# Patient Record
Sex: MALE | Race: WHITE | NOT HISPANIC OR LATINO | Employment: OTHER | URBAN - NONMETROPOLITAN AREA
[De-identification: names, ages, dates, MRNs, and addresses within clinical notes are randomized per-mention and may not be internally consistent; named-entity substitution may affect disease eponyms.]

---

## 2017-01-25 ENCOUNTER — PREP FOR SURGERY (OUTPATIENT)
Dept: UROLOGY | Facility: HOSPITAL | Age: 72
End: 2017-01-25

## 2017-01-25 DIAGNOSIS — N20.1 LEFT URETERAL CALCULUS: Primary | ICD-10-CM

## 2017-01-25 RX ORDER — LEVOFLOXACIN 5 MG/ML
500 INJECTION, SOLUTION INTRAVENOUS ONCE
Status: CANCELLED | OUTPATIENT
Start: 2017-01-25 | End: 2017-01-25

## 2017-01-27 ENCOUNTER — APPOINTMENT (OUTPATIENT)
Dept: PREADMISSION TESTING | Facility: HOSPITAL | Age: 72
End: 2017-01-27

## 2017-01-27 VITALS
RESPIRATION RATE: 18 BRPM | HEIGHT: 65 IN | OXYGEN SATURATION: 93 % | WEIGHT: 221 LBS | HEART RATE: 81 BPM | DIASTOLIC BLOOD PRESSURE: 80 MMHG | SYSTOLIC BLOOD PRESSURE: 150 MMHG | BODY MASS INDEX: 36.82 KG/M2

## 2017-01-27 DIAGNOSIS — E11.9 TYPE 2 DIABETES MELLITUS WITHOUT COMPLICATION, UNSPECIFIED LONG TERM INSULIN USE STATUS: ICD-10-CM

## 2017-01-27 DIAGNOSIS — N20.1 LEFT URETERAL CALCULUS: ICD-10-CM

## 2017-01-27 LAB
ANION GAP SERPL CALCULATED.3IONS-SCNC: 13 MMOL/L (ref 5–15)
BILIRUB UR QL STRIP: ABNORMAL
BUN BLD-MCNC: 19 MG/DL (ref 7–21)
BUN/CREAT SERPL: 16.4 (ref 7–25)
CALCIUM SPEC-SCNC: 9.8 MG/DL (ref 8.4–10.2)
CHLORIDE SERPL-SCNC: 97 MMOL/L (ref 95–110)
CLARITY UR: CLEAR
CO2 SERPL-SCNC: 29 MMOL/L (ref 22–31)
COLOR UR: ABNORMAL
CREAT BLD-MCNC: 1.16 MG/DL (ref 0.7–1.3)
GFR SERPL CREATININE-BSD FRML MDRD: 62 ML/MIN/1.73 (ref 60–113)
GLUCOSE BLD-MCNC: 233 MG/DL (ref 60–100)
GLUCOSE UR STRIP-MCNC: NEGATIVE MG/DL
HGB UR QL STRIP.AUTO: ABNORMAL
KETONES UR QL STRIP: ABNORMAL
LEUKOCYTE ESTERASE UR QL STRIP.AUTO: ABNORMAL
NITRITE UR QL STRIP: NEGATIVE
PH UR STRIP.AUTO: 5.5 [PH] (ref 5–9)
POTASSIUM BLD-SCNC: 4.3 MMOL/L (ref 3.5–5.1)
PROT UR QL STRIP: ABNORMAL
SODIUM BLD-SCNC: 139 MMOL/L (ref 137–145)
SP GR UR STRIP: 1.03 (ref 1–1.03)
UROBILINOGEN UR QL STRIP: ABNORMAL

## 2017-01-27 PROCEDURE — 81003 URINALYSIS AUTO W/O SCOPE: CPT | Performed by: UROLOGY

## 2017-01-27 PROCEDURE — 80048 BASIC METABOLIC PNL TOTAL CA: CPT | Performed by: ANESTHESIOLOGY

## 2017-01-27 PROCEDURE — 36415 COLL VENOUS BLD VENIPUNCTURE: CPT

## 2017-01-27 RX ORDER — LOPERAMIDE HYDROCHLORIDE 2 MG/1
2 CAPSULE ORAL 2 TIMES DAILY PRN
COMMUNITY

## 2017-01-28 ENCOUNTER — ANESTHESIA EVENT (OUTPATIENT)
Dept: PERIOP | Facility: HOSPITAL | Age: 72
End: 2017-01-28

## 2017-02-03 ENCOUNTER — HOSPITAL ENCOUNTER (OUTPATIENT)
Facility: HOSPITAL | Age: 72
Setting detail: HOSPITAL OUTPATIENT SURGERY
Discharge: HOME | End: 2017-02-03
Attending: UROLOGY | Admitting: UROLOGY

## 2017-02-03 ENCOUNTER — ANESTHESIA (OUTPATIENT)
Dept: PERIOP | Facility: HOSPITAL | Age: 72
End: 2017-02-03

## 2017-02-03 ENCOUNTER — APPOINTMENT (OUTPATIENT)
Dept: GENERAL RADIOLOGY | Facility: HOSPITAL | Age: 72
End: 2017-02-03

## 2017-02-03 VITALS
DIASTOLIC BLOOD PRESSURE: 69 MMHG | TEMPERATURE: 96.9 F | WEIGHT: 222.66 LBS | HEIGHT: 65 IN | HEART RATE: 73 BPM | OXYGEN SATURATION: 93 % | RESPIRATION RATE: 18 BRPM | BODY MASS INDEX: 37.1 KG/M2 | SYSTOLIC BLOOD PRESSURE: 111 MMHG

## 2017-02-03 DIAGNOSIS — G89.18 POST-OP PAIN: Primary | ICD-10-CM

## 2017-02-03 LAB
GLUCOSE BLDC GLUCOMTR-MCNC: 134 MG/DL (ref 70–130)
GLUCOSE BLDC GLUCOMTR-MCNC: 153 MG/DL (ref 70–130)

## 2017-02-03 PROCEDURE — 25010000002 MIDAZOLAM PER 1 MG: Performed by: NURSE ANESTHETIST, CERTIFIED REGISTERED

## 2017-02-03 PROCEDURE — 25010000002 LEVOFLOXACIN PER 250 MG: Performed by: UROLOGY

## 2017-02-03 PROCEDURE — 74000 HC ABDOMEN KUB: CPT

## 2017-02-03 PROCEDURE — 25010000002 ONDANSETRON PER 1 MG: Performed by: NURSE ANESTHETIST, CERTIFIED REGISTERED

## 2017-02-03 PROCEDURE — 82962 GLUCOSE BLOOD TEST: CPT

## 2017-02-03 PROCEDURE — 25010000002 PROPOFOL 10 MG/ML EMULSION: Performed by: NURSE ANESTHETIST, CERTIFIED REGISTERED

## 2017-02-03 PROCEDURE — 25010000002 PHENYLEPHRINE PER 1 ML: Performed by: NURSE ANESTHETIST, CERTIFIED REGISTERED

## 2017-02-03 PROCEDURE — 25010000002 FENTANYL CITRATE (PF) 100 MCG/2ML SOLUTION: Performed by: NURSE ANESTHETIST, CERTIFIED REGISTERED

## 2017-02-03 RX ORDER — ONDANSETRON 2 MG/ML
4 INJECTION INTRAMUSCULAR; INTRAVENOUS ONCE AS NEEDED
Status: COMPLETED | OUTPATIENT
Start: 2017-02-03 | End: 2017-02-03

## 2017-02-03 RX ORDER — LABETALOL HYDROCHLORIDE 5 MG/ML
5 INJECTION, SOLUTION INTRAVENOUS
Status: DISCONTINUED | OUTPATIENT
Start: 2017-02-03 | End: 2017-02-03 | Stop reason: HOSPADM

## 2017-02-03 RX ORDER — SODIUM CHLORIDE 9 MG/ML
1000 INJECTION, SOLUTION INTRAVENOUS CONTINUOUS PRN
Status: DISCONTINUED | OUTPATIENT
Start: 2017-02-03 | End: 2017-02-03 | Stop reason: HOSPADM

## 2017-02-03 RX ORDER — SODIUM CHLORIDE 0.9 % (FLUSH) 0.9 %
3 SYRINGE (ML) INJECTION AS NEEDED
Status: DISCONTINUED | OUTPATIENT
Start: 2017-02-03 | End: 2017-02-03 | Stop reason: HOSPADM

## 2017-02-03 RX ORDER — LIDOCAINE HYDROCHLORIDE 10 MG/ML
0.5 INJECTION, SOLUTION INFILTRATION; PERINEURAL ONCE AS NEEDED
Status: DISCONTINUED | OUTPATIENT
Start: 2017-02-03 | End: 2017-02-03 | Stop reason: HOSPADM

## 2017-02-03 RX ORDER — CEPHALEXIN 250 MG/1
250 CAPSULE ORAL 4 TIMES DAILY
Qty: 10 CAPSULE | Refills: 0 | Status: SHIPPED | OUTPATIENT
Start: 2017-02-03 | End: 2017-02-13

## 2017-02-03 RX ORDER — LEVOFLOXACIN 5 MG/ML
500 INJECTION, SOLUTION INTRAVENOUS ONCE
Status: COMPLETED | OUTPATIENT
Start: 2017-02-03 | End: 2017-02-03

## 2017-02-03 RX ORDER — ONDANSETRON 2 MG/ML
INJECTION INTRAMUSCULAR; INTRAVENOUS AS NEEDED
Status: DISCONTINUED | OUTPATIENT
Start: 2017-02-03 | End: 2017-02-03 | Stop reason: SURG

## 2017-02-03 RX ORDER — FENTANYL CITRATE 50 UG/ML
INJECTION, SOLUTION INTRAMUSCULAR; INTRAVENOUS AS NEEDED
Status: DISCONTINUED | OUTPATIENT
Start: 2017-02-03 | End: 2017-02-03 | Stop reason: SURG

## 2017-02-03 RX ORDER — MIDAZOLAM HYDROCHLORIDE 1 MG/ML
INJECTION INTRAMUSCULAR; INTRAVENOUS AS NEEDED
Status: DISCONTINUED | OUTPATIENT
Start: 2017-02-03 | End: 2017-02-03 | Stop reason: SURG

## 2017-02-03 RX ORDER — DIPHENHYDRAMINE HYDROCHLORIDE 50 MG/ML
12.5 INJECTION INTRAMUSCULAR; INTRAVENOUS
Status: DISCONTINUED | OUTPATIENT
Start: 2017-02-03 | End: 2017-02-03 | Stop reason: HOSPADM

## 2017-02-03 RX ORDER — METOCLOPRAMIDE HYDROCHLORIDE 5 MG/ML
10 INJECTION INTRAMUSCULAR; INTRAVENOUS ONCE AS NEEDED
Status: DISCONTINUED | OUTPATIENT
Start: 2017-02-03 | End: 2017-02-03 | Stop reason: HOSPADM

## 2017-02-03 RX ORDER — NALOXONE HCL 0.4 MG/ML
0.2 VIAL (ML) INJECTION AS NEEDED
Status: DISCONTINUED | OUTPATIENT
Start: 2017-02-03 | End: 2017-02-03 | Stop reason: HOSPADM

## 2017-02-03 RX ORDER — MORPHINE SULFATE 2 MG/ML
2 INJECTION, SOLUTION INTRAMUSCULAR; INTRAVENOUS
Status: DISCONTINUED | OUTPATIENT
Start: 2017-02-03 | End: 2017-02-03 | Stop reason: HOSPADM

## 2017-02-03 RX ORDER — LIDOCAINE HYDROCHLORIDE 20 MG/ML
INJECTION, SOLUTION INFILTRATION; PERINEURAL AS NEEDED
Status: DISCONTINUED | OUTPATIENT
Start: 2017-02-03 | End: 2017-02-03 | Stop reason: SURG

## 2017-02-03 RX ORDER — ACETAMINOPHEN 325 MG/1
650 TABLET ORAL ONCE AS NEEDED
Status: DISCONTINUED | OUTPATIENT
Start: 2017-02-03 | End: 2017-02-03 | Stop reason: HOSPADM

## 2017-02-03 RX ORDER — HYDRALAZINE HYDROCHLORIDE 20 MG/ML
5 INJECTION INTRAMUSCULAR; INTRAVENOUS
Status: DISCONTINUED | OUTPATIENT
Start: 2017-02-03 | End: 2017-02-03 | Stop reason: HOSPADM

## 2017-02-03 RX ORDER — FLUMAZENIL 0.1 MG/ML
0.2 INJECTION INTRAVENOUS AS NEEDED
Status: DISCONTINUED | OUTPATIENT
Start: 2017-02-03 | End: 2017-02-03 | Stop reason: HOSPADM

## 2017-02-03 RX ORDER — ACETAMINOPHEN 650 MG/1
650 SUPPOSITORY RECTAL ONCE AS NEEDED
Status: DISCONTINUED | OUTPATIENT
Start: 2017-02-03 | End: 2017-02-03 | Stop reason: HOSPADM

## 2017-02-03 RX ORDER — PROPOFOL 10 MG/ML
VIAL (ML) INTRAVENOUS AS NEEDED
Status: DISCONTINUED | OUTPATIENT
Start: 2017-02-03 | End: 2017-02-03 | Stop reason: SURG

## 2017-02-03 RX ORDER — OXYCODONE AND ACETAMINOPHEN 7.5; 325 MG/1; MG/1
1-2 TABLET ORAL EVERY 4 HOURS PRN
Qty: 15 TABLET | Refills: 0 | Status: SHIPPED | OUTPATIENT
Start: 2017-02-03 | End: 2017-06-23

## 2017-02-03 RX ADMIN — ONDANSETRON 4 MG: 2 INJECTION INTRAMUSCULAR; INTRAVENOUS at 16:41

## 2017-02-03 RX ADMIN — PHENYLEPHRINE HYDROCHLORIDE 100 MCG: 10 INJECTION INTRAVENOUS at 15:19

## 2017-02-03 RX ADMIN — SODIUM CHLORIDE 1000 ML: 9 INJECTION, SOLUTION INTRAVENOUS at 12:55

## 2017-02-03 RX ADMIN — MIDAZOLAM 2 MG: 1 INJECTION INTRAMUSCULAR; INTRAVENOUS at 14:58

## 2017-02-03 RX ADMIN — LEVOFLOXACIN 500 MG: 5 INJECTION, SOLUTION INTRAVENOUS at 15:07

## 2017-02-03 RX ADMIN — FENTANYL CITRATE 50 MCG: 50 INJECTION, SOLUTION INTRAMUSCULAR; INTRAVENOUS at 15:13

## 2017-02-03 RX ADMIN — FENTANYL CITRATE 50 MCG: 50 INJECTION, SOLUTION INTRAMUSCULAR; INTRAVENOUS at 15:36

## 2017-02-03 RX ADMIN — ONDANSETRON 4 MG: 2 INJECTION INTRAMUSCULAR; INTRAVENOUS at 15:12

## 2017-02-03 RX ADMIN — PROPOFOL 170 MG: 10 INJECTION, EMULSION INTRAVENOUS at 15:04

## 2017-02-03 RX ADMIN — PHENYLEPHRINE HYDROCHLORIDE 100 MCG: 10 INJECTION INTRAVENOUS at 15:35

## 2017-02-03 RX ADMIN — LIDOCAINE HYDROCHLORIDE 100 MG: 20 INJECTION, SOLUTION INFILTRATION; PERINEURAL at 15:04

## 2017-02-03 NOTE — ANESTHESIA PROCEDURE NOTES
Airway  Urgency: elective    Airway not difficult    General Information and Staff    Patient location during procedure: OR  CRNA: GABINO THURMAN    Indications and Patient Condition  Indications for airway management: airway protection    Preoxygenated: yes  Mask difficulty assessment: 0 - not attempted    Final Airway Details  Final airway type: supraglottic airway      Successful airway: classic  Size 4    Number of attempts at approach: 1

## 2017-02-03 NOTE — PLAN OF CARE
Problem: Patient Care Overview (Adult)  Goal: Plan of Care Review  Outcome: Ongoing (interventions implemented as appropriate)    02/03/17 6114   Coping/Psychosocial Response Interventions   Plan Of Care Reviewed With patient   Patient Care Overview   Progress improving   Outcome Evaluation   Outcome Summary/Follow up Plan vss. maintaining sats on r/a. meets d/c criteria         Problem: Perioperative Period (Adult)  Goal: Signs and Symptoms of Listed Potential Problems Will be Absent or Manageable (Perioperative Period)  Outcome: Ongoing (interventions implemented as appropriate)

## 2017-02-03 NOTE — BRIEF OP NOTE
EXTRACORPOREAL SHOCKWAVE LITHOTRIPSY WITH STENT INSERTION/REMOVAL  Procedure Note    Rolando Villarrealradha  2/3/2017    Pre-op Diagnosis:   Left ureteral calculus [N20.1]    Post-op Diagnosis:     Post-Op Diagnosis Codes:     * Left ureteral calculus [N20.1]    Procedure/CPT® Codes:      Procedure(s):  LEFT EXTRACORPOREAL SHOCKWAVE LITHOTRIPSY, POSSIBLE CYSTOSCOPY, POSSIBLE STENT REMOVAL     Surgeon(s):  Prosper Ariza MD    Anesthesia: General    Staff:   Circulator: Freda Lucio RN; Sherice Bailey RN; Tonia Huizar RN  Scrub Person: Zander Bailey  Assistant: Justina Lopez CSA    Estimated Blood Loss: * No values recorded between 2/3/2017  2:55 PM and 2/3/2017  3:44 PM *    Specimens:                * No specimens in log *      Drains:    none         Findings: same    Complications: none      Prosper Ariza MD     Date: 2/3/2017  Time: 3:44 PM

## 2017-02-03 NOTE — H&P
[facility] Progress Note  JAMIA WHITAKER  71-year-old; :1945;;male  [address];[place] [id number]  Ins: 1) KORTNEY/FRANK  Employer: [employer];  MRN:[id number]  MAE SALMON MD  [facility]  2017 10:12 AM  Current Medications  lisinopril 40 mg tablet 1 oral  Prilosec 20 mg capsule,delayed release 1  oral  * Medications Reconciled  Problem List  Ureteric stone, NOS 2017 10:12:00 AM  Medical History  DIABETES UNSP  CALCULUS KIDNEY  HYPERTENSION  Heart disease  Patient reports having had a colonoscopy within  the past 9 years.  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 Has anxiety.  Family History  HEART DISEASE  DIABETES UNSP  HYPERTENSION  Mother  Father  Brother  Alive Brother  Brother Alive Sister   Sister   Social History  . Unemployed. Does not drink. Never  smoker. LIves with his wife.  Imm/Inj/Office Meds History Comments  Patient has had influenza vaccination for this  season.  Patient has had pneumococcal vaccination.  Chief Complaint  Ureteral stone  History of Present Illness  JAMIA WHITAKER is a 71-year-old male here for evaluation. He complains today of  left flank pain. KUB shows 5 mm proximal left ureteral stone hasn't moved in 8 weeks.  Location: Left ureter.  Severity: Mild.  Onset / Duration: >2 months.  Modifying factors: Status post ESWL.  Associated signs and symptoms: No fever.  Review of Systems  Eyes: Reports: blurry visionDenies: pain in the eyes and double vision  ENMT: Reports: sinus problemsDenies: ear pain and sore throat  Cardiovascular: Denies: chest pain, varicose veins, angina and syncope  Respiratory: Reports: shortness of breath and wheezingDenies: frequent  cough  Gastrointestinal: Reports: heartburnDenies: abdominal pain, nausea, vomiting and  indigestion  Genitourinary: Reports: other symptoms (see HPI)  Musculoskeletal: Reports: back painDenies: joint pain and neck pain  Integumentary: Reports: skin rash and persistent itchDenies: boils  Neurological: Reports: dizzy spellsDenies: tremors and numbness / tingling  Psychiatric: Reports: generally satisfied with life and anxietyDenies: depression and  suicidal ideation  Endocrine: Reports: tired/sluggishDenies: Excessive thirst, cold intolerance and heat  intolerance  Hematologic/Lymphatic: Denies: swollen glands and blood clotting problem  Allergic/Immunologic: Denies: hayfever  Constitutional: Denies: fever, chills and weight loss  Vital Signs  BP: 133/75 (mmHg) Weight: 224 (lb)  Heart Rate: 73 (/min) Resp Rate: 18 (/min)  BMI: 37.28 Never smoker  Height: 65 (in)  Exam  General appearance: The patient appears well developed and well nourished, in no  apparent acute distress.  Assessment of hearing: Hearing appears normal.  Examination of neck: Neck appears supple.  Assessment of respiratory effort: Respiratory effort appears normal. No apparent  distress.  Inspection of breasts: Deferred.  Obtain stool sample: Stool specimen for occult blood is not indicated.  Digital rectal examination of prostate gland: Digital rectal exam revealed a 3+ prostate.  Inspection of skin and subcutaneous tissue: Exam of the skin is within normal limits.  The color and skin turgor are normal.  No lesions, bruises, rashes, or jaundice.  Description of patients judgment and insight: Judgement and insight appear normal.  Mood and affect: Mood and affect normal.  Orientation to time, place and person: Oriented to person, place, and time.  Electronically Signed By MAE SALMON MD on 02/03/2017 12:22 PM  Generated by [employer], a product of [employer]. [email or web address] Page 1 of 2  [facility]  Note  JAMIA WHITAKER  71-year-old; :1945;;male  [address];[place] [id number]  Ins: 1) KORTNEY/FRANK  Employer: [employer];  MRN:[id number]  MAE ARIZA MD  UROLOGY [facility]  2017 10:12 AM  Data Review  Medications and chart reviewed. History and physical form/ROS reviewed and no  changes noted. Previous encounter reviewed. Last form done 16. KUB ordered  and reviewed by Dr. Ariza today.  Assessment - Ureteric stone, NOS  DX:  Ureteric stone, NOS  SNO: [id number], ICD-9: 592.1, ICD-10: N20.1  Assessment Notes:  Proximal left ureteral stone.  Plan  Plan Notes:  Left ESWL.  Education:  Kidney Stones - English  Discussion:  Discussed my findings and plan of action and reasoning behind decision making. All  questions were answered. FINDINGS WERE DISCUSSED WITH PATIENT. RISKS  AND BENEFITS EXPLAINED. PATIENT WISHES TO PROCEED.  Instructions:  Patient is instructed to call with any problems. Patient is instructed to call if the  condition worsens. Patient is instructed to call with any changes in condition. Patient  is instructed to call if he has any intractable pain, fever, chills, nausea, or vomiting.  INCREASE FLUIDS. IF PAIN WORSENS/ UNCONTROLLED OR TEMPERATURE  >101.0 GO IMMEDIATELY TO ER.  Electronically Signed By MAE ARIZA MD on 2017 12:22 PM  Generated by [employer], a product of [employer]. [email or web address] Page 2 of 2

## 2017-02-03 NOTE — ANESTHESIA PREPROCEDURE EVALUATION
Anesthesia Evaluation     Patient summary reviewed and Nursing notes reviewed    Airway   Mallampati: III  TM distance: >3 FB  Neck ROM: full  possible difficult intubation  Comment: Has a short/thick neck and possible KUMAR  Dental - normal exam     Comment: Has many capped teeth    Pulmonary - normal exam   (+) sleep apnea ( He snores and has possible Kumar, but not on C-PAP),   Cardiovascular - normal exam  Exercise tolerance: good (4-7 METS)  (+) hypertension well controlled,     Beta blocker given within 24 hours of surgery  ROS comment: Had a normal EKG on 9/6/16    Neuro/Psych- negative ROS  GI/Hepatic/Renal/Endo    (+)  GERD well controlled, chronic renal disease ( left sided stones), diabetes mellitus type 2 well controlled,     Musculoskeletal (-) negative ROS    Abdominal   (+) obese,    Substance History - negative use     OB/GYN negative ob/gyn ROS         Other - negative ROS                            Anesthesia Plan    ASA 3     general     intravenous induction   Anesthetic plan and risks discussed with patient and spouse/significant other.

## 2017-02-04 NOTE — ANESTHESIA POSTPROCEDURE EVALUATION
Patient: Rolando Novoa    Procedure Summary     Date Anesthesia Start Anesthesia Stop Room / Location    02/03/17 1502 1547 [facility] OR 05 / [facility] OR       Procedure Diagnosis Surgeon Provider    LEFT EXTRACORPOREAL SHOCKWAVE LITHOTRIPSY, POSSIBLE CYSTOSCOPY, POSSIBLE STENT REMOVAL  (Left ) Left ureteral calculus  (Left ureteral calculus [N20.1]) MD Sherwin Uribe MD          Anesthesia Type: general  Last vitals  BP      Temp      Pulse     Resp      SpO2        Post Anesthesia Care and Evaluation      Comments: The patient was discharged from the PACU by the nurses in stable condition.

## 2017-02-06 NOTE — OP NOTE
Date of Procedure:  02/03/2017    PREOPERATIVE DIAGNOSIS: Left ureteral stone and left kidney stones.     POSTOPERATIVE DIAGNOSIS: Left ureteral stone and left kidney stones.    PROCEDURE: Left extracorporeal shock wave lithotripsy.     SURGEON: Prosper Ariza MD    ASSISTANT: Justina Lopez    ANESTHESIA: General.    INDICATIONS: See history.     DESCRIPTION OF PROCEDURE: The patient was brought to the operating suite, placed in the supine position on the lithotripsy table, AP oblique plane. Stone was localized. With the power ranging between 4 and 5, we used 1000 shocks on the proximal left ureteral stone with good fragmentation and then took our attention to the kidney where which we gave 1500 shocks of that with good fragmentation of stones. Once this was accomplished, the patient was taken back off the table, back to recovery having tolerated the procedure well.       CC:            Prosper Ariza MD  Dictation Date/Time: 02/03/2017 21:39:37(Eastern Time Zone)  Transcribed Date/Time: 02/03/2017 22:48:14 (Eastern Time Zone)  Dictator/ Initials:  ANDREA/co  Document ID:                [id number]  Job ID: [id number]

## 2017-02-21 RX ORDER — NEBIVOLOL 10 MG/1
10 TABLET ORAL NIGHTLY
Qty: 90 TABLET | Refills: 2 | Status: SHIPPED | OUTPATIENT
Start: 2017-02-21 | End: 2017-10-13 | Stop reason: SDUPTHER

## 2017-02-21 NOTE — TELEPHONE ENCOUNTER
Refill Request came in by fax from   [facility] - [place], [place] - [address] [place] [address] RD - [phone]  - [phone]   [address] [facility]  [address]  [place] [address]  Phone: [phone] Fax: [phone]   Refill sent to pharmacy via e-scribe.

## 2017-06-23 ENCOUNTER — OFFICE VISIT (OUTPATIENT)
Dept: CARDIOLOGY | Facility: CLINIC | Age: 72
End: 2017-06-23

## 2017-06-23 VITALS
SYSTOLIC BLOOD PRESSURE: 128 MMHG | BODY MASS INDEX: 36.82 KG/M2 | HEIGHT: 65 IN | WEIGHT: 221 LBS | DIASTOLIC BLOOD PRESSURE: 85 MMHG | HEART RATE: 84 BPM

## 2017-06-23 DIAGNOSIS — E11.9 TYPE 2 DIABETES MELLITUS WITHOUT COMPLICATION, WITHOUT LONG-TERM CURRENT USE OF INSULIN (HCC): Primary | ICD-10-CM

## 2017-06-23 DIAGNOSIS — I10 ESSENTIAL HYPERTENSION: ICD-10-CM

## 2017-06-23 DIAGNOSIS — R06.02 SOB (SHORTNESS OF BREATH): ICD-10-CM

## 2017-06-23 DIAGNOSIS — E78.00 PURE HYPERCHOLESTEROLEMIA: ICD-10-CM

## 2017-06-23 PROCEDURE — 99213 OFFICE O/P EST LOW 20 MIN: CPT | Performed by: INTERNAL MEDICINE

## 2017-06-23 RX ORDER — GLIMEPIRIDE 2 MG/1
2 TABLET ORAL 2 TIMES DAILY
COMMUNITY

## 2017-06-23 NOTE — PROGRESS NOTES
[facility] Cardiology  OFFICE NOTE    Rolando Novoa  72 y.o. male    06/23/2017  1. Type 2 diabetes mellitus without complication, without long-term current use of insulin    2. Essential hypertension    3. Pure hypercholesterolemia    4. SOB (shortness of breath)        Chief complaint -Follow-up shortness of breath      History of present Illness- 72-year-old gentleman who has history of diabetes, hypertension no history of CAD has complications of diabetes with neuropathy and retinopathy.  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  His A1c is 8.1.  His triglycerides and LDL mildly elevated and followed by Dr. Zepeda.  He is on Lipitor 20 mg daily.  He has a sedentary lifestyle asked him to exercise little bit more.  He denies any TIA symptoms headache or visual complaints no GI symptoms              No Known Allergies      Past Medical History:   Diagnosis Date   • Acid reflux    • Diabetes    • Enlarged prostate    • High cholesterol    • Hypertension    • Kidney stone     multiple   • Psoriasis          Past Surgical History:   Procedure Laterality Date   • AMPUTATION Left     lower arm and hand   • CHOLECYSTECTOMY OPEN     • EXTRACORPOREAL SHOCK WAVE LITHOTRIPSY (ESWL)     • EXTRACORPOREAL SHOCKWAVE LITHOTRIPSY (ESWL), STENT INSERTION/REMOVAL Left 2/3/2017    Procedure: LEFT EXTRACORPOREAL SHOCKWAVE LITHOTRIPSY, POSSIBLE CYSTOSCOPY, POSSIBLE STENT REMOVAL ;  Surgeon: Prosper Ariza MD;  Location: [facility];  Service:    • KIDNEY STONE SURGERY     • SCROTAL SURGERY      trauma         History reviewed. No pertinent family history.      Social History     Social History   • Marital status:      Spouse name: N/A   • Number of children: N/A   • Years of education: N/A     Occupational History   • Not on file.     Social History Main Topics   • Smoking status: Never Smoker   • Smokeless tobacco: Never Used   • Alcohol use No   • Drug use: No   • Sexual activity: Defer  "    Other Topics Concern   • Not on file     Social History Narrative         Current Outpatient Prescriptions   Medication Sig Dispense Refill   • atorvastatin (LIPITOR) 20 MG tablet Take 20 mg by mouth Every Night.     • esomeprazole (nexIUM) 20 MG capsule Take 20 mg by mouth Every Morning Before Breakfast.     • Exenatide ER 2 MG pen-injector Inject  under the skin 1 (One) Time Per Week.     • glimepiride (AMARYL) 2 MG tablet Take 2 mg by mouth Every Morning Before Breakfast.     • metFORMIN (GLUCOPHAGE) 500 MG tablet Take 500 mg by mouth 2 (two) times a day. May take 3 depending on glucose reading     • nebivolol (BYSTOLIC) 10 MG tablet Take 1 tablet by mouth Every Night. 90 tablet 2   • loperamide (IMODIUM) 2 MG capsule Take 2 mg by mouth 2 (Two) Times a Day As Needed for diarrhea.       No current facility-administered medications for this visit.          Review of Systems     Constitution: Denies any fatigue, fever or chills    HENT: Denies any headache, hearing impairment,     Eyes: Has diabetic retinopathy     Cardivascular - As per history of present illness     Respiratory system-Has black lung with coworkers pneumoconiosis     Endocrine:   history of hyperlipidemia, diabetes,         Musculoskeletal:   history of arthritis with history of amputation of the left forearm    Gastrointestinal: No nausea, vomiting, or melena    Genitourinary: BPH    Neurological:   No history of seizure disorder, stroke, memory problems    Psychiatric/Behavioral:        No history of depression,  No history of bipolar disorder or schizophrenia     Hematological- no history of easy bruising or any bleeding diathesis            OBJECTIVE    /85  Pulse 84  Ht 65\" (165.1 cm)  Wt 221 lb (100 kg)  BMI 36.78 kg/m2      Physical Exam     Constitutional: is oriented to person, place, and time.     Skin-warm and dry    Well developed and nourished in no acute distress      Head: Normocephalic and atraumatic.     Eyes: Pupils " are equal, round, and reactive to light.     Neck: Neck supple. No bruit in the carotids, no elevation of JVD    Cardiovascular: [place] in the fifth intercostal space   Regular rate, and  Rhythm,    S1 greater than S2, no S3 or S4, no gallop     Pulmonary/Chest:   Air  Entry is equal on both sides  No wheezing or crackles,      Abdominal: Soft.  No hepatosplenomegaly, bowel sounds are present    Musculoskeletal: Arthritis of the left hip    Neurological: is alert and oriented to person, place, and time.    cranial nerve are intact .   Peripheral neuropathy    Extremities-no edema, amputation of the left forearm      Psychiatric: He has a normal mood and affect.                  His behavior is normal.           Procedures      No results found for: WBC, HGB, HCT, MCV, PLT  Lab Results   Component Value Date    GLUCOSE 233 (H) 01/27/2017    BUN 19 01/27/2017    CREATININE 1.16 01/27/2017    EGFRIFNONA 62 01/27/2017    BCR 16.4 01/27/2017    CO2 29.0 01/27/2017    CALCIUM 9.8 01/27/2017         A/P    Shortness of breath-with history of hypertension and diabetes-will do an echocardiogram to assess his LV function and valvular function.  He is sedentary • talked to him about regular exercise.    Hyperlipidemia on atorvastatin doing okay followed by Dr. Zepeda    Diabetes on metformin and glipizide his A1c has improved 8.1 from before    BMI of 36.8.  Talked to him about exercise and watching the diet.  Follow-up in 9 months            This document has been electronically signed by Adonay Grijalva MD on June 23, 2017 9:09 AM       EMR Dragon/Transcription disclaimer:   Some of this note may be an electronic transcription/translation of spoken language to printed text. The electronic translation of spoken language may permit erroneous, or at times, nonsensical words or phrases to be inadvertently transcribed; Although I have reviewed the note for such errors, some may still exist.

## 2017-07-17 LAB
BH CV ECHO MEAS - ACS: 1.6 CM
BH CV ECHO MEAS - AO MAX PG (FULL): 0.33 MMHG
BH CV ECHO MEAS - AO MAX PG: 7.6 MMHG
BH CV ECHO MEAS - AO MEAN PG (FULL): 0 MMHG
BH CV ECHO MEAS - AO MEAN PG: 3 MMHG
BH CV ECHO MEAS - AO ROOT AREA (BSA CORRECTED): 1.5
BH CV ECHO MEAS - AO ROOT AREA: 7.5 CM^2
BH CV ECHO MEAS - AO ROOT DIAM: 3.1 CM
BH CV ECHO MEAS - AO V2 MAX: 138 CM/SEC
BH CV ECHO MEAS - AO V2 MEAN: 83.6 CM/SEC
BH CV ECHO MEAS - AO V2 VTI: 25.3 CM
BH CV ECHO MEAS - BSA(HAYCOCK): 2.2 M^2
BH CV ECHO MEAS - BSA: 2.1 M^2
BH CV ECHO MEAS - BZI_BMI: 36.8 KILOGRAMS/M^2
BH CV ECHO MEAS - BZI_METRIC_HEIGHT: 165.1 CM
BH CV ECHO MEAS - BZI_METRIC_WEIGHT: 100.2 KG
BH CV ECHO MEAS - EDV(CUBED): 125 ML
BH CV ECHO MEAS - EDV(TEICH): 118.2 ML
BH CV ECHO MEAS - EF(CUBED): 62.7 %
BH CV ECHO MEAS - EF(TEICH): 54 %
BH CV ECHO MEAS - EPSS: 0.5 CM
BH CV ECHO MEAS - ESV(CUBED): 46.7 ML
BH CV ECHO MEAS - ESV(TEICH): 54.4 ML
BH CV ECHO MEAS - FS: 28 %
BH CV ECHO MEAS - IVS/LVPW: 0.57
BH CV ECHO MEAS - IVSD: 0.8 CM
BH CV ECHO MEAS - LA DIMENSION: 4.6 CM
BH CV ECHO MEAS - LA/AO: 1.5
BH CV ECHO MEAS - LV MASS(C)D: 207.1 GRAMS
BH CV ECHO MEAS - LV MASS(C)DI: 100.4 GRAMS/M^2
BH CV ECHO MEAS - LV MAX PG: 7.3 MMHG
BH CV ECHO MEAS - LV MEAN PG: 3 MMHG
BH CV ECHO MEAS - LV V1 MAX: 135 CM/SEC
BH CV ECHO MEAS - LV V1 MEAN: 73 CM/SEC
BH CV ECHO MEAS - LV V1 VTI: 25.9 CM
BH CV ECHO MEAS - LVIDD: 5 CM
BH CV ECHO MEAS - LVIDS: 3.6 CM
BH CV ECHO MEAS - LVPWD: 1.4 CM
BH CV ECHO MEAS - MV A MAX VEL: 90.3 CM/SEC
BH CV ECHO MEAS - MV E MAX VEL: 74.5 CM/SEC
BH CV ECHO MEAS - MV E/A: 0.83
BH CV ECHO MEAS - PA MAX PG: 3.9 MMHG
BH CV ECHO MEAS - PA MEAN PG: 3 MMHG
BH CV ECHO MEAS - PA V2 MAX: 99.3 CM/SEC
BH CV ECHO MEAS - PA V2 MEAN: 76.2 CM/SEC
BH CV ECHO MEAS - PA V2 VTI: 23.8 CM
BH CV ECHO MEAS - RAP SYSTOLE: 10 MMHG
BH CV ECHO MEAS - RVDD: 1.2 CM
BH CV ECHO MEAS - RVSP: 22.9 MMHG
BH CV ECHO MEAS - SI(AO): 92.5 ML/M^2
BH CV ECHO MEAS - SI(CUBED): 38 ML/M^2
BH CV ECHO MEAS - SI(TEICH): 30.9 ML/M^2
BH CV ECHO MEAS - SV(AO): 191 ML
BH CV ECHO MEAS - SV(CUBED): 78.3 ML
BH CV ECHO MEAS - SV(TEICH): 63.8 ML
BH CV ECHO MEAS - TR MAX VEL: 179.3 CM/SEC
LV EF 2D ECHO EST: 60 %

## 2017-07-21 ENCOUNTER — TELEPHONE (OUTPATIENT)
Dept: CARDIOLOGY | Facility: CLINIC | Age: 72
End: 2017-07-21

## 2017-10-13 RX ORDER — NEBIVOLOL HYDROCHLORIDE 10 MG/1
TABLET ORAL
Qty: 30 TABLET | Refills: 5 | Status: SHIPPED | OUTPATIENT
Start: 2017-10-13 | End: 2017-12-14 | Stop reason: SDUPTHER

## 2017-12-12 ENCOUNTER — PREP FOR SURGERY (OUTPATIENT)
Dept: OTHER | Facility: HOSPITAL | Age: 72
End: 2017-12-12

## 2017-12-12 DIAGNOSIS — N20.0 KIDNEY STONE: Primary | ICD-10-CM

## 2017-12-12 RX ORDER — LEVOFLOXACIN 5 MG/ML
500 INJECTION, SOLUTION INTRAVENOUS ONCE
Status: CANCELLED | OUTPATIENT
Start: 2017-12-15

## 2017-12-13 PROBLEM — N20.0 KIDNEY STONE: Status: ACTIVE | Noted: 2017-12-13

## 2017-12-14 ENCOUNTER — ANESTHESIA EVENT (OUTPATIENT)
Dept: PERIOP | Facility: HOSPITAL | Age: 72
End: 2017-12-14

## 2017-12-14 ENCOUNTER — APPOINTMENT (OUTPATIENT)
Dept: PREADMISSION TESTING | Facility: HOSPITAL | Age: 72
End: 2017-12-14

## 2017-12-14 VITALS
HEART RATE: 80 BPM | BODY MASS INDEX: 36.99 KG/M2 | SYSTOLIC BLOOD PRESSURE: 140 MMHG | WEIGHT: 222 LBS | HEIGHT: 65 IN | DIASTOLIC BLOOD PRESSURE: 88 MMHG | OXYGEN SATURATION: 95 % | RESPIRATION RATE: 18 BRPM

## 2017-12-14 DIAGNOSIS — N20.0 KIDNEY STONE: ICD-10-CM

## 2017-12-14 LAB
ANION GAP SERPL CALCULATED.3IONS-SCNC: 13 MMOL/L (ref 5–15)
BILIRUB UR QL STRIP: NEGATIVE
BUN BLD-MCNC: 16 MG/DL (ref 7–21)
BUN/CREAT SERPL: 16.3 (ref 7–25)
CALCIUM SPEC-SCNC: 9.5 MG/DL (ref 8.4–10.2)
CHLORIDE SERPL-SCNC: 99 MMOL/L (ref 95–110)
CLARITY UR: ABNORMAL
CO2 SERPL-SCNC: 28 MMOL/L (ref 22–31)
COLOR UR: YELLOW
CREAT BLD-MCNC: 0.98 MG/DL (ref 0.7–1.3)
GFR SERPL CREATININE-BSD FRML MDRD: 76 ML/MIN/1.73 (ref 42–98)
GLUCOSE BLD-MCNC: 127 MG/DL (ref 60–100)
GLUCOSE UR STRIP-MCNC: NEGATIVE MG/DL
HGB UR QL STRIP.AUTO: ABNORMAL
KETONES UR QL STRIP: NEGATIVE
LEUKOCYTE ESTERASE UR QL STRIP.AUTO: ABNORMAL
NITRITE UR QL STRIP: NEGATIVE
PH UR STRIP.AUTO: 6 [PH] (ref 5–9)
POTASSIUM BLD-SCNC: 4.1 MMOL/L (ref 3.5–5.1)
PROT UR QL STRIP: ABNORMAL
SODIUM BLD-SCNC: 140 MMOL/L (ref 137–145)
SP GR UR STRIP: 1.02 (ref 1–1.03)
UROBILINOGEN UR QL STRIP: ABNORMAL

## 2017-12-14 PROCEDURE — 36415 COLL VENOUS BLD VENIPUNCTURE: CPT

## 2017-12-14 PROCEDURE — 80048 BASIC METABOLIC PNL TOTAL CA: CPT | Performed by: ANESTHESIOLOGY

## 2017-12-14 PROCEDURE — 93005 ELECTROCARDIOGRAM TRACING: CPT

## 2017-12-14 PROCEDURE — 81003 URINALYSIS AUTO W/O SCOPE: CPT | Performed by: UROLOGY

## 2017-12-14 PROCEDURE — 93010 ELECTROCARDIOGRAM REPORT: CPT | Performed by: INTERNAL MEDICINE

## 2017-12-14 RX ORDER — OMEPRAZOLE 20 MG/1
20 CAPSULE, DELAYED RELEASE ORAL DAILY
COMMUNITY
End: 2022-03-07

## 2017-12-14 RX ORDER — TAMSULOSIN HYDROCHLORIDE 0.4 MG/1
1 CAPSULE ORAL NIGHTLY
COMMUNITY

## 2017-12-14 RX ORDER — SODIUM CHLORIDE 9 MG/ML
1000 INJECTION, SOLUTION INTRAVENOUS CONTINUOUS PRN
Status: CANCELLED | OUTPATIENT
Start: 2017-12-15

## 2017-12-14 RX ORDER — NEBIVOLOL 10 MG/1
10 TABLET ORAL DAILY
COMMUNITY
End: 2018-03-23 | Stop reason: SDUPTHER

## 2017-12-14 NOTE — DISCHARGE INSTRUCTIONS
[facility]  Pre-op Information and Guidelines    You will be called after 2 p.m. the day before your surgery (Friday for Monday surgery) and notified of your time for arrival and approximate surgery time.  If you have not received a call by 4P.M., please contact Same Day Surgery at [phone] of if outside [facility] call [phone].    Please Follow these Important Safety Guidelines:    • The morning of your procedure, take only the medications listed below with   A sip of water:_____________________________________________       ___BYSTOLIC, OMEPRAZOLE__FLOMAX_________________    • DO NOT eat or drink anything after 12:00 midnight the night before surgery  Specific instructions concerning drinking clear liquids will be discussed during  the pre-surgery instruction call the day before your surgery.    • If you take a blood thinner (ex. Plavix, Coumadin, aspirin), ask your doctor when to stop it before surgery  STOP DATE: _________________    • Only 2 visitors are allowed in patient rooms at a time  Your visitors will be asked to wait in the lobby until the admission process is complete with the exception of a parent with a child and patients in need of special assistance.    • YOU CANNOT DRIVE YOURSELF HOME  You must be accompanied by someone who will be responsible for driving you home after surgery and for your care at home.    • DO NOT chew gum, use breath mints, hard candy, or smoke the day of surgery  • DO NOT drink alcohol for at least 24 hours before your surgery  • DO NOT wear any jewelry and remove all body piercing before coming to the hospital  • DO NOT wear make-up to the hospital  • If you are having surgery on an extremity (arm/leg/foot) remove nail polish/artificial nails on the surgical side  • Clothing, glasses, contacts, dentures, and hairpieces must be removed before surgery  • Bathe the night before or the morning of your surgery and do not use powders/lotions on  skin.

## 2017-12-15 ENCOUNTER — APPOINTMENT (OUTPATIENT)
Dept: GENERAL RADIOLOGY | Facility: HOSPITAL | Age: 72
End: 2017-12-15

## 2017-12-15 ENCOUNTER — ANESTHESIA (OUTPATIENT)
Dept: PERIOP | Facility: HOSPITAL | Age: 72
End: 2017-12-15

## 2017-12-15 ENCOUNTER — HOSPITAL ENCOUNTER (OUTPATIENT)
Facility: HOSPITAL | Age: 72
Discharge: HOME | End: 2017-12-18
Attending: UROLOGY | Admitting: UROLOGY

## 2017-12-15 DIAGNOSIS — N20.0 KIDNEY STONE: ICD-10-CM

## 2017-12-15 DIAGNOSIS — R06.02 SOB (SHORTNESS OF BREATH): ICD-10-CM

## 2017-12-15 DIAGNOSIS — R31.9 HEMATURIA SYNDROME: Primary | ICD-10-CM

## 2017-12-15 LAB
GLUCOSE BLDC GLUCOMTR-MCNC: 109 MG/DL (ref 70–130)
GLUCOSE BLDC GLUCOMTR-MCNC: 117 MG/DL (ref 70–130)

## 2017-12-15 PROCEDURE — 25010000002 PHENYLEPHRINE PER 1 ML: Performed by: NURSE ANESTHETIST, CERTIFIED REGISTERED

## 2017-12-15 PROCEDURE — C1758 CATHETER, URETERAL: HCPCS | Performed by: UROLOGY

## 2017-12-15 PROCEDURE — 25010000002 MORPHINE PER 10 MG: Performed by: UROLOGY

## 2017-12-15 PROCEDURE — 25010000002 PROPOFOL 10 MG/ML EMULSION: Performed by: NURSE ANESTHETIST, CERTIFIED REGISTERED

## 2017-12-15 PROCEDURE — 94640 AIRWAY INHALATION TREATMENT: CPT

## 2017-12-15 PROCEDURE — 63710000001 TAMSULOSIN 0.4 MG CAPSULE: Performed by: UROLOGY

## 2017-12-15 PROCEDURE — C1894 INTRO/SHEATH, NON-LASER: HCPCS | Performed by: UROLOGY

## 2017-12-15 PROCEDURE — 82962 GLUCOSE BLOOD TEST: CPT

## 2017-12-15 PROCEDURE — 25010000002 MIDAZOLAM PER 1 MG: Performed by: NURSE ANESTHETIST, CERTIFIED REGISTERED

## 2017-12-15 PROCEDURE — C2617 STENT, NON-COR, TEM W/O DEL: HCPCS | Performed by: UROLOGY

## 2017-12-15 PROCEDURE — C1769 GUIDE WIRE: HCPCS | Performed by: UROLOGY

## 2017-12-15 PROCEDURE — 25010000002 FENTANYL CITRATE (PF) 100 MCG/2ML SOLUTION: Performed by: NURSE ANESTHETIST, CERTIFIED REGISTERED

## 2017-12-15 PROCEDURE — 0 IOPAMIDOL 61 % SOLUTION: Performed by: UROLOGY

## 2017-12-15 PROCEDURE — 25010000002 LEVOFLOXACIN PER 250 MG: Performed by: UROLOGY

## 2017-12-15 PROCEDURE — 74420 UROGRAPHY RTRGR +-KUB: CPT

## 2017-12-15 PROCEDURE — A9270 NON-COVERED ITEM OR SERVICE: HCPCS | Performed by: UROLOGY

## 2017-12-15 DEVICE — URETERAL STENT
Type: IMPLANTABLE DEVICE | Status: FUNCTIONAL
Brand: POLARIS™ ULTRA

## 2017-12-15 RX ORDER — SODIUM CHLORIDE 9 MG/ML
1000 INJECTION, SOLUTION INTRAVENOUS CONTINUOUS PRN
Status: DISCONTINUED | OUTPATIENT
Start: 2017-12-15 | End: 2017-12-15 | Stop reason: HOSPADM

## 2017-12-15 RX ORDER — ALBUTEROL SULFATE 2.5 MG/3ML
2.5 SOLUTION RESPIRATORY (INHALATION) ONCE
Status: COMPLETED | OUTPATIENT
Start: 2017-12-15 | End: 2017-12-15

## 2017-12-15 RX ORDER — NALOXONE HCL 0.4 MG/ML
0.2 VIAL (ML) INJECTION AS NEEDED
Status: DISCONTINUED | OUTPATIENT
Start: 2017-12-15 | End: 2017-12-15 | Stop reason: HOSPADM

## 2017-12-15 RX ORDER — ACETAMINOPHEN 650 MG/1
650 SUPPOSITORY RECTAL ONCE AS NEEDED
Status: DISCONTINUED | OUTPATIENT
Start: 2017-12-15 | End: 2017-12-15 | Stop reason: HOSPADM

## 2017-12-15 RX ORDER — LIDOCAINE HYDROCHLORIDE 20 MG/ML
INJECTION, SOLUTION INFILTRATION; PERINEURAL AS NEEDED
Status: DISCONTINUED | OUTPATIENT
Start: 2017-12-15 | End: 2017-12-15 | Stop reason: SURG

## 2017-12-15 RX ORDER — ACETAMINOPHEN 325 MG/1
650 TABLET ORAL ONCE AS NEEDED
Status: DISCONTINUED | OUTPATIENT
Start: 2017-12-15 | End: 2017-12-15 | Stop reason: HOSPADM

## 2017-12-15 RX ORDER — PROPOFOL 10 MG/ML
VIAL (ML) INTRAVENOUS AS NEEDED
Status: DISCONTINUED | OUTPATIENT
Start: 2017-12-15 | End: 2017-12-15 | Stop reason: SURG

## 2017-12-15 RX ORDER — LABETALOL HYDROCHLORIDE 5 MG/ML
5 INJECTION, SOLUTION INTRAVENOUS
Status: DISCONTINUED | OUTPATIENT
Start: 2017-12-15 | End: 2017-12-15 | Stop reason: HOSPADM

## 2017-12-15 RX ORDER — ONDANSETRON 2 MG/ML
4 INJECTION INTRAMUSCULAR; INTRAVENOUS EVERY 6 HOURS PRN
Status: DISCONTINUED | OUTPATIENT
Start: 2017-12-15 | End: 2017-12-18 | Stop reason: HOSPADM

## 2017-12-15 RX ORDER — NALOXONE HCL 0.4 MG/ML
0.4 VIAL (ML) INJECTION
Status: DISCONTINUED | OUTPATIENT
Start: 2017-12-15 | End: 2017-12-18 | Stop reason: HOSPADM

## 2017-12-15 RX ORDER — TAMSULOSIN HYDROCHLORIDE 0.4 MG/1
0.4 CAPSULE ORAL NIGHTLY
Status: DISCONTINUED | OUTPATIENT
Start: 2017-12-15 | End: 2017-12-18 | Stop reason: HOSPADM

## 2017-12-15 RX ORDER — ONDANSETRON 4 MG/1
4 TABLET, FILM COATED ORAL EVERY 6 HOURS PRN
Status: DISCONTINUED | OUTPATIENT
Start: 2017-12-15 | End: 2017-12-18 | Stop reason: HOSPADM

## 2017-12-15 RX ORDER — MORPHINE SULFATE 8 MG/ML
4 INJECTION INTRAMUSCULAR; INTRAVENOUS; SUBCUTANEOUS
Status: DISCONTINUED | OUTPATIENT
Start: 2017-12-15 | End: 2017-12-18 | Stop reason: HOSPADM

## 2017-12-15 RX ORDER — DIPHENHYDRAMINE HYDROCHLORIDE 50 MG/ML
12.5 INJECTION INTRAMUSCULAR; INTRAVENOUS
Status: DISCONTINUED | OUTPATIENT
Start: 2017-12-15 | End: 2017-12-15 | Stop reason: HOSPADM

## 2017-12-15 RX ORDER — ONDANSETRON 2 MG/ML
4 INJECTION INTRAMUSCULAR; INTRAVENOUS ONCE AS NEEDED
Status: DISCONTINUED | OUTPATIENT
Start: 2017-12-15 | End: 2017-12-15 | Stop reason: HOSPADM

## 2017-12-15 RX ORDER — FENTANYL CITRATE 50 UG/ML
INJECTION, SOLUTION INTRAMUSCULAR; INTRAVENOUS AS NEEDED
Status: DISCONTINUED | OUTPATIENT
Start: 2017-12-15 | End: 2017-12-15 | Stop reason: SURG

## 2017-12-15 RX ORDER — EPHEDRINE SULFATE 50 MG/ML
5 INJECTION, SOLUTION INTRAVENOUS ONCE AS NEEDED
Status: DISCONTINUED | OUTPATIENT
Start: 2017-12-15 | End: 2017-12-15 | Stop reason: HOSPADM

## 2017-12-15 RX ORDER — ONDANSETRON 4 MG/1
4 TABLET, ORALLY DISINTEGRATING ORAL EVERY 6 HOURS PRN
Status: DISCONTINUED | OUTPATIENT
Start: 2017-12-15 | End: 2017-12-18 | Stop reason: HOSPADM

## 2017-12-15 RX ORDER — LEVOFLOXACIN 5 MG/ML
500 INJECTION, SOLUTION INTRAVENOUS ONCE
Status: COMPLETED | OUTPATIENT
Start: 2017-12-15 | End: 2017-12-15

## 2017-12-15 RX ORDER — FLUMAZENIL 0.1 MG/ML
0.2 INJECTION INTRAVENOUS AS NEEDED
Status: DISCONTINUED | OUTPATIENT
Start: 2017-12-15 | End: 2017-12-15 | Stop reason: HOSPADM

## 2017-12-15 RX ORDER — MIDAZOLAM HYDROCHLORIDE 1 MG/ML
INJECTION INTRAMUSCULAR; INTRAVENOUS AS NEEDED
Status: DISCONTINUED | OUTPATIENT
Start: 2017-12-15 | End: 2017-12-15 | Stop reason: SURG

## 2017-12-15 RX ORDER — LOPERAMIDE HYDROCHLORIDE 2 MG/1
2 CAPSULE ORAL 2 TIMES DAILY PRN
Status: DISCONTINUED | OUTPATIENT
Start: 2017-12-15 | End: 2017-12-18 | Stop reason: HOSPADM

## 2017-12-15 RX ORDER — OXYCODONE AND ACETAMINOPHEN 7.5; 325 MG/1; MG/1
1-2 TABLET ORAL EVERY 4 HOURS PRN
Qty: 12 TABLET | Refills: 0 | Status: ON HOLD | OUTPATIENT
Start: 2017-12-15 | End: 2018-05-04

## 2017-12-15 RX ADMIN — PROPOFOL 50 MG: 10 INJECTION, EMULSION INTRAVENOUS at 16:40

## 2017-12-15 RX ADMIN — MORPHINE SULFATE 4 MG: 8 INJECTION, SOLUTION INTRAMUSCULAR; INTRAVENOUS at 22:11

## 2017-12-15 RX ADMIN — MEPERIDINE HYDROCHLORIDE 12.5 MG: 50 INJECTION INTRAMUSCULAR; INTRAVENOUS; SUBCUTANEOUS at 18:26

## 2017-12-15 RX ADMIN — PHENYLEPHRINE HYDROCHLORIDE 100 MCG: 10 INJECTION INTRAVENOUS at 16:53

## 2017-12-15 RX ADMIN — FENTANYL CITRATE 50 MCG: 50 INJECTION, SOLUTION INTRAMUSCULAR; INTRAVENOUS at 16:28

## 2017-12-15 RX ADMIN — LIDOCAINE HYDROCHLORIDE 80 MG: 20 INJECTION, SOLUTION INFILTRATION; PERINEURAL at 16:28

## 2017-12-15 RX ADMIN — MEPERIDINE HYDROCHLORIDE 12.5 MG: 50 INJECTION INTRAMUSCULAR; INTRAVENOUS; SUBCUTANEOUS at 18:36

## 2017-12-15 RX ADMIN — ALBUTEROL SULFATE 2.5 MG: 2.5 SOLUTION RESPIRATORY (INHALATION) at 12:51

## 2017-12-15 RX ADMIN — SODIUM CHLORIDE 1000 ML: 9 INJECTION, SOLUTION INTRAVENOUS at 12:52

## 2017-12-15 RX ADMIN — MIDAZOLAM 1 MG: 1 INJECTION INTRAMUSCULAR; INTRAVENOUS at 16:40

## 2017-12-15 RX ADMIN — HYDROMORPHONE HYDROCHLORIDE 0.5 MG: 10 INJECTION, SOLUTION INTRAMUSCULAR; INTRAVENOUS; SUBCUTANEOUS at 17:53

## 2017-12-15 RX ADMIN — LEVOFLOXACIN 500 MG: 5 INJECTION, SOLUTION INTRAVENOUS at 16:31

## 2017-12-15 RX ADMIN — PHENYLEPHRINE HYDROCHLORIDE 100 MCG: 10 INJECTION INTRAVENOUS at 16:39

## 2017-12-15 RX ADMIN — MEPERIDINE HYDROCHLORIDE 12.5 MG: 50 INJECTION INTRAMUSCULAR; INTRAVENOUS; SUBCUTANEOUS at 19:11

## 2017-12-15 RX ADMIN — PROPOFOL 150 MG: 10 INJECTION, EMULSION INTRAVENOUS at 16:28

## 2017-12-15 RX ADMIN — MIDAZOLAM 1 MG: 1 INJECTION INTRAMUSCULAR; INTRAVENOUS at 16:21

## 2017-12-15 RX ADMIN — PHENYLEPHRINE HYDROCHLORIDE 100 MCG: 10 INJECTION INTRAVENOUS at 16:44

## 2017-12-15 RX ADMIN — HYDROMORPHONE HYDROCHLORIDE 0.5 MG: 10 INJECTION, SOLUTION INTRAMUSCULAR; INTRAVENOUS; SUBCUTANEOUS at 18:08

## 2017-12-15 RX ADMIN — TAMSULOSIN HYDROCHLORIDE 0.4 MG: 0.4 CAPSULE ORAL at 22:54

## 2017-12-15 RX ADMIN — FENTANYL CITRATE 50 MCG: 50 INJECTION, SOLUTION INTRAMUSCULAR; INTRAVENOUS at 16:40

## 2017-12-15 RX ADMIN — ATROPA BELLADONNA AND OPIUM 30 MG: 16.2; 3 SUPPOSITORY RECTAL at 18:33

## 2017-12-15 NOTE — H&P
Patient Care Team:  Jaxson Zepeda MD as PCP - General    Chief complaint kidney stone    Subjective     HPI Comments: Mr. Rolando Novoa is a 72-year-old male with a history of bilateral kidney stone, left ureteric stone on 12/1/17 KUB which is unchanged from 11/14/17 KUB. He states it is causing a chronic left flank pain for months and is ready for lithotripsy. No urinary symptoms of infection. He takes Flomax daily.    Flank Pain   This is a chronic problem. The current episode started more than 1 month ago. The problem occurs intermittently. The problem has been waxing and waning since onset. The quality of the pain is described as aching. The pain is moderate. Pertinent negatives include no abdominal pain, bladder incontinence, dysuria, fever, headaches or weakness. Risk factors include obesity. He has tried nothing for the symptoms.       Review of Systems   Constitutional: Negative.  Negative for fever.   HENT: Negative.    Eyes: Negative.    Respiratory: Negative.    Cardiovascular: Negative.    Gastrointestinal: Negative for abdominal pain.   Endocrine: Negative.    Genitourinary: Positive for flank pain. Negative for bladder incontinence, decreased urine volume, difficulty urinating, dysuria, hematuria, testicular pain and urgency.   Allergic/Immunologic: Negative.    Neurological: Negative.  Negative for weakness and headaches.   Hematological: Negative.    Psychiatric/Behavioral: Negative.    All other systems reviewed and are negative.       Past Medical History:   Diagnosis Date   • Acid reflux    • Diabetes    • Enlarged prostate    • High cholesterol    • Hypertension    • Kidney stone     multiple   • Psoriasis      Past Surgical History:   Procedure Laterality Date   • AMPUTATION Left     lower arm and hand   • CHOLECYSTECTOMY OPEN     • EXTRACORPOREAL SHOCK WAVE LITHOTRIPSY (ESWL)     • EXTRACORPOREAL SHOCKWAVE LITHOTRIPSY (ESWL), STENT INSERTION/REMOVAL Left 2/3/2017    Procedure: LEFT  EXTRACORPOREAL SHOCKWAVE LITHOTRIPSY, POSSIBLE CYSTOSCOPY, POSSIBLE STENT REMOVAL ;  Surgeon: Prosper Ariza MD;  Location: [facility];  Service:    • KIDNEY STONE SURGERY     • SCROTAL SURGERY      trauma     History reviewed. No pertinent family history.  Social History   Substance Use Topics   • Smoking status: Never Smoker   • Smokeless tobacco: Never Used   • Alcohol use No     Prescriptions Prior to Admission   Medication Sig Dispense Refill Last Dose   • atorvastatin (LIPITOR) 20 MG tablet Take 20 mg by mouth Every Night.   12/14/2017 at 1300   • Exenatide ER 2 MG pen-injector Inject  under the skin 1 (One) Time Per Week.   12/12/2017 at Unknown time   • glimepiride (AMARYL) 2 MG tablet Take 2 mg by mouth Every Morning Before Breakfast.   12/14/2017 at 0900   • loperamide (IMODIUM) 2 MG capsule Take 2 mg by mouth 2 (Two) Times a Day As Needed for diarrhea.   12/14/2017 at 1300   • metFORMIN (GLUCOPHAGE) 500 MG tablet Take 500 mg by mouth 2 (two) times a day. May take 3 depending on glucose reading   12/14/2017 at 1300   • nebivolol (BYSTOLIC) 10 MG tablet Take 10 mg by mouth Daily.   12/15/2017 at 0900   • omeprazole (priLOSEC) 20 MG capsule Take 20 mg by mouth Daily.   12/15/2017 at 0900   • tamsulosin (FLOMAX) 0.4 MG capsule 24 hr capsule Take 1 capsule by mouth Every Night.   12/14/2017 at 1300     Allergies:  Review of patient's allergies indicates no known allergies.    Objective      Vital Signs  Temp:  [97 °F (36.1 °C)] 97 °F (36.1 °C)  Heart Rate:  [69-79] 79  Resp:  [18-20] 18  BP: (149-166)/(74-80) 149/74    Physical Exam   Constitutional: Vital signs are normal. He appears well-developed and well-nourished.  Non-toxic appearance.   HENT:   Head: Normocephalic.   Eyes: Pupils are equal, round, and reactive to light. Right eye exhibits no discharge. Left eye exhibits no discharge. No scleral icterus.   Cardiovascular: Normal rate.    Pulmonary/Chest: Effort normal. No respiratory distress.    Abdominal: Soft. Normal appearance. He exhibits no ascites.   Neurological: He is alert.   Skin: Skin is warm and dry. No cyanosis.   Psychiatric: He has a normal mood and affect. His behavior is normal. He is attentive.       Results Review:   I reviewed the patient's new clinical results.  I reviewed the patient's new imaging results and agree with the interpretation.      Assessment/Plan     Principal Problem:    Kidney stone      Assessment:  (Calculus of the kidney and ureter  Left flank pain).     Plan:   (LEFT CRULLS).       I discussed the patients findings and my recommendations with patient    José HorowitzRIVKA  12/15/17  4:03 PM

## 2017-12-15 NOTE — ANESTHESIA POSTPROCEDURE EVALUATION
Patient: Rolando Novoa    Procedure Summary     Date Anesthesia Start Anesthesia Stop Room / Location    12/15/17 1624 1710 [facility] OR 06 / [facility] OR       Procedure Diagnosis Surgeon Provider    CYSTOSCOPY LEFT URETEROSCOPY RETROGRADE PYELOGRAM HOLMIUM LASER STENT INSERTION (Left ) Kidney stone  (Kidney stone [N20.0]) MD Adonay Uribe MD          Anesthesia Type: general  Last vitals  BP   149/74 (12/15/17 1525)   Temp   97 °F (36.1 °C) (12/15/17 1525)   Pulse   79 (12/15/17 1525)   Resp   18 (12/15/17 1525)     SpO2   94 % (12/15/17 1525)     Post Anesthesia Care and Evaluation    Patient location during evaluation: bedside  Patient participation: complete - patient participated  Pain management: adequate  Airway patency: patent  Anesthetic complications: No anesthetic complications    Cardiovascular status: acceptable  Respiratory status: acceptable  Hydration status: acceptable

## 2017-12-15 NOTE — ANESTHESIA PREPROCEDURE EVALUATION
Anesthesia Evaluation     NPO Solid Status: > 8 hours  NPO Liquid Status: > 8 hours     Airway   Mallampati: III  TM distance: <3 FB  Neck ROM: full  difficult intubation highly probable  Dental - normal exam     Comment: Full set of capped teeth.    Pulmonary    (+) shortness of breath, wheezes,     ROS comment: Patient was a  for years and now has restrictive lung disease.  PE comment: Wheezing on exam to be treated with albuterol.  Cardiovascular     ECG reviewed  Rhythm: regular  Rate: normal    (+) hypertension well controlled, hyperlipidemia      Neuro/Psych  (+) psychiatric history Anxiety,    GI/Hepatic/Renal/Endo    (+) obesity,  GERD well controlled, diabetes mellitus type 2 well controlled,     ROS Comment: Recurrent Kidney stones noted.    Musculoskeletal     (+) arthralgias,   Abdominal     Abdomen: soft.   Substance History      OB/GYN          Other                                        Anesthesia Plan    ASA 3     general     intravenous induction   Anesthetic plan and risks discussed with patient and spouse/significant other.

## 2017-12-16 ENCOUNTER — APPOINTMENT (OUTPATIENT)
Dept: GENERAL RADIOLOGY | Facility: HOSPITAL | Age: 72
End: 2017-12-16

## 2017-12-16 ENCOUNTER — ANESTHESIA EVENT (OUTPATIENT)
Dept: PERIOP | Facility: HOSPITAL | Age: 72
End: 2017-12-16

## 2017-12-16 ENCOUNTER — ANESTHESIA (OUTPATIENT)
Dept: PERIOP | Facility: HOSPITAL | Age: 72
End: 2017-12-16

## 2017-12-16 PROBLEM — R31.9 HEMATURIA SYNDROME: Status: ACTIVE | Noted: 2017-12-13

## 2017-12-16 LAB
ANION GAP SERPL CALCULATED.3IONS-SCNC: 11 MMOL/L (ref 5–15)
ARTERIAL PATENCY WRIST A: ABNORMAL
ATMOSPHERIC PRESS: ABNORMAL MMHG
BASE EXCESS BLDA CALC-SCNC: 1.9 MMOL/L (ref -2.4–2.4)
BASOPHILS # BLD AUTO: 0.02 10*3/MM3 (ref 0–0.2)
BASOPHILS NFR BLD AUTO: 0.2 % (ref 0–2)
BDY SITE: ABNORMAL
BUN BLD-MCNC: 17 MG/DL (ref 7–21)
BUN/CREAT SERPL: 15.7 (ref 7–25)
CA-I BLD-MCNC: 4.7 MG/DL (ref 4.5–4.9)
CALCIUM SPEC-SCNC: 8.9 MG/DL (ref 8.4–10.2)
CHLORIDE SERPL-SCNC: 98 MMOL/L (ref 95–110)
CO2 BLDA-SCNC: 29.8 MMOL/L (ref 23–27)
CO2 SERPL-SCNC: 27 MMOL/L (ref 22–31)
CREAT BLD-MCNC: 1.08 MG/DL (ref 0.7–1.3)
DEPRECATED RDW RBC AUTO: 45.7 FL (ref 35.1–43.9)
EOSINOPHIL # BLD AUTO: 0.01 10*3/MM3 (ref 0–0.7)
EOSINOPHIL NFR BLD AUTO: 0.1 % (ref 0–7)
ERYTHROCYTE [DISTWIDTH] IN BLOOD BY AUTOMATED COUNT: 13.7 % (ref 11.5–14.5)
GFR SERPL CREATININE-BSD FRML MDRD: 68 ML/MIN/1.73 (ref 60–98)
GLUCOSE BLD-MCNC: 170 MG/DL (ref 60–100)
GLUCOSE BLDA-MCNC: 150 MMOL/L
GLUCOSE BLDC GLUCOMTR-MCNC: 129 MG/DL (ref 70–130)
GLUCOSE BLDC GLUCOMTR-MCNC: 164 MG/DL (ref 70–130)
GLUCOSE BLDC GLUCOMTR-MCNC: 201 MG/DL (ref 70–130)
HCO3 BLDA-SCNC: 28.3 MMOL/L (ref 22–26)
HCT VFR BLD AUTO: 45 % (ref 39–49)
HCT VFR BLD CALC: 47 % (ref 40–54)
HGB BLD-MCNC: 14.8 G/DL (ref 13.7–17.3)
HGB BLDA-MCNC: 16 G/DL (ref 14–18)
IMM GRANULOCYTES # BLD: 0.03 10*3/MM3 (ref 0–0.02)
IMM GRANULOCYTES NFR BLD: 0.3 % (ref 0–0.5)
LYMPHOCYTES # BLD AUTO: 1.12 10*3/MM3 (ref 0.6–4.2)
LYMPHOCYTES NFR BLD AUTO: 10.8 % (ref 10–50)
MCH RBC QN AUTO: 30 PG (ref 26.5–34)
MCHC RBC AUTO-ENTMCNC: 32.9 G/DL (ref 31.5–36.3)
MCV RBC AUTO: 91.1 FL (ref 80–98)
MODALITY: ABNORMAL
MONOCYTES # BLD AUTO: 0.64 10*3/MM3 (ref 0–0.9)
MONOCYTES NFR BLD AUTO: 6.2 % (ref 0–12)
NEUTROPHILS # BLD AUTO: 8.56 10*3/MM3 (ref 2–8.6)
NEUTROPHILS NFR BLD AUTO: 82.4 % (ref 37–80)
PCO2 BLDA: 50.4 MM HG (ref 35–45)
PH BLDA: 7.37 PH UNITS (ref 7.35–7.45)
PLATELET # BLD AUTO: 261 10*3/MM3 (ref 150–450)
PMV BLD AUTO: 9.1 FL (ref 8–12)
PO2 BLDA: 65.3 MM HG (ref 80–105)
POTASSIUM BLD-SCNC: 4.5 MMOL/L (ref 3.5–5.1)
POTASSIUM BLDA-SCNC: 4.21 MMOL/L (ref 3.6–4.9)
RBC # BLD AUTO: 4.94 10*6/MM3 (ref 4.37–5.74)
SAO2 % BLDCOA: 92.7 %
SODIUM BLD-SCNC: 136 MMOL/L (ref 137–145)
SODIUM BLDA-SCNC: 138.2 MMOL/L (ref 138–146)
WBC NRBC COR # BLD: 10.38 10*3/MM3 (ref 3.2–9.8)

## 2017-12-16 PROCEDURE — 94760 N-INVAS EAR/PLS OXIMETRY 1: CPT

## 2017-12-16 PROCEDURE — 94640 AIRWAY INHALATION TREATMENT: CPT

## 2017-12-16 PROCEDURE — 80048 BASIC METABOLIC PNL TOTAL CA: CPT | Performed by: UROLOGY

## 2017-12-16 PROCEDURE — 63710000001 BELLADONNA-OPIUM 16.2-30 MG SUPPOSITORY: Performed by: UROLOGY

## 2017-12-16 PROCEDURE — C1894 INTRO/SHEATH, NON-LASER: HCPCS | Performed by: UROLOGY

## 2017-12-16 PROCEDURE — 94799 UNLISTED PULMONARY SVC/PX: CPT

## 2017-12-16 PROCEDURE — 25010000002 PROPOFOL 10 MG/ML EMULSION: Performed by: NURSE ANESTHETIST, CERTIFIED REGISTERED

## 2017-12-16 PROCEDURE — 71020 HC CHEST PA AND LATERAL: CPT

## 2017-12-16 PROCEDURE — 63710000001 INSULIN ASPART PER 5 UNITS: Performed by: UROLOGY

## 2017-12-16 PROCEDURE — 85025 COMPLETE CBC W/AUTO DIFF WBC: CPT | Performed by: UROLOGY

## 2017-12-16 PROCEDURE — 63710000001 TAMSULOSIN 0.4 MG CAPSULE: Performed by: UROLOGY

## 2017-12-16 PROCEDURE — 36600 WITHDRAWAL OF ARTERIAL BLOOD: CPT

## 2017-12-16 PROCEDURE — 82803 BLOOD GASES ANY COMBINATION: CPT | Performed by: FAMILY MEDICINE

## 2017-12-16 PROCEDURE — A9270 NON-COVERED ITEM OR SERVICE: HCPCS | Performed by: UROLOGY

## 2017-12-16 PROCEDURE — 25010000002 MORPHINE PER 10 MG: Performed by: UROLOGY

## 2017-12-16 PROCEDURE — 25010000002 HYDROMORPHONE PER 4 MG: Performed by: ANESTHESIOLOGY

## 2017-12-16 PROCEDURE — 82962 GLUCOSE BLOOD TEST: CPT

## 2017-12-16 RX ORDER — PROPOFOL 10 MG/ML
VIAL (ML) INTRAVENOUS AS NEEDED
Status: DISCONTINUED | OUTPATIENT
Start: 2017-12-16 | End: 2017-12-16 | Stop reason: SURG

## 2017-12-16 RX ORDER — DEXTROSE MONOHYDRATE 25 G/50ML
25 INJECTION, SOLUTION INTRAVENOUS
Status: DISCONTINUED | OUTPATIENT
Start: 2017-12-16 | End: 2017-12-18 | Stop reason: HOSPADM

## 2017-12-16 RX ORDER — LIDOCAINE HYDROCHLORIDE 20 MG/ML
INJECTION, SOLUTION INFILTRATION; PERINEURAL AS NEEDED
Status: DISCONTINUED | OUTPATIENT
Start: 2017-12-16 | End: 2017-12-16 | Stop reason: SURG

## 2017-12-16 RX ORDER — BUDESONIDE 0.5 MG/2ML
0.5 INHALANT ORAL
Status: DISCONTINUED | OUTPATIENT
Start: 2017-12-16 | End: 2017-12-18 | Stop reason: HOSPADM

## 2017-12-16 RX ORDER — HYDROMORPHONE HCL 110MG/55ML
0.5 PATIENT CONTROLLED ANALGESIA SYRINGE INTRAVENOUS
Status: DISCONTINUED | OUTPATIENT
Start: 2017-12-16 | End: 2017-12-16 | Stop reason: HOSPADM

## 2017-12-16 RX ORDER — NEBIVOLOL 5 MG/1
10 TABLET ORAL DAILY
Status: DISCONTINUED | OUTPATIENT
Start: 2017-12-16 | End: 2017-12-18 | Stop reason: HOSPADM

## 2017-12-16 RX ORDER — NICOTINE POLACRILEX 4 MG
15 LOZENGE BUCCAL
Status: DISCONTINUED | OUTPATIENT
Start: 2017-12-16 | End: 2017-12-18 | Stop reason: HOSPADM

## 2017-12-16 RX ORDER — IPRATROPIUM BROMIDE AND ALBUTEROL SULFATE 2.5; .5 MG/3ML; MG/3ML
3 SOLUTION RESPIRATORY (INHALATION)
Status: DISCONTINUED | OUTPATIENT
Start: 2017-12-16 | End: 2017-12-18 | Stop reason: HOSPADM

## 2017-12-16 RX ADMIN — IPRATROPIUM BROMIDE AND ALBUTEROL SULFATE 3 ML: 2.5; .5 SOLUTION RESPIRATORY (INHALATION) at 11:56

## 2017-12-16 RX ADMIN — PROPOFOL 150 MG: 10 INJECTION, EMULSION INTRAVENOUS at 13:05

## 2017-12-16 RX ADMIN — MORPHINE SULFATE 4 MG: 8 INJECTION, SOLUTION INTRAMUSCULAR; INTRAVENOUS at 20:23

## 2017-12-16 RX ADMIN — ATROPA BELLADONNA AND OPIUM 30 MG: 16.2; 3 SUPPOSITORY RECTAL at 15:44

## 2017-12-16 RX ADMIN — MORPHINE SULFATE 4 MG: 8 INJECTION, SOLUTION INTRAMUSCULAR; INTRAVENOUS at 05:42

## 2017-12-16 RX ADMIN — IPRATROPIUM BROMIDE AND ALBUTEROL SULFATE 3 ML: 2.5; .5 SOLUTION RESPIRATORY (INHALATION) at 19:27

## 2017-12-16 RX ADMIN — HYDROMORPHONE HYDROCHLORIDE 0.5 MG: 2 INJECTION, SOLUTION INTRAMUSCULAR; INTRAVENOUS; SUBCUTANEOUS at 14:09

## 2017-12-16 RX ADMIN — HYDROMORPHONE HYDROCHLORIDE 0.5 MG: 2 INJECTION, SOLUTION INTRAMUSCULAR; INTRAVENOUS; SUBCUTANEOUS at 13:59

## 2017-12-16 RX ADMIN — BUDESONIDE 0.5 MG: 0.5 INHALANT RESPIRATORY (INHALATION) at 19:28

## 2017-12-16 RX ADMIN — MORPHINE SULFATE 4 MG: 8 INJECTION, SOLUTION INTRAMUSCULAR; INTRAVENOUS at 11:15

## 2017-12-16 RX ADMIN — LIDOCAINE HYDROCHLORIDE 100 MG: 20 INJECTION, SOLUTION INFILTRATION; PERINEURAL at 13:05

## 2017-12-16 RX ADMIN — MORPHINE SULFATE 4 MG: 8 INJECTION, SOLUTION INTRAMUSCULAR; INTRAVENOUS at 18:32

## 2017-12-16 RX ADMIN — PROPOFOL 50 MG: 10 INJECTION, EMULSION INTRAVENOUS at 13:15

## 2017-12-16 RX ADMIN — HYDROMORPHONE HYDROCHLORIDE 0.5 MG: 2 INJECTION, SOLUTION INTRAMUSCULAR; INTRAVENOUS; SUBCUTANEOUS at 13:49

## 2017-12-16 RX ADMIN — TAMSULOSIN HYDROCHLORIDE 0.4 MG: 0.4 CAPSULE ORAL at 20:56

## 2017-12-16 RX ADMIN — MORPHINE SULFATE 4 MG: 8 INJECTION, SOLUTION INTRAMUSCULAR; INTRAVENOUS at 09:38

## 2017-12-16 RX ADMIN — INSULIN ASPART 3 UNITS: 100 INJECTION, SOLUTION INTRAVENOUS; SUBCUTANEOUS at 21:33

## 2017-12-16 RX ADMIN — BUDESONIDE 0.5 MG: 0.5 INHALANT RESPIRATORY (INHALATION) at 12:01

## 2017-12-16 NOTE — NURSING NOTE
No measurable urine overnight, just 'squirts' per the patient every couple of hours. Assisted pt to bathroom, did not have but a few dribbles. Post void residual bladder scan >530ml. Pt c/o pain in abdomen/bladder 'feels like its full of water.' Dr Ariza notified. Order for coude insertion. Coude 16FR inserted without difficulty or resistance, pt tolerated well. Urine returned, however only about 40ml with small pieces of clots, tea color. Pt having bladder spasms and urine noted to trickle down tube with spasms. Pt still voices discomfort of 'full of water' sensation. Dr Galo in room and assessing pt.

## 2017-12-16 NOTE — ANESTHESIA POSTPROCEDURE EVALUATION
Patient: Rolando Novoa    Procedure Summary     Date Anesthesia Start Anesthesia Stop Room / Location    12/16/17 1301 1334  [facility] OR 02 / [facility]       Procedure Diagnosis Surgeon Provider    CYSTOSCOPY WITH CLOT EVACUATION (N/A Urethra) Hematuria syndrome  (Hematuria syndrome [R31.9]) MD Uday Uribe MD          Anesthesia Type: general  Last vitals  BP   133/76 (12/16/17 1212)   Temp   98 °F (36.7 °C) (12/16/17 1212)   Pulse   95 (12/16/17 1212)   Resp   18 (12/16/17 1212)     SpO2   92 % (12/16/17 1212)     Post Anesthesia Care and Evaluation    Patient location during evaluation: PACU  Patient participation: complete - patient participated  Level of consciousness: awake and alert  Pain score: 0  Pain management: adequate  Airway patency: patent  Anesthetic complications: No anesthetic complications  PONV Status: none  Cardiovascular status: acceptable  Respiratory status: acceptable  Hydration status: acceptable

## 2017-12-16 NOTE — DISCHARGE INSTR - APPOINTMENTS
Please call Dr. Ocampo's office Tuesday for follow up  On Wednesday 12-20.  Information sent to office

## 2017-12-16 NOTE — PLAN OF CARE
Problem: Patient Care Overview (Adult)  Goal: Plan of Care Review  Outcome: Ongoing (interventions implemented as appropriate)  Pt voiding well but did not always use urinal. Urine cherry color.    12/16/17 0513   Coping/Psychosocial Response Interventions   Plan Of Care Reviewed With patient   Patient Care Overview   Progress progress toward functional goals as expected       Goal: Adult Individualization and Mutuality  Outcome: Ongoing (interventions implemented as appropriate)  Goal: Discharge Needs Assessment  Outcome: Ongoing (interventions implemented as appropriate)    Problem: Perioperative Period (Adult)  Goal: Signs and Symptoms of Listed Potential Problems Will be Absent or Manageable (Perioperative Period)  Outcome: Ongoing (interventions implemented as appropriate)

## 2017-12-16 NOTE — NURSING NOTE
Pt still complaining of discomfort, feeling full. Screaming with bladder spasms. Dr Annita aware, Pt made NPO and pt going to have clot removal in bladder. Dr Galo notified that pt unable to go to CXR at this time and states its ok to get CXR after procedure is completed.

## 2017-12-16 NOTE — OP NOTE
CYSTOSCOPY  Procedure Note    Rolando Novoa  12/15/2017 - 12/16/2017    Pre-op Diagnosis:   Hematuria syndrome [R31.9]    Post-op Diagnosis:     Post-Op Diagnosis Codes:     * Hematuria syndrome [R31.9]      Procedure(s):  CYSTOSCOPY WITH CLOT EVACUATION    Surgeon(s):  Prosper Ariza MD    Anesthesia: Choice    Staff:   Circulator: Myrna Hernandez RN  Scrub Person: Merry Palacios    Estimated Blood Loss: none    Specimens:                None      Drains:           Findings: Urinary retention is high riding prostate obstruction    Complications: Unable void difficulty getting catheter in    Indications: Same    Description of Procedure: Patient brought operative suite placed in dorsal lithotomy position.  I passed a 22 cystoscope open the bladder itself evacuated clot and also urinary retention 5 cc drape of the bladder.  A placed a 22 three-way catheter with 20 cc placed in balloon through irrigation was started.  Examination was done and shows a high riding prostate feels to be benign noted on inspection of the cystoscopy at that the prostate was high riding and obstructed and inflamed bladder was hemorrhagic    Prosper Ariza MD     Date: 12/16/2017  Time: 1:26 PM

## 2017-12-16 NOTE — ANESTHESIA PROCEDURE NOTES
Airway  Urgency: emergent    Airway not difficult    General Information and Staff    Patient location during procedure: OR    Consent for Airway (if performed for an anesthetic, see related documentation for consents)  Patient identity confirmed: verbally with patient and arm band  Consent: No emergent situation. Verbal consent obtained. Written consent obtained.  Risks and benefits: risks, benefits and alternatives were discussed  Consent given by: patient      Indications and Patient Condition  Indications for airway management: airway protection and CNS depression    Preoxygenated: yes  MILS maintained throughout  Mask difficulty assessment: 0 - not attempted    Final Airway Details  Final airway type: supraglottic airway      Successful airway: classic  Size 4

## 2017-12-16 NOTE — PROGRESS NOTES
LOS: 0 days     Patient Care Team:  Jaxson Zepeda MD as PCP - General      Subjective     Miserable with urinary clot retention    Objective       Vital Signs  Temp:  [96.8 °F (36 °C)-98 °F (36.7 °C)] 98 °F (36.7 °C)  Heart Rate:  [63-95] 95  Resp:  [16-24] 18  BP: (118-149)/(67-89) 133/76    Physical Exam:        General Appearance:    Excruciating pain     Respiratory:    UNLABORED RESPIRATIONS.     Abdomen:     SOFT.       Genitourinary:   Evans not draining distended abdomen     Rectal:     DEFERRED       Results Review:       Imaging Results (last 24 hours)     Procedure Component Value Units Date/Time    FL Retrograde Pyelogram In OR [[id number]] Updated:  12/15/17 1702        Lab Results (last 24 hours)     Procedure Component Value Units Date/Time    POC Glucose Once [[id number]]  (Normal) Collected:  12/15/17 1713    Specimen:  Blood Updated:  12/15/17 1725     Glucose 117 mg/dL       RN NotifiedMeter: [id number]: [id number] EJ BRADFORD       CBC & Differential [[id number]] Collected:  12/16/17 0602    Specimen:  Blood Updated:  12/16/17 0639    Narrative:       The following orders were created for panel order CBC & Differential.  Procedure                               Abnormality         Status                     ---------                               -----------         ------                     CBC Auto Differential[[id number]]        Abnormal            Final result                 Please view results for these tests on the individual orders.    CBC Auto Differential [[id number]]  (Abnormal) Collected:  12/16/17 0602    Specimen:  Blood Updated:  12/16/17 0639     WBC 10.38 (H) 10*3/mm3      RBC 4.94 10*6/mm3      Hemoglobin 14.8 g/dL      Hematocrit 45.0 %      MCV 91.1 fL      MCH 30.0 pg      MCHC 32.9 g/dL      RDW 13.7 %      RDW-SD 45.7 (H) fl      MPV 9.1 fL      Platelets 261 10*3/mm3      Neutrophil % 82.4 (H) %      Lymphocyte % 10.8 %      Monocyte % 6.2 %      Eosinophil %  0.1 %      Basophil % 0.2 %      Immature Grans % 0.3 %      Neutrophils, Absolute 8.56 10*3/mm3      Lymphocytes, Absolute 1.12 10*3/mm3      Monocytes, Absolute 0.64 10*3/mm3      Eosinophils, Absolute 0.01 10*3/mm3      Basophils, Absolute 0.02 10*3/mm3      Immature Grans, Absolute 0.03 (H) 10*3/mm3     Basic Metabolic Panel [[id number]]  (Abnormal) Collected:  12/16/17 0602    Specimen:  Blood Updated:  12/16/17 0651     Glucose 170 (H) mg/dL      BUN 17 mg/dL      Creatinine 1.08 mg/dL      Sodium 136 (L) mmol/L      Potassium 4.5 mmol/L      Chloride 98 mmol/L      CO2 27.0 mmol/L      Calcium 8.9 mg/dL      eGFR Non African Amer 68 mL/min/1.73      BUN/Creatinine Ratio 15.7     Anion Gap 11.0 mmol/L     Blood Gas, Arterial [[id number]]  (Abnormal) Collected:  12/16/17 1153    Specimen:  Arterial Blood from Arm, Right Updated:  12/16/17 1216     Site --      Not performed at this site.        Elder's Test --      Not performed at this site.        pH, Arterial 7.367 pH units      pCO2, Arterial 50.4 (H) mm Hg      pO2, Arterial 65.3 (L) mm Hg      HCO3, Arterial 28.3 (H) mmol/L      Base Excess, Arterial 1.9 mmol/L      O2 Saturation, Arterial 92.7 %      Hemoglobin, Blood Gas 16.0 g/dL      Hematocrit, Blood Gas 47.0 %      CO2 Content 29.8 (H)     Sodium, Arterial 138.2 mmol/L      Potassium, Arterial 4.21 mmol/L      Glucose, Arterial 150 mmol/L      Barometric Pressure for Blood Gas -- mmHg       Not performed at this site.        Modality --      Not performed at this site.        Ionized Calcium 4.70 mg/dL             I reviewed the patient's new clinical results.  I reviewed the patient's new imaging results and agree with the interpretation.  I reviewed the patient's other test results and agree with the interpretation        Assessment/Plan     Principal Problem:    Hematuria syndrome  Active Problems:    Kidney stone      Plan for cystoscopy clot evacuation and 3 way CBI, patient was discussed risk  and benefits      Prosper Ariza MD  12/16/17  1:10 PM

## 2017-12-16 NOTE — NURSING NOTE
"Informed Dr Ariza of inability to remove oxygen from patient without patient sats falling to less than 90%. Also informed MD patient stated \"my pain is being caused from this catheter being in place.\" Md stated to \"D/c skinner at this time and we will see if his pain improves. Place order for bedded outpatient and obtain room for patient at this time.\"  "

## 2017-12-16 NOTE — ANESTHESIA PREPROCEDURE EVALUATION
Anesthesia Evaluation     NPO Solid Status: > 8 hours  NPO Liquid Status: > 8 hours     Airway   Mallampati: III  TM distance: <3 FB  Neck ROM: full  Dental - normal exam     Comment: Full set of capped teeth.    Pulmonary    (+) shortness of breath, wheezes,     ROS comment: Patient was a  for years and now has restrictive lung disease.  PE comment: Wheezing on exam to be treated with albuterol.  Cardiovascular     ECG reviewed  Rhythm: regular  Rate: normal    (+) hypertension well controlled, hyperlipidemia      Neuro/Psych  (+) psychiatric history Anxiety,    GI/Hepatic/Renal/Endo    (+) obesity,  GERD well controlled, diabetes mellitus type 2 well controlled,     ROS Comment: Recurrent Kidney stones noted.    Musculoskeletal     (+) arthralgias,   Abdominal     Abdomen: soft.   Substance History      OB/GYN          Other                                      Anesthesia Plan    ASA 3     general     intravenous induction   Anesthetic plan and risks discussed with patient.

## 2017-12-16 NOTE — PLAN OF CARE
Problem: Infection, Risk/Actual (Adult)  Goal: Identify Related Risk Factors and Signs and Symptoms  Outcome: Outcome(s) achieved Date Met:  12/16/17  Goal: Infection Prevention/Resolution  Outcome: Ongoing (interventions implemented as appropriate)    Problem: Pain, Acute (Adult)  Goal: Identify Related Risk Factors and Signs and Symptoms  Outcome: Outcome(s) achieved Date Met:  12/16/17  Goal: Acceptable Pain Control/Comfort Level  Outcome: Ongoing (interventions implemented as appropriate)    Problem: Respiratory Insufficiency (Adult)  Goal: Identify Related Risk Factors and Signs and Symptoms  Outcome: Outcome(s) achieved Date Met:  12/16/17  Goal: Acid/Base Balance  Outcome: Ongoing (interventions implemented as appropriate)  Goal: Effective Ventilation  Outcome: Ongoing (interventions implemented as appropriate)  Still requiring oxy    Problem: Urine Elimination, Impaired (Adult)  Goal: Identify Related Risk Factors and Signs and Symptoms  Outcome: Outcome(s) achieved Date Met:  12/16/17  Goal: Effective Urinary Elimination  Outcome: Ongoing (interventions implemented as appropriate)  Goal: Effective Containment of Urine  Outcome: Ongoing (interventions implemented as appropriate)  Goal: Reduced Incontinence Episodes  Outcome: Outcome(s) achieved Date Met:  12/16/17  No incontinence

## 2017-12-16 NOTE — OP NOTE
CYSTOSCOPY URETEROSCOPY RETROGRADE PYELOGRAM HOLMIUM LASER STENT INSERTION  Procedure Note    Rolando Novoa  12/15/2017    Pre-op Diagnosis:   Kidney stone [N20.0]    Post-op Diagnosis:     Post-Op Diagnosis Codes:     * Kidney stone [N20.0]      Procedure(s):  CYSTOSCOPY LEFT URETEROSCOPY RETROGRADE PYELOGRAM HOLMIUM LASER STENT INSERTION    Surgeon(s):  Prosper Ariza MD    Anesthesia: General    Staff:   Circulator: Gabi Simons RN; Pearl Santiago RN  Scrub Person: Mehnaz Beard  Assistant: Justina Lopez CSA    Estimated Blood Loss: none    Specimens:                None      Drains:   Urethral Catheter 12/15/17 1656 silastic 16 10 10 (Active)   Daily Indications < 24 hr post op 12/15/2017  5:08 PM   Securement secured to upper leg with tape 12/15/2017  5:08 PM           Findings: Proximal left ureteral stone    Complications: None    Indications: Flank pain hematuria failure stone to progress    Description of Procedure: Patient was brought the operating suite placed in dorsolithotomy position.  Cystoscope was placed in the bladder patient had a high bladder neck and large prostate.  Switched to a flexible cystoscope place in the bladder but 0.38 guidewire up the ureter left inside in the renal pelvis.  Ureter catheter was top that shot 6 cc contrast pushing the stone back in the renal pelvis.  It was prohibitive to be able to pass flexor scope up the ureter to this point.  Over top guidewire through cystoscope we placed a 6 x 28 double-J stent with pusher was a double-J stent in the renal pelvis distal end in the bladder.  I anchored a #16 coudé catheter.  This showed J stent was good position.  Take an recovery Prosper Ariza MD     Date: 12/15/2017  Time: 6:27 PM

## 2017-12-17 LAB
ALBUMIN SERPL-MCNC: 4 G/DL (ref 3.4–4.8)
ALBUMIN/GLOB SERPL: 1.3 G/DL (ref 1.1–1.8)
ALP SERPL-CCNC: 57 U/L (ref 38–126)
ALT SERPL W P-5'-P-CCNC: 46 U/L (ref 21–72)
ANION GAP SERPL CALCULATED.3IONS-SCNC: 14 MMOL/L (ref 5–15)
AST SERPL-CCNC: 26 U/L (ref 17–59)
BASOPHILS # BLD AUTO: 0.02 10*3/MM3 (ref 0–0.2)
BASOPHILS NFR BLD AUTO: 0.2 % (ref 0–2)
BILIRUB SERPL-MCNC: 0.9 MG/DL (ref 0.2–1.3)
BUN BLD-MCNC: 16 MG/DL (ref 7–21)
BUN/CREAT SERPL: 15.1 (ref 7–25)
CALCIUM SPEC-SCNC: 8.2 MG/DL (ref 8.4–10.2)
CHLORIDE SERPL-SCNC: 96 MMOL/L (ref 95–110)
CO2 SERPL-SCNC: 28 MMOL/L (ref 22–31)
CREAT BLD-MCNC: 1.06 MG/DL (ref 0.7–1.3)
DEPRECATED RDW RBC AUTO: 48.1 FL (ref 35.1–43.9)
EOSINOPHIL # BLD AUTO: 0.08 10*3/MM3 (ref 0–0.7)
EOSINOPHIL NFR BLD AUTO: 0.7 % (ref 0–7)
ERYTHROCYTE [DISTWIDTH] IN BLOOD BY AUTOMATED COUNT: 14.2 % (ref 11.5–14.5)
GFR SERPL CREATININE-BSD FRML MDRD: 69 ML/MIN/1.73 (ref 60–98)
GLOBULIN UR ELPH-MCNC: 3.2 GM/DL (ref 2.3–3.5)
GLUCOSE BLD-MCNC: 154 MG/DL (ref 60–100)
GLUCOSE BLDC GLUCOMTR-MCNC: 134 MG/DL (ref 70–130)
GLUCOSE BLDC GLUCOMTR-MCNC: 147 MG/DL (ref 70–130)
GLUCOSE BLDC GLUCOMTR-MCNC: 165 MG/DL (ref 70–130)
GLUCOSE BLDC GLUCOMTR-MCNC: 180 MG/DL (ref 70–130)
HCT VFR BLD AUTO: 44.1 % (ref 39–49)
HGB BLD-MCNC: 14.1 G/DL (ref 13.7–17.3)
IMM GRANULOCYTES # BLD: 0.07 10*3/MM3 (ref 0–0.02)
IMM GRANULOCYTES NFR BLD: 0.7 % (ref 0–0.5)
LYMPHOCYTES # BLD AUTO: 1.5 10*3/MM3 (ref 0.6–4.2)
LYMPHOCYTES NFR BLD AUTO: 14 % (ref 10–50)
MCH RBC QN AUTO: 29.9 PG (ref 26.5–34)
MCHC RBC AUTO-ENTMCNC: 32 G/DL (ref 31.5–36.3)
MCV RBC AUTO: 93.4 FL (ref 80–98)
MONOCYTES # BLD AUTO: 1.39 10*3/MM3 (ref 0–0.9)
MONOCYTES NFR BLD AUTO: 13 % (ref 0–12)
NEUTROPHILS # BLD AUTO: 7.63 10*3/MM3 (ref 2–8.6)
NEUTROPHILS NFR BLD AUTO: 71.4 % (ref 37–80)
PLATELET # BLD AUTO: 254 10*3/MM3 (ref 150–450)
PMV BLD AUTO: 9.1 FL (ref 8–12)
POTASSIUM BLD-SCNC: 3.9 MMOL/L (ref 3.5–5.1)
PROT SERPL-MCNC: 7.2 G/DL (ref 6.3–8.6)
RBC # BLD AUTO: 4.72 10*6/MM3 (ref 4.37–5.74)
SODIUM BLD-SCNC: 138 MMOL/L (ref 137–145)
WBC NRBC COR # BLD: 10.69 10*3/MM3 (ref 3.2–9.8)

## 2017-12-17 PROCEDURE — 85025 COMPLETE CBC W/AUTO DIFF WBC: CPT | Performed by: FAMILY MEDICINE

## 2017-12-17 PROCEDURE — A9270 NON-COVERED ITEM OR SERVICE: HCPCS | Performed by: UROLOGY

## 2017-12-17 PROCEDURE — 94799 UNLISTED PULMONARY SVC/PX: CPT

## 2017-12-17 PROCEDURE — 63710000001 METFORMIN 500 MG TABLET: Performed by: UROLOGY

## 2017-12-17 PROCEDURE — 63710000001 INSULIN ASPART PER 5 UNITS: Performed by: UROLOGY

## 2017-12-17 PROCEDURE — 82962 GLUCOSE BLOOD TEST: CPT

## 2017-12-17 PROCEDURE — 25010000002 MORPHINE PER 10 MG: Performed by: UROLOGY

## 2017-12-17 PROCEDURE — 63710000001 NEBIVOLOL 5 MG TABLET: Performed by: UROLOGY

## 2017-12-17 PROCEDURE — 94760 N-INVAS EAR/PLS OXIMETRY 1: CPT

## 2017-12-17 PROCEDURE — 80053 COMPREHEN METABOLIC PANEL: CPT | Performed by: FAMILY MEDICINE

## 2017-12-17 PROCEDURE — 63710000001 TAMSULOSIN 0.4 MG CAPSULE: Performed by: UROLOGY

## 2017-12-17 PROCEDURE — 63710000001 BELLADONNA-OPIUM 16.2-30 MG SUPPOSITORY: Performed by: UROLOGY

## 2017-12-17 RX ADMIN — MORPHINE SULFATE 4 MG: 8 INJECTION, SOLUTION INTRAMUSCULAR; INTRAVENOUS at 21:42

## 2017-12-17 RX ADMIN — MORPHINE SULFATE 4 MG: 8 INJECTION, SOLUTION INTRAMUSCULAR; INTRAVENOUS at 03:34

## 2017-12-17 RX ADMIN — TAMSULOSIN HYDROCHLORIDE 0.4 MG: 0.4 CAPSULE ORAL at 21:42

## 2017-12-17 RX ADMIN — ATROPA BELLADONNA AND OPIUM 30 MG: 16.2; 3 SUPPOSITORY RECTAL at 11:06

## 2017-12-17 RX ADMIN — BUDESONIDE 0.5 MG: 0.5 INHALANT RESPIRATORY (INHALATION) at 08:20

## 2017-12-17 RX ADMIN — MORPHINE SULFATE 4 MG: 8 INJECTION, SOLUTION INTRAMUSCULAR; INTRAVENOUS at 14:16

## 2017-12-17 RX ADMIN — BUDESONIDE 0.5 MG: 0.5 INHALANT RESPIRATORY (INHALATION) at 20:36

## 2017-12-17 RX ADMIN — METFORMIN HYDROCHLORIDE 500 MG: 500 TABLET ORAL at 17:41

## 2017-12-17 RX ADMIN — IPRATROPIUM BROMIDE AND ALBUTEROL SULFATE 3 ML: 2.5; .5 SOLUTION RESPIRATORY (INHALATION) at 08:20

## 2017-12-17 RX ADMIN — IPRATROPIUM BROMIDE AND ALBUTEROL SULFATE 3 ML: 2.5; .5 SOLUTION RESPIRATORY (INHALATION) at 11:56

## 2017-12-17 RX ADMIN — IPRATROPIUM BROMIDE AND ALBUTEROL SULFATE 3 ML: 2.5; .5 SOLUTION RESPIRATORY (INHALATION) at 16:52

## 2017-12-17 RX ADMIN — INSULIN ASPART 2 UNITS: 100 INJECTION, SOLUTION INTRAVENOUS; SUBCUTANEOUS at 21:42

## 2017-12-17 RX ADMIN — NEBIVOLOL HYDROCHLORIDE 10 MG: 5 TABLET ORAL at 08:13

## 2017-12-17 RX ADMIN — IPRATROPIUM BROMIDE AND ALBUTEROL SULFATE 3 ML: 2.5; .5 SOLUTION RESPIRATORY (INHALATION) at 20:36

## 2017-12-17 RX ADMIN — MORPHINE SULFATE 4 MG: 8 INJECTION, SOLUTION INTRAMUSCULAR; INTRAVENOUS at 11:05

## 2017-12-17 NOTE — PROGRESS NOTES
[facility]  INPATIENT PROGRESS NOTE    Length of Stay: 0  Date of Admission: 12/15/2017  Primary Care Physician: Jaxson Zepeda MD    Subjective   Chief Complaint: kidney stone  HPI:  Patient reports no trouble breathing.  No coughing.  Still on 2-3 L nasal canula.  Reports being enrolled in [facility] for pneumoconiosis however doesn't have oxygen at home    Review of Systems   Constitutional: Negative for activity change.   Respiratory: Negative for chest tightness and shortness of breath.    Gastrointestinal: Negative for diarrhea.   Genitourinary: Positive for dysuria.        All pertinent negatives and positives are as above. All other systems have been reviewed and are negative unless otherwise stated.     Objective    Temp:  [96.3 °F (35.7 °C)-99.5 °F (37.5 °C)] 96.3 °F (35.7 °C)  Heart Rate:  [] 92  Resp:  [18-20] 20  BP: (116-147)/(60-76) 147/76  Physical Exam   Constitutional: He appears well-developed and well-nourished. No distress.   HENT:   Head: Normocephalic and atraumatic.   Cardiovascular: Normal rate.    Pulmonary/Chest: Effort normal. No respiratory distress. He has no wheezes.   Abdominal: Soft. He exhibits no distension.   Musculoskeletal: Normal range of motion.   Neurological: He is alert. No cranial nerve deficit.   Skin: Skin is warm. He is not diaphoretic.   Psychiatric: He has a normal mood and affect. His behavior is normal. Judgment and thought content normal.   Vitals reviewed.          Results Review:  I have reviewed the labs, radiology results, and diagnostic studies.    Laboratory Data:   Lab Results (last 24 hours)     Procedure Component Value Units Date/Time    Blood Gas, Arterial [[id number]]  (Abnormal) Collected:  12/16/17 1153    Specimen:  Arterial Blood from Arm, Right Updated:  12/16/17 1216     Site --      Not performed at this site.        Elder's Test --      Not performed at this site.         pH, Arterial 7.367 pH units      pCO2, Arterial 50.4 (H) mm Hg      pO2, Arterial 65.3 (L) mm Hg      HCO3, Arterial 28.3 (H) mmol/L      Base Excess, Arterial 1.9 mmol/L      O2 Saturation, Arterial 92.7 %      Hemoglobin, Blood Gas 16.0 g/dL      Hematocrit, Blood Gas 47.0 %      CO2 Content 29.8 (H)     Sodium, Arterial 138.2 mmol/L      Potassium, Arterial 4.21 mmol/L      Glucose, Arterial 150 mmol/L      Barometric Pressure for Blood Gas -- mmHg       Not performed at this site.        Modality --      Not performed at this site.        Ionized Calcium 4.70 mg/dL     POC Glucose Once [[id number]]  (Normal) Collected:  12/16/17 1338    Specimen:  Blood Updated:  12/16/17 1350     Glucose 129 mg/dL       RN NotifiedMeter: [id number]: [id number] PERCY CARRASCO       POC Glucose Once [[id number]]  (Abnormal) Collected:  12/16/17 1643    Specimen:  Blood Updated:  12/16/17 1656     Glucose 164 (H) mg/dL       RN NotifiedMeter: [id number]: [id number] ANGIE BREWSTER       POC Glucose Once [[id number]]  (Abnormal) Collected:  12/16/17 2051    Specimen:  Blood Updated:  12/16/17 2129     Glucose 201 (H) mg/dL       RN NotifiedMeter: [id number]: [id number] ROGERIO HUSSEIN       Comprehensive Metabolic Panel [[id number]]  (Abnormal) Collected:  12/17/17 0529    Specimen:  Blood Updated:  12/17/17 0718     Glucose 154 (H) mg/dL      BUN 16 mg/dL      Creatinine 1.06 mg/dL      Sodium 138 mmol/L      Potassium 3.9 mmol/L      Chloride 96 mmol/L      CO2 28.0 mmol/L      Calcium 8.2 (L) mg/dL      Total Protein 7.2 g/dL      Albumin 4.00 g/dL      ALT (SGPT) 46 U/L      AST (SGOT) 26 U/L      Alkaline Phosphatase 57 U/L      Total Bilirubin 0.9 mg/dL      eGFR Non African Amer 69 mL/min/1.73      Globulin 3.2 gm/dL      A/G Ratio 1.3 g/dL      BUN/Creatinine Ratio 15.1     Anion Gap 14.0 mmol/L     CBC & Differential [[id number]] Collected:  12/17/17 0529    Specimen:  Blood Updated:  12/17/17 0725     Narrative:       The following orders were created for panel order CBC & Differential.  Procedure                               Abnormality         Status                     ---------                               -----------         ------                     CBC Auto Differential[[id number]]        Abnormal            Final result                 Please view results for these tests on the individual orders.    CBC Auto Differential [[id number]]  (Abnormal) Collected:  12/17/17 0529    Specimen:  Blood Updated:  12/17/17 0725     WBC 10.69 (H) 10*3/mm3      RBC 4.72 10*6/mm3      Hemoglobin 14.1 g/dL      Hematocrit 44.1 %      MCV 93.4 fL      MCH 29.9 pg      MCHC 32.0 g/dL      RDW 14.2 %      RDW-SD 48.1 (H) fl      MPV 9.1 fL      Platelets 254 10*3/mm3      Neutrophil % 71.4 %      Lymphocyte % 14.0 %      Monocyte % 13.0 (H) %      Eosinophil % 0.7 %      Basophil % 0.2 %      Immature Grans % 0.7 (H) %      Neutrophils, Absolute 7.63 10*3/mm3      Lymphocytes, Absolute 1.50 10*3/mm3      Monocytes, Absolute 1.39 (H) 10*3/mm3      Eosinophils, Absolute 0.08 10*3/mm3      Basophils, Absolute 0.02 10*3/mm3      Immature Grans, Absolute 0.07 (H) 10*3/mm3     POC Glucose Once [[id number]]  (Abnormal) Collected:  12/17/17 0715    Specimen:  Blood Updated:  12/17/17 0732     Glucose 165 (H) mg/dL       RN NotifiedMeter: [id number]: [id number] ANGIE BREWSTER             Culture Data:   No results found for: BLOODCX  No results found for: URINECX  No results found for: RESPCX  No results found for: WOUNDCX  No results found for: STOOLCX  No components found for: BODYFLD    Radiology Data:   Imaging Results (last 24 hours)     Procedure Component Value Units Date/Time    XR Chest PA & Lateral [[id number]] Collected:  12/16/17 1821     Updated:  12/16/17 1840    Narrative:         EXAM:          Radiograph(s), Chest   VIEWS:    PA / Lat ; 2       DATE/TIME:  12/16/2017 6:38 PM CST               INDICATION:    shortness of air, R31.9 Hematuria, unspecified  N20.0 Calculus of kidney  COMPARISON:  CXR: none          FINDINGS:             - lines/tubes:    none     - cardiac:         size within normal limits         - mediastinum: contour within normal limits         - lungs:         Zones of linear fibrotic changes, likely  atelectatic.             - pleura:         no evidence of  fluid                  - osseous:         unremarkable for age                  - misc.:         Impression:       CONCLUSION:        1. No evidence of an acute cardiopulmonary process.                                    Electronically signed by:  MARILYNN Lau MD  12/16/2017 6:39  PM CST Workstation: [id number]          I have reviewed the patient current medications.     Assessment/Plan     Hospital Problem List     * (Principal)Hematuria syndrome    Overview Signed 12/16/2017 11:40 AM by Prosper Ariza MD     Added automatically from request for surgery [id number]         Kidney stone    Overview Signed 12/13/2017  8:42 AM by Prosper Ariza MD     Added automatically from request for surgery [id number]             Acute on chronic respiratory failure with hypoxia - supplemental oxygen. pulmicort , albuterol nebulizer treatments.  Chest xray is clear, Will continue to wean off oxygen.    Kidney stone and hematuria - had a clot evacuation yesterday.  Doing better today with pain.   Lander managing   DM II - sliding scale insulin  HTN - bystolic       Xu Galo MD   12/17/17   9:20 AM

## 2017-12-17 NOTE — PLAN OF CARE
Problem: Infection, Risk/Actual (Adult)  Goal: Infection Prevention/Resolution  Outcome: Ongoing (interventions implemented as appropriate)    Problem: Pain, Acute (Adult)  Goal: Acceptable Pain Control/Comfort Level  Outcome: Ongoing (interventions implemented as appropriate)    Problem: Respiratory Insufficiency (Adult)  Goal: Acid/Base Balance  Outcome: Ongoing (interventions implemented as appropriate)  Goal: Effective Ventilation  Outcome: Ongoing (interventions implemented as appropriate)    Problem: Urine Elimination, Impaired (Adult)  Goal: Effective Urinary Elimination  Outcome: Ongoing (interventions implemented as appropriate)  Goal: Effective Containment of Urine  Outcome: Ongoing (interventions implemented as appropriate)

## 2017-12-17 NOTE — PLAN OF CARE
Problem: Patient Care Overview (Adult)  Goal: Plan of Care Review  Outcome: Ongoing (interventions implemented as appropriate)  Pt urine clearing up.    12/16/17 2059   Coping/Psychosocial Response Interventions   Plan Of Care Reviewed With patient   Patient Care Overview   Progress improving       Goal: Adult Individualization and Mutuality  Outcome: Ongoing (interventions implemented as appropriate)  Goal: Discharge Needs Assessment  Outcome: Ongoing (interventions implemented as appropriate)    Problem: Infection, Risk/Actual (Adult)  Goal: Infection Prevention/Resolution  Outcome: Ongoing (interventions implemented as appropriate)    Problem: Pain, Acute (Adult)  Goal: Acceptable Pain Control/Comfort Level  Outcome: Ongoing (interventions implemented as appropriate)    Problem: Respiratory Insufficiency (Adult)  Goal: Acid/Base Balance  Outcome: Ongoing (interventions implemented as appropriate)  Goal: Effective Ventilation  Outcome: Ongoing (interventions implemented as appropriate)    Problem: Urine Elimination, Impaired (Adult)  Goal: Effective Urinary Elimination  Outcome: Ongoing (interventions implemented as appropriate)  Goal: Effective Containment of Urine  Outcome: Ongoing (interventions implemented as appropriate)

## 2017-12-17 NOTE — PROGRESS NOTES
LOS: 0 days     Patient Care Team:  Jaxson Zepeda MD as PCP - General      Subjective     Urine retention BPH left renal stone with J stent    Objective       Vital Signs  Temp:  [96.3 °F (35.7 °C)-99.5 °F (37.5 °C)] 97.2 °F (36.2 °C)  Heart Rate:  [] 88  Resp:  [18-20] 18  BP: (116-157)/(60-97) 157/97    Physical Exam:        General Appearance:    Up eating lunch pain better but has bladder spasms     Respiratory:    UNLABORED RESPIRATIONS.     Abdomen:     SOFT.       Genitourinary: Evans on CBI     Rectal:     DEFERRED       Results Review:       Imaging Results (last 24 hours)     Procedure Component Value Units Date/Time    XR Chest PA & Lateral [[id number]] Collected:  12/16/17 1821     Updated:  12/16/17 1840    Narrative:         EXAM:          Radiograph(s), Chest   VIEWS:    PA / Lat ; 2       DATE/TIME:  12/16/2017 6:38 PM CST               INDICATION:   shortness of air, R31.9 Hematuria, unspecified  N20.0 Calculus of kidney  COMPARISON:  CXR: none          FINDINGS:             - lines/tubes:    none     - cardiac:         size within normal limits         - mediastinum: contour within normal limits         - lungs:         Zones of linear fibrotic changes, likely  atelectatic.             - pleura:         no evidence of  fluid                  - osseous:         unremarkable for age                  - misc.:         Impression:       CONCLUSION:        1. No evidence of an acute cardiopulmonary process.                                    Electronically signed by:  MARILYNN Lau MD  12/16/2017 6:39  PM CST Workstation: [id number]        Lab Results (last 24 hours)     Procedure Component Value Units Date/Time    Blood Gas, Arterial [[id number]]  (Abnormal) Collected:  12/16/17 1153    Specimen:  Arterial Blood from Arm, Right Updated:  12/16/17 1216     Site --      Not performed at this site.        Elder's Test --      Not performed at this site.        pH, Arterial 7.367 pH units       pCO2, Arterial 50.4 (H) mm Hg      pO2, Arterial 65.3 (L) mm Hg      HCO3, Arterial 28.3 (H) mmol/L      Base Excess, Arterial 1.9 mmol/L      O2 Saturation, Arterial 92.7 %      Hemoglobin, Blood Gas 16.0 g/dL      Hematocrit, Blood Gas 47.0 %      CO2 Content 29.8 (H)     Sodium, Arterial 138.2 mmol/L      Potassium, Arterial 4.21 mmol/L      Glucose, Arterial 150 mmol/L      Barometric Pressure for Blood Gas -- mmHg       Not performed at this site.        Modality --      Not performed at this site.        Ionized Calcium 4.70 mg/dL     POC Glucose Once [[id number]]  (Normal) Collected:  12/16/17 1338    Specimen:  Blood Updated:  12/16/17 1350     Glucose 129 mg/dL       RN NotifiedMeter: [id number]: [id number] PERCY CARRASCO       POC Glucose Once [[id number]]  (Abnormal) Collected:  12/16/17 1643    Specimen:  Blood Updated:  12/16/17 1656     Glucose 164 (H) mg/dL       RN NotifiedMeter: [id number]: [id number] WAKAMILAH BREWSTER       POC Glucose Once [[id number]]  (Abnormal) Collected:  12/16/17 2051    Specimen:  Blood Updated:  12/16/17 2129     Glucose 201 (H) mg/dL       RN NotifiedMeter: [id number]: [id number] ROGERIO HUSSEIN       Comprehensive Metabolic Panel [[id number]]  (Abnormal) Collected:  12/17/17 0529    Specimen:  Blood Updated:  12/17/17 0718     Glucose 154 (H) mg/dL      BUN 16 mg/dL      Creatinine 1.06 mg/dL      Sodium 138 mmol/L      Potassium 3.9 mmol/L      Chloride 96 mmol/L      CO2 28.0 mmol/L      Calcium 8.2 (L) mg/dL      Total Protein 7.2 g/dL      Albumin 4.00 g/dL      ALT (SGPT) 46 U/L      AST (SGOT) 26 U/L      Alkaline Phosphatase 57 U/L      Total Bilirubin 0.9 mg/dL      eGFR Non African Amer 69 mL/min/1.73      Globulin 3.2 gm/dL      A/G Ratio 1.3 g/dL      BUN/Creatinine Ratio 15.1     Anion Gap 14.0 mmol/L     CBC & Differential [[id number]] Collected:  12/17/17 0529    Specimen:  Blood Updated:  12/17/17 0725    Narrative:       The following  orders were created for panel order CBC & Differential.  Procedure                               Abnormality         Status                     ---------                               -----------         ------                     CBC Auto Differential[[id number]]        Abnormal            Final result                 Please view results for these tests on the individual orders.    CBC Auto Differential [[id number]]  (Abnormal) Collected:  12/17/17 0529    Specimen:  Blood Updated:  12/17/17 0725     WBC 10.69 (H) 10*3/mm3      RBC 4.72 10*6/mm3      Hemoglobin 14.1 g/dL      Hematocrit 44.1 %      MCV 93.4 fL      MCH 29.9 pg      MCHC 32.0 g/dL      RDW 14.2 %      RDW-SD 48.1 (H) fl      MPV 9.1 fL      Platelets 254 10*3/mm3      Neutrophil % 71.4 %      Lymphocyte % 14.0 %      Monocyte % 13.0 (H) %      Eosinophil % 0.7 %      Basophil % 0.2 %      Immature Grans % 0.7 (H) %      Neutrophils, Absolute 7.63 10*3/mm3      Lymphocytes, Absolute 1.50 10*3/mm3      Monocytes, Absolute 1.39 (H) 10*3/mm3      Eosinophils, Absolute 0.08 10*3/mm3      Basophils, Absolute 0.02 10*3/mm3      Immature Grans, Absolute 0.07 (H) 10*3/mm3     POC Glucose Once [[id number]]  (Abnormal) Collected:  12/17/17 0715    Specimen:  Blood Updated:  12/17/17 0732     Glucose 165 (H) mg/dL       RN NotifiedMeter: [id number]: [id number] ANGIE BREWSTER       POC Glucose Once [[id number]]  (Abnormal) Collected:  12/17/17 1058    Specimen:  Blood Updated:  12/17/17 1122     Glucose 147 (H) mg/dL       RN NotifiedMeter: [id number]: [id number] ANGIE BREWSTER               I reviewed the patient's new clinical results.  I reviewed the patient's new imaging results and agree with the interpretation.  I reviewed the patient's other test results and agree with the interpretation        Assessment/Plan     Principal Problem:    Hematuria syndrome  Active Problems:    Kidney stone      Plan to AZAM Evans in a.m. and probably discharge home  left ESWL later in the week      Prosper Ariza MD  12/17/17  11:38 AM

## 2017-12-18 VITALS
TEMPERATURE: 98.9 F | SYSTOLIC BLOOD PRESSURE: 128 MMHG | DIASTOLIC BLOOD PRESSURE: 79 MMHG | HEIGHT: 65 IN | HEART RATE: 87 BPM | OXYGEN SATURATION: 88 % | RESPIRATION RATE: 18 BRPM | WEIGHT: 227.51 LBS | BODY MASS INDEX: 37.91 KG/M2

## 2017-12-18 LAB
ANION GAP SERPL CALCULATED.3IONS-SCNC: 16 MMOL/L (ref 5–15)
BASOPHILS # BLD AUTO: 0.04 10*3/MM3 (ref 0–0.2)
BASOPHILS NFR BLD AUTO: 0.5 % (ref 0–2)
BUN BLD-MCNC: 14 MG/DL (ref 7–21)
BUN/CREAT SERPL: 12.5 (ref 7–25)
CALCIUM SPEC-SCNC: 8.9 MG/DL (ref 8.4–10.2)
CHLORIDE SERPL-SCNC: 95 MMOL/L (ref 95–110)
CO2 SERPL-SCNC: 29 MMOL/L (ref 22–31)
CREAT BLD-MCNC: 1.12 MG/DL (ref 0.7–1.3)
DEPRECATED RDW RBC AUTO: 49.5 FL (ref 35.1–43.9)
EOSINOPHIL # BLD AUTO: 0.19 10*3/MM3 (ref 0–0.7)
EOSINOPHIL NFR BLD AUTO: 2.3 % (ref 0–7)
ERYTHROCYTE [DISTWIDTH] IN BLOOD BY AUTOMATED COUNT: 14.3 % (ref 11.5–14.5)
GFR SERPL CREATININE-BSD FRML MDRD: 65 ML/MIN/1.73
GLUCOSE BLD-MCNC: 146 MG/DL (ref 60–100)
GLUCOSE BLDC GLUCOMTR-MCNC: 133 MG/DL (ref 70–130)
GLUCOSE BLDC GLUCOMTR-MCNC: 156 MG/DL (ref 70–130)
HCT VFR BLD AUTO: 45.5 % (ref 39–49)
HGB BLD-MCNC: 14.8 G/DL (ref 13.7–17.3)
IMM GRANULOCYTES # BLD: 0.06 10*3/MM3 (ref 0–0.02)
IMM GRANULOCYTES NFR BLD: 0.7 % (ref 0–0.5)
LYMPHOCYTES # BLD AUTO: 1.91 10*3/MM3 (ref 0.6–4.2)
LYMPHOCYTES NFR BLD AUTO: 22.9 % (ref 10–50)
MCH RBC QN AUTO: 30.6 PG (ref 26.5–34)
MCHC RBC AUTO-ENTMCNC: 32.5 G/DL (ref 31.5–36.3)
MCV RBC AUTO: 94.2 FL (ref 80–98)
MONOCYTES # BLD AUTO: 0.85 10*3/MM3 (ref 0–0.9)
MONOCYTES NFR BLD AUTO: 10.2 % (ref 0–12)
NEUTROPHILS # BLD AUTO: 5.29 10*3/MM3 (ref 2–8.6)
NEUTROPHILS NFR BLD AUTO: 63.4 % (ref 37–80)
PLATELET # BLD AUTO: 302 10*3/MM3 (ref 150–450)
PMV BLD AUTO: 8.6 FL (ref 8–12)
POTASSIUM BLD-SCNC: 3.7 MMOL/L (ref 3.5–5.1)
RBC # BLD AUTO: 4.83 10*6/MM3 (ref 4.37–5.74)
SODIUM BLD-SCNC: 140 MMOL/L (ref 137–145)
WBC NRBC COR # BLD: 8.34 10*3/MM3 (ref 3.2–9.8)

## 2017-12-18 PROCEDURE — 25010000002 MORPHINE PER 10 MG: Performed by: UROLOGY

## 2017-12-18 PROCEDURE — 63710000001 METFORMIN 500 MG TABLET: Performed by: UROLOGY

## 2017-12-18 PROCEDURE — 90682 RIV4 VACC RECOMBINANT DNA IM: CPT | Performed by: UROLOGY

## 2017-12-18 PROCEDURE — 63710000001 NEBIVOLOL 5 MG TABLET: Performed by: UROLOGY

## 2017-12-18 PROCEDURE — 82962 GLUCOSE BLOOD TEST: CPT

## 2017-12-18 PROCEDURE — 85025 COMPLETE CBC W/AUTO DIFF WBC: CPT | Performed by: FAMILY MEDICINE

## 2017-12-18 PROCEDURE — A9270 NON-COVERED ITEM OR SERVICE: HCPCS | Performed by: UROLOGY

## 2017-12-18 PROCEDURE — 94799 UNLISTED PULMONARY SVC/PX: CPT

## 2017-12-18 PROCEDURE — G0008 ADMIN INFLUENZA VIRUS VAC: HCPCS | Performed by: UROLOGY

## 2017-12-18 PROCEDURE — 94760 N-INVAS EAR/PLS OXIMETRY 1: CPT

## 2017-12-18 PROCEDURE — 25010000002 INFLUENZA VAC SPLIT QUAD SUSPENSION: Performed by: UROLOGY

## 2017-12-18 PROCEDURE — 80048 BASIC METABOLIC PNL TOTAL CA: CPT | Performed by: FAMILY MEDICINE

## 2017-12-18 RX ORDER — DOXYCYCLINE HYCLATE 100 MG/1
100 TABLET, DELAYED RELEASE ORAL DAILY
Qty: 7 TABLET | Refills: 0
Start: 2017-12-18 | End: 2017-12-25

## 2017-12-18 RX ADMIN — IPRATROPIUM BROMIDE AND ALBUTEROL SULFATE 3 ML: 2.5; .5 SOLUTION RESPIRATORY (INHALATION) at 11:57

## 2017-12-18 RX ADMIN — NEBIVOLOL HYDROCHLORIDE 10 MG: 5 TABLET ORAL at 08:16

## 2017-12-18 RX ADMIN — IPRATROPIUM BROMIDE AND ALBUTEROL SULFATE 3 ML: 2.5; .5 SOLUTION RESPIRATORY (INHALATION) at 16:06

## 2017-12-18 RX ADMIN — IPRATROPIUM BROMIDE AND ALBUTEROL SULFATE 3 ML: 2.5; .5 SOLUTION RESPIRATORY (INHALATION) at 09:03

## 2017-12-18 RX ADMIN — MORPHINE SULFATE 4 MG: 8 INJECTION, SOLUTION INTRAMUSCULAR; INTRAVENOUS at 05:00

## 2017-12-18 RX ADMIN — BUDESONIDE 0.5 MG: 0.5 INHALANT RESPIRATORY (INHALATION) at 09:03

## 2017-12-18 RX ADMIN — INFLUENZA A VIRUS A/MICHIGAN/45/2015 X-275 (H1N1) ANTIGEN (FORMALDEHYDE INACTIVATED), INFLUENZA A VIRUS A/HONG KONG/4801/2014 X-263B (H3N2) ANTIGEN (FORMALDEHYDE INACTIVATED), INFLUENZA B VIRUS B/PHUKET/3073/2013 ANTIGEN (FORMALDEHYDE INACTIVATED), AND INFLUENZA B VIRUS B/BRISBANE/60/2008 ANTIGEN (FORMALDEHYDE INACTIVATED) 0.5 ML: 15; 15; 15; 15 INJECTION, SUSPENSION INTRAMUSCULAR at 16:22

## 2017-12-18 RX ADMIN — METFORMIN HYDROCHLORIDE 500 MG: 500 TABLET ORAL at 08:16

## 2017-12-18 NOTE — DISCHARGE PLACEMENT REQUEST
"Rolando Novoa (72 y.o. Male)     Date of Birth Social Security Number Address Home Phone MRN    1945  [address] [id number] [phone] [id number]    [employer] Marital Status          Faith        Admission Date Admission Type Admitting Provider Attending Provider Department, Room/Bed    12/15/17 Elective [place], Prosper WALKER MD [place], Prosper WALKER MD [address], [facility]/1    Discharge Date Discharge Disposition Discharge Destination         Home or Self Care             Attending Provider: Prosper Ariza MD     Allergies:  No Known Allergies    Isolation:  None   Infection:  None   Code Status:  FULL    Ht:  165.1 cm (65\")   Wt:  103 kg (227 lb 8.2 oz)    Admission Cmt:  None   Principal Problem:  Hematuria syndrome [R31.9] More...                 Active Insurance as of 12/15/2017     Primary Coverage     Payor Plan Insurance Group Employer/Plan Group    ANTHEM MEDICARE REPLACEMENT ANTHEM MEDICARE ADVANTAGE [id number]     Payor Plan Address Payor Plan Phone Number Effective From Effective To    [address] [phone] 2016     [place] [address]       Subscriber Name Subscriber Birth Date Member ID       ROLANDO NOVOA 1945 [id number]                 Emergency Contacts      (Rel.) Home Phone Work Phone Mobile Phone    Solange Novoa (Spouse) [phone] -- [phone]        Pilar Austin RN [facility]  [phone] phone  [phone] fax    [address] [address]  Phone:  [phone]  Fax:   Date Ordered: Dec 18, 2017         Patient:  Rolando Novoa MRN:  [id number]   [address] [facility] [address] :  1945  SSN:    Phone: [phone] Sex:  M     Weight: 103 kg (227 lb 8.2 oz)         Ht Readings from Last 1 Encounters:   12/15/17 165.1 cm (65\")         Oxygen Therapy                   (Order ID: " [id number])    Diagnosis:  SOB (shortness of breath) (R06.02 [ICD-10-CM] 786.05 [ICD-9-CM])   Quantity:  1     Delivery Modality: Nasal Cannula  Liters Per Minute: 3.5  Duration: Continuous  Equipment:  Oxygen Concentrator &  &  Portable Gaseous Oxygen System & Portable Oxygen Contents Gaseous &  Conserving Regulator  Length of Need (99 Months = Lifetime): 99 Months = Lifetime            Verbal Order Mode: Verbal with readback   Authorizing Provider: Prosper Ariza MD  Authorizing Provider's NPI: [id number]     Order Entered By: Festus Sharp RN 12/18/2017  3:48 PM     Electronically signed by:                    History & Physical      RIVKA Bonilla at 12/15/2017  4:03 PM                Patient Care Team:  Jaxson Zepeda MD as PCP - General    Chief complaint kidney stone    Subjective     HPI Comments: Mr. Rolando Novoa is a 72-year-old male with a history of bilateral kidney stone, left ureteric stone on 12/1/17 KUB which is unchanged from 11/14/17 KUB. He states it is causing a chronic left flank pain for months and is ready for lithotripsy. No urinary symptoms of infection. He takes Flomax daily.    Flank Pain   This is a chronic problem. The current episode started more than 1 month ago. The problem occurs intermittently. The problem has been waxing and waning since onset. The quality of the pain is described as aching. The pain is moderate. Pertinent negatives include no abdominal pain, bladder incontinence, dysuria, fever, headaches or weakness. Risk factors include obesity. He has tried nothing for the symptoms.       Review of Systems   Constitutional: Negative.  Negative for fever.   HENT: Negative.    Eyes: Negative.    Respiratory: Negative.    Cardiovascular: Negative.    Gastrointestinal: Negative for abdominal pain.   Endocrine: Negative.    Genitourinary: Positive for flank pain. Negative for bladder incontinence, decreased urine volume, difficulty urinating,  dysuria, hematuria, testicular pain and urgency.   Allergic/Immunologic: Negative.    Neurological: Negative.  Negative for weakness and headaches.   Hematological: Negative.    Psychiatric/Behavioral: Negative.    All other systems reviewed and are negative.       Past Medical History:   Diagnosis Date   • Acid reflux    • Diabetes    • Enlarged prostate    • High cholesterol    • Hypertension    • Kidney stone     multiple   • Psoriasis      Past Surgical History:   Procedure Laterality Date   • AMPUTATION Left     lower arm and hand   • CHOLECYSTECTOMY OPEN     • EXTRACORPOREAL SHOCK WAVE LITHOTRIPSY (ESWL)     • EXTRACORPOREAL SHOCKWAVE LITHOTRIPSY (ESWL), STENT INSERTION/REMOVAL Left 2/3/2017    Procedure: LEFT EXTRACORPOREAL SHOCKWAVE LITHOTRIPSY, POSSIBLE CYSTOSCOPY, POSSIBLE STENT REMOVAL ;  Surgeon: Prosper Ariza MD;  Location: [facility];  Service:    • KIDNEY STONE SURGERY     • SCROTAL SURGERY      trauma     History reviewed. No pertinent family history.  Social History   Substance Use Topics   • Smoking status: Never Smoker   • Smokeless tobacco: Never Used   • Alcohol use No     Prescriptions Prior to Admission   Medication Sig Dispense Refill Last Dose   • atorvastatin (LIPITOR) 20 MG tablet Take 20 mg by mouth Every Night.   12/14/2017 at 1300   • Exenatide ER 2 MG pen-injector Inject  under the skin 1 (One) Time Per Week.   12/12/2017 at Unknown time   • glimepiride (AMARYL) 2 MG tablet Take 2 mg by mouth Every Morning Before Breakfast.   12/14/2017 at 0900   • loperamide (IMODIUM) 2 MG capsule Take 2 mg by mouth 2 (Two) Times a Day As Needed for diarrhea.   12/14/2017 at 1300   • metFORMIN (GLUCOPHAGE) 500 MG tablet Take 500 mg by mouth 2 (two) times a day. May take 3 depending on glucose reading   12/14/2017 at 1300   • nebivolol (BYSTOLIC) 10 MG tablet Take 10 mg by mouth Daily.   12/15/2017 at 0900   • omeprazole (priLOSEC) 20 MG capsule Take 20 mg by mouth Daily.   12/15/2017 at 0900   •  tamsulosin (FLOMAX) 0.4 MG capsule 24 hr capsule Take 1 capsule by mouth Every Night.   12/14/2017 at 1300     Allergies:  Review of patient's allergies indicates no known allergies.    Objective      Vital Signs  Temp:  [97 °F (36.1 °C)] 97 °F (36.1 °C)  Heart Rate:  [69-79] 79  Resp:  [18-20] 18  BP: (149-166)/(74-80) 149/74    Physical Exam   Constitutional: Vital signs are normal. He appears well-developed and well-nourished.  Non-toxic appearance.   HENT:   Head: Normocephalic.   Eyes: Pupils are equal, round, and reactive to light. Right eye exhibits no discharge. Left eye exhibits no discharge. No scleral icterus.   Cardiovascular: Normal rate.    Pulmonary/Chest: Effort normal. No respiratory distress.   Abdominal: Soft. Normal appearance. He exhibits no ascites.   Neurological: He is alert.   Skin: Skin is warm and dry. No cyanosis.   Psychiatric: He has a normal mood and affect. His behavior is normal. He is attentive.       Results Review:   I reviewed the patient's new clinical results.  I reviewed the patient's new imaging results and agree with the interpretation.      Assessment/Plan     Principal Problem:    Kidney stone      Assessment:  (Calculus of the kidney and ureter  Left flank pain).     Plan:   (LEFT CRULLS).       I discussed the patients findings and my recommendations with patient    RIVKA Bonilla  12/15/17  4:03 PM       Electronically signed by Prosper Ariza MD at 12/15/2017  4:15 PM      Prosper Ariza MD at 12/15/2017  4:16 PM            H&P reviewed. The patient was examined and there are no changes to the H&P.         Electronically signed by Prosper Ariza MD at 12/15/2017  4:16 PM    Source Note                   Patient Care Team:  Jaxson Zepeda MD as PCP - General    Chief complaint kidney stone    Subjective     HPI Comments: Mr. Rolando Novoa is a 72-year-old male with a history of bilateral kidney stone, left ureteric stone on 12/1/17 KUB which is unchanged  from 11/14/17 KUB. He states it is causing a chronic left flank pain for months and is ready for lithotripsy. No urinary symptoms of infection. He takes Flomax daily.    Flank Pain   This is a chronic problem. The current episode started more than 1 month ago. The problem occurs intermittently. The problem has been waxing and waning since onset. The quality of the pain is described as aching. The pain is moderate. Pertinent negatives include no abdominal pain, bladder incontinence, dysuria, fever, headaches or weakness. Risk factors include obesity. He has tried nothing for the symptoms.       Review of Systems   Constitutional: Negative.  Negative for fever.   HENT: Negative.    Eyes: Negative.    Respiratory: Negative.    Cardiovascular: Negative.    Gastrointestinal: Negative for abdominal pain.   Endocrine: Negative.    Genitourinary: Positive for flank pain. Negative for bladder incontinence, decreased urine volume, difficulty urinating, dysuria, hematuria, testicular pain and urgency.   Allergic/Immunologic: Negative.    Neurological: Negative.  Negative for weakness and headaches.   Hematological: Negative.    Psychiatric/Behavioral: Negative.    All other systems reviewed and are negative.       Past Medical History:   Diagnosis Date   • Acid reflux    • Diabetes    • Enlarged prostate    • High cholesterol    • Hypertension    • Kidney stone     multiple   • Psoriasis      Past Surgical History:   Procedure Laterality Date   • AMPUTATION Left     lower arm and hand   • CHOLECYSTECTOMY OPEN     • EXTRACORPOREAL SHOCK WAVE LITHOTRIPSY (ESWL)     • EXTRACORPOREAL SHOCKWAVE LITHOTRIPSY (ESWL), STENT INSERTION/REMOVAL Left 2/3/2017    Procedure: LEFT EXTRACORPOREAL SHOCKWAVE LITHOTRIPSY, POSSIBLE CYSTOSCOPY, POSSIBLE STENT REMOVAL ;  Surgeon: Prosper Ariza MD;  Location: [facility];  Service:    • KIDNEY STONE SURGERY     • SCROTAL SURGERY      trauma     History reviewed. No pertinent family history.  Social  History   Substance Use Topics   • Smoking status: Never Smoker   • Smokeless tobacco: Never Used   • Alcohol use No     Prescriptions Prior to Admission   Medication Sig Dispense Refill Last Dose   • atorvastatin (LIPITOR) 20 MG tablet Take 20 mg by mouth Every Night.   12/14/2017 at 1300   • Exenatide ER 2 MG pen-injector Inject  under the skin 1 (One) Time Per Week.   12/12/2017 at Unknown time   • glimepiride (AMARYL) 2 MG tablet Take 2 mg by mouth Every Morning Before Breakfast.   12/14/2017 at 0900   • loperamide (IMODIUM) 2 MG capsule Take 2 mg by mouth 2 (Two) Times a Day As Needed for diarrhea.   12/14/2017 at 1300   • metFORMIN (GLUCOPHAGE) 500 MG tablet Take 500 mg by mouth 2 (two) times a day. May take 3 depending on glucose reading   12/14/2017 at 1300   • nebivolol (BYSTOLIC) 10 MG tablet Take 10 mg by mouth Daily.   12/15/2017 at 0900   • omeprazole (priLOSEC) 20 MG capsule Take 20 mg by mouth Daily.   12/15/2017 at 0900   • tamsulosin (FLOMAX) 0.4 MG capsule 24 hr capsule Take 1 capsule by mouth Every Night.   12/14/2017 at 1300     Allergies:  Review of patient's allergies indicates no known allergies.    Objective      Vital Signs  Temp:  [97 °F (36.1 °C)] 97 °F (36.1 °C)  Heart Rate:  [69-79] 79  Resp:  [18-20] 18  BP: (149-166)/(74-80) 149/74    Physical Exam   Constitutional: Vital signs are normal. He appears well-developed and well-nourished.  Non-toxic appearance.   HENT:   Head: Normocephalic.   Eyes: Pupils are equal, round, and reactive to light. Right eye exhibits no discharge. Left eye exhibits no discharge. No scleral icterus.   Cardiovascular: Normal rate.    Pulmonary/Chest: Effort normal. No respiratory distress.   Abdominal: Soft. Normal appearance. He exhibits no ascites.   Neurological: He is alert.   Skin: Skin is warm and dry. No cyanosis.   Psychiatric: He has a normal mood and affect. His behavior is normal. He is attentive.       Results Review:   I reviewed the patient's new  clinical results.  I reviewed the patient's new imaging results and agree with the interpretation.      Assessment/Plan     Principal Problem:    Kidney stone      Assessment:  (Calculus of the kidney and ureter  Left flank pain).     Plan:   (LEFT CRULLS).       I discussed the patients findings and my recommendations with patient    RIVKA Bonilla  12/15/17  4:03 PM        Electronically signed by Prosper Ariza MD at 12/15/2017  4:15 PM                 Physician Progress Notes (most recent note)      Jorge Palma MD at 12/18/2017  9:55 AM  Version 1 of 1          LOS: 0 days     Patient Care Team:  Jaxson Zepeda MD as PCP - General      Subjective     Urine retention BPH left renal stone with J stent  Patient reports that catheter was removed about an hour ago. He denies having had any urination since skinner removal and states that he can't get his foreskin to come forward as it feels swollen.     Objective       Vital Signs  Temp:  [97.2 °F (36.2 °C)-98.5 °F (36.9 °C)] 97.7 °F (36.5 °C)  Heart Rate:  [74-97] 74  Resp:  [18-20] 18  BP: (119-166)/(72-97) 140/78    Physical Exam:        General Appearance:    Alert and oriented in no acute distress.      Respiratory:    UNLABORED RESPIRATIONS, clear to auscultation bilaterally.     Abdomen:     SOFT, ND, NT, BS+.       Genitourinary:    Skinner removed, swollen and slightly erythematous prepuce.     Rectal:     DEFERRED       Results Review:       Imaging Results (last 24 hours)     Procedure Component Value Units Date/Time    FL Retrograde Pyelogram In OR [[id number]] Resulted:  12/18/17 0811     Updated:  12/18/17 0811        Lab Results (last 24 hours)     Procedure Component Value Units Date/Time    POC Glucose Once [[id number]]  (Abnormal) Collected:  12/17/17 1058    Specimen:  Blood Updated:  12/17/17 1122     Glucose 147 (H) mg/dL       RN NotifiedMeter: [id number]: [id number] ANGIE BREWSTER       POC Glucose Once [[id number]]  (Abnormal)  Collected:  12/17/17 1624    Specimen:  Blood Updated:  12/17/17 1637     Glucose 134 (H) mg/dL       RN NotifiedMeter: [id number]: [id number] ANGIE BREWSTER       POC Glucose Once [[id number]]  (Abnormal) Collected:  12/17/17 2014    Specimen:  Blood Updated:  12/17/17 2032     Glucose 180 (H) mg/dL       RN NotifiedMeter: [id number]: [id number] ROGERIO HUSSEIN       CBC & Differential [[id number]] Collected:  12/18/17 0528    Specimen:  Blood Updated:  12/18/17 0628    Narrative:       The following orders were created for panel order CBC & Differential.  Procedure                               Abnormality         Status                     ---------                               -----------         ------                     CBC Auto Differential[[id number]]        Abnormal            Final result                 Please view results for these tests on the individual orders.    CBC Auto Differential [[id number]]  (Abnormal) Collected:  12/18/17 0528    Specimen:  Blood Updated:  12/18/17 0628     WBC 8.34 10*3/mm3      RBC 4.83 10*6/mm3      Hemoglobin 14.8 g/dL      Hematocrit 45.5 %      MCV 94.2 fL      MCH 30.6 pg      MCHC 32.5 g/dL      RDW 14.3 %      RDW-SD 49.5 (H) fl      MPV 8.6 fL      Platelets 302 10*3/mm3      Neutrophil % 63.4 %      Lymphocyte % 22.9 %      Monocyte % 10.2 %      Eosinophil % 2.3 %      Basophil % 0.5 %      Immature Grans % 0.7 (H) %      Neutrophils, Absolute 5.29 10*3/mm3      Lymphocytes, Absolute 1.91 10*3/mm3      Monocytes, Absolute 0.85 10*3/mm3      Eosinophils, Absolute 0.19 10*3/mm3      Basophils, Absolute 0.04 10*3/mm3      Immature Grans, Absolute 0.06 (H) 10*3/mm3     POC Glucose Once [[id number]]  (Abnormal) Collected:  12/18/17 0705    Specimen:  Blood Updated:  12/18/17 0719     Glucose 156 (H) mg/dL       RN NotifiedMeter: [id number]: [id number] ROGERIO HUSSEIN       Basic Metabolic Panel [[id number]]  (Abnormal) Collected:  12/18/17 0528     Specimen:  Blood Updated:  12/18/17 0824     Glucose 146 (H) mg/dL      BUN 14 mg/dL      Creatinine 1.12 mg/dL      Sodium 140 mmol/L      Potassium 3.7 mmol/L      Chloride 95 mmol/L      CO2 29.0 mmol/L      Calcium 8.9 mg/dL      eGFR Non African Amer 65 mL/min/1.73      BUN/Creatinine Ratio 12.5     Anion Gap 16.0 (H) mmol/L             I reviewed the patient's new clinical results.  I reviewed the patient's new imaging results and agree with the interpretation.  I reviewed the patient's other test results and agree with the interpretation        Assessment/Plan     Principal Problem:    Hematuria syndrome  Active Problems:    Kidney stone      Evans D/Bang this morning, no urination yet, swollen prepuce. Will discuss with Dr. Ariza. ESWL planned for later this week.          This document has been electronically signed by Jorge Sanchez MD on December 18, 2017 9:57 AM      Jorge Sanchez MD  12/18/17  9:55 AM       Electronically signed by Jorge Palma MD at 12/18/2017  9:59 AM           Consult Notes (most recent note)      Xu Galo MD at 12/16/2017 10:36 AM  Version 1 of 1     Consult Orders:    1. Inpatient Consult to Hospitalist [[id number]] ordered by Prosper Ariza MD at 12/16/17 0931                      [facility] Medicine Admission      Date of Admission: 12/15/2017      Primary Care Physician: Jaxson Zepeda MD      Chief Complaint: hypoxia    HPI:    72 yr old male admitted after he had a stent placed for a kidney stone.  Following the procedure he was unable to wean off of his oxygen.  Although he does not complain of any shortness of air.  His oxygen saturation went to 87 percent on room air.  And currently he is on 2 L.  He denies any coughing.  He does report that he has on and off trouble breathing especially in the winter months.  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  He uses albuterol inhaler intermittently but he is not on  oxygen at home.    Past Medical History:  has a past medical history of Acid reflux; Diabetes; Enlarged prostate; High cholesterol; Hypertension; Kidney stone; and Psoriasis.    Past Surgical History:  has a past surgical history that includes Extracorporeal shock wave lithotripsy; Cholecystectomy open; Amputation (Left); Scrotal surgery; extracorporeal shockwave lithotripsy (eswl), stent insertion/removal (Left, 2/3/2017); and Kidney stone surgery.    Family History: family history is not on file.   Father had coronary artery disease    Social History:  reports that he has never smoked. He has never used smokeless tobacco. He reports that he does not drink alcohol or use illicit drugs.    Allergies: No Known Allergies    Medications: Scheduled Meds:  budesonide 0.5 mg Nebulization BID - RT   ipratropium-albuterol 3 mL Nebulization 4x Daily - RT   metFORMIN 500 mg Oral BID   tamsulosin 0.4 mg Oral Nightly     Continuous Infusions:   PRN Meds:.iopamidol  •  loperamide  •  Morphine **AND** naloxone  •  ondansetron **OR** ondansetron ODT **OR** ondansetron  No current facility-administered medications on file prior to encounter.      Current Outpatient Prescriptions on File Prior to Encounter   Medication Sig Dispense Refill   • atorvastatin (LIPITOR) 20 MG tablet Take 20 mg by mouth Every Night.     • Exenatide ER 2 MG pen-injector Inject  under the skin 1 (One) Time Per Week.     • glimepiride (AMARYL) 2 MG tablet Take 2 mg by mouth Every Morning Before Breakfast.     • loperamide (IMODIUM) 2 MG capsule Take 2 mg by mouth 2 (Two) Times a Day As Needed for diarrhea.     • metFORMIN (GLUCOPHAGE) 500 MG tablet Take 500 mg by mouth 2 (two) times a day. May take 3 depending on glucose reading         Review of Systems:  Review of Systems   Constitutional: Negative for activity change.   HENT: Positive for congestion.    Respiratory: Negative for chest tightness and shortness of breath.    Cardiovascular: Negative for  chest pain.   Gastrointestinal: Negative for constipation and diarrhea.   Genitourinary: Positive for difficulty urinating and dysuria.      Otherwise complete ROS is negative except as mentioned above.    Physical Exam:   Temp:  [96.8 °F (36 °C)-98 °F (36.7 °C)] 97.7 °F (36.5 °C)  Heart Rate:  [63-90] 88  Resp:  [16-24] 20  BP: (118-166)/(67-89) 125/73  Physical Exam   Constitutional: He appears well-developed and well-nourished. No distress.   HENT:   Head: Normocephalic and atraumatic.   Cardiovascular: Normal rate.    Pulmonary/Chest: Effort normal. No respiratory distress. He has no wheezes.   Abdominal: Soft. He exhibits no distension.   Musculoskeletal: Normal range of motion.   Neurological: He is alert. No cranial nerve deficit.   Skin: Skin is warm. He is not diaphoretic.   Psychiatric: He has a normal mood and affect. His behavior is normal. Judgment and thought content normal.   Vitals reviewed.        Results Reviewed:  I have personally reviewed current lab, radiology, and data and agree with results.  Lab Results (last 24 hours)     Procedure Component Value Units Date/Time    POC Glucose Once [[id number]]  (Normal) Collected:  12/15/17 1244    Specimen:  Blood Updated:  12/15/17 1255     Glucose 109 mg/dL       Meter: [id number]: [id number] [facility]       POC Glucose Once [[id number]]  (Normal) Collected:  12/15/17 1713    Specimen:  Blood Updated:  12/15/17 1725     Glucose 117 mg/dL       RN NotifiedMeter: [id number]: [id number] EJ BRADFORD       CBC & Differential [[id number]] Collected:  12/16/17 0602    Specimen:  Blood Updated:  12/16/17 0639    Narrative:       The following orders were created for panel order CBC & Differential.  Procedure                               Abnormality         Status                     ---------                               -----------         ------                     CBC Auto Differential[[id number]]        Abnormal            Final  result                 Please view results for these tests on the individual orders.    CBC Auto Differential [[id number]]  (Abnormal) Collected:  12/16/17 0602    Specimen:  Blood Updated:  12/16/17 0639     WBC 10.38 (H) 10*3/mm3      RBC 4.94 10*6/mm3      Hemoglobin 14.8 g/dL      Hematocrit 45.0 %      MCV 91.1 fL      MCH 30.0 pg      MCHC 32.9 g/dL      RDW 13.7 %      RDW-SD 45.7 (H) fl      MPV 9.1 fL      Platelets 261 10*3/mm3      Neutrophil % 82.4 (H) %      Lymphocyte % 10.8 %      Monocyte % 6.2 %      Eosinophil % 0.1 %      Basophil % 0.2 %      Immature Grans % 0.3 %      Neutrophils, Absolute 8.56 10*3/mm3      Lymphocytes, Absolute 1.12 10*3/mm3      Monocytes, Absolute 0.64 10*3/mm3      Eosinophils, Absolute 0.01 10*3/mm3      Basophils, Absolute 0.02 10*3/mm3      Immature Grans, Absolute 0.03 (H) 10*3/mm3     Basic Metabolic Panel [[id number]]  (Abnormal) Collected:  12/16/17 0602    Specimen:  Blood Updated:  12/16/17 0651     Glucose 170 (H) mg/dL      BUN 17 mg/dL      Creatinine 1.08 mg/dL      Sodium 136 (L) mmol/L      Potassium 4.5 mmol/L      Chloride 98 mmol/L      CO2 27.0 mmol/L      Calcium 8.9 mg/dL      eGFR Non African Amer 68 mL/min/1.73      BUN/Creatinine Ratio 15.7     Anion Gap 11.0 mmol/L         Imaging Results (last 24 hours)     Procedure Component Value Units Date/Time    FL Retrograde Pyelogram In OR [[id number]] Updated:  12/15/17 1702            Assessment:    Hospital Problem List     * (Principal)Kidney stone    Overview Signed 12/13/2017  8:42 AM by Prosper Ariza MD     Added automatically from request for surgery [id number]             Kidney stone - dr. Ocampo managing. Currently has skinner catheter    Hypoxia - nebulizer treatments ordered, supplemental oxygen , chest xray , ABG    DM II - metformin          Xu Galo MD  12/16/17  10:36 AM                   Electronically signed by Xu Galo MD at 12/16/2017 10:42 AM                    12/10 -  12/11 12/11 - 12/12 12/12 - 12/13 12/13 - 12/14 12/14 - 12/15 12/15 - 12/16 12/16 - 12/17 12/17 - 12/18 12/18 - 12/19    24 Hrs:  0701 0701 0701 0701 0701 0701 0701 0701 0701        Vitals    Temp       98 (36.7) 98.9 (37.2) 97.7 (36.5) 98.9 (37.2)  Temp     Temp Source       Oral Oral Temporal Art Oral  Temp Source     Pulse      80 80 91 90 87  Pulse     Heart Rate Source       Monitor Monitor Monitor Monitor  Heart Rate Source     Resp rate      18 18 20 18 18  Resp rate     Resp rate source      Visual Visual Visual Visual Visual  Resp rate source     BP (cuff)      140/88 135/82 143/74 140/78 128/79  BP (cuff)     BP Location      Right arm Right arm Right arm Right arm Right arm  BP Location     BP Method      Manual Automatic Automatic Automatic Automatic  BP Method     Orthostatic Position      Sitting Lying Lying Lying Lying  Orthostatic Position     Weight      101 kg (222 lb) 103 kg (227 l...     Weight     Pain    Presence Of Pain       complains of... denies pain/... complains of... denies pain/...  Presence Of Pain     Preferred Pain Scale       word (verbal...  number (Nume... number (Nume...  Preferred Pain Scale     Pain Rating (0-10): Rest       0 0 4 0  Pain Rating (0-10): Rest     Nonverbal Indicators Of Pain       moaning;grim...     Nonverbal Indicators Of Pain     Pain Management Interventions       medicated medicated medicated   Pain Management Interventions     Sleep/Rest/Relaxation       awake awake awake awake  Sleep/Rest/Relaxation     Sedation Scales    Activity       2-->moves 4 ... 2-->moves 4 ...    Activity     Respiration       2-->able to ... 2-->able to ...    Respiration     Circulation       2-->BP withi... 2-->BP withi...    Circulation     Consciousness       2-->fully aw... 2-->fully aw...    Consciousness     O2 Saturation       1-->needs O2... 1-->needs O2...    O2 Saturation     Rose (with O2 Saturation) Score       9 9    Rose (with O2 Saturation) Score     (POSS)  Pasero Opioid-Induced Sed Scale       S - Sleep, e...     (POSS) Pasero Opioid-Induced Sed Scale     Oxygenation    SpO2      95 94 92 95 88  SpO2     Pulse Oximetry Type       Continuous Intermittent Intermittent Intermittent  Pulse Oximetry Type     O2 Device      room air nasal cannula nasal cannula nasal cannula room air  O2 Device     Flow (L/min)

## 2017-12-18 NOTE — PLAN OF CARE
Problem: Patient Care Overview (Adult)  Goal: Plan of Care Review  Outcome: Ongoing (interventions implemented as appropriate)    12/18/17 0258   Coping/Psychosocial Response Interventions   Plan Of Care Reviewed With patient   Patient Care Overview   Progress no change   Outcome Evaluation   Outcome Summary/Follow up Plan Patient VSS, no changes       Goal: Adult Individualization and Mutuality  Outcome: Ongoing (interventions implemented as appropriate)  Goal: Discharge Needs Assessment  Outcome: Ongoing (interventions implemented as appropriate)    Problem: Infection, Risk/Actual (Adult)  Goal: Infection Prevention/Resolution  Outcome: Ongoing (interventions implemented as appropriate)    Problem: Pain, Acute (Adult)  Goal: Acceptable Pain Control/Comfort Level  Outcome: Ongoing (interventions implemented as appropriate)    Problem: Respiratory Insufficiency (Adult)  Goal: Acid/Base Balance  Outcome: Ongoing (interventions implemented as appropriate)  Goal: Effective Ventilation  Outcome: Ongoing (interventions implemented as appropriate)    Problem: Urine Elimination, Impaired (Adult)  Goal: Effective Urinary Elimination  Outcome: Ongoing (interventions implemented as appropriate)  Goal: Effective Containment of Urine  Outcome: Ongoing (interventions implemented as appropriate)

## 2017-12-18 NOTE — NURSING NOTE
Pressure held to glans per MD instructions for 10 minutes. Swelling was decreased and was able to pull foreskin over glans of penis. Patient tolerated well. Festus EID at bedside to assist

## 2017-12-18 NOTE — PROGRESS NOTES
LOS: 0 days     Patient Care Team:  Jaxson Zepeda MD as PCP - General      Subjective     Urine retention BPH left renal stone with J stent  Patient reports that catheter was removed about an hour ago. He denies having had any urination since skinner removal and states that he can't get his foreskin to come forward as it feels swollen.     Objective       Vital Signs  Temp:  [97.2 °F (36.2 °C)-98.5 °F (36.9 °C)] 97.7 °F (36.5 °C)  Heart Rate:  [74-97] 74  Resp:  [18-20] 18  BP: (119-166)/(72-97) 140/78    Physical Exam:        General Appearance:    Alert and oriented in no acute distress.      Respiratory:    UNLABORED RESPIRATIONS, clear to auscultation bilaterally.     Abdomen:     SOFT, ND, NT, BS+.       Genitourinary:    Skinner removed, swollen and slightly erythematous prepuce.     Rectal:     DEFERRED       Results Review:       Imaging Results (last 24 hours)     Procedure Component Value Units Date/Time    FL Retrograde Pyelogram In OR [[id number]] Resulted:  12/18/17 0811     Updated:  12/18/17 0811        Lab Results (last 24 hours)     Procedure Component Value Units Date/Time    POC Glucose Once [[id number]]  (Abnormal) Collected:  12/17/17 1058    Specimen:  Blood Updated:  12/17/17 1122     Glucose 147 (H) mg/dL       RN NotifiedMeter: [id number]: [id number] ANGIE BREWSTER       POC Glucose Once [[id number]]  (Abnormal) Collected:  12/17/17 1624    Specimen:  Blood Updated:  12/17/17 1637     Glucose 134 (H) mg/dL       RN NotifiedMeter: [id number]: [id number] ANGIE BREWSTER       POC Glucose Once [[id number]]  (Abnormal) Collected:  12/17/17 2014    Specimen:  Blood Updated:  12/17/17 2032     Glucose 180 (H) mg/dL       RN NotifiedMeter: [id number]: [id number] ROGERIO HUSSEIN       CBC & Differential [[id number]] Collected:  12/18/17 0528    Specimen:  Blood Updated:  12/18/17 0628    Narrative:       The following orders were created for panel order CBC & Differential.  Procedure                                Abnormality         Status                     ---------                               -----------         ------                     CBC Auto Differential[[id number]]        Abnormal            Final result                 Please view results for these tests on the individual orders.    CBC Auto Differential [[id number]]  (Abnormal) Collected:  12/18/17 0528    Specimen:  Blood Updated:  12/18/17 0628     WBC 8.34 10*3/mm3      RBC 4.83 10*6/mm3      Hemoglobin 14.8 g/dL      Hematocrit 45.5 %      MCV 94.2 fL      MCH 30.6 pg      MCHC 32.5 g/dL      RDW 14.3 %      RDW-SD 49.5 (H) fl      MPV 8.6 fL      Platelets 302 10*3/mm3      Neutrophil % 63.4 %      Lymphocyte % 22.9 %      Monocyte % 10.2 %      Eosinophil % 2.3 %      Basophil % 0.5 %      Immature Grans % 0.7 (H) %      Neutrophils, Absolute 5.29 10*3/mm3      Lymphocytes, Absolute 1.91 10*3/mm3      Monocytes, Absolute 0.85 10*3/mm3      Eosinophils, Absolute 0.19 10*3/mm3      Basophils, Absolute 0.04 10*3/mm3      Immature Grans, Absolute 0.06 (H) 10*3/mm3     POC Glucose Once [[id number]]  (Abnormal) Collected:  12/18/17 0705    Specimen:  Blood Updated:  12/18/17 0719     Glucose 156 (H) mg/dL       RN NotifiedMeter: [id number]: [id number] ROGERIO HUSSEIN       Basic Metabolic Panel [[id number]]  (Abnormal) Collected:  12/18/17 0528    Specimen:  Blood Updated:  12/18/17 0824     Glucose 146 (H) mg/dL      BUN 14 mg/dL      Creatinine 1.12 mg/dL      Sodium 140 mmol/L      Potassium 3.7 mmol/L      Chloride 95 mmol/L      CO2 29.0 mmol/L      Calcium 8.9 mg/dL      eGFR Non African Amer 65 mL/min/1.73      BUN/Creatinine Ratio 12.5     Anion Gap 16.0 (H) mmol/L             I reviewed the patient's new clinical results.  I reviewed the patient's new imaging results and agree with the interpretation.  I reviewed the patient's other test results and agree with the interpretation        Assessment/Plan     Principal  Problem:    Hematuria syndrome  Active Problems:    Kidney stone      Evans D/Bang this morning, no urination yet, swollen prepuce. Will discuss with Dr. Ariza. ESWL planned for later this week.          This document has been electronically signed by Jorge Sanchez MD on December 18, 2017 9:57 AM      Jorge Sanchez MD  12/18/17  9:55 AM

## 2017-12-27 NOTE — DISCHARGE SUMMARY
Date of Discharge:  12/18/2017    Discharge Diagnosis: hematuria    Secondary Diagnoses: HTN, DM, COPD    History of Present Illness:  Kidney stone [N20.0]  Kidney stone [N20.0]  Kidney stone [N20.0]       Hospital Course  Patient Rolando Novoa  is a 72 y.o. male presented on 12/16/17 with urinary retention and difficulty placing catheter after LEFT CRULLS (J-stent in place) 12/15/17 for proximal left ureteric stone. On 12/16/17 Dr. Ariza performed cystoscopy with clot evacuation on 12/16/17 and anchored a 22 F 3-way catheter for bladder irrigation and noted a high riding prostate with prostate obstruction and hemorrhagic bladder.  He was discharged on 12/18/17 with an SpO2 of 88% on room air, medical history included pneumoconiosis and is followed by the [facility] and he does not use oxygen supplementation at home. He was asymptomatic from a respiratory standpoint. CXR showed no acute process. Dr. Ariza' plan was to discharge home and perform LEFT ESWL later in the week.     Procedures Performed  Procedure(s):  CYSTOSCOPY WITH CLOT EVACUATION       Consults:   Consults     Date and Time Order Name Status Description    12/16/2017 0931 Inpatient Consult to Hospitalist Completed           Pertinent Test Results:   Lab Results (last 72 hours)     Procedure Component Value Units Date/Time    CBC & Differential [[id number]] Collected:  12/16/17 0602    Specimen:  Blood Updated:  12/16/17 0639    Narrative:       The following orders were created for panel order CBC & Differential.  Procedure                               Abnormality         Status                     ---------                               -----------         ------                     CBC Auto Differential[[id number]]        Abnormal            Final result                 Please view results for these tests on the individual orders.    CBC Auto Differential [[id number]]  (Abnormal) Collected:  12/16/17 0602    Specimen:  Blood Updated:   12/16/17 0639     WBC 10.38 (H) 10*3/mm3      RBC 4.94 10*6/mm3      Hemoglobin 14.8 g/dL      Hematocrit 45.0 %      MCV 91.1 fL      MCH 30.0 pg      MCHC 32.9 g/dL      RDW 13.7 %      RDW-SD 45.7 (H) fl      MPV 9.1 fL      Platelets 261 10*3/mm3      Neutrophil % 82.4 (H) %      Lymphocyte % 10.8 %      Monocyte % 6.2 %      Eosinophil % 0.1 %      Basophil % 0.2 %      Immature Grans % 0.3 %      Neutrophils, Absolute 8.56 10*3/mm3      Lymphocytes, Absolute 1.12 10*3/mm3      Monocytes, Absolute 0.64 10*3/mm3      Eosinophils, Absolute 0.01 10*3/mm3      Basophils, Absolute 0.02 10*3/mm3      Immature Grans, Absolute 0.03 (H) 10*3/mm3     Basic Metabolic Panel [[id number]]  (Abnormal) Collected:  12/16/17 0602    Specimen:  Blood Updated:  12/16/17 0651     Glucose 170 (H) mg/dL      BUN 17 mg/dL      Creatinine 1.08 mg/dL      Sodium 136 (L) mmol/L      Potassium 4.5 mmol/L      Chloride 98 mmol/L      CO2 27.0 mmol/L      Calcium 8.9 mg/dL      eGFR Non African Amer 68 mL/min/1.73      BUN/Creatinine Ratio 15.7     Anion Gap 11.0 mmol/L     Blood Gas, Arterial [[id number]]  (Abnormal) Collected:  12/16/17 1153    Specimen:  Arterial Blood from Arm, Right Updated:  12/16/17 1216     Site --      Not performed at this site.        Elder's Test --      Not performed at this site.        pH, Arterial 7.367 pH units      pCO2, Arterial 50.4 (H) mm Hg      pO2, Arterial 65.3 (L) mm Hg      HCO3, Arterial 28.3 (H) mmol/L      Base Excess, Arterial 1.9 mmol/L      O2 Saturation, Arterial 92.7 %      Hemoglobin, Blood Gas 16.0 g/dL      Hematocrit, Blood Gas 47.0 %      CO2 Content 29.8 (H)     Sodium, Arterial 138.2 mmol/L      Potassium, Arterial 4.21 mmol/L      Glucose, Arterial 150 mmol/L      Barometric Pressure for Blood Gas -- mmHg       Not performed at this site.        Modality --      Not performed at this site.        Ionized Calcium 4.70 mg/dL     POC Glucose Once [[id number]]  (Normal) Collected:   12/16/17 1338    Specimen:  Blood Updated:  12/16/17 1350     Glucose 129 mg/dL       RN NotifiedMeter: [id number]: [id number] PERCY CARRASCO       POC Glucose Once [[id number]]  (Abnormal) Collected:  12/16/17 1643    Specimen:  Blood Updated:  12/16/17 1656     Glucose 164 (H) mg/dL       RN NotifiedMeter: [id number]: [id number] ANGIE BREWSTER       POC Glucose Once [[id number]]  (Abnormal) Collected:  12/16/17 2051    Specimen:  Blood Updated:  12/16/17 2129     Glucose 201 (H) mg/dL       RN NotifiedMeter: [id number]: [id number] ROGERIO HUSSEIN       Comprehensive Metabolic Panel [[id number]]  (Abnormal) Collected:  12/17/17 0529    Specimen:  Blood Updated:  12/17/17 0718     Glucose 154 (H) mg/dL      BUN 16 mg/dL      Creatinine 1.06 mg/dL      Sodium 138 mmol/L      Potassium 3.9 mmol/L      Chloride 96 mmol/L      CO2 28.0 mmol/L      Calcium 8.2 (L) mg/dL      Total Protein 7.2 g/dL      Albumin 4.00 g/dL      ALT (SGPT) 46 U/L      AST (SGOT) 26 U/L      Alkaline Phosphatase 57 U/L      Total Bilirubin 0.9 mg/dL      eGFR Non African Amer 69 mL/min/1.73      Globulin 3.2 gm/dL      A/G Ratio 1.3 g/dL      BUN/Creatinine Ratio 15.1     Anion Gap 14.0 mmol/L     CBC & Differential [[id number]] Collected:  12/17/17 0529    Specimen:  Blood Updated:  12/17/17 0725    Narrative:       The following orders were created for panel order CBC & Differential.  Procedure                               Abnormality         Status                     ---------                               -----------         ------                     CBC Auto Differential[[id number]]        Abnormal            Final result                 Please view results for these tests on the individual orders.    CBC Auto Differential [[id number]]  (Abnormal) Collected:  12/17/17 0529    Specimen:  Blood Updated:  12/17/17 0725     WBC 10.69 (H) 10*3/mm3      RBC 4.72 10*6/mm3      Hemoglobin 14.1 g/dL      Hematocrit 44.1 %       MCV 93.4 fL      MCH 29.9 pg      MCHC 32.0 g/dL      RDW 14.2 %      RDW-SD 48.1 (H) fl      MPV 9.1 fL      Platelets 254 10*3/mm3      Neutrophil % 71.4 %      Lymphocyte % 14.0 %      Monocyte % 13.0 (H) %      Eosinophil % 0.7 %      Basophil % 0.2 %      Immature Grans % 0.7 (H) %      Neutrophils, Absolute 7.63 10*3/mm3      Lymphocytes, Absolute 1.50 10*3/mm3      Monocytes, Absolute 1.39 (H) 10*3/mm3      Eosinophils, Absolute 0.08 10*3/mm3      Basophils, Absolute 0.02 10*3/mm3      Immature Grans, Absolute 0.07 (H) 10*3/mm3     POC Glucose Once [[id number]]  (Abnormal) Collected:  12/17/17 0715    Specimen:  Blood Updated:  12/17/17 0732     Glucose 165 (H) mg/dL       RN NotifiedMeter: [id number]: [id number] ANGIE BREWSTER       POC Glucose Once [[id number]]  (Abnormal) Collected:  12/17/17 1058    Specimen:  Blood Updated:  12/17/17 1122     Glucose 147 (H) mg/dL       RN NotifiedMeter: [id number]: [id number] ANGIE BREWSTER       POC Glucose Once [[id number]]  (Abnormal) Collected:  12/17/17 1624    Specimen:  Blood Updated:  12/17/17 1637     Glucose 134 (H) mg/dL       RN NotifiedMeter: [id number]: [id number] ANGIE BREWSTER       POC Glucose Once [[id number]]  (Abnormal) Collected:  12/17/17 2014    Specimen:  Blood Updated:  12/17/17 2032     Glucose 180 (H) mg/dL       RN NotifiedMeter: [id number]: [id number] ROGERIO HUSSEIN       CBC & Differential [[id number]] Collected:  12/18/17 0528    Specimen:  Blood Updated:  12/18/17 0628    Narrative:       The following orders were created for panel order CBC & Differential.  Procedure                               Abnormality         Status                     ---------                               -----------         ------                     CBC Auto Differential[[id number]]        Abnormal            Final result                 Please view results for these tests on the individual orders.    CBC Auto Differential  "[[id number]]  (Abnormal) Collected:  12/18/17 0528    Specimen:  Blood Updated:  12/18/17 0628     WBC 8.34 10*3/mm3      RBC 4.83 10*6/mm3      Hemoglobin 14.8 g/dL      Hematocrit 45.5 %      MCV 94.2 fL      MCH 30.6 pg      MCHC 32.5 g/dL      RDW 14.3 %      RDW-SD 49.5 (H) fl      MPV 8.6 fL      Platelets 302 10*3/mm3      Neutrophil % 63.4 %      Lymphocyte % 22.9 %      Monocyte % 10.2 %      Eosinophil % 2.3 %      Basophil % 0.5 %      Immature Grans % 0.7 (H) %      Neutrophils, Absolute 5.29 10*3/mm3      Lymphocytes, Absolute 1.91 10*3/mm3      Monocytes, Absolute 0.85 10*3/mm3      Eosinophils, Absolute 0.19 10*3/mm3      Basophils, Absolute 0.04 10*3/mm3      Immature Grans, Absolute 0.06 (H) 10*3/mm3     POC Glucose Once [[id number]]  (Abnormal) Collected:  12/18/17 0705    Specimen:  Blood Updated:  12/18/17 0719     Glucose 156 (H) mg/dL       RN NotifiedMeter: [id number]: [id number] ROGERIO HUSSEIN       Basic Metabolic Panel [[id number]]  (Abnormal) Collected:  12/18/17 0528    Specimen:  Blood Updated:  12/18/17 0824     Glucose 146 (H) mg/dL      BUN 14 mg/dL      Creatinine 1.12 mg/dL      Sodium 140 mmol/L      Potassium 3.7 mmol/L      Chloride 95 mmol/L      CO2 29.0 mmol/L      Calcium 8.9 mg/dL      eGFR Non African Amer 65 mL/min/1.73      BUN/Creatinine Ratio 12.5     Anion Gap 16.0 (H) mmol/L     POC Glucose Once [[id number]]  (Abnormal) Collected:  12/18/17 1043    Specimen:  Blood Updated:  12/18/17 1059     Glucose 133 (H) mg/dL       RN NotifiedMeter: [id number]: [id number] HUSK JV             Condition on Discharge:  stable    Vital Signs  Blood pressure 128/79, pulse 87, temperature 98.9 °F (37.2 °C), temperature source Oral, resp. rate 18, height 165.1 cm (65\"), weight 103 kg (227 lb 8.2 oz), SpO2 (!) 88 %.        Discharge Disposition  Home or Self Care    Discharge Medications   Rolando Novoa   Home Medication Instructions JOSÉ MIGUEL:[id number]    Printed " on:12/27/17 1603   Medication Information                      atorvastatin (LIPITOR) 20 MG tablet  Take 20 mg by mouth Every Night.             Exenatide ER 2 MG pen-injector  Inject  under the skin 1 (One) Time Per Week.             glimepiride (AMARYL) 2 MG tablet  Take 2 mg by mouth Every Morning Before Breakfast.             loperamide (IMODIUM) 2 MG capsule  Take 2 mg by mouth 2 (Two) Times a Day As Needed for diarrhea.             metFORMIN (GLUCOPHAGE) 500 MG tablet  Take 500 mg by mouth 2 (two) times a day. May take 3 depending on glucose reading             nebivolol (BYSTOLIC) 10 MG tablet  Take 10 mg by mouth Daily.             omeprazole (priLOSEC) 20 MG capsule  Take 20 mg by mouth Daily.             oxyCODONE-acetaminophen (PERCOCET) 7.5-325 MG per tablet  Take 1-2 tablets by mouth Every 4 (Four) Hours As Needed (Pain).             tamsulosin (FLOMAX) 0.4 MG capsule 24 hr capsule  Take 1 capsule by mouth Every Night.                 Discharge Diet:   Diet Instructions     Advance Diet as Tolerated                     Activity at Discharge:   Activity Instructions     Discharge Activity       1) No driving for 24 hours or while taking pain medications.  2) May shower / sponge bathe after 24 hours.  3) Do not lift / push / pull more then 10 lbs.                 Follow-up Appointments  Future Appointments  Date Time Provider Department Center   3/23/2018 9:15 AM Adonay Grijalva MD [facility] None     Additional Instructions for the Follow-ups that You Need to Schedule     Dr ANTHONY  DEC. 26, 2017 AT 11:20 A.M.  [phone]                 Test Results Pending at Discharge       RIVKA Bonilla  12/27/17  4:03 PM

## 2018-03-21 RX ORDER — NEBIVOLOL HYDROCHLORIDE 10 MG/1
TABLET ORAL
Qty: 30 TABLET | Refills: 6 | Status: SHIPPED | OUTPATIENT
Start: 2018-03-21 | End: 2018-05-02

## 2018-03-23 ENCOUNTER — DOCUMENTATION (OUTPATIENT)
Dept: CARDIOLOGY | Facility: CLINIC | Age: 73
End: 2018-03-23

## 2018-03-23 ENCOUNTER — OFFICE VISIT (OUTPATIENT)
Dept: CARDIOLOGY | Facility: CLINIC | Age: 73
End: 2018-03-23

## 2018-03-23 VITALS
HEIGHT: 65 IN | DIASTOLIC BLOOD PRESSURE: 62 MMHG | SYSTOLIC BLOOD PRESSURE: 132 MMHG | OXYGEN SATURATION: 94 % | WEIGHT: 227.5 LBS | HEART RATE: 75 BPM | BODY MASS INDEX: 37.9 KG/M2

## 2018-03-23 DIAGNOSIS — E11.9 TYPE 2 DIABETES MELLITUS WITHOUT COMPLICATION, WITHOUT LONG-TERM CURRENT USE OF INSULIN (HCC): ICD-10-CM

## 2018-03-23 DIAGNOSIS — I10 ESSENTIAL HYPERTENSION: ICD-10-CM

## 2018-03-23 DIAGNOSIS — E78.00 PURE HYPERCHOLESTEROLEMIA: ICD-10-CM

## 2018-03-23 DIAGNOSIS — R06.02 SOB (SHORTNESS OF BREATH): Primary | ICD-10-CM

## 2018-03-23 PROCEDURE — 99214 OFFICE O/P EST MOD 30 MIN: CPT | Performed by: INTERNAL MEDICINE

## 2018-03-23 NOTE — PROGRESS NOTES
[facility]  OFFICE NOTE    Rolando Novoa  72 y.o. male    03/23/2018  1. SOB (shortness of breath)    2. Essential hypertension    3. Pure hypercholesterolemia    4. Type 2 diabetes mellitus without complication, without long-term current use of insulin        Chief complaint -Follow-up shortness of breath      History of present Illness- 72-year-old gentleman who has history of diabetes, hypertension no history of CAD has complications of diabetes with neuropathy and retinopathy.  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 He has been having multiple kidney stones and they've been removing it still he has couple more stones left.  His echo showed normal LV systolic function.  He is a diabetic and has been having some burning sensation on the left side of the chest predominately to the lower abdominal area.  He never had any history of CAD in the past.  And will do a Lexiscan nuclear stress to rule out ischemia as he has multiple risk factors.          No Known Allergies      Past Medical History:   Diagnosis Date   • Acid reflux    • Diabetes    • Enlarged prostate    • High cholesterol    • Hypertension    • Kidney stone     multiple   • Psoriasis          Past Surgical History:   Procedure Laterality Date   • AMPUTATION Left     lower arm and hand   • CHOLECYSTECTOMY OPEN     • CYSTOSCOPY N/A 12/16/2017    Procedure: CYSTOSCOPY WITH CLOT EVACUATION;  Surgeon: Prosper Ariza MD;  Location: [facility];  Service:    • CYSTOSCOPY, URETEROSCOPY, RETROGRADE PYELOGRAM, STENT INSERTION Left 12/15/2017    Procedure: CYSTOSCOPY LEFT URETEROSCOPY RETROGRADE PYELOGRAM HOLMIUM LASER STENT INSERTION;  Surgeon: Prosper Ariza MD;  Location: [facility];  Service:    • EXTRACORPOREAL SHOCK WAVE LITHOTRIPSY (ESWL)     • EXTRACORPOREAL SHOCKWAVE LITHOTRIPSY (ESWL), STENT INSERTION/REMOVAL Left 2/3/2017    Procedure: LEFT EXTRACORPOREAL SHOCKWAVE LITHOTRIPSY, POSSIBLE CYSTOSCOPY, POSSIBLE  STENT REMOVAL ;  Surgeon: Prosper Ariza MD;  Location: [facility];  Service:    • KIDNEY STONE SURGERY     • SCROTAL SURGERY      trauma         No family history on file.      Social History     Social History   • Marital status:      Spouse name: N/A   • Number of children: N/A   • Years of education: N/A     Occupational History   • Not on file.     Social History Main Topics   • Smoking status: Never Smoker   • Smokeless tobacco: Never Used   • Alcohol use No   • Drug use: No   • Sexual activity: Defer     Other Topics Concern   • Not on file     Social History Narrative   • No narrative on file         Current Outpatient Prescriptions   Medication Sig Dispense Refill   • atorvastatin (LIPITOR) 20 MG tablet Take 20 mg by mouth Every Night.     • BYSTOLIC 10 MG tablet TAKE ONE TABLET BY MOUTH ONCE DAILY 30 tablet 6   • Exenatide ER 2 MG pen-injector Inject  under the skin 1 (One) Time Per Week.     • glimepiride (AMARYL) 2 MG tablet Take 2 mg by mouth Every Morning Before Breakfast.     • loperamide (IMODIUM) 2 MG capsule Take 2 mg by mouth 2 (Two) Times a Day As Needed for diarrhea.     • metFORMIN (GLUCOPHAGE) 500 MG tablet Take 500 mg by mouth 2 (two) times a day. May take 3 depending on glucose reading     • omeprazole (priLOSEC) 20 MG capsule Take 20 mg by mouth Daily.     • oxyCODONE-acetaminophen (PERCOCET) 7.5-325 MG per tablet Take 1-2 tablets by mouth Every 4 (Four) Hours As Needed (Pain). 12 tablet 0   • tamsulosin (FLOMAX) 0.4 MG capsule 24 hr capsule Take 1 capsule by mouth Every Night.     • TANZEUM 30 MG pen-injector        No current facility-administered medications for this visit.          Review of Systems     Constitution: Denies any fatigue, fever or chills    HENT: Denies any headache, hearing impairment,     Eyes: Has diabetic retinopathy     Cardivascular - As per history of present illness     Respiratory system-Has black lung with coal workers pneumoconiosis     Endocrine:    "history of hyperlipidemia, diabetes,         Musculoskeletal:   history of arthritis with history of amputation of the left forearm    Gastrointestinal: No nausea, vomiting, or melena    Genitourinary: BPH    Neurological:   No history of seizure disorder, stroke, memory problems    Psychiatric/Behavioral:        No history of depression,      Hematological- no history of easy bruising             OBJECTIVE    /62 (BP Location: Right arm, Patient Position: Sitting, Cuff Size: Adult)   Pulse 75   Ht 165.1 cm (65\")   Wt 103 kg (227 lb 8 oz)   SpO2 94%   BMI 37.86 kg/m²       Physical Exam     Constitutional: is oriented to person, place, and time.     Skin-warm and dry    Well developed and nourished in no acute distress      Head: Normocephalic and atraumatic.     Eyes: Pupils are equal, round,     Neck: Neck supple. No bruit in the carotids,     Cardiovascular: [place] in the fifth intercostal space   Regular rate, and  Rhythm,    S1 greater than S2, no S3 or S4, no gallop     Pulmonary/Chest:   Air  Entry is equal on both sides  No wheezing or crackles,      Abdominal: Soft.  No hepatosplenomegaly,     Musculoskeletal: Arthritis of the left hip    Neurological: is alert and oriented to person, place, and time.    cranial nerve are intact .   Peripheral neuropathy    Extremities-no edema, amputation of the left forearm      Psychiatric: He has a normal mood and affect.                  His behavior is normal.           Procedures      Lab Results   Component Value Date    WBC 8.34 12/18/2017    HGB 14.8 12/18/2017    HCT 45.5 12/18/2017    MCV 94.2 12/18/2017     12/18/2017     Lab Results   Component Value Date    GLUCOSE 146 (H) 12/18/2017    BUN 14 12/18/2017    CREATININE 1.12 12/18/2017    EGFRIFNONA 65 12/18/2017    BCR 12.5 12/18/2017    CO2 29.0 12/18/2017    CALCIUM 8.9 12/18/2017    ALBUMIN 4.00 12/17/2017    LABIL2 1.3 12/17/2017    AST 26 12/17/2017    ALT 46 12/17/2017 "         A/P    Shortness of breath-with history of hypertension and diabetes-Also has some burning sensation on the left side of the lower chest and abdominal area, will do a Lexiscan nuclear stress to rule out ischemia.    Hyperlipidemia on atorvastatin doing okay followed by Dr. Zepeda    Diabetes on metformin and glipizide and is developing complications with neuropathy    BMI of 37.8.  Talked to him about exercise and watching the diet.    Follow-up in 12 months            This document has been electronically signed by Adonay Grijalva MD on March 23, 2018 9:39 AM       EMR Dragon/Transcription disclaimer:   Some of this note may be an electronic transcription/translation of spoken language to printed text. The electronic translation of spoken language may permit erroneous, or at times, nonsensical words or phrases to be inadvertently transcribed; Although I have reviewed the note for such errors, some may still exist.

## 2018-04-27 ENCOUNTER — PREP FOR SURGERY (OUTPATIENT)
Dept: OTHER | Facility: HOSPITAL | Age: 73
End: 2018-04-27

## 2018-04-27 DIAGNOSIS — N20.0 CALCULUS OF KIDNEY: Primary | ICD-10-CM

## 2018-04-27 RX ORDER — LEVOFLOXACIN 5 MG/ML
500 INJECTION, SOLUTION INTRAVENOUS ONCE
Status: CANCELLED | OUTPATIENT
Start: 2018-05-04 | End: 2018-05-04

## 2018-05-01 NOTE — H&P
"[facility] History and Physical  JAMIA WHITAKER  73-year-old; :1945;;male  [address];[place] [address]  Ins: 1) KORTNEY/FRANK  Employer: [employer];  MRN:[id number]  [facility] NITA KIRAN SALMON MD  [facility] [place]  2018 10:20 AM  Allergies  No Known Allergies Unknown  Current Medications  lisinopril 40 mg tablet 1 oral  Prilosec 20 mg capsule,delayed release 1 oral  ondansetron 4 mg oral tablet 1 tablet every 6  hours as needed for nausea  Flomax 0.4 mg oral capsule Take 1 Capsule(s)  once a day (at bedtime)  Problem List  Calculus of kidney and ureter  Medical History  DIABETES UNSP  CALCULUS KIDNEY  HYPERTENSION  Heart disease  Surgical History  KIDNEY STONE SURGERY  Gallbladder removed  Arm surgery  AMPUTATED 40 YRS AGO  ESWL 2016  ESWL 2017  Psychiatric History  Patient is generally satisfied with life and has no  depression or thoughts of suicide. Has anxiety.  Family History  HEART DISEASE  DIABETES UNSP  HYPERTENSION  Mother  Father  Brother  Alive Brother  Brother Alive Sister   Sister   Social History  . Unemployed. Does not drink. Never  smoker. LIves with his wife.  Chief Complaint  Ureteral Calculi  History of Present Illness  JAMIA WHITAKER is a 73-year-old male here for evaluation. He has a history of  bilateral kidney stones. KUB today shows bilateral kidney stones similar in appearance from Feb. 19 and 6 mm upper LEFT ureteric stone at L2 unchanged. Renal ultrasound shows no hydronephrosis. He feels his stones are growing, he can \"feel the stones\" and wants to have ESWL now. Previous ESWL was 12/15/17 (left side) and required cysto with clot evac and J-stent removal.  Location: Ureter LEFT  Severity: mild  Onset / Duration: months  Timing: intermittent  Modifying factors: LEFT ESWL  Associated signs and symptoms: flank awareness  Review of Systems  Eyes: " Reports: blurry vision; Denies: pain in the eyes and double vision  ENMT: Reports: sinus problems; Denies: ear pain and sore throat  Cardiovascular: ; Denies: chest pain, varicose veins, angina and syncope  Respiratory: Reports: shortness of breath; Denies: wheezing and frequent cough  Gastrointestinal: ; Denies: abdominal pain, nausea, vomiting, indigestion and  heartburn  Genitourinary: Reports: other symptoms (see HPI)  Musculoskeletal: Reports: back pain; Denies: joint pain and neck pain  Integumentary: Reports: skin rash and persistent itch; Denies: boils  Neurological: Reports: dizzy spells; Denies: tremors and numbness / tingling  Psychiatric: Reports: generally satisfied with life and anxiety; Denies: depression  and suicidal ideation  Endocrine: Reports: tired/sluggish; Denies: Excessive thirst, cold intolerance and  heat intolerance  Hematologic/Lymphatic: ; Denies: swollen glands and blood clotting problem  Allergic/Immunologic: ; Denies: hayfever  Constitutional: ; Denies: fever, chills and weight loss  Vital Signs  4/23/2018 10:20:00 AM  BP: 141/80 (mmHg) Heart Rate: 67 (/min)  Weight: 225 (lb) Resp Rate: 18 (/min)  Height: 67 (in) BMI: 35.24  Exam  General appearance: The patient appears well developed and well nourished, in no apparent acute distress.  Examination of pupils and irises: Normal.  Assessment of hearing: Normal.  Examination of neck: Neck appears supple.  Assessment of respiratory effort: Respiratory effort appears normal. No apparent  distress.  Auscultation of lungs: Chest exam is clear to auscultation.  Auscultation of heart: Normal rate and rhythm noted.  Examination of Extremities for edema and/or varicosities: None  Inspection of breasts: Deferred.  Examination of abdomen: Soft benign abdomen on exam.  Obtain stool sample: Stool specimen for occult blood is not indicated.  Examination of gait and station: Normal.  Orientation to time, place and person: Oriented to person, place, and  time.  Mood and affect: Normal mood and affect.  Data Review  Medications and chart reviewed. History and physical form/ROS reviewed and no  changes noted. Previous encounter reviewed. Last form done 10/31/17. US ordered and reviewed by provider. KUB ordered and reviewed by PROVIDER today.  Assessment - Calculus of kidney and ureter  DX:  Calculus of kidney and ureter  SNO: [id number], ICD-9: 592.0, ICD-10: N20.2  Assessment Notes:  no UTI symptoms, no hydronephrosis  Plan  Plan Notes:  LEFT ESWL, possible cystoscopy, stent placement.  Education:  Kidney Stones - English  Discussion:  Discussed my findings and plan of action and reasoning behind decision making. All questions were answered. FINDINGS WERE DISCUSSED WITH PATIENT. RISKS AND BENEFITS EXPLAINED. PATIENT WISHES TO PROCEED.  Instructions:  Patient is instructed to call with any problems. Patient is instructed to call if the  condition worsens. Patient is instructed to call with any changes in condition. Patient is instructed to call if he has any intractable pain, fever, chills, nausea, or vomiting. INCREASE FLUIDS. IF PAIN WORSENS/ UNCONTROLLED OR TEMPERATURE >101.0 GO IMMEDIATELY TO ER.

## 2018-05-02 RX ORDER — NEBIVOLOL 10 MG/1
10 TABLET ORAL DAILY
COMMUNITY
End: 2020-02-25 | Stop reason: SDUPTHER

## 2018-05-04 ENCOUNTER — APPOINTMENT (OUTPATIENT)
Dept: GENERAL RADIOLOGY | Facility: HOSPITAL | Age: 73
End: 2018-05-04

## 2018-05-04 ENCOUNTER — HOSPITAL ENCOUNTER (OUTPATIENT)
Facility: HOSPITAL | Age: 73
Setting detail: HOSPITAL OUTPATIENT SURGERY
Discharge: HOME | End: 2018-05-04
Attending: UROLOGY | Admitting: UROLOGY

## 2018-05-04 ENCOUNTER — ANESTHESIA (OUTPATIENT)
Dept: PERIOP | Facility: HOSPITAL | Age: 73
End: 2018-05-04

## 2018-05-04 ENCOUNTER — ANESTHESIA EVENT (OUTPATIENT)
Dept: PERIOP | Facility: HOSPITAL | Age: 73
End: 2018-05-04

## 2018-05-04 VITALS
OXYGEN SATURATION: 93 % | SYSTOLIC BLOOD PRESSURE: 177 MMHG | RESPIRATION RATE: 20 BRPM | WEIGHT: 227.29 LBS | TEMPERATURE: 97 F | BODY MASS INDEX: 37.87 KG/M2 | DIASTOLIC BLOOD PRESSURE: 85 MMHG | HEART RATE: 80 BPM | HEIGHT: 65 IN

## 2018-05-04 DIAGNOSIS — N20.0 CALCULUS OF KIDNEY: ICD-10-CM

## 2018-05-04 LAB
ANION GAP SERPL CALCULATED.3IONS-SCNC: 11 MMOL/L (ref 5–15)
BILIRUB UR QL STRIP: NEGATIVE
BUN BLD-MCNC: 15 MG/DL (ref 7–21)
BUN/CREAT SERPL: 15.8 (ref 7–25)
CALCIUM SPEC-SCNC: 9 MG/DL (ref 8.4–10.2)
CHLORIDE SERPL-SCNC: 100 MMOL/L (ref 95–110)
CLARITY UR: CLEAR
CO2 SERPL-SCNC: 29 MMOL/L (ref 22–31)
COLOR UR: YELLOW
CREAT BLD-MCNC: 0.95 MG/DL (ref 0.7–1.3)
GFR SERPL CREATININE-BSD FRML MDRD: 78 ML/MIN/1.73 (ref 42–98)
GLUCOSE BLD-MCNC: 125 MG/DL (ref 60–100)
GLUCOSE BLDC GLUCOMTR-MCNC: 108 MG/DL (ref 70–130)
GLUCOSE UR STRIP-MCNC: NEGATIVE MG/DL
HGB UR QL STRIP.AUTO: ABNORMAL
KETONES UR QL STRIP: NEGATIVE
LEUKOCYTE ESTERASE UR QL STRIP.AUTO: NEGATIVE
NITRITE UR QL STRIP: NEGATIVE
PH UR STRIP.AUTO: <=5 [PH] (ref 5–9)
POTASSIUM BLD-SCNC: 4.2 MMOL/L (ref 3.5–5.1)
PROT UR QL STRIP: ABNORMAL
SODIUM BLD-SCNC: 140 MMOL/L (ref 137–145)
SP GR UR STRIP: 1.02 (ref 1–1.03)
UROBILINOGEN UR QL STRIP: ABNORMAL

## 2018-05-04 PROCEDURE — 82962 GLUCOSE BLOOD TEST: CPT

## 2018-05-04 PROCEDURE — 81003 URINALYSIS AUTO W/O SCOPE: CPT | Performed by: UROLOGY

## 2018-05-04 PROCEDURE — 25010000002 LEVOFLOXACIN PER 250 MG: Performed by: UROLOGY

## 2018-05-04 PROCEDURE — 93005 ELECTROCARDIOGRAM TRACING: CPT | Performed by: ANESTHESIOLOGY

## 2018-05-04 PROCEDURE — 93010 ELECTROCARDIOGRAM REPORT: CPT | Performed by: INTERNAL MEDICINE

## 2018-05-04 PROCEDURE — 25010000002 FENTANYL CITRATE (PF) 100 MCG/2ML SOLUTION: Performed by: NURSE ANESTHETIST, CERTIFIED REGISTERED

## 2018-05-04 PROCEDURE — 25010000002 PROPOFOL 10 MG/ML EMULSION: Performed by: NURSE ANESTHETIST, CERTIFIED REGISTERED

## 2018-05-04 PROCEDURE — 0TF4XZZ FRAGMENTATION IN LEFT KIDNEY PELVIS, EXTERNAL APPROACH: ICD-10-PCS | Performed by: UROLOGY

## 2018-05-04 PROCEDURE — 74018 RADEX ABDOMEN 1 VIEW: CPT

## 2018-05-04 PROCEDURE — 25010000002 MIDAZOLAM PER 1 MG: Performed by: NURSE ANESTHETIST, CERTIFIED REGISTERED

## 2018-05-04 PROCEDURE — 80048 BASIC METABOLIC PNL TOTAL CA: CPT | Performed by: ANESTHESIOLOGY

## 2018-05-04 PROCEDURE — 25010000002 PHENYLEPHRINE PER 1 ML: Performed by: NURSE ANESTHETIST, CERTIFIED REGISTERED

## 2018-05-04 RX ORDER — PROPOFOL 10 MG/ML
VIAL (ML) INTRAVENOUS AS NEEDED
Status: DISCONTINUED | OUTPATIENT
Start: 2018-05-04 | End: 2018-05-04

## 2018-05-04 RX ORDER — LABETALOL HYDROCHLORIDE 5 MG/ML
5 INJECTION, SOLUTION INTRAVENOUS
Status: DISCONTINUED | OUTPATIENT
Start: 2018-05-04 | End: 2018-05-04 | Stop reason: HOSPADM

## 2018-05-04 RX ORDER — ACETAMINOPHEN 325 MG/1
650 TABLET ORAL ONCE AS NEEDED
Status: DISCONTINUED | OUTPATIENT
Start: 2018-05-04 | End: 2018-05-04 | Stop reason: HOSPADM

## 2018-05-04 RX ORDER — EPHEDRINE SULFATE 50 MG/ML
INJECTION, SOLUTION INTRAVENOUS AS NEEDED
Status: DISCONTINUED | OUTPATIENT
Start: 2018-05-04 | End: 2018-05-04 | Stop reason: SURG

## 2018-05-04 RX ORDER — OXYCODONE AND ACETAMINOPHEN 7.5; 325 MG/1; MG/1
1-2 TABLET ORAL EVERY 4 HOURS PRN
Qty: 10 TABLET | Refills: 0 | Status: SHIPPED | OUTPATIENT
Start: 2018-05-04 | End: 2018-11-06

## 2018-05-04 RX ORDER — DOXYCYCLINE HYCLATE 100 MG/1
100 CAPSULE ORAL DAILY
Qty: 7 CAPSULE | Refills: 0 | Status: SHIPPED | OUTPATIENT
Start: 2018-05-04 | End: 2018-05-11 | Stop reason: HOSPADM

## 2018-05-04 RX ORDER — EPHEDRINE SULFATE 50 MG/ML
5 INJECTION, SOLUTION INTRAVENOUS ONCE AS NEEDED
Status: DISCONTINUED | OUTPATIENT
Start: 2018-05-04 | End: 2018-05-04 | Stop reason: HOSPADM

## 2018-05-04 RX ORDER — LIDOCAINE HYDROCHLORIDE 20 MG/ML
INJECTION, SOLUTION INFILTRATION; PERINEURAL AS NEEDED
Status: DISCONTINUED | OUTPATIENT
Start: 2018-05-04 | End: 2018-05-04

## 2018-05-04 RX ORDER — LEVOFLOXACIN 5 MG/ML
500 INJECTION, SOLUTION INTRAVENOUS ONCE
Status: COMPLETED | OUTPATIENT
Start: 2018-05-04 | End: 2018-05-04

## 2018-05-04 RX ORDER — MIDAZOLAM HYDROCHLORIDE 1 MG/ML
INJECTION INTRAMUSCULAR; INTRAVENOUS AS NEEDED
Status: DISCONTINUED | OUTPATIENT
Start: 2018-05-04 | End: 2018-05-04 | Stop reason: SURG

## 2018-05-04 RX ORDER — FENTANYL CITRATE 50 UG/ML
INJECTION, SOLUTION INTRAMUSCULAR; INTRAVENOUS AS NEEDED
Status: DISCONTINUED | OUTPATIENT
Start: 2018-05-04 | End: 2018-05-04 | Stop reason: SURG

## 2018-05-04 RX ORDER — MEPERIDINE HYDROCHLORIDE 50 MG/ML
12.5 INJECTION INTRAMUSCULAR; INTRAVENOUS; SUBCUTANEOUS
Status: DISCONTINUED | OUTPATIENT
Start: 2018-05-04 | End: 2018-05-04 | Stop reason: HOSPADM

## 2018-05-04 RX ORDER — FLUMAZENIL 0.1 MG/ML
0.2 INJECTION INTRAVENOUS AS NEEDED
Status: DISCONTINUED | OUTPATIENT
Start: 2018-05-04 | End: 2018-05-04 | Stop reason: HOSPADM

## 2018-05-04 RX ORDER — ONDANSETRON 2 MG/ML
4 INJECTION INTRAMUSCULAR; INTRAVENOUS ONCE AS NEEDED
Status: DISCONTINUED | OUTPATIENT
Start: 2018-05-04 | End: 2018-05-04 | Stop reason: HOSPADM

## 2018-05-04 RX ORDER — DIPHENHYDRAMINE HYDROCHLORIDE 50 MG/ML
12.5 INJECTION INTRAMUSCULAR; INTRAVENOUS
Status: DISCONTINUED | OUTPATIENT
Start: 2018-05-04 | End: 2018-05-04 | Stop reason: HOSPADM

## 2018-05-04 RX ORDER — NALOXONE HCL 0.4 MG/ML
0.2 VIAL (ML) INJECTION AS NEEDED
Status: DISCONTINUED | OUTPATIENT
Start: 2018-05-04 | End: 2018-05-04 | Stop reason: HOSPADM

## 2018-05-04 RX ORDER — ACETAMINOPHEN 650 MG/1
650 SUPPOSITORY RECTAL ONCE AS NEEDED
Status: DISCONTINUED | OUTPATIENT
Start: 2018-05-04 | End: 2018-05-04 | Stop reason: HOSPADM

## 2018-05-04 RX ORDER — SODIUM CHLORIDE 9 MG/ML
1000 INJECTION, SOLUTION INTRAVENOUS CONTINUOUS
Status: DISCONTINUED | OUTPATIENT
Start: 2018-05-04 | End: 2018-05-04 | Stop reason: HOSPADM

## 2018-05-04 RX ORDER — PROPOFOL 10 MG/ML
VIAL (ML) INTRAVENOUS AS NEEDED
Status: DISCONTINUED | OUTPATIENT
Start: 2018-05-04 | End: 2018-05-04 | Stop reason: SURG

## 2018-05-04 RX ADMIN — FENTANYL CITRATE 50 MCG: 50 INJECTION, SOLUTION INTRAMUSCULAR; INTRAVENOUS at 18:00

## 2018-05-04 RX ADMIN — MIDAZOLAM 2 MG: 1 INJECTION INTRAMUSCULAR; INTRAVENOUS at 17:25

## 2018-05-04 RX ADMIN — PROPOFOL 150 MG: 10 INJECTION, EMULSION INTRAVENOUS at 17:32

## 2018-05-04 RX ADMIN — PHENYLEPHRINE HYDROCHLORIDE 100 MCG: 10 INJECTION INTRAVENOUS at 17:53

## 2018-05-04 RX ADMIN — SODIUM CHLORIDE 1000 ML: 9 INJECTION, SOLUTION INTRAVENOUS at 15:23

## 2018-05-04 RX ADMIN — LEVOFLOXACIN 500 MG: 5 INJECTION, SOLUTION INTRAVENOUS at 17:26

## 2018-05-04 RX ADMIN — PHENYLEPHRINE HYDROCHLORIDE 100 MCG: 10 INJECTION INTRAVENOUS at 18:00

## 2018-05-04 RX ADMIN — EPHEDRINE SULFATE 10 MG: 50 INJECTION INTRAVENOUS at 18:00

## 2018-05-04 RX ADMIN — PHENYLEPHRINE HYDROCHLORIDE 100 MCG: 10 INJECTION INTRAVENOUS at 17:44

## 2018-05-04 RX ADMIN — PHENYLEPHRINE HYDROCHLORIDE 100 MCG: 10 INJECTION INTRAVENOUS at 17:35

## 2018-05-04 RX ADMIN — EPHEDRINE SULFATE 10 MG: 50 INJECTION INTRAVENOUS at 17:35

## 2018-05-04 RX ADMIN — PHENYLEPHRINE HYDROCHLORIDE 100 MCG: 10 INJECTION INTRAVENOUS at 17:49

## 2018-05-04 RX ADMIN — FENTANYL CITRATE 100 MCG: 50 INJECTION, SOLUTION INTRAMUSCULAR; INTRAVENOUS at 17:30

## 2018-05-04 NOTE — ANESTHESIA POSTPROCEDURE EVALUATION
Patient: Rolando Novoa    Procedure Summary     Date:  05/04/18 Room / Location:  [facility] OR 08 / [facility] OR    Anesthesia Start:  1725 Anesthesia Stop:  1820    Procedure:  EXTRACORPOREAL SHOCKWAVE LITHOTRIPSY WITH  POSIBLE STENT INSERTION (Left ) Diagnosis:       Calculus of kidney      (Calculus of kidney [N20.0])    Surgeon:  Prosper Ariza MD Provider:  Adonay Venegas MD    Anesthesia Type:  general ASA Status:  3          Anesthesia Type: general  Last vitals  BP   142/88 (05/04/18 1515)   Temp   97 °F (36.1 °C) (05/04/18 1515)   Pulse   70 (05/04/18 1515)   Resp   18 (05/04/18 1515)     SpO2   93 % (05/04/18 1515)     Post Anesthesia Care and Evaluation    Patient location during evaluation: PACU  Patient participation: complete - patient participated  Level of consciousness: awake and alert  Pain score: 1  Pain management: adequate  Airway patency: patent  Anesthetic complications: No anesthetic complications  PONV Status: noneRespiratory status: acceptable  Hydration status: acceptable

## 2018-05-04 NOTE — ANESTHESIA PROCEDURE NOTES
Airway  Urgency: elective    Airway not difficult    General Information and Staff    Patient location during procedure: OR    Indications and Patient Condition  Indications for airway management: airway protection    Preoxygenated: yes  Mask difficulty assessment: 2 - vent by mask + OA or adjuvant +/- NMBA    Final Airway Details  Final airway type: supraglottic airway      Successful airway: I-gel  Size 4    Cormack-Lehane Classification: grade IIa - partial view of glottis  Number of attempts at approach: 1

## 2018-05-04 NOTE — ANESTHESIA PREPROCEDURE EVALUATION
Anesthesia Evaluation     Patient summary reviewed and Nursing notes reviewed   NPO Solid Status: > 8 hours  NPO Liquid Status: > 8 hours           Airway   Mallampati: III  TM distance: <3 FB  Neck ROM: full  Narrow palate and Possible difficult intubation  Dental    (+) poor dentition    Comment: Full set of capped teeth.    Pulmonary    (+) a smoker Former, COPD mild, shortness of breath, decreased breath sounds,   (-) wheezes    ROS comment: Patient was a  for years and now has restrictive lung disease.  PE comment: Wheezing on exam to be treated with albuterol.  Cardiovascular   Exercise tolerance: poor (<4 METS)    NYHA Classification: III  ECG reviewed  PT is on anticoagulation therapy  Rhythm: regular  Rate: normal    (+) hypertension well controlled, dysrhythmias PAC, hyperlipidemia,       Neuro/Psych  (+) weakness, psychiatric history Anxiety,     GI/Hepatic/Renal/Endo    (+) obesity,  GERD well controlled,  diabetes mellitus type 2 well controlled,     ROS Comment: Recurrent Kidney stones noted.    Musculoskeletal     (+) arthralgias, back pain, chronic pain,   Abdominal     Abdomen: soft.   Substance History      OB/GYN          Other   (+) arthritis                       Anesthesia Plan    ASA 3     general     intravenous induction   Anesthetic plan and risks discussed with patient and spouse/significant other.

## 2018-05-04 NOTE — OP NOTE
EXTRACORPOREAL SHOCKWAVE LITHOTRIPSY WITH STENT INSERTION/REMOVAL  Procedure Note    Rolando Novoa  5/4/2018    Pre-op Diagnosis:   Calculus of kidney [N20.0]    Post-op Diagnosis:     Post-Op Diagnosis Codes:     * Calculus of kidney [N20.0]      Procedure(s):  EXTRACORPOREAL SHOCKWAVE LITHOTRIPSY WITH  POSIBLE STENT INSERTION    Surgeon(s):  Prosper Ariza MD    Anesthesia: General    Staff:   Circulator: Gabi Simons RN  Scrub Person: Mehnaz Beard  Assistant: Justina Lopez CSA    Estimated Blood Loss: minimal    Specimens:                None      Drains:      Findings: Left kidney stone UPJ    Complications: None    Indications: Left flank pain hematuria    Description of Procedure: The patient was brought to the operating suite, placed in the supine position on lithotripsy table.  AP and oblique planes, stone was localized.  With a power setting ranging between 4 and5, we used 2500 shocks to fragment the stone.  Once this was accomplished, patient was taken back off the table and taken to recovery having tolerated the procedure well.    Prosper Ariza MD     Date: 5/4/2018  Time: 6:12 PM

## 2018-05-05 ENCOUNTER — APPOINTMENT (OUTPATIENT)
Dept: CT IMAGING | Facility: HOSPITAL | Age: 73
End: 2018-05-05

## 2018-05-05 ENCOUNTER — HOSPITAL ENCOUNTER (EMERGENCY)
Facility: HOSPITAL | Age: 73
Discharge: HOME | End: 2018-05-05
Attending: FAMILY MEDICINE | Admitting: FAMILY MEDICINE

## 2018-05-05 VITALS
HEART RATE: 82 BPM | BODY MASS INDEX: 37.82 KG/M2 | DIASTOLIC BLOOD PRESSURE: 76 MMHG | RESPIRATION RATE: 18 BRPM | SYSTOLIC BLOOD PRESSURE: 140 MMHG | HEIGHT: 65 IN | TEMPERATURE: 97.5 F | WEIGHT: 227 LBS | OXYGEN SATURATION: 94 %

## 2018-05-05 DIAGNOSIS — N20.1 URETEROLITHIASIS: Primary | ICD-10-CM

## 2018-05-05 LAB
ALBUMIN SERPL-MCNC: 4.2 G/DL (ref 3.4–4.8)
ALBUMIN/GLOB SERPL: 1.4 G/DL (ref 1.1–1.8)
ALP SERPL-CCNC: 60 U/L (ref 38–126)
ALT SERPL W P-5'-P-CCNC: 56 U/L (ref 21–72)
ANION GAP SERPL CALCULATED.3IONS-SCNC: 11 MMOL/L (ref 5–15)
AST SERPL-CCNC: 30 U/L (ref 17–59)
BACTERIA UR QL AUTO: ABNORMAL /HPF
BASOPHILS # BLD AUTO: 0.05 10*3/MM3 (ref 0–0.2)
BASOPHILS NFR BLD AUTO: 0.7 % (ref 0–2)
BILIRUB SERPL-MCNC: 0.5 MG/DL (ref 0.2–1.3)
BILIRUB UR QL STRIP: NEGATIVE
BUN BLD-MCNC: 15 MG/DL (ref 7–21)
BUN/CREAT SERPL: 15.8 (ref 7–25)
CALCIUM SPEC-SCNC: 8.8 MG/DL (ref 8.4–10.2)
CHLORIDE SERPL-SCNC: 100 MMOL/L (ref 95–110)
CLARITY UR: ABNORMAL
CO2 SERPL-SCNC: 26 MMOL/L (ref 22–31)
COLOR UR: YELLOW
CREAT BLD-MCNC: 0.95 MG/DL (ref 0.7–1.3)
DEPRECATED RDW RBC AUTO: 45.2 FL (ref 35.1–43.9)
EOSINOPHIL # BLD AUTO: 0.18 10*3/MM3 (ref 0–0.7)
EOSINOPHIL NFR BLD AUTO: 2.4 % (ref 0–7)
ERYTHROCYTE [DISTWIDTH] IN BLOOD BY AUTOMATED COUNT: 13.7 % (ref 11.5–14.5)
GFR SERPL CREATININE-BSD FRML MDRD: 78 ML/MIN/1.73 (ref 42–98)
GLOBULIN UR ELPH-MCNC: 3 GM/DL (ref 2.3–3.5)
GLUCOSE BLD-MCNC: 177 MG/DL (ref 60–100)
GLUCOSE UR STRIP-MCNC: NEGATIVE MG/DL
HCT VFR BLD AUTO: 44.8 % (ref 39–49)
HGB BLD-MCNC: 15 G/DL (ref 13.7–17.3)
HGB UR QL STRIP.AUTO: ABNORMAL
HOLD SPECIMEN: NORMAL
HYALINE CASTS UR QL AUTO: ABNORMAL /LPF
IMM GRANULOCYTES # BLD: 0.06 10*3/MM3 (ref 0–0.02)
IMM GRANULOCYTES NFR BLD: 0.8 % (ref 0–0.5)
KETONES UR QL STRIP: NEGATIVE
LEUKOCYTE ESTERASE UR QL STRIP.AUTO: NEGATIVE
LYMPHOCYTES # BLD AUTO: 1.44 10*3/MM3 (ref 0.6–4.2)
LYMPHOCYTES NFR BLD AUTO: 19 % (ref 10–50)
MAGNESIUM SERPL-MCNC: 1.9 MG/DL (ref 1.6–2.3)
MCH RBC QN AUTO: 30.5 PG (ref 26.5–34)
MCHC RBC AUTO-ENTMCNC: 33.5 G/DL (ref 31.5–36.3)
MCV RBC AUTO: 91.1 FL (ref 80–98)
MONOCYTES # BLD AUTO: 0.6 10*3/MM3 (ref 0–0.9)
MONOCYTES NFR BLD AUTO: 7.9 % (ref 0–12)
NEUTROPHILS # BLD AUTO: 5.26 10*3/MM3 (ref 2–8.6)
NEUTROPHILS NFR BLD AUTO: 69.2 % (ref 37–80)
NITRITE UR QL STRIP: NEGATIVE
PH UR STRIP.AUTO: 5.5 [PH] (ref 5–9)
PLATELET # BLD AUTO: 224 10*3/MM3 (ref 150–450)
PMV BLD AUTO: 9 FL (ref 8–12)
POTASSIUM BLD-SCNC: 4.4 MMOL/L (ref 3.5–5.1)
PROT SERPL-MCNC: 7.2 G/DL (ref 6.3–8.6)
PROT UR QL STRIP: ABNORMAL
RBC # BLD AUTO: 4.92 10*6/MM3 (ref 4.37–5.74)
RBC # UR: ABNORMAL /HPF
REF LAB TEST METHOD: ABNORMAL
SODIUM BLD-SCNC: 137 MMOL/L (ref 137–145)
SP GR UR STRIP: ABNORMAL (ref 1–1.03)
SQUAMOUS #/AREA URNS HPF: ABNORMAL /HPF
UROBILINOGEN UR QL STRIP: ABNORMAL
WBC NRBC COR # BLD: 7.59 10*3/MM3 (ref 3.2–9.8)
WBC UR QL AUTO: ABNORMAL /HPF
WHOLE BLOOD HOLD SPECIMEN: NORMAL

## 2018-05-05 PROCEDURE — 25010000002 MORPHINE PER 10 MG: Performed by: FAMILY MEDICINE

## 2018-05-05 PROCEDURE — 81001 URINALYSIS AUTO W/SCOPE: CPT | Performed by: FAMILY MEDICINE

## 2018-05-05 PROCEDURE — 96374 THER/PROPH/DIAG INJ IV PUSH: CPT

## 2018-05-05 PROCEDURE — 96375 TX/PRO/DX INJ NEW DRUG ADDON: CPT

## 2018-05-05 PROCEDURE — 83735 ASSAY OF MAGNESIUM: CPT | Performed by: FAMILY MEDICINE

## 2018-05-05 PROCEDURE — 99284 EMERGENCY DEPT VISIT MOD MDM: CPT

## 2018-05-05 PROCEDURE — 80053 COMPREHEN METABOLIC PANEL: CPT | Performed by: FAMILY MEDICINE

## 2018-05-05 PROCEDURE — 87086 URINE CULTURE/COLONY COUNT: CPT | Performed by: FAMILY MEDICINE

## 2018-05-05 PROCEDURE — 85025 COMPLETE CBC W/AUTO DIFF WBC: CPT | Performed by: FAMILY MEDICINE

## 2018-05-05 PROCEDURE — 74176 CT ABD & PELVIS W/O CONTRAST: CPT

## 2018-05-05 PROCEDURE — 25010000002 KETOROLAC TROMETHAMINE PER 15 MG: Performed by: FAMILY MEDICINE

## 2018-05-05 PROCEDURE — 25010000002 ONDANSETRON PER 1 MG: Performed by: FAMILY MEDICINE

## 2018-05-05 RX ORDER — ONDANSETRON 4 MG/1
4 TABLET, ORALLY DISINTEGRATING ORAL EVERY 6 HOURS PRN
Qty: 10 TABLET | Refills: 0 | Status: SHIPPED | OUTPATIENT
Start: 2018-05-05 | End: 2021-01-20

## 2018-05-05 RX ORDER — KETOROLAC TROMETHAMINE 30 MG/ML
30 INJECTION, SOLUTION INTRAMUSCULAR; INTRAVENOUS ONCE
Status: COMPLETED | OUTPATIENT
Start: 2018-05-05 | End: 2018-05-05

## 2018-05-05 RX ORDER — KETOROLAC TROMETHAMINE 10 MG/1
10 TABLET, FILM COATED ORAL EVERY 6 HOURS PRN
Qty: 15 TABLET | Refills: 0 | Status: ON HOLD | OUTPATIENT
Start: 2018-05-05 | End: 2018-05-07

## 2018-05-05 RX ORDER — ONDANSETRON 2 MG/ML
4 INJECTION INTRAMUSCULAR; INTRAVENOUS ONCE
Status: COMPLETED | OUTPATIENT
Start: 2018-05-05 | End: 2018-05-05

## 2018-05-05 RX ADMIN — ONDANSETRON 4 MG: 2 INJECTION INTRAMUSCULAR; INTRAVENOUS at 15:06

## 2018-05-05 RX ADMIN — KETOROLAC TROMETHAMINE 30 MG: 30 INJECTION, SOLUTION INTRAMUSCULAR; INTRAVENOUS at 15:06

## 2018-05-05 RX ADMIN — MORPHINE SULFATE 4 MG: 4 INJECTION, SOLUTION INTRAMUSCULAR; INTRAVENOUS at 15:06

## 2018-05-05 NOTE — NURSING NOTE
Notified Dr Ariza pt's med rec for d/c was incomplete. Went through medications with MD and he stated to continue all.

## 2018-05-05 NOTE — ED PROVIDER NOTES
Subjective   Pt with h/o chroinic renal stones, sees Dr Ariza.        Flank Pain   Pain location:  L flank  Pain quality: aching    Pain radiates to:  L flank and LLQ  Pain severity:  Moderate  Onset quality:  Sudden  Duration:  4 hours  Timing:  Constant  Progression:  Waxing and waning  Chronicity:  Recurrent  Relieved by:  Nothing  Worsened by:  Nothing  Ineffective treatments:  None tried  Associated symptoms: nausea    Associated symptoms: no chest pain, no chills, no cough, no diarrhea, no dysuria, no fatigue, no fever, no shortness of breath, no sore throat and no vomiting        Review of Systems   Constitutional: Negative for appetite change, chills, diaphoresis, fatigue and fever.   HENT: Negative for congestion, ear discharge, ear pain, nosebleeds, rhinorrhea, sinus pressure, sore throat and trouble swallowing.    Eyes: Negative for discharge and redness.   Respiratory: Negative for apnea, cough, chest tightness, shortness of breath and wheezing.    Cardiovascular: Negative for chest pain.   Gastrointestinal: Positive for nausea. Negative for abdominal pain, diarrhea and vomiting.   Endocrine: Negative for polyuria.   Genitourinary: Positive for flank pain. Negative for dysuria, frequency and urgency.   Musculoskeletal: Negative for myalgias and neck pain.   Skin: Negative for color change and rash.   Allergic/Immunologic: Negative for immunocompromised state.   Neurological: Negative for dizziness, seizures, syncope, weakness, light-headedness and headaches.   Hematological: Negative for adenopathy. Does not bruise/bleed easily.   Psychiatric/Behavioral: Negative for behavioral problems and confusion.   All other systems reviewed and are negative.      Past Medical History:   Diagnosis Date   • Acid reflux    • Diabetes    • Enlarged prostate    • High cholesterol    • Hypertension    • Kidney stone     multiple   • Psoriasis        Allergies   Allergen Reactions   • Toradol [Ketorolac Tromethamine]  Anaphylaxis       Past Surgical History:   Procedure Laterality Date   • AMPUTATION Left     lower arm and hand   • CHOLECYSTECTOMY OPEN     • CYSTOSCOPY N/A 12/16/2017    Procedure: CYSTOSCOPY WITH CLOT EVACUATION;  Surgeon: Prosper Ariza MD;  Location: [facility];  Service:    • CYSTOSCOPY, URETEROSCOPY, RETROGRADE PYELOGRAM, STENT INSERTION Left 12/15/2017    Procedure: CYSTOSCOPY LEFT URETEROSCOPY RETROGRADE PYELOGRAM HOLMIUM LASER STENT INSERTION;  Surgeon: Prosper Ariza MD;  Location: [facility];  Service:    • EXTRACORPOREAL SHOCK WAVE LITHOTRIPSY (ESWL)     • EXTRACORPOREAL SHOCKWAVE LITHOTRIPSY (ESWL), STENT INSERTION/REMOVAL Left 2/3/2017    Procedure: LEFT EXTRACORPOREAL SHOCKWAVE LITHOTRIPSY, POSSIBLE CYSTOSCOPY, POSSIBLE STENT REMOVAL ;  Surgeon: Prosper Ariza MD;  Location: [facility];  Service:    • EXTRACORPOREAL SHOCKWAVE LITHOTRIPSY (ESWL), STENT INSERTION/REMOVAL Left 5/4/2018    Procedure: EXTRACORPOREAL SHOCKWAVE LITHOTRIPSY;  Surgeon: Prosper Ariza MD;  Location: [facility];  Service: Urology   • KIDNEY STONE SURGERY     • SCROTAL SURGERY      trauma       History reviewed. No pertinent family history.    Social History     Social History   • Marital status:      Social History Main Topics   • Smoking status: Never Smoker   • Smokeless tobacco: Never Used   • Alcohol use No   • Drug use: No   • Sexual activity: Defer     Other Topics Concern   • Not on file           Objective   Physical Exam   Constitutional: He is oriented to person, place, and time. He appears well-developed and well-nourished.   HENT:   Head: Normocephalic and atraumatic.   Nose: Nose normal.   Mouth/Throat: Oropharynx is clear and moist.   Eyes: Conjunctivae and EOM are normal. Pupils are equal, round, and reactive to light. Right eye exhibits no discharge. Left eye exhibits no discharge. No scleral icterus.   Neck: Normal range of motion. Neck supple. No tracheal deviation present.   Cardiovascular: Normal  rate, regular rhythm and normal heart sounds.    No murmur heard.  Pulmonary/Chest: Effort normal and breath sounds normal. No stridor. No respiratory distress. He has no wheezes. He has no rales.   Abdominal: Soft. Bowel sounds are normal. He exhibits no distension and no mass. There is tenderness in the left lower quadrant. There is CVA tenderness (left). There is no rebound and no guarding.   Musculoskeletal: He exhibits no edema.   Neurological: He is alert and oriented to person, place, and time. Coordination normal.   Skin: Skin is warm and dry. No rash noted. No erythema.   Psychiatric: He has a normal mood and affect. His behavior is normal. Thought content normal.   Nursing note and vitals reviewed.      Procedures           ED Course  ED Course          Labs Reviewed   COMPREHENSIVE METABOLIC PANEL - Abnormal; Notable for the following:        Result Value    Glucose 177 (*)     All other components within normal limits    Narrative:     The MDRD GFR formula is only valid for adults with stable renal function between ages 18 and 70.   URINALYSIS W/ CULTURE IF INDICATED - Abnormal; Notable for the following:     Appearance, UA Turbid (*)     Blood, UA Large (3+) (*)     Protein,  mg/dL (2+) (*)     All other components within normal limits   CBC WITH AUTO DIFFERENTIAL - Abnormal; Notable for the following:     RDW-SD 45.2 (*)     Immature Grans % 0.8 (*)     Immature Grans, Absolute 0.06 (*)     All other components within normal limits   URINALYSIS, MICROSCOPIC ONLY - Abnormal; Notable for the following:     RBC, UA Too Numerous to Count (*)     WBC, UA 6-12 (*)     Squamous Epithelial Cells, UA 3-5 (*)     All other components within normal limits   URINE CULTURE - Normal   MAGNESIUM - Normal   CBC AND DIFFERENTIAL    Narrative:     The following orders were created for panel order CBC & Differential.  Procedure                               Abnormality         Status                     ---------                                -----------         ------                     CBC Auto Differential[[id number]]        Abnormal            Final result                 Please view results for these tests on the individual orders.   EXTRA TUBES    Narrative:     The following orders were created for panel order Extra Tubes.  Procedure                               Abnormality         Status                     ---------                               -----------         ------                     Light Blue Top[[id number]]                                   Final result               Gold Top - SST[[id number]]                                   Final result                 Please view results for these tests on the individual orders.   LIGHT BLUE TOP   GOLD TOP - SST       CT Abdomen Pelvis Without Contrast   Final Result   CONCLUSION:       1.  0.5 cm calculus involving the distal ureter.   2. Bilateral nephrolithiasis.         Suggest obtaining a single supine abdominal radiograph to   correlate with this examination to serve as a baseline to monitor   change in location of the above described calculus.                    Electronically signed by:  MARILYNN Lau MD  5/5/2018 3:54 PM   CDT Workstation: [id number]                    [facility] [place]      Final diagnoses:   Ureterolithiasis            Moose Nam MD  05/05/18 1703       Moose Nam MD  05/16/18 0056

## 2018-05-06 ENCOUNTER — ANESTHESIA EVENT (OUTPATIENT)
Dept: ICU | Facility: HOSPITAL | Age: 73
End: 2018-05-06

## 2018-05-06 ENCOUNTER — HOSPITAL ENCOUNTER (INPATIENT)
Facility: HOSPITAL | Age: 73
LOS: 5 days | Discharge: HOME HEALTH | End: 2018-05-11
Attending: EMERGENCY MEDICINE | Admitting: INTERNAL MEDICINE

## 2018-05-06 ENCOUNTER — APPOINTMENT (OUTPATIENT)
Dept: GENERAL RADIOLOGY | Facility: HOSPITAL | Age: 73
End: 2018-05-06

## 2018-05-06 ENCOUNTER — ANESTHESIA (OUTPATIENT)
Dept: ICU | Facility: HOSPITAL | Age: 73
End: 2018-05-06

## 2018-05-06 DIAGNOSIS — R26.89 IMPAIRED GAIT AND MOBILITY: ICD-10-CM

## 2018-05-06 DIAGNOSIS — Z74.09 IMPAIRED MOBILITY AND ACTIVITIES OF DAILY LIVING: ICD-10-CM

## 2018-05-06 DIAGNOSIS — R09.02 HYPOXIA: ICD-10-CM

## 2018-05-06 DIAGNOSIS — R06.02 SOB (SHORTNESS OF BREATH): ICD-10-CM

## 2018-05-06 DIAGNOSIS — T78.2XXA ANAPHYLAXIS, INITIAL ENCOUNTER: Primary | ICD-10-CM

## 2018-05-06 DIAGNOSIS — Z78.9 IMPAIRED MOBILITY AND ACTIVITIES OF DAILY LIVING: ICD-10-CM

## 2018-05-06 PROBLEM — N20.0 KIDNEY STONE: Status: ACTIVE | Noted: 2017-12-13

## 2018-05-06 PROBLEM — N20.0 KIDNEY STONES: Status: ACTIVE | Noted: 2018-05-06

## 2018-05-06 LAB
ANION GAP SERPL CALCULATED.3IONS-SCNC: 12 MMOL/L (ref 5–15)
APTT PPP: 23 SECONDS (ref 20–40.3)
ARTERIAL PATENCY WRIST A: ABNORMAL
ATMOSPHERIC PRESS: 748 MMHG
BACTERIA SPEC AEROBE CULT: NORMAL
BASE EXCESS BLDA CALC-SCNC: -1.4 MMOL/L (ref 0–2)
BASOPHILS # BLD AUTO: 0.02 10*3/MM3 (ref 0–0.2)
BASOPHILS NFR BLD AUTO: 0.2 % (ref 0–2)
BDY SITE: ABNORMAL
BUN BLD-MCNC: 18 MG/DL (ref 7–21)
BUN/CREAT SERPL: 15.1 (ref 7–25)
CA-I BLD-MCNC: 4.53 MG/DL (ref 4.6–5.6)
CALCIUM SPEC-SCNC: 8.9 MG/DL (ref 8.4–10.2)
CHLORIDE SERPL-SCNC: 97 MMOL/L (ref 95–110)
CO2 SERPL-SCNC: 28 MMOL/L (ref 22–31)
COHGB MFR BLD: 0.6 % (ref 0–5)
CREAT BLD-MCNC: 1.19 MG/DL (ref 0.7–1.3)
DEPRECATED RDW RBC AUTO: 45.6 FL (ref 35.1–43.9)
EOSINOPHIL # BLD AUTO: 0.19 10*3/MM3 (ref 0–0.7)
EOSINOPHIL NFR BLD AUTO: 1.9 % (ref 0–7)
ERYTHROCYTE [DISTWIDTH] IN BLOOD BY AUTOMATED COUNT: 13.7 % (ref 11.5–14.5)
GFR SERPL CREATININE-BSD FRML MDRD: 60 ML/MIN/1.73 (ref 60–98)
GLUCOSE BLD-MCNC: 181 MG/DL (ref 60–100)
GLUCOSE BLDA-MCNC: 191 MMOL/L (ref 65–95)
GLUCOSE BLDC GLUCOMTR-MCNC: 213 MG/DL (ref 70–130)
HCO3 BLDA-SCNC: 27.2 MMOL/L (ref 20–26)
HCT VFR BLD AUTO: 50.4 % (ref 39–49)
HCT VFR BLD CALC: 46.9 % (ref 38–51)
HGB BLD-MCNC: 16.6 G/DL (ref 13.7–17.3)
HGB BLDA-MCNC: 15.3 G/DL (ref 14–18)
HOLD SPECIMEN: NORMAL
HOLD SPECIMEN: NORMAL
HOROWITZ INDEX BLD+IHG-RTO: 100 %
IMM GRANULOCYTES # BLD: 0.07 10*3/MM3 (ref 0–0.02)
IMM GRANULOCYTES NFR BLD: 0.7 % (ref 0–0.5)
LIPASE SERPL-CCNC: 52 U/L (ref 23–300)
LYMPHOCYTES # BLD AUTO: 3.35 10*3/MM3 (ref 0.6–4.2)
LYMPHOCYTES NFR BLD AUTO: 33.8 % (ref 10–50)
Lab: ABNORMAL
MAGNESIUM SERPL-MCNC: 2 MG/DL (ref 1.6–2.3)
MCH RBC QN AUTO: 30.3 PG (ref 26.5–34)
MCHC RBC AUTO-ENTMCNC: 32.9 G/DL (ref 31.5–36.3)
MCV RBC AUTO: 92.1 FL (ref 80–98)
METHGB BLD QL: 0.6 % (ref 0–3)
MODALITY: ABNORMAL
MONOCYTES # BLD AUTO: 0.76 10*3/MM3 (ref 0–0.9)
MONOCYTES NFR BLD AUTO: 7.7 % (ref 0–12)
NEUTROPHILS # BLD AUTO: 5.52 10*3/MM3 (ref 2–8.6)
NEUTROPHILS NFR BLD AUTO: 55.7 % (ref 37–80)
OXYHGB MFR BLDV: 93.6 % (ref 94–99)
PCO2 BLDA: 60.8 MM HG (ref 35–45)
PCO2 TEMP ADJ BLD: ABNORMAL MM HG (ref 35–48)
PEEP RESPIRATORY: 8 CM[H2O]
PH BLDA: 7.26 PH UNITS (ref 7.35–7.45)
PH, TEMP CORRECTED: ABNORMAL PH UNITS
PHOSPHATE SERPL-MCNC: 3.6 MG/DL (ref 2.4–4.4)
PLATELET # BLD AUTO: 282 10*3/MM3 (ref 150–450)
PMV BLD AUTO: 9.4 FL (ref 8–12)
PO2 BLDA: 79.4 MM HG (ref 83–108)
PO2 TEMP ADJ BLD: ABNORMAL MM HG (ref 83–108)
POTASSIUM BLD-SCNC: 4.2 MMOL/L (ref 3.5–5.1)
POTASSIUM BLDA-SCNC: 4.5 MMOL/L (ref 3.4–4.5)
RBC # BLD AUTO: 5.47 10*6/MM3 (ref 4.37–5.74)
SAO2 % BLDCOA: 94.7 % (ref 94–99)
SET MECH RESP RATE: 12
SODIUM BLD-SCNC: 137 MMOL/L (ref 137–145)
SODIUM BLDA-SCNC: 137 MMOL/L (ref 136–146)
T4 FREE SERPL-MCNC: 1.21 NG/DL (ref 0.78–2.19)
TSH SERPL DL<=0.05 MIU/L-ACNC: 3.02 MIU/ML (ref 0.46–4.68)
VENTILATOR MODE: AC
VT ON VENT VENT: 550 ML
WBC NRBC COR # BLD: 9.91 10*3/MM3 (ref 3.2–9.8)
WHOLE BLOOD HOLD SPECIMEN: NORMAL
WHOLE BLOOD HOLD SPECIMEN: NORMAL

## 2018-05-06 PROCEDURE — 36415 COLL VENOUS BLD VENIPUNCTURE: CPT

## 2018-05-06 PROCEDURE — 25010000002 LEVOFLOXACIN PER 250 MG: Performed by: FAMILY MEDICINE

## 2018-05-06 PROCEDURE — 82375 ASSAY CARBOXYHB QUANT: CPT

## 2018-05-06 PROCEDURE — 94799 UNLISTED PULMONARY SVC/PX: CPT

## 2018-05-06 PROCEDURE — 25010000002 SUCCINYLCHOLINE PER 20 MG: Performed by: EMERGENCY MEDICINE

## 2018-05-06 PROCEDURE — 25010000002 EPINEPHRINE PER 0.1 MG: Performed by: EMERGENCY MEDICINE

## 2018-05-06 PROCEDURE — 83690 ASSAY OF LIPASE: CPT | Performed by: EMERGENCY MEDICINE

## 2018-05-06 PROCEDURE — 36600 WITHDRAWAL OF ARTERIAL BLOOD: CPT

## 2018-05-06 PROCEDURE — 85730 THROMBOPLASTIN TIME PARTIAL: CPT | Performed by: EMERGENCY MEDICINE

## 2018-05-06 PROCEDURE — 25010000002 PROPOFOL 10 MG/ML EMULSION: Performed by: EMERGENCY MEDICINE

## 2018-05-06 PROCEDURE — 94760 N-INVAS EAR/PLS OXIMETRY 1: CPT

## 2018-05-06 PROCEDURE — 80048 BASIC METABOLIC PNL TOTAL CA: CPT | Performed by: EMERGENCY MEDICINE

## 2018-05-06 PROCEDURE — 63710000001 INSULIN ASPART PER 5 UNITS: Performed by: FAMILY MEDICINE

## 2018-05-06 PROCEDURE — 94640 AIRWAY INHALATION TREATMENT: CPT

## 2018-05-06 PROCEDURE — 84439 ASSAY OF FREE THYROXINE: CPT | Performed by: EMERGENCY MEDICINE

## 2018-05-06 PROCEDURE — 85025 COMPLETE CBC W/AUTO DIFF WBC: CPT | Performed by: EMERGENCY MEDICINE

## 2018-05-06 PROCEDURE — 25010000002 DIPHENHYDRAMINE PER 50 MG: Performed by: EMERGENCY MEDICINE

## 2018-05-06 PROCEDURE — 25010000002 PROPOFOL 1000 MG/ML EMULSION: Performed by: EMERGENCY MEDICINE

## 2018-05-06 PROCEDURE — 31500 INSERT EMERGENCY AIRWAY: CPT | Performed by: ANESTHESIOLOGY

## 2018-05-06 PROCEDURE — 5A1945Z RESPIRATORY VENTILATION, 24-96 CONSECUTIVE HOURS: ICD-10-PCS | Performed by: EMERGENCY MEDICINE

## 2018-05-06 PROCEDURE — 71045 X-RAY EXAM CHEST 1 VIEW: CPT

## 2018-05-06 PROCEDURE — 25010000002 METHYLPREDNISOLONE PER 40 MG: Performed by: FAMILY MEDICINE

## 2018-05-06 PROCEDURE — 82962 GLUCOSE BLOOD TEST: CPT

## 2018-05-06 PROCEDURE — 93010 ELECTROCARDIOGRAM REPORT: CPT | Performed by: INTERNAL MEDICINE

## 2018-05-06 PROCEDURE — 99291 CRITICAL CARE FIRST HOUR: CPT

## 2018-05-06 PROCEDURE — 83735 ASSAY OF MAGNESIUM: CPT | Performed by: EMERGENCY MEDICINE

## 2018-05-06 PROCEDURE — 25010000002 METHYLPREDNISOLONE PER 125 MG: Performed by: EMERGENCY MEDICINE

## 2018-05-06 PROCEDURE — 83050 HGB METHEMOGLOBIN QUAN: CPT

## 2018-05-06 PROCEDURE — 93005 ELECTROCARDIOGRAM TRACING: CPT | Performed by: EMERGENCY MEDICINE

## 2018-05-06 PROCEDURE — 94003 VENT MGMT INPAT SUBQ DAY: CPT

## 2018-05-06 PROCEDURE — 25010000002 HEPARIN (PORCINE) PER 1000 UNITS: Performed by: FAMILY MEDICINE

## 2018-05-06 PROCEDURE — 82805 BLOOD GASES W/O2 SATURATION: CPT

## 2018-05-06 PROCEDURE — 94002 VENT MGMT INPAT INIT DAY: CPT

## 2018-05-06 PROCEDURE — 25010000002 METHYLPREDNISOLONE PER 125 MG: Performed by: FAMILY MEDICINE

## 2018-05-06 PROCEDURE — 31500 INSERT EMERGENCY AIRWAY: CPT

## 2018-05-06 PROCEDURE — 84443 ASSAY THYROID STIM HORMONE: CPT | Performed by: EMERGENCY MEDICINE

## 2018-05-06 PROCEDURE — 84100 ASSAY OF PHOSPHORUS: CPT | Performed by: EMERGENCY MEDICINE

## 2018-05-06 PROCEDURE — 0BH17EZ INSERTION OF ENDOTRACHEAL AIRWAY INTO TRACHEA, VIA NATURAL OR ARTIFICIAL OPENING: ICD-10-PCS | Performed by: EMERGENCY MEDICINE

## 2018-05-06 RX ORDER — NICOTINE POLACRILEX 4 MG
15 LOZENGE BUCCAL
Status: DISCONTINUED | OUTPATIENT
Start: 2018-05-06 | End: 2018-05-11 | Stop reason: HOSPADM

## 2018-05-06 RX ORDER — SODIUM CHLORIDE 0.9 % (FLUSH) 0.9 %
10 SYRINGE (ML) INJECTION AS NEEDED
Status: DISCONTINUED | OUTPATIENT
Start: 2018-05-06 | End: 2018-05-11 | Stop reason: HOSPADM

## 2018-05-06 RX ORDER — PROPOFOL 10 MG/ML
50 VIAL (ML) INTRAVENOUS ONCE
Status: COMPLETED | OUTPATIENT
Start: 2018-05-06 | End: 2018-05-06

## 2018-05-06 RX ORDER — METHYLPREDNISOLONE SODIUM SUCCINATE 125 MG/2ML
60 INJECTION, POWDER, LYOPHILIZED, FOR SOLUTION INTRAMUSCULAR; INTRAVENOUS EVERY 6 HOURS
Status: DISCONTINUED | OUTPATIENT
Start: 2018-05-06 | End: 2018-05-08

## 2018-05-06 RX ORDER — SODIUM CHLORIDE 9 MG/ML
100 INJECTION, SOLUTION INTRAVENOUS CONTINUOUS
Status: DISCONTINUED | OUTPATIENT
Start: 2018-05-06 | End: 2018-05-07

## 2018-05-06 RX ORDER — EPINEPHRINE 1 MG/ML
0.3 INJECTION, SOLUTION, CONCENTRATE INTRAVENOUS ONCE
Status: COMPLETED | OUTPATIENT
Start: 2018-05-06 | End: 2018-05-06

## 2018-05-06 RX ORDER — SUCCINYLCHOLINE CHLORIDE 20 MG/ML
100 INJECTION INTRAMUSCULAR; INTRAVENOUS ONCE
Status: COMPLETED | OUTPATIENT
Start: 2018-05-06 | End: 2018-05-06

## 2018-05-06 RX ORDER — LEVOFLOXACIN 5 MG/ML
500 INJECTION, SOLUTION INTRAVENOUS EVERY 24 HOURS
Status: DISCONTINUED | OUTPATIENT
Start: 2018-05-06 | End: 2018-05-09

## 2018-05-06 RX ORDER — DIPHENHYDRAMINE HYDROCHLORIDE 50 MG/ML
50 INJECTION INTRAMUSCULAR; INTRAVENOUS ONCE
Status: COMPLETED | OUTPATIENT
Start: 2018-05-06 | End: 2018-05-06

## 2018-05-06 RX ORDER — SODIUM CHLORIDE 0.9 % (FLUSH) 0.9 %
1-10 SYRINGE (ML) INJECTION AS NEEDED
Status: DISCONTINUED | OUTPATIENT
Start: 2018-05-06 | End: 2018-05-11 | Stop reason: HOSPADM

## 2018-05-06 RX ORDER — ONDANSETRON 2 MG/ML
4 INJECTION INTRAMUSCULAR; INTRAVENOUS EVERY 6 HOURS PRN
Status: DISCONTINUED | OUTPATIENT
Start: 2018-05-06 | End: 2018-05-11 | Stop reason: HOSPADM

## 2018-05-06 RX ORDER — HEPARIN SODIUM 5000 [USP'U]/ML
5000 INJECTION, SOLUTION INTRAVENOUS; SUBCUTANEOUS EVERY 8 HOURS SCHEDULED
Status: DISCONTINUED | OUTPATIENT
Start: 2018-05-06 | End: 2018-05-11 | Stop reason: HOSPADM

## 2018-05-06 RX ORDER — ALBUTEROL SULFATE 90 UG/1
2 AEROSOL, METERED RESPIRATORY (INHALATION)
Status: DISCONTINUED | OUTPATIENT
Start: 2018-05-06 | End: 2018-05-09

## 2018-05-06 RX ORDER — ALBUTEROL SULFATE 2.5 MG/3ML
5 SOLUTION RESPIRATORY (INHALATION) ONCE
Status: DISCONTINUED | OUTPATIENT
Start: 2018-05-06 | End: 2018-05-08

## 2018-05-06 RX ORDER — METHYLPREDNISOLONE SODIUM SUCCINATE 125 MG/2ML
125 INJECTION, POWDER, LYOPHILIZED, FOR SOLUTION INTRAMUSCULAR; INTRAVENOUS ONCE
Status: COMPLETED | OUTPATIENT
Start: 2018-05-06 | End: 2018-05-06

## 2018-05-06 RX ORDER — PROPOFOL 10 MG/ML
VIAL (ML) INTRAVENOUS
Status: DISCONTINUED
Start: 2018-05-06 | End: 2018-05-11 | Stop reason: HOSPADM

## 2018-05-06 RX ORDER — DEXTROSE MONOHYDRATE 25 G/50ML
25 INJECTION, SOLUTION INTRAVENOUS
Status: DISCONTINUED | OUTPATIENT
Start: 2018-05-06 | End: 2018-05-11 | Stop reason: HOSPADM

## 2018-05-06 RX ORDER — PANTOPRAZOLE SODIUM 40 MG/10ML
40 INJECTION, POWDER, LYOPHILIZED, FOR SOLUTION INTRAVENOUS EVERY 12 HOURS SCHEDULED
Status: DISCONTINUED | OUTPATIENT
Start: 2018-05-06 | End: 2018-05-08

## 2018-05-06 RX ADMIN — METHYLPREDNISOLONE SODIUM SUCCINATE 60 MG: 125 INJECTION, POWDER, FOR SOLUTION INTRAMUSCULAR; INTRAVENOUS at 05:30

## 2018-05-06 RX ADMIN — SUCCINYLCHOLINE CHLORIDE 100 MG: 20 INJECTION, SOLUTION INTRAMUSCULAR; INTRAVENOUS at 02:34

## 2018-05-06 RX ADMIN — PROPOFOL 50 MCG/KG/MIN: 10 INJECTION, EMULSION INTRAVENOUS at 21:36

## 2018-05-06 RX ADMIN — LEVOFLOXACIN 500 MG: 5 INJECTION, SOLUTION INTRAVENOUS at 04:29

## 2018-05-06 RX ADMIN — PROPOFOL 50 MG: 10 INJECTION, EMULSION INTRAVENOUS at 02:48

## 2018-05-06 RX ADMIN — METHYLPREDNISOLONE SODIUM SUCCINATE 60 MG: 125 INJECTION, POWDER, FOR SOLUTION INTRAMUSCULAR; INTRAVENOUS at 15:43

## 2018-05-06 RX ADMIN — ALBUTEROL SULFATE 2 PUFF: 90 AEROSOL, METERED RESPIRATORY (INHALATION) at 15:21

## 2018-05-06 RX ADMIN — EPINEPHRINE 0.3 MG: 1 INJECTION, SOLUTION, CONCENTRATE INTRAVENOUS at 02:00

## 2018-05-06 RX ADMIN — SUCCINYLCHOLINE CHLORIDE 100 MG: 20 INJECTION, SOLUTION INTRAMUSCULAR; INTRAVENOUS at 02:25

## 2018-05-06 RX ADMIN — DIPHENHYDRAMINE HYDROCHLORIDE 50 MG: 50 INJECTION INTRAMUSCULAR; INTRAVENOUS at 01:29

## 2018-05-06 RX ADMIN — PROPOFOL 50 MG: 10 INJECTION, EMULSION INTRAVENOUS at 02:26

## 2018-05-06 RX ADMIN — TRIAMCINOLONE ACETONIDE: 1 OINTMENT TOPICAL at 21:00

## 2018-05-06 RX ADMIN — METHYLPREDNISOLONE SODIUM SUCCINATE 60 MG: 125 INJECTION, POWDER, FOR SOLUTION INTRAMUSCULAR; INTRAVENOUS at 09:02

## 2018-05-06 RX ADMIN — SODIUM CHLORIDE 100 ML/HR: 900 INJECTION, SOLUTION INTRAVENOUS at 15:43

## 2018-05-06 RX ADMIN — METHYLPREDNISOLONE SODIUM SUCCINATE 125 MG: 125 INJECTION, POWDER, FOR SOLUTION INTRAMUSCULAR; INTRAVENOUS at 01:29

## 2018-05-06 RX ADMIN — EPINEPHRINE 0.3 MG: 1 INJECTION INTRAMUSCULAR; INTRAVENOUS; SUBCUTANEOUS at 01:26

## 2018-05-06 RX ADMIN — HEPARIN SODIUM 5000 UNITS: 5000 INJECTION, SOLUTION INTRAVENOUS; SUBCUTANEOUS at 21:35

## 2018-05-06 RX ADMIN — INSULIN ASPART 4 UNITS: 100 INJECTION, SOLUTION INTRAVENOUS; SUBCUTANEOUS at 21:43

## 2018-05-06 RX ADMIN — PROPOFOL 50 MCG/KG/MIN: 10 INJECTION, EMULSION INTRAVENOUS at 11:18

## 2018-05-06 RX ADMIN — PANTOPRAZOLE SODIUM 40 MG: 40 INJECTION, POWDER, FOR SOLUTION INTRAVENOUS at 21:35

## 2018-05-06 RX ADMIN — SODIUM CHLORIDE 100 ML/HR: 900 INJECTION, SOLUTION INTRAVENOUS at 04:29

## 2018-05-06 RX ADMIN — ALBUTEROL SULFATE 2 PUFF: 90 AEROSOL, METERED RESPIRATORY (INHALATION) at 10:58

## 2018-05-06 RX ADMIN — ALBUTEROL SULFATE 2 PUFF: 90 AEROSOL, METERED RESPIRATORY (INHALATION) at 19:52

## 2018-05-06 RX ADMIN — PANTOPRAZOLE SODIUM 40 MG: 40 INJECTION, POWDER, FOR SOLUTION INTRAVENOUS at 09:02

## 2018-05-06 RX ADMIN — PROPOFOL 50 MG: 10 INJECTION, EMULSION INTRAVENOUS at 02:25

## 2018-05-06 RX ADMIN — PROPOFOL 50 MG: 10 INJECTION, EMULSION INTRAVENOUS at 02:45

## 2018-05-06 RX ADMIN — PROPOFOL 50 MG: 10 INJECTION, EMULSION INTRAVENOUS at 02:57

## 2018-05-06 RX ADMIN — PROPOFOL 50 MCG/KG/MIN: 10 INJECTION, EMULSION INTRAVENOUS at 07:45

## 2018-05-06 RX ADMIN — HEPARIN SODIUM 5000 UNITS: 5000 INJECTION, SOLUTION INTRAVENOUS; SUBCUTANEOUS at 15:43

## 2018-05-06 RX ADMIN — HEPARIN SODIUM 5000 UNITS: 5000 INJECTION, SOLUTION INTRAVENOUS; SUBCUTANEOUS at 05:30

## 2018-05-06 RX ADMIN — RACEPINEPHRINE HYDROCHLORIDE 0.5 ML: 11.25 SOLUTION RESPIRATORY (INHALATION) at 01:41

## 2018-05-06 RX ADMIN — ALBUTEROL SULFATE 2 PUFF: 90 AEROSOL, METERED RESPIRATORY (INHALATION) at 06:57

## 2018-05-06 RX ADMIN — METHYLPREDNISOLONE SODIUM SUCCINATE 60 MG: 125 INJECTION, POWDER, FOR SOLUTION INTRAMUSCULAR; INTRAVENOUS at 21:34

## 2018-05-06 RX ADMIN — PROPOFOL 50 MCG/KG/MIN: 10 INJECTION, EMULSION INTRAVENOUS at 15:00

## 2018-05-06 RX ADMIN — PROPOFOL 50 MG: 10 INJECTION, EMULSION INTRAVENOUS at 02:31

## 2018-05-06 RX ADMIN — PROPOFOL 50 MCG/KG/MIN: 10 INJECTION, EMULSION INTRAVENOUS at 04:30

## 2018-05-06 RX ADMIN — PROPOFOL 50 MG: 10 INJECTION, EMULSION INTRAVENOUS at 02:41

## 2018-05-06 NOTE — H&P
[facility] Medicine Admission      Date of Admission: 5/6/2018      Primary Care Physician: Jaxson Zepeda MD      Chief Complaint   Patient presents with   • Oral Swelling       HPI:  Patient is currently intubated. HPI is obtained from family and chart ,  Patient is 73-year-old male came to  ED because of tongue swelling and difficulty breathing.  Which had been worsening through the day.  Patient had received SHOCKWAVE LITHOTRIPSY by Dr ariza the day before, was supposed to start anabiotics according to the wife but she doesn't think that he picked them up, complained of pain earlier today, came to the ED received Toradol and morphine sent home. Patient came back with worsening symptoms of tongue swelling and dyspnea and rash . Wife states symptoms present before the first ED visit.    Past Medical History:   Past Medical History:   Diagnosis Date   • Acid reflux    • Diabetes    • Enlarged prostate    • High cholesterol    • Hypertension    • Kidney stone     multiple   • Psoriasis        Past Surgical History:   Past Surgical History:   Procedure Laterality Date   • AMPUTATION Left     lower arm and hand   • CHOLECYSTECTOMY OPEN     • CYSTOSCOPY N/A 12/16/2017    Procedure: CYSTOSCOPY WITH CLOT EVACUATION;  Surgeon: Prosper Ariza MD;  Location: [facility];  Service:    • CYSTOSCOPY, URETEROSCOPY, RETROGRADE PYELOGRAM, STENT INSERTION Left 12/15/2017    Procedure: CYSTOSCOPY LEFT URETEROSCOPY RETROGRADE PYELOGRAM HOLMIUM LASER STENT INSERTION;  Surgeon: Prosper Ariza MD;  Location: [facility];  Service:    • EXTRACORPOREAL SHOCK WAVE LITHOTRIPSY (ESWL)     • EXTRACORPOREAL SHOCKWAVE LITHOTRIPSY (ESWL), STENT INSERTION/REMOVAL Left 2/3/2017    Procedure: LEFT EXTRACORPOREAL SHOCKWAVE LITHOTRIPSY, POSSIBLE CYSTOSCOPY, POSSIBLE STENT REMOVAL ;  Surgeon: Prosper Ariza MD;  Location: [facility];  Service:    • KIDNEY STONE SURGERY     • SCROTAL SURGERY      trauma        Family History: History reviewed. No pertinent family history.    Social History:   Social History     Social History   • Marital status:      Social History Main Topics   • Smoking status: Never Smoker   • Smokeless tobacco: Never Used   • Alcohol use No   • Drug use: No   • Sexual activity: Defer     Other Topics Concern   • Not on file       Allergies: No Known Allergies    Medications:   Prior to Admission medications    Medication Sig Start Date End Date Taking? Authorizing Provider   aspirin 81 MG tablet Take 81 mg by mouth Daily.    Historical Provider, MD   atorvastatin (LIPITOR) 20 MG tablet Take 20 mg by mouth Every Night. 8/2/16   Historical Provider, MD   doxycycline (VIBRAMYCIN) 100 MG capsule Take 1 capsule by mouth Daily for 7 days. 5/4/18 5/11/18  Prosper Ariza MD   glimepiride (AMARYL) 2 MG tablet Take 2 mg by mouth Every Morning Before Breakfast.    Historical Provider, MD   ketorolac (TORADOL) 10 MG tablet Take 1 tablet by mouth Every 6 (Six) Hours As Needed for Moderate Pain . 5/5/18   Moose Nam MD   loperamide (IMODIUM) 2 MG capsule Take 2 mg by mouth 2 (Two) Times a Day As Needed for diarrhea.    Historical Provider, MD   metFORMIN (GLUCOPHAGE) 500 MG tablet Take 500 mg by mouth 2 (two) times a day. May take 3 depending on glucose reading 6/3/16   Historical Provider, MD   nebivolol (BYSTOLIC) 10 MG tablet Take 10 mg by mouth Daily.    Historical Provider, MD   omeprazole (priLOSEC) 20 MG capsule Take 20 mg by mouth Daily.    Historical Provider, MD   ondansetron ODT (ZOFRAN-ODT) 4 MG disintegrating tablet Take 1 tablet by mouth Every 6 (Six) Hours As Needed for Nausea or Vomiting. 5/5/18   Moose Nam MD   oxyCODONE-acetaminophen (PERCOCET) 7.5-325 MG per tablet Take 1-2 tablets by mouth Every 4 (Four) Hours As Needed (Pain). 5/4/18   Prosper Ariza MD   tamsulosin (FLOMAX) 0.4 MG capsule 24 hr capsule Take 1 capsule by mouth Every Night.    Historical  Provider, MD   TANZEUM 30 MG pen-injector Inject 1 dose as directed 1 (One) Time Per Week. 3/21/18   Historical Provider, MD       Review of Systems:    -Otherwise complete ROS is negative except as mentioned above.    Physical Exam:    Physical Exam     Temp:  [98 °F (36.7 °C)] 98 °F (36.7 °C)  Heart Rate:  [] 102  Resp:  [20-24] 24  BP: (112-172)/(58-80) 112/58    -General:intaubed . Ill looking .   -CVS: Normal rate, regular rhythm, normal heart sounds and intact distal pulses.  Exam reveals no gallop and no friction rub.    No murmur heard.  -Pulmonary: Effort normal and breath sounds normal. No stridor. No respiratory distress. He has no wheezes. No rales or tenderness.   -Abdominal: Soft, Bowel sounds are normal. No distension and no mass. There is no tenderness. There is no rebound and no guarding. No hernia.   -Musc: No Cyanosis clubbing or edema.  -Lymph: No cervical adenopathy.   -Skin: Skin is warm. No rash noted. Patient is not diaphoretic. No erythema. No pallor.     Nursing note and vitals reviewed.    Results Reviewed:  I have personally reviewed current lab, radiology, and data and agree with results.  Lab Results (last 24 hours)     Procedure Component Value Units Date/Time    TSH [[id number]]  (Normal) Collected:  05/06/18 0129    Specimen:  Blood Updated:  05/06/18 0241     TSH 3.020 mIU/mL     [place] Draw [[id number]] Collected:  05/06/18 0129    Specimen:  Blood Updated:  05/06/18 0230    Narrative:       The following orders were created for panel order [place] Draw.  Procedure                               Abnormality         Status                     ---------                               -----------         ------                     Light Blue Top[[id number]]                                   Final result               Green Top (Gel)[[id number]]                                  Final result               Lavender Top[[id number]]                                     Final result                Gold Top - SST[[id number]]                                   Final result                 Please view results for these tests on the individual orders.    Light Blue Top [[id number]] Collected:  05/06/18 0129    Specimen:  Blood Updated:  05/06/18 0230     Extra Tube hold for add-on     Comment: Auto resulted       Green Top (Gel) [[id number]] Collected:  05/06/18 0129    Specimen:  Blood Updated:  05/06/18 0230     Extra Tube Hold for add-ons.     Comment: Auto resulted.       Lavender Top [[id number]] Collected:  05/06/18 0129    Specimen:  Blood Updated:  05/06/18 0230     Extra Tube hold for add-on     Comment: Auto resulted       Gold Top - SST [[id number]] Collected:  05/06/18 0129    Specimen:  Blood Updated:  05/06/18 0230     Extra Tube Hold for add-ons.     Comment: Auto resulted.       T4, Free [[id number]]  (Normal) Collected:  05/06/18 0129    Specimen:  Blood Updated:  05/06/18 0227     Free T4 1.21 ng/dL     CBC & Differential [[id number]] Collected:  05/06/18 0129    Specimen:  Blood Updated:  05/06/18 0219    Narrative:       The following orders were created for panel order CBC & Differential.  Procedure                               Abnormality         Status                     ---------                               -----------         ------                     CBC Auto Differential[[id number]]        Abnormal            Final result                 Please view results for these tests on the individual orders.    CBC Auto Differential [[id number]]  (Abnormal) Collected:  05/06/18 0129    Specimen:  Blood Updated:  05/06/18 0219     WBC 9.91 (H) 10*3/mm3      RBC 5.47 10*6/mm3      Hemoglobin 16.6 g/dL      Hematocrit 50.4 (H) %      MCV 92.1 fL      MCH 30.3 pg      MCHC 32.9 g/dL      RDW 13.7 %      RDW-SD 45.6 (H) fl      MPV 9.4 fL      Platelets 282 10*3/mm3      Neutrophil % 55.7 %      Lymphocyte % 33.8 %      Monocyte % 7.7 %      Eosinophil % 1.9 %      Basophil % 0.2 %      Immature Grans  % 0.7 (H) %      Neutrophils, Absolute 5.52 10*3/mm3      Lymphocytes, Absolute 3.35 10*3/mm3      Monocytes, Absolute 0.76 10*3/mm3      Eosinophils, Absolute 0.19 10*3/mm3      Basophils, Absolute 0.02 10*3/mm3      Immature Grans, Absolute 0.07 (H) 10*3/mm3     Lipase [[id number]]  (Normal) Collected:  05/06/18 0129    Specimen:  Blood Updated:  05/06/18 0210     Lipase 52 U/L     Basic Metabolic Panel [[id number]]  (Abnormal) Collected:  05/06/18 0129    Specimen:  Blood Updated:  05/06/18 0210     Glucose 181 (H) mg/dL      BUN 18 mg/dL      Creatinine 1.19 mg/dL      Sodium 137 mmol/L      Potassium 4.2 mmol/L      Chloride 97 mmol/L      CO2 28.0 mmol/L      Calcium 8.9 mg/dL      eGFR Non African Amer 60 (L) mL/min/1.73      BUN/Creatinine Ratio 15.1     Anion Gap 12.0 mmol/L     Narrative:       The MDRD GFR formula is only valid for adults with stable renal function between ages 18 and 70.    Phosphorus [[id number]]  (Normal) Collected:  05/06/18 0129    Specimen:  Blood Updated:  05/06/18 0210     Phosphorus 3.6 mg/dL     Magnesium [[id number]]  (Normal) Collected:  05/06/18 0129    Specimen:  Blood Updated:  05/06/18 0210     Magnesium 2.0 mg/dL     aPTT [[id number]]  (Normal) Collected:  05/06/18 0129    Specimen:  Blood Updated:  05/06/18 0208     PTT 23.0 seconds     Narrative:       The recommended Heparin therapeutic range is 68-97 seconds.        Imaging Results (last 24 hours)     Procedure Component Value Units Date/Time    XR Chest 1 View [[id number]] Updated:  05/06/18 0303    XR Chest 1 View [[id number]] Collected:  05/06/18 0207     Updated:  05/06/18 0233    Narrative:       Exam: AP portable chest    INDICATION: Shortness of breath    COMPARISON: 12/16/2017    FINDINGS: AP portable chest. Bony structures are intact. The  lungs are hypoinflated which accentuates the heart size and  bronchovascular markings. There is bilateral linear atelectasis.  Difficult to assess the heart size. Cannot exclude  infiltrate  and/or pleural effusion in the left lung base.      Impression:       1. Hypoinflated lungs.  2. Bilateral linear atelectasis.    Electronically signed by:  Valentino South MD  5/6/2018 2:32 AM CDT  Workstation: [facility] Problem List     * (Principal)Anaphylactic syndrome    Type 2 diabetes mellitus without complication    Hyperlipemia    Essential hypertension    Psoriasis    Kidney stones        Assessment/Plan           Assessment / Plan  --anaphylaxis   received epi and solumdrol in ED  Admit to ICU   Continue solumderol, vent management and close monitring     --kidney stones   Had SHOCKWAVE LITHOTRIPSY by Dr jain on 5/4/18  According to the wife Patient was supposed to start on some antibiotics , she is not very sure of the name.  Will start Levaquin .    --DM  ISS    --HLD  Lipitor. Held for now    --GERD  PPI    --HTN  bystolic    --GI/DVT prophylaxis    I discussed the patients findings and my recommendations with the patient, and the patient verbalized understanding of the plan, risks and benefits of the plan.    This document has been electronically signed by Mike Byrd MD on May 6, 2018 3:07 AM

## 2018-05-06 NOTE — ANESTHESIA PROCEDURE NOTES
Airway  Urgency: emergent    Difficult airway    General Information and Staff    Patient location during procedure: ED    Consent for Airway (if performed for an anesthetic, see related documentation for consents)  Patient identity confirmed: verbally with patient, arm band and hospital-assigned identification number  Consent: No emergent situation. Verbal consent obtained.  Consent given by: patient and spouse      Indications and Patient Condition  Indications for airway management: airway protection, respiratory distress/failure and CNS depression    Preoxygenated: yes  MILS maintained throughout  Mask difficulty assessment: 3 - difficult mask (inadequate, unstable or two providers) +/- NMBA    Final Airway Details  Final airway type: endotracheal airway      Successful airway: ETT  Cuffed: yes   Successful intubation technique: video laryngoscopy  Facilitating devices/methods: intubating stylet  Endotracheal tube insertion site: oral  Blade: Wilcox  Blade size: #3  ETT size: 7.0 mm  Cormack-Lehane Classification: grade III - view of epiglottis only  Placement verified by: chest auscultation and capnometry   Inital cuff pressure (cm H2O): 5  Measured from: gums  ETT to gums (cm): 22  Number of attempts at approach: 3 or more    Additional Comments  Ctsp by er physician dr. Carmona; patient was in obvious respiratory distress secondary to worsening angioedema.  Patient was on face mask oxygen with declining saturation and mental status.  Initially failed attempts at DL necessitated the use of the wilcox to place a 7.0 ett with use of blue bougie.  Multiple attempts finally succeeded with etco2.  Patient remained HD thruout the procedure.

## 2018-05-06 NOTE — PLAN OF CARE
Problem: Skin Injury Risk (Adult)  Goal: Identify Related Risk Factors and Signs and Symptoms  Outcome: Ongoing (interventions implemented as appropriate)   05/06/18 0639   Skin Injury Risk (Adult)   Related Risk Factors (Skin Injury Risk) advanced age;critical care admission     Goal: Skin Health and Integrity  Outcome: Ongoing (interventions implemented as appropriate)      Problem: Restraint, Nonbehavioral (Nonviolent)  Goal: Rationale and Justification  Outcome: Ongoing (interventions implemented as appropriate)    Goal: Nonbehavioral (Nonviolent) Restraint: Absence of Injury/Harm  Outcome: Ongoing (interventions implemented as appropriate)    Goal: Nonbehavioral (Nonviolent) Restraint: Achievement of Discontinuation Criteria  Outcome: Ongoing (interventions implemented as appropriate)    Goal: Nonbehavioral (Nonviolent) Restraint: Preservation of Dignity and Wellbeing  Outcome: Ongoing (interventions implemented as appropriate)

## 2018-05-06 NOTE — PROGRESS NOTES
[facility] Medicine Services  INPATIENT PROGRESS NOTE    Length of Stay: 0  Date of Admission: 5/6/2018  Primary Care Physician: Jaxson Zepeda MD    Subjective   Chief Complaint: anaphylaxis   HPI:    Intubated sedated.  Still has no cuff leak.  Requiring 100% oxygen.  Family at bedside.   No obvious allergen identified     Review of Systems   Unable to perform ROS: Intubated        All pertinent negatives and positives are as above. All other systems have been reviewed and are negative unless otherwise stated.     Objective    Temp:  [98 °F (36.7 °C)-98.3 °F (36.8 °C)] 98.3 °F (36.8 °C)  Heart Rate:  [] 76  Resp:  [12-24] 16  BP: ()/(52-80) 115/58  FiO2 (%):  [100 %] 100 %  Physical Exam   Constitutional: He appears well-developed and well-nourished. No distress.   HENT:   Head: Normocephalic and atraumatic.   Cardiovascular: Normal rate.    Pulmonary/Chest: Effort normal. No respiratory distress. He has no wheezes.   Abdominal: Soft. He exhibits no distension.   Musculoskeletal: Normal range of motion. He exhibits no edema.   Neurological: No cranial nerve deficit.   Skin: Skin is warm and dry. He is not diaphoretic.   Vitals reviewed.          Results Review:  I have reviewed the labs, radiology results, and diagnostic studies.    Laboratory Data:   Lab Results (last 24 hours)     Procedure Component Value Units Date/Time    Blood Gas, Arterial With Co-Ox [[id number]]  (Abnormal) Collected:  05/06/18 0440    Specimen:  Arterial Blood Updated:  05/06/18 0446     Site Right Radial     Elder's Test N/A     pH, Arterial 7.259 (L) pH units      pCO2, Arterial 60.8 (H) mm Hg      pO2, Arterial 79.4 (L) mm Hg      HCO3, Arterial 27.2 (H) mmol/L      Base Excess, Arterial -1.4 (L) mmol/L      O2 Saturation, Arterial 94.7 %      Hemoglobin, Blood Gas 15.3 g/dL      Hematocrit, Blood Gas 46.9 %      Oxyhemoglobin 93.6 (L) %      Methemoglobin 0.60 %       Carboxyhemoglobin 0.6 %      Sodium, Arterial 137 mmol/L      Potassium, Arterial 4.5 mmol/L      Ionized Calcium 4.53 (L) mg/dL      Glucose, Arterial 191 (H) mmol/L      Barometric Pressure for Blood Gas 748 mmHg      Modality PRB     FIO2 100 %      Ventilator Mode AC     Set Tidal Volume 550     Set Mech Resp Rate 12.0     PEEP 8.0     Collected by gold ramos     pH, Temp Corrected -- pH Units      pCO2, Temperature Corrected -- mm Hg      pO2, Temperature Corrected -- mm Hg     TSH [[id number]]  (Normal) Collected:  05/06/18 0129    Specimen:  Blood Updated:  05/06/18 0241     TSH 3.020 mIU/mL     [place] Draw [[id number]] Collected:  05/06/18 0129    Specimen:  Blood Updated:  05/06/18 0230    Narrative:       The following orders were created for panel order [place] Draw.  Procedure                               Abnormality         Status                     ---------                               -----------         ------                     Light Blue Top[[id number]]                                   Final result               Green Top (Gel)[[id number]]                                  Final result               Lavender Top[[id number]]                                     Final result               Gold Top - SST[[id number]]                                   Final result                 Please view results for these tests on the individual orders.    Light Blue Top [[id number]] Collected:  05/06/18 0129    Specimen:  Blood Updated:  05/06/18 0230     Extra Tube hold for add-on     Comment: Auto resulted       Green Top (Gel) [[id number]] Collected:  05/06/18 0129    Specimen:  Blood Updated:  05/06/18 0230     Extra Tube Hold for add-ons.     Comment: Auto resulted.       Lavender Top [[id number]] Collected:  05/06/18 0129    Specimen:  Blood Updated:  05/06/18 0230     Extra Tube hold for add-on     Comment: Auto resulted       Gold Top - SST [[id number]] Collected:  05/06/18 0129    Specimen:  Blood Updated:   05/06/18 0230     Extra Tube Hold for add-ons.     Comment: Auto resulted.       T4, Free [[id number]]  (Normal) Collected:  05/06/18 0129    Specimen:  Blood Updated:  05/06/18 0227     Free T4 1.21 ng/dL     CBC & Differential [[id number]] Collected:  05/06/18 0129    Specimen:  Blood Updated:  05/06/18 0219    Narrative:       The following orders were created for panel order CBC & Differential.  Procedure                               Abnormality         Status                     ---------                               -----------         ------                     CBC Auto Differential[[id number]]        Abnormal            Final result                 Please view results for these tests on the individual orders.    CBC Auto Differential [[id number]]  (Abnormal) Collected:  05/06/18 0129    Specimen:  Blood Updated:  05/06/18 0219     WBC 9.91 (H) 10*3/mm3      RBC 5.47 10*6/mm3      Hemoglobin 16.6 g/dL      Hematocrit 50.4 (H) %      MCV 92.1 fL      MCH 30.3 pg      MCHC 32.9 g/dL      RDW 13.7 %      RDW-SD 45.6 (H) fl      MPV 9.4 fL      Platelets 282 10*3/mm3      Neutrophil % 55.7 %      Lymphocyte % 33.8 %      Monocyte % 7.7 %      Eosinophil % 1.9 %      Basophil % 0.2 %      Immature Grans % 0.7 (H) %      Neutrophils, Absolute 5.52 10*3/mm3      Lymphocytes, Absolute 3.35 10*3/mm3      Monocytes, Absolute 0.76 10*3/mm3      Eosinophils, Absolute 0.19 10*3/mm3      Basophils, Absolute 0.02 10*3/mm3      Immature Grans, Absolute 0.07 (H) 10*3/mm3     Lipase [[id number]]  (Normal) Collected:  05/06/18 0129    Specimen:  Blood Updated:  05/06/18 0210     Lipase 52 U/L     Basic Metabolic Panel [[id number]]  (Abnormal) Collected:  05/06/18 0129    Specimen:  Blood Updated:  05/06/18 0210     Glucose 181 (H) mg/dL      BUN 18 mg/dL      Creatinine 1.19 mg/dL      Sodium 137 mmol/L      Potassium 4.2 mmol/L      Chloride 97 mmol/L      CO2 28.0 mmol/L      Calcium 8.9 mg/dL      eGFR Non  Amer 60 (L)  mL/min/1.73      BUN/Creatinine Ratio 15.1     Anion Gap 12.0 mmol/L     Narrative:       The MDRD GFR formula is only valid for adults with stable renal function between ages 18 and 70.    Phosphorus [[id number]]  (Normal) Collected:  05/06/18 0129    Specimen:  Blood Updated:  05/06/18 0210     Phosphorus 3.6 mg/dL     Magnesium [[id number]]  (Normal) Collected:  05/06/18 0129    Specimen:  Blood Updated:  05/06/18 0210     Magnesium 2.0 mg/dL     aPTT [[id number]]  (Normal) Collected:  05/06/18 0129    Specimen:  Blood Updated:  05/06/18 0208     PTT 23.0 seconds     Narrative:       The recommended Heparin therapeutic range is 68-97 seconds.          Culture Data:   No results found for: BLOODCX  Urine Culture   Date Value Ref Range Status   05/05/2018 Culture in progress  Preliminary     No results found for: RESPCX  No results found for: WOUNDCX  No results found for: STOOLCX  No components found for: BODYFLD    Radiology Data:   Imaging Results (last 24 hours)     Procedure Component Value Units Date/Time    XR Chest 1 View [[id number]] Collected:  05/06/18 0253     Updated:  05/06/18 0333    Narrative:       Exam: AP portable chest    INDICATION: ET tube placement    COMPARISON: 5/6/2018 at 2:06 AM    FINDINGS: AP portable chest. ET tube tip is 3.37 cm above the  julieta. NG tube tip is in the stomach. Lungs are hypoinflated.  There is linear bilateral atelectasis. Difficult to assess the  heart size.       Impression:       ET tube and NG tube are in good position.    Electronically signed by:  Valentino South MD  5/6/2018 3:31 AM CDT  Workstation: [id number]    XR Chest 1 View [[id number]] Collected:  05/06/18 0207     Updated:  05/06/18 0233    Narrative:       Exam: AP portable chest    INDICATION: Shortness of breath    COMPARISON: 12/16/2017    FINDINGS: AP portable chest. Bony structures are intact. The  lungs are hypoinflated which accentuates the heart size and  bronchovascular markings. There is  bilateral linear atelectasis.  Difficult to assess the heart size. Cannot exclude infiltrate  and/or pleural effusion in the left lung base.      Impression:       1. Hypoinflated lungs.  2. Bilateral linear atelectasis.    Electronically signed by:  Valentino South MD  5/6/2018 2:32 AM CDT  Workstation: [id number]          I have reviewed the patient current medications.     Assessment/Plan     Hospital Problem List     * (Principal)Anaphylactic syndrome    Type 2 diabetes mellitus without complication    Hyperlipemia    Essential hypertension    Psoriasis    Kidney stones        --anaphylaxis   Intubated.  Continue solumedrol      --kidney stones   Had SHOCKWAVE LITHOTRIPSY by Dr jain on 5/4/18  levaquin     --DM  Sliding scale insulin     --HLD  Lipitor. Held for now     --GERD  PPI     --HTN  stable     --GI/DVT prophylaxis          Xu Galo MD   05/06/18   11:04 AM

## 2018-05-06 NOTE — ED PROVIDER NOTES
Subjective   History of Present Illness  73-year-old male comes into the ED because of tongue swelling and difficulty breathing.  Sounds like this processes and worsening throughout the day.  He was seen in the ED earlier and actually sounds like he had some tongue swelling at that time.  Family denies any new medications, new foods, not allergies.  No bee stings.  He does not take any lisinopril or other ACE inhibitor or ARB. No fevers.   Review of Systems   Constitutional: Negative for chills, fatigue and fever.   HENT: Positive for trouble swallowing and voice change. Negative for congestion, dental problem, nosebleeds, postnasal drip, sinus pressure and sore throat.    Respiratory: Positive for shortness of breath. Negative for cough, chest tightness, wheezing and stridor.    Cardiovascular: Negative for chest pain and leg swelling.   Gastrointestinal: Negative for abdominal pain, constipation, diarrhea, nausea and vomiting.   Genitourinary: Negative for dysuria, flank pain, frequency, hematuria, testicular pain and urgency.   Musculoskeletal: Negative for arthralgias and myalgias.   Skin: Negative for rash.   Neurological: Negative for syncope, light-headedness and headaches.   Psychiatric/Behavioral: Negative.        Past Medical History:   Diagnosis Date   • Acid reflux    • Diabetes    • Enlarged prostate    • High cholesterol    • Hypertension    • Kidney stone     multiple   • Psoriasis        No Known Allergies    Past Surgical History:   Procedure Laterality Date   • AMPUTATION Left     lower arm and hand   • CHOLECYSTECTOMY OPEN     • CYSTOSCOPY N/A 12/16/2017    Procedure: CYSTOSCOPY WITH CLOT EVACUATION;  Surgeon: Prosper Ariza MD;  Location: [facility];  Service:    • CYSTOSCOPY, URETEROSCOPY, RETROGRADE PYELOGRAM, STENT INSERTION Left 12/15/2017    Procedure: CYSTOSCOPY LEFT URETEROSCOPY RETROGRADE PYELOGRAM HOLMIUM LASER STENT INSERTION;  Surgeon: Prosper Ariza MD;  Location: [facility];   Service:    • EXTRACORPOREAL SHOCK WAVE LITHOTRIPSY (ESWL)     • EXTRACORPOREAL SHOCKWAVE LITHOTRIPSY (ESWL), STENT INSERTION/REMOVAL Left 2/3/2017    Procedure: LEFT EXTRACORPOREAL SHOCKWAVE LITHOTRIPSY, POSSIBLE CYSTOSCOPY, POSSIBLE STENT REMOVAL ;  Surgeon: Prosper Ariza MD;  Location: [facility];  Service:    • KIDNEY STONE SURGERY     • SCROTAL SURGERY      trauma       History reviewed. No pertinent family history.    Social History     Social History   • Marital status:      Social History Main Topics   • Smoking status: Never Smoker   • Smokeless tobacco: Never Used   • Alcohol use No   • Drug use: No   • Sexual activity: Defer     Other Topics Concern   • Not on file           Objective   Physical Exam   Constitutional: He is oriented to person, place, and time. He appears well-developed and well-nourished.   HENT:   Head: Normocephalic.   Enlarged tongue. Large neck. Visible epiglottis in posterior pharynx.  Difficulty speaking secondary to tongue swellng   Eyes: Pupils are equal, round, and reactive to light.   Neck: Normal range of motion. Neck supple.   Thick neck   Cardiovascular: Normal rate, regular rhythm and normal heart sounds.    Pulmonary/Chest: Effort normal and breath sounds normal.   Lungs are clear. No wheezing. No stridor   Abdominal: Soft. Bowel sounds are normal. He exhibits no distension. There is no tenderness.   Musculoskeletal:   Pt w/ LUE amputation below the elbow   Neurological: He is alert and oriented to person, place, and time.   Skin: Skin is warm and dry. Capillary refill takes less than 2 seconds.   Psychiatric: He has a normal mood and affect.   Nursing note and vitals reviewed.      Critical Care  Performed by: FEROZ TARIQ  Authorized by: FEROZ TARIQ     Critical care provider statement:     Critical care time (minutes):  35    Critical care time was exclusive of:  Separately billable procedures and treating other patients    Critical care  was necessary to treat or prevent imminent or life-threatening deterioration of the following conditions:  Respiratory failure    Critical care was time spent personally by me on the following activities:  Blood draw for specimens, ordering and performing treatments and interventions, ordering and review of laboratory studies, development of treatment plan with patient or surrogate, discussions with consultants, ordering and review of radiographic studies, pulse oximetry, evaluation of patient's response to treatment, re-evaluation of patient's condition, review of old charts, examination of patient, ventilator management and obtaining history from patient or surrogate    I assumed direction of critical care for this patient from another provider in my specialty: no                   ED Course  ED Course      Labs Reviewed   BASIC METABOLIC PANEL - Abnormal; Notable for the following:        Result Value    Glucose 181 (*)     eGFR Non  Amer 60 (*)     All other components within normal limits    Narrative:     The MDRD GFR formula is only valid for adults with stable renal function between ages 18 and 70.   CBC WITH AUTO DIFFERENTIAL - Abnormal; Notable for the following:     WBC 9.91 (*)     Hematocrit 50.4 (*)     RDW-SD 45.6 (*)     Immature Grans % 0.7 (*)     Immature Grans, Absolute 0.07 (*)     All other components within normal limits   APTT - Normal    Narrative:     The recommended Heparin therapeutic range is 68-97 seconds.   LIPASE - Normal   TSH - Normal   T4, FREE - Normal   PHOSPHORUS - Normal   MAGNESIUM - Normal   RAINBOW DRAW    Narrative:     The following orders were created for panel order [place] Draw.  Procedure                               Abnormality         Status                     ---------                               -----------         ------                     Light Blue Top[[id number]]                                   Final result               Green Top (Gel)[[id number]]                                   Final result               Lavender Top[[id number]]                                     Final result               Gold Top - SST[[id number]]                                   Final result                 Please view results for these tests on the individual orders.   BLOOD GAS, ARTERIAL   LIGHT BLUE TOP   GREEN TOP   LAVENDER TOP   GOLD TOP - SST   CBC AND DIFFERENTIAL    Narrative:     The following orders were created for panel order CBC & Differential.  Procedure                               Abnormality         Status                     ---------                               -----------         ------                     CBC Auto Differential[[id number]]        Abnormal            Final result                 Please view results for these tests on the individual orders.     XR Chest 1 View   Final Result   1. Hypoinflated lungs.   2. Bilateral linear atelectasis.      Electronically signed by:  Valentino South MD  5/6/2018 2:32 AM CDT   Workstation: [id number]      XR Chest 1 View    (Results Pending)                 MDM  Number of Diagnoses or Management Options  Anaphylaxis, initial encounter:   Hypoxia:   Diagnosis management comments: Anesthesia paged for assistance with airway. Pt needs to be intubated. He would be a difficult intubation with his current situation. Baseline body habitus also makes this patient likely for a difficult intubation. Discussed need for intubation with family.      Anesthesia arrived around 2:15 AM.  Decision was made to intubate the patient as he was worsening.  Intubation was performed by anesthesia Dr. Venegas.  Please see his note for intubation.  Intubation was difficult to numerous attempts.  He was eventually intubated with a 7 ET tube.  Admit to the ICU for anaphylaxis.         Final diagnoses:   Anaphylaxis, initial encounter   Hypoxia            Maxwell Carmona MD  05/06/18 0313       Maxwell Carmona MD  05/06/18  [id number]

## 2018-05-07 ENCOUNTER — APPOINTMENT (OUTPATIENT)
Dept: GENERAL RADIOLOGY | Facility: HOSPITAL | Age: 73
End: 2018-05-07

## 2018-05-07 PROBLEM — J96.00 ACUTE RESPIRATORY FAILURE (HCC): Status: ACTIVE | Noted: 2018-05-07

## 2018-05-07 LAB
ANION GAP SERPL CALCULATED.3IONS-SCNC: 8 MMOL/L (ref 5–15)
ARTERIAL PATENCY WRIST A: ABNORMAL
ATMOSPHERIC PRESS: 749 MMHG
BASE EXCESS BLDA CALC-SCNC: 1.7 MMOL/L (ref 0–2)
BASOPHILS # BLD AUTO: 0 10*3/MM3 (ref 0–0.2)
BASOPHILS NFR BLD AUTO: 0 % (ref 0–2)
BDY SITE: ABNORMAL
BUN BLD-MCNC: 22 MG/DL (ref 7–21)
BUN/CREAT SERPL: 22 (ref 7–25)
CA-I BLD-MCNC: 4.54 MG/DL (ref 4.6–5.6)
CALCIUM SPEC-SCNC: 8.4 MG/DL (ref 8.4–10.2)
CHLORIDE SERPL-SCNC: 102 MMOL/L (ref 95–110)
CO2 SERPL-SCNC: 26 MMOL/L (ref 22–31)
COHGB MFR BLD: 0.5 % (ref 0–5)
CREAT BLD-MCNC: 1 MG/DL (ref 0.7–1.3)
DEPRECATED RDW RBC AUTO: 43.3 FL (ref 35.1–43.9)
EOSINOPHIL # BLD AUTO: 0 10*3/MM3 (ref 0–0.7)
EOSINOPHIL NFR BLD AUTO: 0 % (ref 0–7)
ERYTHROCYTE [DISTWIDTH] IN BLOOD BY AUTOMATED COUNT: 13.5 % (ref 11.5–14.5)
GFR SERPL CREATININE-BSD FRML MDRD: 74 ML/MIN/1.73 (ref 60–98)
GLUCOSE BLD-MCNC: 213 MG/DL (ref 60–100)
GLUCOSE BLDA-MCNC: 226 MMOL/L (ref 65–95)
GLUCOSE BLDC GLUCOMTR-MCNC: 219 MG/DL (ref 70–130)
GLUCOSE BLDC GLUCOMTR-MCNC: 236 MG/DL (ref 70–130)
GLUCOSE BLDC GLUCOMTR-MCNC: 255 MG/DL (ref 70–130)
GLUCOSE BLDC GLUCOMTR-MCNC: 266 MG/DL (ref 70–130)
HCO3 BLDA-SCNC: 26.2 MMOL/L (ref 20–26)
HCT VFR BLD AUTO: 40.1 % (ref 39–49)
HCT VFR BLD CALC: 42.5 % (ref 38–51)
HGB BLD-MCNC: 13.8 G/DL (ref 13.7–17.3)
HGB BLDA-MCNC: 13.9 G/DL (ref 14–18)
HOROWITZ INDEX BLD+IHG-RTO: 100 %
IMM GRANULOCYTES # BLD: 0.05 10*3/MM3 (ref 0–0.02)
IMM GRANULOCYTES NFR BLD: 0.4 % (ref 0–0.5)
LYMPHOCYTES # BLD AUTO: 0.77 10*3/MM3 (ref 0.6–4.2)
LYMPHOCYTES NFR BLD AUTO: 5.9 % (ref 10–50)
Lab: ABNORMAL
MCH RBC QN AUTO: 30.4 PG (ref 26.5–34)
MCHC RBC AUTO-ENTMCNC: 34.4 G/DL (ref 31.5–36.3)
MCV RBC AUTO: 88.3 FL (ref 80–98)
METHGB BLD QL: 0.5 % (ref 0–3)
MODALITY: ABNORMAL
MONOCYTES # BLD AUTO: 0.28 10*3/MM3 (ref 0–0.9)
MONOCYTES NFR BLD AUTO: 2.1 % (ref 0–12)
NEUTROPHILS # BLD AUTO: 11.94 10*3/MM3 (ref 2–8.6)
NEUTROPHILS NFR BLD AUTO: 91.6 % (ref 37–80)
OXYHGB MFR BLDV: 93 % (ref 94–99)
PCO2 BLDA: 40 MM HG (ref 35–45)
PCO2 TEMP ADJ BLD: ABNORMAL MM HG (ref 35–48)
PEEP RESPIRATORY: 8 CM[H2O]
PH BLDA: 7.42 PH UNITS (ref 7.35–7.45)
PH, TEMP CORRECTED: ABNORMAL PH UNITS
PLATELET # BLD AUTO: 236 10*3/MM3 (ref 150–450)
PMV BLD AUTO: 9.3 FL (ref 8–12)
PO2 BLDA: 66.4 MM HG (ref 83–108)
PO2 TEMP ADJ BLD: ABNORMAL MM HG (ref 83–108)
POTASSIUM BLD-SCNC: 3.9 MMOL/L (ref 3.5–5.1)
POTASSIUM BLDA-SCNC: 3.9 MMOL/L (ref 3.4–4.5)
RBC # BLD AUTO: 4.54 10*6/MM3 (ref 4.37–5.74)
SAO2 % BLDCOA: 93.9 % (ref 94–99)
SET MECH RESP RATE: 16
SODIUM BLD-SCNC: 136 MMOL/L (ref 137–145)
SODIUM BLDA-SCNC: 138 MMOL/L (ref 136–146)
VENTILATOR MODE: AC
VT ON VENT VENT: 550 ML
WBC NRBC COR # BLD: 13.04 10*3/MM3 (ref 3.2–9.8)

## 2018-05-07 PROCEDURE — 86003 ALLG SPEC IGE CRUDE XTRC EA: CPT | Performed by: FAMILY MEDICINE

## 2018-05-07 PROCEDURE — 83050 HGB METHEMOGLOBIN QUAN: CPT

## 2018-05-07 PROCEDURE — 63710000001 INSULIN ASPART PER 5 UNITS: Performed by: FAMILY MEDICINE

## 2018-05-07 PROCEDURE — 82375 ASSAY CARBOXYHB QUANT: CPT

## 2018-05-07 PROCEDURE — 25010000002 FUROSEMIDE PER 20 MG: Performed by: INTERNAL MEDICINE

## 2018-05-07 PROCEDURE — 25010000002 HYDROMORPHONE PER 4 MG: Performed by: INTERNAL MEDICINE

## 2018-05-07 PROCEDURE — 94799 UNLISTED PULMONARY SVC/PX: CPT

## 2018-05-07 PROCEDURE — 25010000002 HEPARIN (PORCINE) PER 1000 UNITS: Performed by: FAMILY MEDICINE

## 2018-05-07 PROCEDURE — 80048 BASIC METABOLIC PNL TOTAL CA: CPT | Performed by: FAMILY MEDICINE

## 2018-05-07 PROCEDURE — 25010000002 METHYLPREDNISOLONE PER 125 MG: Performed by: FAMILY MEDICINE

## 2018-05-07 PROCEDURE — 94760 N-INVAS EAR/PLS OXIMETRY 1: CPT

## 2018-05-07 PROCEDURE — 25010000002 LEVOFLOXACIN PER 250 MG: Performed by: FAMILY MEDICINE

## 2018-05-07 PROCEDURE — 36600 WITHDRAWAL OF ARTERIAL BLOOD: CPT

## 2018-05-07 PROCEDURE — 82962 GLUCOSE BLOOD TEST: CPT

## 2018-05-07 PROCEDURE — 71045 X-RAY EXAM CHEST 1 VIEW: CPT

## 2018-05-07 PROCEDURE — 99291 CRITICAL CARE FIRST HOUR: CPT | Performed by: INTERNAL MEDICINE

## 2018-05-07 PROCEDURE — 94003 VENT MGMT INPAT SUBQ DAY: CPT

## 2018-05-07 PROCEDURE — 85025 COMPLETE CBC W/AUTO DIFF WBC: CPT | Performed by: FAMILY MEDICINE

## 2018-05-07 PROCEDURE — 82805 BLOOD GASES W/O2 SATURATION: CPT

## 2018-05-07 PROCEDURE — 25010000002 PROPOFOL 1000 MG/ML EMULSION: Performed by: EMERGENCY MEDICINE

## 2018-05-07 RX ORDER — FUROSEMIDE 10 MG/ML
20 INJECTION INTRAMUSCULAR; INTRAVENOUS DAILY
Status: DISCONTINUED | OUTPATIENT
Start: 2018-05-07 | End: 2018-05-08

## 2018-05-07 RX ADMIN — METHYLPREDNISOLONE SODIUM SUCCINATE 60 MG: 125 INJECTION, POWDER, FOR SOLUTION INTRAMUSCULAR; INTRAVENOUS at 15:01

## 2018-05-07 RX ADMIN — HEPARIN SODIUM 5000 UNITS: 5000 INJECTION, SOLUTION INTRAVENOUS; SUBCUTANEOUS at 13:17

## 2018-05-07 RX ADMIN — INSULIN ASPART 4 UNITS: 100 INJECTION, SOLUTION INTRAVENOUS; SUBCUTANEOUS at 07:52

## 2018-05-07 RX ADMIN — PANTOPRAZOLE SODIUM 40 MG: 40 INJECTION, POWDER, FOR SOLUTION INTRAVENOUS at 09:37

## 2018-05-07 RX ADMIN — HEPARIN SODIUM 5000 UNITS: 5000 INJECTION, SOLUTION INTRAVENOUS; SUBCUTANEOUS at 05:21

## 2018-05-07 RX ADMIN — IPRATROPIUM BROMIDE 2 PUFF: 17 AEROSOL, METERED RESPIRATORY (INHALATION) at 15:21

## 2018-05-07 RX ADMIN — PROPOFOL 50 MCG/KG/MIN: 10 INJECTION, EMULSION INTRAVENOUS at 07:11

## 2018-05-07 RX ADMIN — TRIAMCINOLONE ACETONIDE: 1 OINTMENT TOPICAL at 21:03

## 2018-05-07 RX ADMIN — METHYLPREDNISOLONE SODIUM SUCCINATE 60 MG: 125 INJECTION, POWDER, FOR SOLUTION INTRAMUSCULAR; INTRAVENOUS at 20:50

## 2018-05-07 RX ADMIN — PROPOFOL 40 MCG/KG/MIN: 10 INJECTION, EMULSION INTRAVENOUS at 11:37

## 2018-05-07 RX ADMIN — INSULIN ASPART 4 UNITS: 100 INJECTION, SOLUTION INTRAVENOUS; SUBCUTANEOUS at 11:18

## 2018-05-07 RX ADMIN — HYDROMORPHONE HYDROCHLORIDE 0.5 MG: 1 INJECTION, SOLUTION INTRAMUSCULAR; INTRAVENOUS; SUBCUTANEOUS at 12:49

## 2018-05-07 RX ADMIN — HEPARIN SODIUM 5000 UNITS: 5000 INJECTION, SOLUTION INTRAVENOUS; SUBCUTANEOUS at 21:00

## 2018-05-07 RX ADMIN — Medication 10 ML: at 09:49

## 2018-05-07 RX ADMIN — INSULIN ASPART 6 UNITS: 100 INJECTION, SOLUTION INTRAVENOUS; SUBCUTANEOUS at 20:48

## 2018-05-07 RX ADMIN — SODIUM CHLORIDE 100 ML/HR: 900 INJECTION, SOLUTION INTRAVENOUS at 02:14

## 2018-05-07 RX ADMIN — METHYLPREDNISOLONE SODIUM SUCCINATE 60 MG: 125 INJECTION, POWDER, FOR SOLUTION INTRAMUSCULAR; INTRAVENOUS at 04:34

## 2018-05-07 RX ADMIN — PROPOFOL 50 MCG/KG/MIN: 10 INJECTION, EMULSION INTRAVENOUS at 17:32

## 2018-05-07 RX ADMIN — METHYLPREDNISOLONE SODIUM SUCCINATE 60 MG: 125 INJECTION, POWDER, FOR SOLUTION INTRAMUSCULAR; INTRAVENOUS at 09:36

## 2018-05-07 RX ADMIN — LEVOFLOXACIN 500 MG: 5 INJECTION, SOLUTION INTRAVENOUS at 04:34

## 2018-05-07 RX ADMIN — ALBUTEROL SULFATE 2 PUFF: 90 AEROSOL, METERED RESPIRATORY (INHALATION) at 11:32

## 2018-05-07 RX ADMIN — PROPOFOL 49.04 MCG/KG/MIN: 10 INJECTION, EMULSION INTRAVENOUS at 21:09

## 2018-05-07 RX ADMIN — ALBUTEROL SULFATE 2 PUFF: 90 AEROSOL, METERED RESPIRATORY (INHALATION) at 06:25

## 2018-05-07 RX ADMIN — PANTOPRAZOLE SODIUM 40 MG: 40 INJECTION, POWDER, FOR SOLUTION INTRAVENOUS at 20:56

## 2018-05-07 RX ADMIN — INSULIN ASPART 6 UNITS: 100 INJECTION, SOLUTION INTRAVENOUS; SUBCUTANEOUS at 16:54

## 2018-05-07 RX ADMIN — FUROSEMIDE 20 MG: 10 INJECTION, SOLUTION INTRAMUSCULAR; INTRAVENOUS at 11:19

## 2018-05-07 RX ADMIN — ALBUTEROL SULFATE 2 PUFF: 90 AEROSOL, METERED RESPIRATORY (INHALATION) at 18:51

## 2018-05-07 RX ADMIN — PROPOFOL 50 MCG/KG/MIN: 10 INJECTION, EMULSION INTRAVENOUS at 04:04

## 2018-05-07 RX ADMIN — PROPOFOL 50 MCG/KG/MIN: 10 INJECTION, EMULSION INTRAVENOUS at 01:02

## 2018-05-07 RX ADMIN — ALBUTEROL SULFATE 2 PUFF: 90 AEROSOL, METERED RESPIRATORY (INHALATION) at 15:21

## 2018-05-07 RX ADMIN — PROPOFOL 50 MCG/KG/MIN: 10 INJECTION, EMULSION INTRAVENOUS at 14:58

## 2018-05-07 RX ADMIN — TRIAMCINOLONE ACETONIDE: 1 OINTMENT TOPICAL at 09:48

## 2018-05-07 RX ADMIN — HYDROMORPHONE HYDROCHLORIDE 0.5 MG: 1 INJECTION, SOLUTION INTRAMUSCULAR; INTRAVENOUS; SUBCUTANEOUS at 23:29

## 2018-05-07 RX ADMIN — IPRATROPIUM BROMIDE 2 PUFF: 17 AEROSOL, METERED RESPIRATORY (INHALATION) at 18:51

## 2018-05-07 NOTE — PROGRESS NOTES
Progress Note  Krishna Ayoub MD  Hospitalist    Date of visit: 5/7/2018     LOS: 1 day   Patient Care Team:  Jaxson Zepeda MD as PCP - General    Chief Complaint: dyspnea    Subjective     Interval History:     Patient Complaints: intubated for respiratory distress    History taken from: nursing / chart    Medication Review:   Current Facility-Administered Medications   Medication Dose Route Frequency Provider Last Rate Last Dose   • albuterol (PROVENTIL HFA;VENTOLIN HFA) inhaler 2 puff  2 puff Inhalation 4x Daily - RT Mike Byrd MD   2 puff at 05/07/18 1521   • albuterol (PROVENTIL) nebulizer solution 0.083% 2.5 mg/3mL  5 mg Nebulization Once Mxawell Carmona MD       • dextrose (D50W) solution 25 g  25 g Intravenous Q15 Min PRN Mike Byrd MD       • dextrose (GLUTOSE) oral gel 15 g  15 g Oral Q15 Min PRN Mike Byrd MD       • furosemide (LASIX) injection 20 mg  20 mg Intravenous Daily Krishna Ayoub MD   20 mg at 05/07/18 1119   • glucagon (human recombinant) (GLUCAGEN DIAGNOSTIC) injection 1 mg  1 mg Subcutaneous PRN Mike Byrd MD       • heparin (porcine) 5000 UNIT/ML injection 5,000 Units  5,000 Units Subcutaneous Q8H Mike Byrd MD   5,000 Units at 05/07/18 1317   • HYDROmorphone (DILAUDID) injection 0.5 mg  0.5 mg Intravenous Q2H PRN Krishna Ayoub MD   0.5 mg at 05/07/18 1249   • insulin aspart (novoLOG) injection 0-9 Units  0-9 Units Subcutaneous 4x Daily AC & at Bedtime Mike Byrd MD   4 Units at 05/07/18 1118   • ipratropium (ATROVENT HFA) inhaler 2 puff  2 puff Inhalation 4x Daily - RT Rain Mcadams MD   2 puff at 05/07/18 1521   • levoFLOXacin (LEVAQUIN) 500 mg/100 mL D5W (premix) (LEVAQUIN) 500 mg  500 mg Intravenous Q24H Mike Byrd MD   500 mg at 05/07/18 0434   • methylPREDNISolone sodium succinate (SOLU-Medrol) injection 60 mg  60 mg Intravenous Q6H Mike MD Carson   60 mg at 05/07/18 1501   • ondansetron (ZOFRAN) injection 4 mg  4 mg Intravenous Q6H PRN Mike  MD Carson       • pantoprazole (PROTONIX) injection 40 mg  40 mg Intravenous Q12H Mike Byrd MD   40 mg at 05/07/18 0937   • Pharmacy to Dose LevoFLOXacin (LEVAQUIN)   Does not apply Continuous PRN Mike Byrd MD       • propofol (DIPRIVAN) infusion 10 mg/mL 100 mL  5-50 mcg/kg/min Intravenous Titrated Maxwell Carmona MD 31.2 mL/hr at 05/07/18 1458 50 mcg/kg/min at 05/07/18 1458   • sodium chloride 0.9 % flush 1-10 mL  1-10 mL Intravenous PRN Mike Byrd MD       • sodium chloride 0.9 % flush 10 mL  10 mL Intravenous PRN Maxwell Carmona MD       • sodium chloride 0.9 % flush 10 mL  10 mL Intravenous PRN Maxwell Carmona MD   10 mL at 05/07/18 0949   • triamcinolone (KENALOG) 0.1 % ointment   Topical Q12H Mike Byrd MD           Review of Systems:   Review of Systems   Unable to perform ROS: Intubated       Objective     Vital Signs  Temp:  [97.1 °F (36.2 °C)-98.8 °F (37.1 °C)] 97.4 °F (36.3 °C)  Heart Rate:  [65-80] 67  Resp:  [16-20] 17  BP: (112-142)/(55-71) 115/57  FiO2 (%):  [65 %-100 %] 70 %    Physical Exam:  Physical Exam   Constitutional: He is oriented to person, place, and time. He appears well-developed and well-nourished. No distress.   HENT:   Head: Normocephalic and atraumatic.   Eyes: EOM are normal. Pupils are equal, round, and reactive to light. No scleral icterus.   Neck: Normal range of motion. Neck supple.   Cardiovascular: Normal rate and regular rhythm.    Pulmonary/Chest: No respiratory distress. He has no wheezes. He has rales.   Abdominal: Soft. Bowel sounds are normal.   Musculoskeletal: Normal range of motion. He exhibits no edema, tenderness or deformity.   Neurological: He is alert and oriented to person, place, and time. He has normal reflexes. No cranial nerve deficit. Coordination normal.   Skin: Skin is warm and dry. There is pallor.   Psychiatric: He has a normal mood and affect. His behavior is normal.   Vitals reviewed.       Results Review:     Lab Results (last 24 hours)     Procedure Component Value Units Date/Time    POC Glucose Once [[id number]]  (Abnormal) Collected:  05/07/18 1107    Specimen:  Blood Updated:  05/07/18 1121     Glucose 236 (H) mg/dL      Comment: Sliding Scale AdminMeter: [id number]: [id number] LUIS LORD       POC Glucose Once [[id number]]  (Abnormal) Collected:  05/07/18 0747    Specimen:  Blood Updated:  05/07/18 0758     Glucose 219 (H) mg/dL      Comment: Sliding Scale AdminMeter: [id number]: [id number] LUIS LORD       Blood Gas, Arterial With Co-Ox [[id number]]  (Abnormal) Collected:  05/07/18 0450    Specimen:  Arterial Blood Updated:  05/07/18 0459     Site Right Radial     Elder's Test N/A     pH, Arterial 7.425 pH units      pCO2, Arterial 40.0 mm Hg      pO2, Arterial 66.4 (L) mm Hg      HCO3, Arterial 26.2 (H) mmol/L      Base Excess, Arterial 1.7 mmol/L      O2 Saturation, Arterial 93.9 (L) %      Hemoglobin, Blood Gas 13.9 (L) g/dL      Hematocrit, Blood Gas 42.5 %      Oxyhemoglobin 93.0 (L) %      Methemoglobin 0.50 %      Carboxyhemoglobin 0.5 %      Sodium, Arterial 138 mmol/L      Potassium, Arterial 3.9 mmol/L      Ionized Calcium 4.54 (L) mg/dL      Glucose, Arterial 226 (H) mmol/L      Barometric Pressure for Blood Gas 749 mmHg      Modality N/A     FIO2 100 %      Ventilator Mode AC     Set Tidal Volume 550     Set [facility] Resp Rate 16.0     PEEP 8.0     Collected by gold ramos     pH, Temp Corrected -- pH Units      pCO2, Temperature Corrected -- mm Hg      pO2, Temperature Corrected -- mm Hg     CBC & Differential [[id number]] Collected:  05/07/18 0228    Specimen:  Blood Updated:  05/07/18 0319    Narrative:       The following orders were created for panel order CBC & Differential.  Procedure                               Abnormality         Status                     ---------                               -----------         ------                     CBC Auto Differential[[id number]]         Abnormal            Final result                 Please view results for these tests on the individual orders.    CBC Auto Differential [[id number]]  (Abnormal) Collected:  05/07/18 0228    Specimen:  Blood Updated:  05/07/18 0319     WBC 13.04 (H) 10*3/mm3      RBC 4.54 10*6/mm3      Hemoglobin 13.8 g/dL      Hematocrit 40.1 %      MCV 88.3 fL      MCH 30.4 pg      MCHC 34.4 g/dL      RDW 13.5 %      RDW-SD 43.3 fl      MPV 9.3 fL      Platelets 236 10*3/mm3      Neutrophil % 91.6 (H) %      Lymphocyte % 5.9 (L) %      Monocyte % 2.1 %      Eosinophil % 0.0 %      Basophil % 0.0 %      Immature Grans % 0.4 %      Neutrophils, Absolute 11.94 (H) 10*3/mm3      Lymphocytes, Absolute 0.77 10*3/mm3      Monocytes, Absolute 0.28 10*3/mm3      Eosinophils, Absolute 0.00 10*3/mm3      Basophils, Absolute 0.00 10*3/mm3      Immature Grans, Absolute 0.05 (H) 10*3/mm3     Basic Metabolic Panel [[id number]]  (Abnormal) Collected:  05/07/18 0228    Specimen:  Blood Updated:  05/07/18 0305     Glucose 213 (H) mg/dL      BUN 22 (H) mg/dL      Creatinine 1.00 mg/dL      Sodium 136 (L) mmol/L      Potassium 3.9 mmol/L      Chloride 102 mmol/L      CO2 26.0 mmol/L      Calcium 8.4 mg/dL      eGFR Non African Amer 74 mL/min/1.73      BUN/Creatinine Ratio 22.0     Anion Gap 8.0 mmol/L     Narrative:       The MDRD GFR formula is only valid for adults with stable renal function between ages 18 and 70.    Food Allergy Profile [[id number]] Collected:  05/07/18 0228    Specimen:  Blood Updated:  05/07/18 0242    POC Glucose Once [[id number]]  (Abnormal) Collected:  05/06/18 2138    Specimen:  Blood Updated:  05/06/18 2152     Glucose 213 (H) mg/dL      Comment: RN NotifiedSliding Scale AdminMeter: [id number]: [id number]             Imaging Results (last 24 hours)     Procedure Component Value Units Date/Time    XR Chest 1 View [[id number]] Collected:  05/07/18 1122     Updated:  05/07/18 1155    Narrative:       Chest  single view.    CLINICAL INDICATION: Shortness of breath. Anaphylactic shock.    COMPARISON: May 6, 2018    FINDINGS: Borderline cardiomegaly. Elevated diaphragms, probably  expiratory phase. Endotracheal tube with tip 4 cm above the  bifurcation of the julieta. NG tube in stomach. No dense  consolidation. Minimal discoid atelectatic changes right lung  base. Lungs otherwise clear.      Impression:       CONCLUSION: As above.    Electronically signed by:  Ben Natarajan MD  5/7/2018 11:54 AM CDT  Workstation: [id number]          Assessment/Plan     Principal Problem:    Anaphylactic syndrome  Active Problems:    Acute respiratory failure    Hypertension    Diabetes mellitus    Still requiring 100 % FiO2 to maintain Oxygen saturation. Continue with respiratory support, IV steroids, stop IV fluid. Pulmonary consult appreciated.     Krishna Ayoub MD  05/07/18  4:27 PM

## 2018-05-07 NOTE — PLAN OF CARE
Problem: Skin Injury Risk (Adult)  Goal: Skin Health and Integrity  Outcome: Ongoing (interventions implemented as appropriate)      Problem: Restraint, Nonbehavioral (Nonviolent)  Goal: Rationale and Justification  Outcome: Ongoing (interventions implemented as appropriate)    Goal: Nonbehavioral (Nonviolent) Restraint: Absence of Injury/Harm  Outcome: Ongoing (interventions implemented as appropriate)    Goal: Nonbehavioral (Nonviolent) Restraint: Achievement of Discontinuation Criteria  Outcome: Ongoing (interventions implemented as appropriate)    Goal: Nonbehavioral (Nonviolent) Restraint: Preservation of Dignity and Wellbeing  Outcome: Ongoing (interventions implemented as appropriate)      Problem: Fall Risk (Adult)  Goal: Absence of Fall  Outcome: Ongoing (interventions implemented as appropriate)      Problem: Patient Care Overview  Goal: Plan of Care Review  Outcome: Ongoing (interventions implemented as appropriate)   05/06/18 2000 05/07/18 0616   Plan of Care Review   Progress --  no change   OTHER   Outcome Summary --  patient continues to require the ventilator for support. Swelling in face has not gone down any over night. Will continue to monitor closely.    Coping/Psychosocial   Plan of Care Reviewed With spouse --      Goal: Individualization and Mutuality  Outcome: Ongoing (interventions implemented as appropriate)    Goal: Discharge Needs Assessment  Outcome: Ongoing (interventions implemented as appropriate)    Goal: Interprofessional Rounds/Family Conf  Outcome: Ongoing (interventions implemented as appropriate)

## 2018-05-07 NOTE — PLAN OF CARE
Problem: Patient Care Overview  Goal: Plan of Care Review   05/07/18 9175   OTHER   Outcome Summary Pt resting well and tolerating the vent. Follows commands during sedation holiday. Propofol cont. @ 50 mc. PRN pain med given once this shift. No s/s of pain or distress. Family at bedside, fall precautions in place. Decreased swelling in face noted.   Coping/Psychosocial   Plan of Care Reviewed With spouse;daughter

## 2018-05-07 NOTE — CONSULTS
CRITICAL CARE CONSULT NOTE  Rain Mcadams MD    [facility] CRITICAL CARE  5/7/2018        Rolando Novoa  73 y.o. male  1945  [id number]            Requesting physician: Krishna Ayoub MD    Reason for Consultation:  Acute hypoxic respiratory failure    CC: unable to obtain    Subjective     History of Present Illness:  Rolando Novoa is a 73 y.o. male with PMH significant for black lung, obesity, DM, and HTN who is admitted yesterday with suspected anaphylaxis.  History is obtained from the patient's daughter, who reports that he underwent lithotripsy for nephrolithiasis on Friday and was sent home with an antibiotic.  He began having extreme pain on Saturday so he went to the ED where he was given morphine, Toradol, and Zofran prior to being discharged with prescription for Toradol.  Sometime that evening he began experiencing tongue itching as well as swelling and lip swelling so he came to the ED.  Upon arrival, the patient had a compromised airway so he was emergently intubated by anesthesia.  There was suspicion for angioedema/anaphylaxis due to vacations he receive the day prior so he was also given racemic epinephrine and Solu-Medrol.  While his swelling has improved over last 24 hours, he continues to have hypoxemia requiring relatively high FiO2 on the ventilator, so I was consulted to assist.  Currently, he is receiving propofol but he is awake and able to her sponsor to some questions.  He is on AC/VC Vt 550, RR 16, FiO2 95%, PEEP 8.    Review of Systems:   Review of Systems   Unable to perform ROS: Intubated       All systems were reviewed and negative except as noted above in the HPI.    Home Meds:  Prescriptions Prior to Admission   Medication Sig Dispense Refill Last Dose   • triamcinolone (KENALOG) 0.1 % ointment Apply  topically 2 (Two) Times a Day.   5/6/2018 at Unknown time   • aspirin 81 MG tablet Take 81 mg by mouth Daily.      • atorvastatin (LIPITOR) 20 MG  tablet Take 20 mg by mouth Every Night.   5/2/2018   • doxycycline (VIBRAMYCIN) 100 MG capsule Take 1 capsule by mouth Daily for 7 days. 7 capsule 0    • glimepiride (AMARYL) 2 MG tablet Take 2 mg by mouth Every Morning Before Breakfast.   5/3/2018 at 1800   • ketorolac (TORADOL) 10 MG tablet Take 1 tablet by mouth Every 6 (Six) Hours As Needed for Moderate Pain . 15 tablet 0    • loperamide (IMODIUM) 2 MG capsule Take 2 mg by mouth 2 (Two) Times a Day As Needed for diarrhea.   5/3/2018 at 1400   • metFORMIN (GLUCOPHAGE) 500 MG tablet Take 500 mg by mouth 2 (two) times a day. May take 3 depending on glucose reading   5/3/2018 at 2100   • nebivolol (BYSTOLIC) 10 MG tablet Take 10 mg by mouth Daily.   5/4/2018 at 0600   • omeprazole (priLOSEC) 20 MG capsule Take 20 mg by mouth Daily.   5/4/2018 at 0600   • ondansetron ODT (ZOFRAN-ODT) 4 MG disintegrating tablet Take 1 tablet by mouth Every 6 (Six) Hours As Needed for Nausea or Vomiting. 10 tablet 0    • oxyCODONE-acetaminophen (PERCOCET) 7.5-325 MG per tablet Take 1-2 tablets by mouth Every 4 (Four) Hours As Needed (Pain). 10 tablet 0    • tamsulosin (FLOMAX) 0.4 MG capsule 24 hr capsule Take 1 capsule by mouth Every Night.   5/3/2018 at 2100   • TANZEUM 30 MG pen-injector Inject 1 dose as directed 1 (One) Time Per Week.   5/1/2018       Inpatient Meds:    Current Facility-Administered Medications:   •  albuterol (PROVENTIL HFA;VENTOLIN HFA) inhaler 2 puff, 2 puff, Inhalation, 4x Daily - RT, Mike Byrd MD, 2 puff at 05/07/18 1132  •  albuterol (PROVENTIL) nebulizer solution 0.083% 2.5 mg/3mL, 5 mg, Nebulization, Once, Maxwell Carmona MD  •  dextrose (D50W) solution 25 g, 25 g, Intravenous, Q15 Min PRN, Mike Byrd MD  •  dextrose (GLUTOSE) oral gel 15 g, 15 g, Oral, Q15 Min PRN, Mike Byrd MD  •  furosemide (LASIX) injection 20 mg, 20 mg, Intravenous, Daily, Krishna Ayoub MD, 20 mg at 05/07/18 1119  •  glucagon (human recombinant) (GLUCAGEN  DIAGNOSTIC) injection 1 mg, 1 mg, Subcutaneous, PRN, Mike Byrd MD  •  heparin (porcine) 5000 UNIT/ML injection 5,000 Units, 5,000 Units, Subcutaneous, Q8H, Mike Byrd MD, 5,000 Units at 05/07/18 0521  •  insulin aspart (novoLOG) injection 0-9 Units, 0-9 Units, Subcutaneous, 4x Daily AC & at Bedtime, Mike Byrd MD, 4 Units at 05/07/18 1118  •  ipratropium (ATROVENT HFA) inhaler 2 puff, 2 puff, Inhalation, 4x Daily - RT, Rain Mcadams MD  •  levoFLOXacin (LEVAQUIN) 500 mg/100 mL D5W (premix) (LEVAQUIN) 500 mg, 500 mg, Intravenous, Q24H, Mike Byrd MD, 500 mg at 05/07/18 0434  •  methylPREDNISolone sodium succinate (SOLU-Medrol) injection 60 mg, 60 mg, Intravenous, Q6H, Mike Byrd MD, 60 mg at 05/07/18 0936  •  ondansetron (ZOFRAN) injection 4 mg, 4 mg, Intravenous, Q6H PRN, Mike Byrd MD  •  pantoprazole (PROTONIX) injection 40 mg, 40 mg, Intravenous, Q12H, Mike Byrd MD, 40 mg at 05/07/18 0937  •  Pharmacy to Dose LevoFLOXacin (LEVAQUIN), , Does not apply, Continuous PRN, Mike Byrd MD  •  propofol (DIPRIVAN) infusion 10 mg/mL 100 mL, 5-50 mcg/kg/min, Intravenous, Titrated, Maxwell Carmona MD, Last Rate: 25 mL/hr at 05/07/18 1137, 40 mcg/kg/min at 05/07/18 1137  •  sodium chloride 0.9 % flush 1-10 mL, 1-10 mL, Intravenous, PRN, Mike Byrd MD  •  sodium chloride 0.9 % flush 10 mL, 10 mL, Intravenous, PRN, Maxwell Carmona MD  •  Insert peripheral IV, , , Once **AND** sodium chloride 0.9 % flush 10 mL, 10 mL, Intravenous, PRN, Maxwell Carmona MD, 10 mL at 05/07/18 0949  •  triamcinolone (KENALOG) 0.1 % ointment, , Topical, Q12H, Mike Byrd MD    Allergies:  No Known Allergies    Past Medical History:  Past Medical History:   Diagnosis Date   • Acid reflux    • Diabetes    • Enlarged prostate    • High cholesterol    • Hypertension    • Kidney stone     multiple   • Psoriasis        Past Surgical History:  Past Surgical History:   Procedure Laterality Date   •  AMPUTATION Left     lower arm and hand   • CHOLECYSTECTOMY OPEN     • CYSTOSCOPY N/A 12/16/2017    Procedure: CYSTOSCOPY WITH CLOT EVACUATION;  Surgeon: Prsoper Ariza MD;  Location: [facility];  Service:    • CYSTOSCOPY, URETEROSCOPY, RETROGRADE PYELOGRAM, STENT INSERTION Left 12/15/2017    Procedure: CYSTOSCOPY LEFT URETEROSCOPY RETROGRADE PYELOGRAM HOLMIUM LASER STENT INSERTION;  Surgeon: Prosper Ariza MD;  Location: [facility];  Service:    • EXTRACORPOREAL SHOCK WAVE LITHOTRIPSY (ESWL)     • EXTRACORPOREAL SHOCKWAVE LITHOTRIPSY (ESWL), STENT INSERTION/REMOVAL Left 2/3/2017    Procedure: LEFT EXTRACORPOREAL SHOCKWAVE LITHOTRIPSY, POSSIBLE CYSTOSCOPY, POSSIBLE STENT REMOVAL ;  Surgeon: Prosper Ariza MD;  Location: [facility];  Service:    • EXTRACORPOREAL SHOCKWAVE LITHOTRIPSY (ESWL), STENT INSERTION/REMOVAL Left 5/4/2018    Procedure: EXTRACORPOREAL SHOCKWAVE LITHOTRIPSY;  Surgeon: Prosper Ariza MD;  Location: [facility];  Service: Urology   • KIDNEY STONE SURGERY     • SCROTAL SURGERY      trauma        Social History:   Social History     Social History   • Marital status:      Spouse name: N/A   • Number of children: N/A   • Years of education: N/A     Occupational History   • Not on file.     Social History Main Topics   • Smoking status: Never Smoker   • Smokeless tobacco: Never Used   • Alcohol use No   • Drug use: No   • Sexual activity: Defer     Other Topics Concern   • Not on file     Social History Narrative   • No narrative on file       Family History:  History reviewed. No pertinent family history.    Objective     Vital Sign Min/Max for last 24 hours:  Temp  Min: 97.1 °F (36.2 °C)  Max: 99.2 °F (37.3 °C)   BP  Min: 106/58  Max: 142/69   Pulse  Min: 71  Max: 80   Resp  Min: 16  Max: 20   SpO2  Min: 90 %  Max: 96 %   No Data Recorded   Weight  Min: 104 kg (230 lb 2.6 oz)  Max: 104 kg (230 lb 2.6 oz)     Physical Exam:  97.5 °F (36.4 °C) 74 125/58 20 90% 104 kg (230 lb 2.6 oz) Body mass  index is 38.3 kg/m².  Physical Exam   Constitutional: Vital signs are normal. He appears well-developed and well-nourished. He is easily aroused. He is sedated and intubated.   obese   HENT:   Head: Normocephalic and atraumatic.   Nose: Nose normal.   Mouth/Throat: Oropharynx is clear and moist and mucous membranes are normal.   ETT, OGT   Eyes: Conjunctivae, EOM and lids are normal.   Neck: Trachea normal. Neck supple. No thyroid mass present.   Cardiovascular: Normal rate, regular rhythm and normal heart sounds.  PMI is not displaced.  Exam reveals no gallop.    No murmur heard.  Pulmonary/Chest: Effort normal and breath sounds normal. He is intubated. No respiratory distress. He has no wheezes. He has no rhonchi. He has no rales.   Abdominal: Soft. Normal appearance and bowel sounds are normal. There is no hepatomegaly. There is no tenderness.   obese   Musculoskeletal:   Mild BLE edema     Vascular Status -  His right foot exhibits no edema. His left foot exhibits no edema.  Lymphadenopathy:        Head (right side): No submandibular adenopathy present.        Head (left side): No submandibular adenopathy present.     He has no cervical adenopathy.        Right: No supraclavicular adenopathy present.        Left: No supraclavicular adenopathy present.   Neurological: He is easily aroused.   Arousable, answers Y/N questions   Skin: Skin is warm and dry. No cyanosis. Nails show no clubbing.   Psychiatric:   Unable to assess       Central Lines/PICC: absent    Data Review-   Labs: I personally reviewed the latest laboratory results.  Lab Results (last 24 hours)     Procedure Component Value Units Date/Time    POC Glucose Once [[id number]]  (Abnormal) Collected:  05/07/18 1107    Specimen:  Blood Updated:  05/07/18 1121     Glucose 236 (H) mg/dL      Comment: Sliding Scale AdminMeter: [id number]: [id number] LUIS LORD       POC Glucose Once [[id number]]  (Abnormal) Collected:  05/07/18 0747    Specimen:   Blood Updated:  05/07/18 0758     Glucose 219 (H) mg/dL      Comment: Sliding Scale AdminMeter: [id number]: [id number] LUIS LORD       Blood Gas, Arterial With Co-Ox [[id number]]  (Abnormal) Collected:  05/07/18 0450    Specimen:  Arterial Blood Updated:  05/07/18 0459     Site Right Radial     Elder's Test N/A     pH, Arterial 7.425 pH units      pCO2, Arterial 40.0 mm Hg      pO2, Arterial 66.4 (L) mm Hg      HCO3, Arterial 26.2 (H) mmol/L      Base Excess, Arterial 1.7 mmol/L      O2 Saturation, Arterial 93.9 (L) %      Hemoglobin, Blood Gas 13.9 (L) g/dL      Hematocrit, Blood Gas 42.5 %      Oxyhemoglobin 93.0 (L) %      Methemoglobin 0.50 %      Carboxyhemoglobin 0.5 %      Sodium, Arterial 138 mmol/L      Potassium, Arterial 3.9 mmol/L      Ionized Calcium 4.54 (L) mg/dL      Glucose, Arterial 226 (H) mmol/L      Barometric Pressure for Blood Gas 749 mmHg      Modality N/A     FIO2 100 %      Ventilator Mode AC     Set Tidal Volume 550     Set Mech Resp Rate 16.0     PEEP 8.0     Collected by gold ramos     pH, Temp Corrected -- pH Units      pCO2, Temperature Corrected -- mm Hg      pO2, Temperature Corrected -- mm Hg     CBC & Differential [[id number]] Collected:  05/07/18 0228    Specimen:  Blood Updated:  05/07/18 0319    Narrative:       The following orders were created for panel order CBC & Differential.  Procedure                               Abnormality         Status                     ---------                               -----------         ------                     CBC Auto Differential[[id number]]        Abnormal            Final result                 Please view results for these tests on the individual orders.    CBC Auto Differential [[id number]]  (Abnormal) Collected:  05/07/18 0228    Specimen:  Blood Updated:  05/07/18 0319     WBC 13.04 (H) 10*3/mm3      RBC 4.54 10*6/mm3      Hemoglobin 13.8 g/dL      Hematocrit 40.1 %      MCV 88.3 fL      MCH 30.4 pg      MCHC 34.4 g/dL       RDW 13.5 %      RDW-SD 43.3 fl      MPV 9.3 fL      Platelets 236 10*3/mm3      Neutrophil % 91.6 (H) %      Lymphocyte % 5.9 (L) %      Monocyte % 2.1 %      Eosinophil % 0.0 %      Basophil % 0.0 %      Immature Grans % 0.4 %      Neutrophils, Absolute 11.94 (H) 10*3/mm3      Lymphocytes, Absolute 0.77 10*3/mm3      Monocytes, Absolute 0.28 10*3/mm3      Eosinophils, Absolute 0.00 10*3/mm3      Basophils, Absolute 0.00 10*3/mm3      Immature Grans, Absolute 0.05 (H) 10*3/mm3     Basic Metabolic Panel [[id number]]  (Abnormal) Collected:  05/07/18 0228    Specimen:  Blood Updated:  05/07/18 0305     Glucose 213 (H) mg/dL      BUN 22 (H) mg/dL      Creatinine 1.00 mg/dL      Sodium 136 (L) mmol/L      Potassium 3.9 mmol/L      Chloride 102 mmol/L      CO2 26.0 mmol/L      Calcium 8.4 mg/dL      eGFR Non African Amer 74 mL/min/1.73      BUN/Creatinine Ratio 22.0     Anion Gap 8.0 mmol/L     Narrative:       The MDRD GFR formula is only valid for adults with stable renal function between ages 18 and 70.    Food Allergy Profile [[id number]] Collected:  05/07/18 0228    Specimen:  Blood Updated:  05/07/18 0242    POC Glucose Once [[id number]]  (Abnormal) Collected:  05/06/18 2138    Specimen:  Blood Updated:  05/06/18 2152     Glucose 213 (H) mg/dL      Comment: RN NotifiedSliding Scale AdminMeter: [id number]: [id number] [facility]              Imaging: I personally visualized the relevant images of scans/x-rays performed.  Imaging Results (last 24 hours)     Procedure Component Value Units Date/Time    XR Chest 1 View [[id number]] Collected:  05/07/18 1122     Updated:  05/07/18 1155    Narrative:       Chest single view.    CLINICAL INDICATION: Shortness of breath. Anaphylactic shock.    COMPARISON: May 6, 2018    FINDINGS: Borderline cardiomegaly. Elevated diaphragms, probably  expiratory phase. Endotracheal tube with tip 4 cm above the  bifurcation of the julieta. NG tube in stomach. No dense  consolidation.  Minimal discoid atelectatic changes right lung  base. Lungs otherwise clear.      Impression:       CONCLUSION: As above.    Electronically signed by:  Ben Natarajan MD  5/7/2018 11:54 AM CDT  Workstation: [id number]            Assessment/Plan     Assessment:  # Acute hypoxic respiratory failure  # Anaphylaxis- suspected due to toradol, but has had exposure to levaquin and morphine  # Recent lithotripsy  # Leukocytosis- likely steroid demargination  # Morbid obesity   # DM  # HTN    Recommendations:  -Cont AC/VC. Decrease Vt to 490 (8cc/kg IBW), RR to 12, FiO2 70%, and increase PEEP to 8. Goal sats >88%.  -Continue scheduled albuterol and add scheduled ipratropium  -Continue Solu-Medrol  -Stop scheduled racemic epinephrine.  Can use as needed.  -Cont Levaquin for post-op abx  -Start TFs  -Hyperglycemia management per primary team  -Toradol added to allergy list  -PPX: Heparin TID, Protonix  -FULL CODE    Once he has lower oxygen requirements, we will begin spontaneous breathing trials and assess for a cuff leak moving towards extubation.      Critical care time spent: 42 minutes  This time excludes other billable procedures. Time does include preparation of documents, medical consultations, review of old records, and direct bedside care.       Thank you for allowing me to participate in the care of Rolando Samuels Bright. Please contact me with any questions.       This document has been electronically signed by Rain Mcadams MD on May 7, 2018 12:17 PM      [phone]    Dictated using Dragon

## 2018-05-07 NOTE — PAYOR COMM NOTE
"Rolando Novoa (73 y.o. Male)     Date of Birth Social Security Number Address Home Phone MRN    1945  [id number] [address] [phone] [id number]    Latter-day Marital Status          Jainism        Admission Date Admission Type Admitting Provider Attending Provider Department, Room/Bed    5/6/18 Emergency Mike Byrd MD Bleibel, Fadi, MD [facility] CRITICAL CARE, 07/A    Discharge Date Discharge Disposition Discharge Destination                       Attending Provider:  Mike Byrd MD    Allergies:  No Known Allergies    Isolation:  None   Infection:  None   Code Status:  FULL    Ht:  165.1 cm (65\")   Wt:  104 kg (230 lb 2.6 oz)    Admission Cmt:  None   Principal Problem:  Anaphylactic syndrome [T78.2XXA]                 Active Insurance as of 5/6/2018     Primary Coverage     Payor Plan Insurance Group Employer/Plan Group    ANTHEM MEDICARE REPLACEMENT ANTHEM MEDICARE ADVANTAGE [id number]     Payor Plan Address Payor Plan Phone Number Effective From Effective To    [address] [phone] 1/1/2016     [place] [address]       Subscriber Name Subscriber Birth Date Member ID       ROLANDO NOVOA 1945 [id number]                 Emergency Contacts      (Rel.) Home Phone Work Phone Mobile Phone    Solange Novoa (Spouse) [phone] -- [phone]               History & Physical      Mike Byrd MD at 5/6/2018  3:07 AM                [facility] Medicine Admission      Date of Admission: 5/6/2018      Primary Care Physician: Jaxson Zepeda MD      Chief Complaint   Patient presents with   • Oral Swelling       HPI:  Patient is currently intubated. HPI is obtained from family and chart ,  Patient is 73-year-old male came to  ED because of tongue swelling and difficulty breathing.  Which had been worsening through the day.  Patient had received SHOCKWAVE LITHOTRIPSY by Dr" fellows the day before, was supposed to start anabiotics according to the wife but she doesn't think that he picked them up, complained of pain earlier today, came to the ED received Toradol and morphine sent home. Patient came back with worsening symptoms of tongue swelling and dyspnea and rash . Wife states symptoms present before the first ED visit.    Past Medical History:   Past Medical History:   Diagnosis Date   • Acid reflux    • Diabetes    • Enlarged prostate    • High cholesterol    • Hypertension    • Kidney stone     multiple   • Psoriasis        Past Surgical History:   Past Surgical History:   Procedure Laterality Date   • AMPUTATION Left     lower arm and hand   • CHOLECYSTECTOMY OPEN     • CYSTOSCOPY N/A 12/16/2017    Procedure: CYSTOSCOPY WITH CLOT EVACUATION;  Surgeon: Prosper Ariza MD;  Location: [facility];  Service:    • CYSTOSCOPY, URETEROSCOPY, RETROGRADE PYELOGRAM, STENT INSERTION Left 12/15/2017    Procedure: CYSTOSCOPY LEFT URETEROSCOPY RETROGRADE PYELOGRAM HOLMIUM LASER STENT INSERTION;  Surgeon: Prosper Ariza MD;  Location: [facility];  Service:    • EXTRACORPOREAL SHOCK WAVE LITHOTRIPSY (ESWL)     • EXTRACORPOREAL SHOCKWAVE LITHOTRIPSY (ESWL), STENT INSERTION/REMOVAL Left 2/3/2017    Procedure: LEFT EXTRACORPOREAL SHOCKWAVE LITHOTRIPSY, POSSIBLE CYSTOSCOPY, POSSIBLE STENT REMOVAL ;  Surgeon: Prosper Ariza MD;  Location: [facility];  Service:    • KIDNEY STONE SURGERY     • SCROTAL SURGERY      trauma       Family History: History reviewed. No pertinent family history.    Social History:   Social History     Social History   • Marital status:      Social History Main Topics   • Smoking status: Never Smoker   • Smokeless tobacco: Never Used   • Alcohol use No   • Drug use: No   • Sexual activity: Defer     Other Topics Concern   • Not on file       Allergies: No Known Allergies    Medications:   Prior to Admission medications    Medication Sig Start Date End Date Taking?  Authorizing Provider   aspirin 81 MG tablet Take 81 mg by mouth Daily.    Historical Provider, MD   atorvastatin (LIPITOR) 20 MG tablet Take 20 mg by mouth Every Night. 8/2/16   Historical Provider, MD   doxycycline (VIBRAMYCIN) 100 MG capsule Take 1 capsule by mouth Daily for 7 days. 5/4/18 5/11/18  Prosper Ariza MD   glimepiride (AMARYL) 2 MG tablet Take 2 mg by mouth Every Morning Before Breakfast.    Historical Provider, MD   ketorolac (TORADOL) 10 MG tablet Take 1 tablet by mouth Every 6 (Six) Hours As Needed for Moderate Pain . 5/5/18   Moose Nam MD   loperamide (IMODIUM) 2 MG capsule Take 2 mg by mouth 2 (Two) Times a Day As Needed for diarrhea.    Historical Provider, MD   metFORMIN (GLUCOPHAGE) 500 MG tablet Take 500 mg by mouth 2 (two) times a day. May take 3 depending on glucose reading 6/3/16   Historical Provider, MD   nebivolol (BYSTOLIC) 10 MG tablet Take 10 mg by mouth Daily.    Historical Provider, MD   omeprazole (priLOSEC) 20 MG capsule Take 20 mg by mouth Daily.    Historical Provider, MD   ondansetron ODT (ZOFRAN-ODT) 4 MG disintegrating tablet Take 1 tablet by mouth Every 6 (Six) Hours As Needed for Nausea or Vomiting. 5/5/18   Moose Nam MD   oxyCODONE-acetaminophen (PERCOCET) 7.5-325 MG per tablet Take 1-2 tablets by mouth Every 4 (Four) Hours As Needed (Pain). 5/4/18   Prosper Ariza MD   tamsulosin (FLOMAX) 0.4 MG capsule 24 hr capsule Take 1 capsule by mouth Every Night.    Historical Provider, MD   TANZEUM 30 MG pen-injector Inject 1 dose as directed 1 (One) Time Per Week. 3/21/18   Historical Provider, MD       Review of Systems:    -Otherwise complete ROS is negative except as mentioned above.    Physical Exam:    Physical Exam     Temp:  [98 °F (36.7 °C)] 98 °F (36.7 °C)  Heart Rate:  [] 102  Resp:  [20-24] 24  BP: (112-172)/(58-80) 112/58    -General:intaubed . Ill looking .   -CVS: Normal rate, regular rhythm, normal heart sounds and intact distal  pulses.  Exam reveals no gallop and no friction rub.    No murmur heard.  -Pulmonary: Effort normal and breath sounds normal. No stridor. No respiratory distress. He has no wheezes. No rales or tenderness.   -Abdominal: Soft, Bowel sounds are normal. No distension and no mass. There is no tenderness. There is no rebound and no guarding. No hernia.   -Musc: No Cyanosis clubbing or edema.  -Lymph: No cervical adenopathy.   -Skin: Skin is warm. No rash noted. Patient is not diaphoretic. No erythema. No pallor.     Nursing note and vitals reviewed.    Results Reviewed:  I have personally reviewed current lab, radiology, and data and agree with results.  Lab Results (last 24 hours)     Procedure Component Value Units Date/Time    TSH [[id number]]  (Normal) Collected:  05/06/18 0129    Specimen:  Blood Updated:  05/06/18 0241     TSH 3.020 mIU/mL     [place] Draw [[id number]] Collected:  05/06/18 0129    Specimen:  Blood Updated:  05/06/18 0230    Narrative:       The following orders were created for panel order [place] Draw.  Procedure                               Abnormality         Status                     ---------                               -----------         ------                     Light Blue Top[[id number]]                                   Final result               Green Top (Gel)[[id number]]                                  Final result               Lavender Top[[id number]]                                     Final result               Gold Top - SST[[id number]]                                   Final result                 Please view results for these tests on the individual orders.    Light Blue Top [[id number]] Collected:  05/06/18 0129    Specimen:  Blood Updated:  05/06/18 0230     Extra Tube hold for add-on     Comment: Auto resulted       Green Top (Gel) [[id number]] Collected:  05/06/18 0129    Specimen:  Blood Updated:  05/06/18 0230     Extra Tube Hold for add-ons.     Comment: Auto resulted.        Lavender Top [[id number]] Collected:  05/06/18 0129    Specimen:  Blood Updated:  05/06/18 0230     Extra Tube hold for add-on     Comment: Auto resulted       Gold Top - SST [[id number]] Collected:  05/06/18 0129    Specimen:  Blood Updated:  05/06/18 0230     Extra Tube Hold for add-ons.     Comment: Auto resulted.       T4, Free [[id number]]  (Normal) Collected:  05/06/18 0129    Specimen:  Blood Updated:  05/06/18 0227     Free T4 1.21 ng/dL     CBC & Differential [[id number]] Collected:  05/06/18 0129    Specimen:  Blood Updated:  05/06/18 0219    Narrative:       The following orders were created for panel order CBC & Differential.  Procedure                               Abnormality         Status                     ---------                               -----------         ------                     CBC Auto Differential[[id number]]        Abnormal            Final result                 Please view results for these tests on the individual orders.    CBC Auto Differential [[id number]]  (Abnormal) Collected:  05/06/18 0129    Specimen:  Blood Updated:  05/06/18 0219     WBC 9.91 (H) 10*3/mm3      RBC 5.47 10*6/mm3      Hemoglobin 16.6 g/dL      Hematocrit 50.4 (H) %      MCV 92.1 fL      MCH 30.3 pg      MCHC 32.9 g/dL      RDW 13.7 %      RDW-SD 45.6 (H) fl      MPV 9.4 fL      Platelets 282 10*3/mm3      Neutrophil % 55.7 %      Lymphocyte % 33.8 %      Monocyte % 7.7 %      Eosinophil % 1.9 %      Basophil % 0.2 %      Immature Grans % 0.7 (H) %      Neutrophils, Absolute 5.52 10*3/mm3      Lymphocytes, Absolute 3.35 10*3/mm3      Monocytes, Absolute 0.76 10*3/mm3      Eosinophils, Absolute 0.19 10*3/mm3      Basophils, Absolute 0.02 10*3/mm3      Immature Grans, Absolute 0.07 (H) 10*3/mm3     Lipase [[id number]]  (Normal) Collected:  05/06/18 0129    Specimen:  Blood Updated:  05/06/18 0210     Lipase 52 U/L     Basic Metabolic Panel [[id number]]  (Abnormal) Collected:  05/06/18 0129    Specimen:  Blood  Updated:  05/06/18 0210     Glucose 181 (H) mg/dL      BUN 18 mg/dL      Creatinine 1.19 mg/dL      Sodium 137 mmol/L      Potassium 4.2 mmol/L      Chloride 97 mmol/L      CO2 28.0 mmol/L      Calcium 8.9 mg/dL      eGFR Non African Amer 60 (L) mL/min/1.73      BUN/Creatinine Ratio 15.1     Anion Gap 12.0 mmol/L     Narrative:       The MDRD GFR formula is only valid for adults with stable renal function between ages 18 and 70.    Phosphorus [[id number]]  (Normal) Collected:  05/06/18 0129    Specimen:  Blood Updated:  05/06/18 0210     Phosphorus 3.6 mg/dL     Magnesium [[id number]]  (Normal) Collected:  05/06/18 0129    Specimen:  Blood Updated:  05/06/18 0210     Magnesium 2.0 mg/dL     aPTT [[id number]]  (Normal) Collected:  05/06/18 0129    Specimen:  Blood Updated:  05/06/18 0208     PTT 23.0 seconds     Narrative:       The recommended Heparin therapeutic range is 68-97 seconds.        Imaging Results (last 24 hours)     Procedure Component Value Units Date/Time    XR Chest 1 View [[id number]] Updated:  05/06/18 0303    XR Chest 1 View [[id number]] Collected:  05/06/18 0207     Updated:  05/06/18 0233    Narrative:       Exam: AP portable chest    INDICATION: Shortness of breath    COMPARISON: 12/16/2017    FINDINGS: AP portable chest. Bony structures are intact. The  lungs are hypoinflated which accentuates the heart size and  bronchovascular markings. There is bilateral linear atelectasis.  Difficult to assess the heart size. Cannot exclude infiltrate  and/or pleural effusion in the left lung base.      Impression:       1. Hypoinflated lungs.  2. Bilateral linear atelectasis.    Electronically signed by:  Valentino South MD  5/6/2018 2:32 AM CDT  Workstation: [facility] Problem List     * (Principal)Anaphylactic syndrome    Type 2 diabetes mellitus without complication    Hyperlipemia    Essential hypertension    Psoriasis    Kidney stones        Assessment/Plan           Assessment /  Plan  --anaphylaxis   received epi and solumdrol in ED  Admit to ICU   Continue solumderol, vent management and close monitring     --kidney stones   Had SHOCKWAVE LITHOTRIPSY by Dr jain on 5/4/18  According to the wife Patient was supposed to start on some antibiotics , she is not very sure of the name.  Will start Levaquin .    --DM  ISS    --HLD  Lipitor. Held for now    --GERD  PPI    --HTN  bystolic    --GI/DVT prophylaxis    I discussed the patients findings and my recommendations with the patient, and the patient verbalized understanding of the plan, risks and benefits of the plan.    This document has been electronically signed by Mike Byrd MD on May 6, 2018 3:07 AM                        Electronically signed by Mike Byrd MD at 5/6/2018  4:12 AM          Emergency Department Notes      Maxwell Carmona MD at 5/6/2018  1:24 AM      Procedure Orders:    1. Critical Care [[id number]] ordered by Maxwell Carmona MD at 05/06/18 0153                Subjective   History of Present Illness  73-year-old male comes into the ED because of tongue swelling and difficulty breathing.  Sounds like this processes and worsening throughout the day.  He was seen in the ED earlier and actually sounds like he had some tongue swelling at that time.  Family denies any new medications, new foods, not allergies.  No bee stings.  He does not take any lisinopril or other ACE inhibitor or ARB. No fevers.   Review of Systems   Constitutional: Negative for chills, fatigue and fever.   HENT: Positive for trouble swallowing and voice change. Negative for congestion, dental problem, nosebleeds, postnasal drip, sinus pressure and sore throat.    Respiratory: Positive for shortness of breath. Negative for cough, chest tightness, wheezing and stridor.    Cardiovascular: Negative for chest pain and leg swelling.   Gastrointestinal: Negative for abdominal pain, constipation, diarrhea, nausea and vomiting.    Genitourinary: Negative for dysuria, flank pain, frequency, hematuria, testicular pain and urgency.   Musculoskeletal: Negative for arthralgias and myalgias.   Skin: Negative for rash.   Neurological: Negative for syncope, light-headedness and headaches.   Psychiatric/Behavioral: Negative.        Past Medical History:   Diagnosis Date   • Acid reflux    • Diabetes    • Enlarged prostate    • High cholesterol    • Hypertension    • Kidney stone     multiple   • Psoriasis        No Known Allergies    Past Surgical History:   Procedure Laterality Date   • AMPUTATION Left     lower arm and hand   • CHOLECYSTECTOMY OPEN     • CYSTOSCOPY N/A 12/16/2017    Procedure: CYSTOSCOPY WITH CLOT EVACUATION;  Surgeon: Prosper Ariza MD;  Location: [facility];  Service:    • CYSTOSCOPY, URETEROSCOPY, RETROGRADE PYELOGRAM, STENT INSERTION Left 12/15/2017    Procedure: CYSTOSCOPY LEFT URETEROSCOPY RETROGRADE PYELOGRAM HOLMIUM LASER STENT INSERTION;  Surgeon: Prosper Ariza MD;  Location: [facility];  Service:    • EXTRACORPOREAL SHOCK WAVE LITHOTRIPSY (ESWL)     • EXTRACORPOREAL SHOCKWAVE LITHOTRIPSY (ESWL), STENT INSERTION/REMOVAL Left 2/3/2017    Procedure: LEFT EXTRACORPOREAL SHOCKWAVE LITHOTRIPSY, POSSIBLE CYSTOSCOPY, POSSIBLE STENT REMOVAL ;  Surgeon: Prosper Ariza MD;  Location: [facility];  Service:    • KIDNEY STONE SURGERY     • SCROTAL SURGERY      trauma       History reviewed. No pertinent family history.    Social History     Social History   • Marital status:      Social History Main Topics   • Smoking status: Never Smoker   • Smokeless tobacco: Never Used   • Alcohol use No   • Drug use: No   • Sexual activity: Defer     Other Topics Concern   • Not on file           Objective   Physical Exam   Constitutional: He is oriented to person, place, and time. He appears well-developed and well-nourished.   HENT:   Head: Normocephalic.   Enlarged tongue. Large neck. Visible epiglottis in posterior  pharynx.  Difficulty speaking secondary to tongue swellng   Eyes: Pupils are equal, round, and reactive to light.   Neck: Normal range of motion. Neck supple.   Thick neck   Cardiovascular: Normal rate, regular rhythm and normal heart sounds.    Pulmonary/Chest: Effort normal and breath sounds normal.   Lungs are clear. No wheezing. No stridor   Abdominal: Soft. Bowel sounds are normal. He exhibits no distension. There is no tenderness.   Musculoskeletal:   Pt w/ LUE amputation below the elbow   Neurological: He is alert and oriented to person, place, and time.   Skin: Skin is warm and dry. Capillary refill takes less than 2 seconds.   Psychiatric: He has a normal mood and affect.   Nursing note and vitals reviewed.      Critical Care  Performed by: FEROZ TARIQ  Authorized by: FEROZ TARIQ     Critical care provider statement:     Critical care time (minutes):  35    Critical care time was exclusive of:  Separately billable procedures and treating other patients    Critical care was necessary to treat or prevent imminent or life-threatening deterioration of the following conditions:  Respiratory failure    Critical care was time spent personally by me on the following activities:  Blood draw for specimens, ordering and performing treatments and interventions, ordering and review of laboratory studies, development of treatment plan with patient or surrogate, discussions with consultants, ordering and review of radiographic studies, pulse oximetry, evaluation of patient's response to treatment, re-evaluation of patient's condition, review of old charts, examination of patient, ventilator management and obtaining history from patient or surrogate    I assumed direction of critical care for this patient from another provider in my specialty: no                  ED Course  ED Course      Labs Reviewed   BASIC METABOLIC PANEL - Abnormal; Notable for the following:        Result Value    Glucose  181 (*)     eGFR Non  Amer 60 (*)     All other components within normal limits    Narrative:     The MDRD GFR formula is only valid for adults with stable renal function between ages 18 and 70.   CBC WITH AUTO DIFFERENTIAL - Abnormal; Notable for the following:     WBC 9.91 (*)     Hematocrit 50.4 (*)     RDW-SD 45.6 (*)     Immature Grans % 0.7 (*)     Immature Grans, Absolute 0.07 (*)     All other components within normal limits   APTT - Normal    Narrative:     The recommended Heparin therapeutic range is 68-97 seconds.   LIPASE - Normal   TSH - Normal   T4, FREE - Normal   PHOSPHORUS - Normal   MAGNESIUM - Normal   RAINBOW DRAW    Narrative:     The following orders were created for panel order [place] Draw.  Procedure                               Abnormality         Status                     ---------                               -----------         ------                     Light Blue Top[[id number]]                                   Final result               Green Top (Gel)[[id number]]                                  Final result               Lavender Top[[id number]]                                     Final result               Gold Top - SST[[id number]]                                   Final result                 Please view results for these tests on the individual orders.   BLOOD GAS, ARTERIAL   LIGHT BLUE TOP   GREEN TOP   LAVENDER TOP   GOLD TOP - SST   CBC AND DIFFERENTIAL    Narrative:     The following orders were created for panel order CBC & Differential.  Procedure                               Abnormality         Status                     ---------                               -----------         ------                     CBC Auto Differential[[id number]]        Abnormal            Final result                 Please view results for these tests on the individual orders.     XR Chest 1 View   Final Result   1. Hypoinflated lungs.   2. Bilateral linear atelectasis.       Electronically signed by:  Valentino South MD  5/6/2018 2:32 AM CDT   Workstation: [id number]      XR Chest 1 View    (Results Pending)                 MDM  Number of Diagnoses or Management Options  Anaphylaxis, initial encounter:   Hypoxia:   Diagnosis management comments: Anesthesia paged for assistance with airway. Pt needs to be intubated. He would be a difficult intubation with his current situation. Baseline body habitus also makes this patient likely for a difficult intubation. Discussed need for intubation with family.      Anesthesia arrived around 2:15 AM.  Decision was made to intubate the patient as he was worsening.  Intubation was performed by anesthesia Dr. Venegas.  Please see his note for intubation.  Intubation was difficult to numerous attempts.  He was eventually intubated with a 7 ET tube.  Admit to the ICU for anaphylaxis.         Final diagnoses:   Anaphylaxis, initial encounter   Hypoxia            Maxwell Carmona MD  05/06/18 0313       Maxwell Carmona MD  05/06/18 0421      Electronically signed by Maxwell Carmona MD at 5/6/2018  4:21 AM       Hospital Medications (active)       Dose Frequency Start End    albuterol (PROVENTIL HFA;VENTOLIN HFA) inhaler 2 puff 2 puff 4 Times Daily - RT 5/6/2018     Sig - Route: Inhale 2 puffs 4 (Four) Times a Day. - Inhalation    albuterol (PROVENTIL) nebulizer solution 0.083% 2.5 mg/3mL 5 mg Once 5/6/2018     Sig - Route: Take 5 mg by nebulization 1 (One) Time. - Nebulization    dextrose (D50W) solution 25 g 25 g Every 15 Minutes PRN 5/6/2018     Sig - Route: Infuse 50 mL into a venous catheter Every 15 (Fifteen) Minutes As Needed for Low Blood Sugar (Blood Sugar Less Than 70). - Intravenous    dextrose (GLUTOSE) oral gel 15 g 15 g Every 15 Minutes PRN 5/6/2018     Sig - Route: Take 15 g by mouth Every 15 (Fifteen) Minutes As Needed for Low Blood Sugar (Blood sugar less than 70). - Oral    furosemide (LASIX) injection 20 mg 20  mg Daily 5/7/2018     Sig - Route: Infuse 2 mL into a venous catheter Daily. - Intravenous    glucagon (human recombinant) (GLUCAGEN DIAGNOSTIC) injection 1 mg 1 mg As Needed 5/6/2018     Sig - Route: Inject 1 mg under the skin As Needed (Blood Glucose Less Than 70). - Subcutaneous    heparin (porcine) 5000 UNIT/ML injection 5,000 Units 5,000 Units Every 8 Hours Scheduled 5/6/2018     Sig - Route: Inject 1 mL under the skin Every 8 (Eight) Hours. - Subcutaneous    insulin aspart (novoLOG) injection 0-9 Units 0-9 Units 4 Times Daily Before Meals & Nightly 5/6/2018     Sig - Route: Inject 0-9 Units under the skin 4 (Four) Times a Day Before Meals & at Bedtime. - Subcutaneous    levoFLOXacin (LEVAQUIN) 500 mg/100 mL D5W (premix) (LEVAQUIN) 500 mg 500 mg Every 24 Hours 5/6/2018 5/13/2018    Sig - Route: Infuse 100 mL into a venous catheter Daily. - Intravenous    methylPREDNISolone sodium succinate (SOLU-Medrol) injection 60 mg 60 mg Every 6 Hours 5/6/2018     Sig - Route: Infuse 0.96 mL into a venous catheter Every 6 (Six) Hours. - Intravenous    ondansetron (ZOFRAN) injection 4 mg 4 mg Every 6 Hours PRN 5/6/2018     Sig - Route: Infuse 2 mL into a venous catheter Every 6 (Six) Hours As Needed for Nausea or Vomiting. - Intravenous    pantoprazole (PROTONIX) injection 40 mg 40 mg Every 12 Hours Scheduled 5/6/2018     Sig - Route: Infuse 10 mL into a venous catheter Every 12 (Twelve) Hours. - Intravenous    Pharmacy to Dose LevoFLOXacin (LEVAQUIN)  Continuous PRN 5/6/2018 5/13/2018    Sig - Route: Continuous As Needed for Consult. - Does not apply    propofol (DIPRIVAN) infusion 10 mg/mL 100 mL 5-50 mcg/kg/min × 104 kg Titrated 5/6/2018     Sig - Route: Infuse 520-5,200 mcg/min into a venous catheter Dose Adjusted By Provider As Needed. - Intravenous    racemic epinephrine (RACEPINEPHRINE) nebulizer solution 0.5 mL 0.5 mL 3 Times Daily - RT 5/6/2018     Sig - Route: Take 0.5 mL by nebulization 3 (Three) Times a Day. -  "Nebulization    sodium chloride 0.9 % flush 1-10 mL 1-10 mL As Needed 5/6/2018     Sig - Route: Infuse 1-10 mL into a venous catheter As Needed for Line Care. - Intravenous    sodium chloride 0.9 % flush 10 mL 10 mL As Needed 5/6/2018     Sig - Route: Infuse 10 mL into a venous catheter As Needed for Line Care. - Intravenous    Cosign for Ordering: Accepted by Mike Byrd MD on 5/6/2018  3:57 AM    sodium chloride 0.9 % flush 10 mL 10 mL As Needed 5/6/2018     Sig - Route: Infuse 10 mL into a venous catheter As Needed for Line Care. - Intravenous    Linked Group 1:  \"And\" Linked Group Details        triamcinolone (KENALOG) 0.1 % ointment  Every 12 Hours Scheduled 5/6/2018     Sig - Route: Apply  topically Every 12 (Twelve) Hours. - Topical    sodium chloride 0.9 % infusion (Discontinued) 100 mL/hr Continuous 5/6/2018 5/7/2018    Sig - Route: Infuse 100 mL/hr into a venous catheter Continuous. - Intravenous            Lab Results (last 24 hours)     Procedure Component Value Units Date/Time    POC Glucose Once [[id number]]  (Abnormal) Collected:  05/07/18 1107    Specimen:  Blood Updated:  05/07/18 1121     Glucose 236 (H) mg/dL      Comment: Sliding Scale AdminMeter: [id number]: [id number] LUIS LORD       POC Glucose Once [[id number]]  (Abnormal) Collected:  05/07/18 0747    Specimen:  Blood Updated:  05/07/18 0758     Glucose 219 (H) mg/dL      Comment: Sliding Scale AdminMeter: [id number]: [id number] LUIS LORD       Blood Gas, Arterial With Co-Ox [[id number]]  (Abnormal) Collected:  05/07/18 0450    Specimen:  Arterial Blood Updated:  05/07/18 0459     Site Right Radial     Elder's Test N/A     pH, Arterial 7.425 pH units      pCO2, Arterial 40.0 mm Hg      pO2, Arterial 66.4 (L) mm Hg      HCO3, Arterial 26.2 (H) mmol/L      Base Excess, Arterial 1.7 mmol/L      O2 Saturation, Arterial 93.9 (L) %      Hemoglobin, Blood Gas 13.9 (L) g/dL      Hematocrit, Blood Gas 42.5 %      Oxyhemoglobin " 93.0 (L) %      Methemoglobin 0.50 %      Carboxyhemoglobin 0.5 %      Sodium, Arterial 138 mmol/L      Potassium, Arterial 3.9 mmol/L      Ionized Calcium 4.54 (L) mg/dL      Glucose, Arterial 226 (H) mmol/L      Barometric Pressure for Blood Gas 749 mmHg      Modality N/A     FIO2 100 %      Ventilator Mode AC     Set Tidal Volume 550     Set Mech Resp Rate 16.0     PEEP 8.0     Collected by gold ramos     pH, Temp Corrected -- pH Units      pCO2, Temperature Corrected -- mm Hg      pO2, Temperature Corrected -- mm Hg     CBC & Differential [[id number]] Collected:  05/07/18 0228    Specimen:  Blood Updated:  05/07/18 0319    Narrative:       The following orders were created for panel order CBC & Differential.  Procedure                               Abnormality         Status                     ---------                               -----------         ------                     CBC Auto Differential[[id number]]        Abnormal            Final result                 Please view results for these tests on the individual orders.    CBC Auto Differential [[id number]]  (Abnormal) Collected:  05/07/18 0228    Specimen:  Blood Updated:  05/07/18 0319     WBC 13.04 (H) 10*3/mm3      RBC 4.54 10*6/mm3      Hemoglobin 13.8 g/dL      Hematocrit 40.1 %      MCV 88.3 fL      MCH 30.4 pg      MCHC 34.4 g/dL      RDW 13.5 %      RDW-SD 43.3 fl      MPV 9.3 fL      Platelets 236 10*3/mm3      Neutrophil % 91.6 (H) %      Lymphocyte % 5.9 (L) %      Monocyte % 2.1 %      Eosinophil % 0.0 %      Basophil % 0.0 %      Immature Grans % 0.4 %      Neutrophils, Absolute 11.94 (H) 10*3/mm3      Lymphocytes, Absolute 0.77 10*3/mm3      Monocytes, Absolute 0.28 10*3/mm3      Eosinophils, Absolute 0.00 10*3/mm3      Basophils, Absolute 0.00 10*3/mm3      Immature Grans, Absolute 0.05 (H) 10*3/mm3     Basic Metabolic Panel [[id number]]  (Abnormal) Collected:  05/07/18 0228    Specimen:  Blood Updated:  05/07/18 0305     Glucose 213 (H) mg/dL       BUN 22 (H) mg/dL      Creatinine 1.00 mg/dL      Sodium 136 (L) mmol/L      Potassium 3.9 mmol/L      Chloride 102 mmol/L      CO2 26.0 mmol/L      Calcium 8.4 mg/dL      eGFR Non African Amer 74 mL/min/1.73      BUN/Creatinine Ratio 22.0     Anion Gap 8.0 mmol/L     Narrative:       The MDRD GFR formula is only valid for adults with stable renal function between ages 18 and 70.    Food Allergy Profile [[id number]] Collected:  05/07/18 0228    Specimen:  Blood Updated:  05/07/18 0242    POC Glucose Once [[id number]]  (Abnormal) Collected:  05/06/18 2138    Specimen:  Blood Updated:  05/06/18 2152     Glucose 213 (H) mg/dL      Comment: RN NotifiedSliding Scale AdminMeter: [id number]: [id number]           Imaging Results (last 24 hours)     ** No results found for the last 24 hours. **        ECG/EMG Results (last 24 hours)     Procedure Component Value Units Date/Time    SCANNED EKG [[id number]] Resulted:  05/06/18      Updated:  05/06/18 1436    SCANNED EKG [[id number]] Resulted:  05/06/18      Updated:  05/06/18 1502    ECG 12 Lead [[id number]] Collected:  05/06/18 0145     Updated:  05/06/18 1636    Narrative:       Test Reason : tachycadia  Blood Pressure : **/** mmHG  Vent. Rate : 098 BPM     Atrial Rate : 098 BPM     P-R Int : 140 ms          QRS Dur : 086 ms      QT Int : 356 ms       P-R-T Axes : 042 128 025 degrees     QTc Int : 454 ms    Normal sinus rhythm  Right axis deviation  Possible Right ventricular hypertrophy  Abnormal ECG    Confirmed by PAIGE HERNANDEZ, EMMA ([id number]),  ZAKIA DIAZ ([id number]) on 5/6/2018  4:36:06 PM    Referred By:             Confirmed By:EMMA GIBSON MD    SCANNED EKG [[id number]] Resulted:  05/06/18      Updated:  05/07/18 1118          Ventilator/Non-Invasive Ventilation Settings     Start     Ordered    05/07/18 1115  Ventilator - AC/VC+; 12; 88%; 10; 490; 8  Continuous     Question Answer Comment   Vent Mode AC/VC+    Breath rate 12    FiO2 titrate for Sp02%  =/> 88%    PEEP 10    Tidal Volume 490    Tidal Volume ML/Kg 8        05/07/18 1114    05/06/18 0000  Ventilator - AC/VC+; (16); 100; 8; 550  Continuous,   Status:  Canceled     Question Answer Comment   Vent Mode AC/VC+    Breath rate  16   FiO2 100    PEEP 8    Tidal Volume 550        05/07/18 0021          Physician Progress Notes (last 24 hours) (Notes from 5/6/2018 11:32 AM through 5/7/2018 11:32 AM)     No notes of this type exist for this encounter.        Consult Notes (last 24 hours) (Notes from 5/6/2018 11:32 AM through 5/7/2018 11:32 AM)     No notes of this type exist for this encounter.

## 2018-05-08 LAB
ANION GAP SERPL CALCULATED.3IONS-SCNC: 10 MMOL/L (ref 5–15)
ARTERIAL PATENCY WRIST A: ABNORMAL
ATMOSPHERIC PRESS: 751 MMHG
BASE EXCESS BLDA CALC-SCNC: 3.2 MMOL/L (ref 0–2)
BASOPHILS # BLD AUTO: 0.01 10*3/MM3 (ref 0–0.2)
BASOPHILS NFR BLD AUTO: 0.1 % (ref 0–2)
BDY SITE: ABNORMAL
BUN BLD-MCNC: 27 MG/DL (ref 7–21)
BUN/CREAT SERPL: 28.4 (ref 7–25)
CA-I BLD-MCNC: 4.59 MG/DL (ref 4.6–5.6)
CALCIUM SPEC-SCNC: 8.5 MG/DL (ref 8.4–10.2)
CHLORIDE SERPL-SCNC: 101 MMOL/L (ref 95–110)
CO2 SERPL-SCNC: 26 MMOL/L (ref 22–31)
COHGB MFR BLD: 0.4 % (ref 0–5)
CREAT BLD-MCNC: 0.95 MG/DL (ref 0.7–1.3)
DEPRECATED RDW RBC AUTO: 44 FL (ref 35.1–43.9)
EOSINOPHIL # BLD AUTO: 0 10*3/MM3 (ref 0–0.7)
EOSINOPHIL NFR BLD AUTO: 0 % (ref 0–7)
ERYTHROCYTE [DISTWIDTH] IN BLOOD BY AUTOMATED COUNT: 13.8 % (ref 11.5–14.5)
GFR SERPL CREATININE-BSD FRML MDRD: 78 ML/MIN/1.73 (ref 60–98)
GLUCOSE BLD-MCNC: 297 MG/DL (ref 60–100)
GLUCOSE BLDA-MCNC: 301 MMOL/L (ref 65–95)
GLUCOSE BLDC GLUCOMTR-MCNC: 293 MG/DL (ref 70–130)
GLUCOSE BLDC GLUCOMTR-MCNC: 339 MG/DL (ref 70–130)
GLUCOSE BLDC GLUCOMTR-MCNC: 354 MG/DL (ref 70–130)
GLUCOSE BLDC GLUCOMTR-MCNC: 358 MG/DL (ref 70–130)
HCO3 BLDA-SCNC: 28.4 MMOL/L (ref 20–26)
HCT VFR BLD AUTO: 40.4 % (ref 39–49)
HCT VFR BLD CALC: 43.9 % (ref 38–51)
HGB BLD-MCNC: 14 G/DL (ref 13.7–17.3)
HGB BLDA-MCNC: 14.3 G/DL (ref 14–18)
HOROWITZ INDEX BLD+IHG-RTO: 70 %
IMM GRANULOCYTES # BLD: 0.05 10*3/MM3 (ref 0–0.02)
IMM GRANULOCYTES NFR BLD: 0.4 % (ref 0–0.5)
LYMPHOCYTES # BLD AUTO: 0.74 10*3/MM3 (ref 0.6–4.2)
LYMPHOCYTES NFR BLD AUTO: 6.6 % (ref 10–50)
Lab: ABNORMAL
MCH RBC QN AUTO: 30.4 PG (ref 26.5–34)
MCHC RBC AUTO-ENTMCNC: 34.7 G/DL (ref 31.5–36.3)
MCV RBC AUTO: 87.8 FL (ref 80–98)
METHGB BLD QL: 0.5 % (ref 0–3)
MODALITY: ABNORMAL
MONOCYTES # BLD AUTO: 0.24 10*3/MM3 (ref 0–0.9)
MONOCYTES NFR BLD AUTO: 2.1 % (ref 0–12)
NEUTROPHILS # BLD AUTO: 10.14 10*3/MM3 (ref 2–8.6)
NEUTROPHILS NFR BLD AUTO: 90.8 % (ref 37–80)
OXYHGB MFR BLDV: 93.6 % (ref 94–99)
PAW @ PEAK INSP FLOW SETTING VENT: 23 CMH2O
PCO2 BLDA: 44.6 MM HG (ref 35–45)
PCO2 TEMP ADJ BLD: ABNORMAL MM HG (ref 35–48)
PEEP RESPIRATORY: 10 CM[H2O]
PH BLDA: 7.41 PH UNITS (ref 7.35–7.45)
PH, TEMP CORRECTED: ABNORMAL PH UNITS
PLATELET # BLD AUTO: 239 10*3/MM3 (ref 150–450)
PMV BLD AUTO: 9.3 FL (ref 8–12)
PO2 BLDA: 71.7 MM HG (ref 83–108)
PO2 TEMP ADJ BLD: ABNORMAL MM HG (ref 83–108)
POTASSIUM BLD-SCNC: 4.1 MMOL/L (ref 3.5–5.1)
POTASSIUM BLDA-SCNC: 4 MMOL/L (ref 3.4–4.5)
RBC # BLD AUTO: 4.6 10*6/MM3 (ref 4.37–5.74)
SAO2 % BLDCOA: 94.4 % (ref 94–99)
SET MECH RESP RATE: 12
SODIUM BLD-SCNC: 137 MMOL/L (ref 137–145)
SODIUM BLDA-SCNC: 139 MMOL/L (ref 136–146)
VENTILATOR MODE: AC
VT ON VENT VENT: 490 ML
WBC NRBC COR # BLD: 11.18 10*3/MM3 (ref 3.2–9.8)

## 2018-05-08 PROCEDURE — 99291 CRITICAL CARE FIRST HOUR: CPT | Performed by: INTERNAL MEDICINE

## 2018-05-08 PROCEDURE — 82805 BLOOD GASES W/O2 SATURATION: CPT

## 2018-05-08 PROCEDURE — 25010000002 METHYLPREDNISOLONE PER 125 MG: Performed by: FAMILY MEDICINE

## 2018-05-08 PROCEDURE — 25010000002 FUROSEMIDE PER 20 MG: Performed by: INTERNAL MEDICINE

## 2018-05-08 PROCEDURE — 85025 COMPLETE CBC W/AUTO DIFF WBC: CPT | Performed by: FAMILY MEDICINE

## 2018-05-08 PROCEDURE — 25010000002 LEVOFLOXACIN PER 250 MG: Performed by: FAMILY MEDICINE

## 2018-05-08 PROCEDURE — 82375 ASSAY CARBOXYHB QUANT: CPT

## 2018-05-08 PROCEDURE — 25010000002 HYDROMORPHONE PER 4 MG: Performed by: INTERNAL MEDICINE

## 2018-05-08 PROCEDURE — 36600 WITHDRAWAL OF ARTERIAL BLOOD: CPT

## 2018-05-08 PROCEDURE — 94003 VENT MGMT INPAT SUBQ DAY: CPT

## 2018-05-08 PROCEDURE — 94799 UNLISTED PULMONARY SVC/PX: CPT

## 2018-05-08 PROCEDURE — 25010000002 METHYLPREDNISOLONE PER 40 MG: Performed by: INTERNAL MEDICINE

## 2018-05-08 PROCEDURE — 25010000002 MIDAZOLAM PER 1 MG: Performed by: INTERNAL MEDICINE

## 2018-05-08 PROCEDURE — 25010000002 HEPARIN (PORCINE) PER 1000 UNITS: Performed by: FAMILY MEDICINE

## 2018-05-08 PROCEDURE — 83050 HGB METHEMOGLOBIN QUAN: CPT

## 2018-05-08 PROCEDURE — 82962 GLUCOSE BLOOD TEST: CPT

## 2018-05-08 PROCEDURE — 25010000002 PROPOFOL 1000 MG/ML EMULSION: Performed by: EMERGENCY MEDICINE

## 2018-05-08 PROCEDURE — 63710000001 INSULIN ASPART PER 5 UNITS: Performed by: FAMILY MEDICINE

## 2018-05-08 PROCEDURE — 63710000001 INSULIN DETEMIR PER 5 UNITS: Performed by: INTERNAL MEDICINE

## 2018-05-08 PROCEDURE — 80048 BASIC METABOLIC PNL TOTAL CA: CPT | Performed by: FAMILY MEDICINE

## 2018-05-08 RX ORDER — METHYLPREDNISOLONE SODIUM SUCCINATE 40 MG/ML
40 INJECTION, POWDER, LYOPHILIZED, FOR SOLUTION INTRAMUSCULAR; INTRAVENOUS EVERY 12 HOURS
Status: DISCONTINUED | OUTPATIENT
Start: 2018-05-08 | End: 2018-05-09

## 2018-05-08 RX ORDER — FAMOTIDINE 20 MG/1
20 TABLET, FILM COATED ORAL DAILY
Status: DISCONTINUED | OUTPATIENT
Start: 2018-05-08 | End: 2018-05-11 | Stop reason: HOSPADM

## 2018-05-08 RX ORDER — FUROSEMIDE 10 MG/ML
20 INJECTION INTRAMUSCULAR; INTRAVENOUS EVERY 12 HOURS
Status: DISCONTINUED | OUTPATIENT
Start: 2018-05-08 | End: 2018-05-09

## 2018-05-08 RX ORDER — MIDAZOLAM HYDROCHLORIDE 1 MG/ML
1 INJECTION INTRAMUSCULAR; INTRAVENOUS
Status: DISCONTINUED | OUTPATIENT
Start: 2018-05-08 | End: 2018-05-09

## 2018-05-08 RX ADMIN — MIDAZOLAM 1 MG: 1 INJECTION INTRAMUSCULAR; INTRAVENOUS at 16:41

## 2018-05-08 RX ADMIN — PROPOFOL 50 MCG/KG/MIN: 10 INJECTION, EMULSION INTRAVENOUS at 13:26

## 2018-05-08 RX ADMIN — METHYLPREDNISOLONE SODIUM SUCCINATE 60 MG: 125 INJECTION, POWDER, FOR SOLUTION INTRAMUSCULAR; INTRAVENOUS at 03:05

## 2018-05-08 RX ADMIN — LEVOFLOXACIN 500 MG: 5 INJECTION, SOLUTION INTRAVENOUS at 03:54

## 2018-05-08 RX ADMIN — ALBUTEROL SULFATE 2 PUFF: 90 AEROSOL, METERED RESPIRATORY (INHALATION) at 14:56

## 2018-05-08 RX ADMIN — INSULIN ASPART 7 UNITS: 100 INJECTION, SOLUTION INTRAVENOUS; SUBCUTANEOUS at 10:54

## 2018-05-08 RX ADMIN — FUROSEMIDE 20 MG: 10 INJECTION, SOLUTION INTRAMUSCULAR; INTRAVENOUS at 21:15

## 2018-05-08 RX ADMIN — METHYLPREDNISOLONE SODIUM SUCCINATE 40 MG: 40 INJECTION, POWDER, FOR SOLUTION INTRAMUSCULAR; INTRAVENOUS at 15:22

## 2018-05-08 RX ADMIN — FUROSEMIDE 20 MG: 10 INJECTION, SOLUTION INTRAMUSCULAR; INTRAVENOUS at 09:02

## 2018-05-08 RX ADMIN — ALBUTEROL SULFATE 2 PUFF: 90 AEROSOL, METERED RESPIRATORY (INHALATION) at 10:45

## 2018-05-08 RX ADMIN — IPRATROPIUM BROMIDE 2 PUFF: 17 AEROSOL, METERED RESPIRATORY (INHALATION) at 06:55

## 2018-05-08 RX ADMIN — Medication 10 ML: at 09:02

## 2018-05-08 RX ADMIN — ALBUTEROL SULFATE 2 PUFF: 90 AEROSOL, METERED RESPIRATORY (INHALATION) at 19:21

## 2018-05-08 RX ADMIN — PROPOFOL 50 MCG/KG/MIN: 10 INJECTION, EMULSION INTRAVENOUS at 10:10

## 2018-05-08 RX ADMIN — HEPARIN SODIUM 5000 UNITS: 5000 INJECTION, SOLUTION INTRAVENOUS; SUBCUTANEOUS at 05:19

## 2018-05-08 RX ADMIN — FAMOTIDINE 20 MG: 20 TABLET ORAL at 09:02

## 2018-05-08 RX ADMIN — MIDAZOLAM 1 MG: 1 INJECTION INTRAMUSCULAR; INTRAVENOUS at 09:20

## 2018-05-08 RX ADMIN — PROPOFOL 50 MCG/KG/MIN: 10 INJECTION, EMULSION INTRAVENOUS at 19:33

## 2018-05-08 RX ADMIN — TRIAMCINOLONE ACETONIDE: 1 OINTMENT TOPICAL at 09:03

## 2018-05-08 RX ADMIN — HEPARIN SODIUM 5000 UNITS: 5000 INJECTION, SOLUTION INTRAVENOUS; SUBCUTANEOUS at 13:25

## 2018-05-08 RX ADMIN — HYDROMORPHONE HYDROCHLORIDE 0.5 MG: 1 INJECTION, SOLUTION INTRAMUSCULAR; INTRAVENOUS; SUBCUTANEOUS at 12:45

## 2018-05-08 RX ADMIN — IPRATROPIUM BROMIDE 2 PUFF: 17 AEROSOL, METERED RESPIRATORY (INHALATION) at 19:21

## 2018-05-08 RX ADMIN — PROPOFOL 50 MCG/KG/MIN: 10 INJECTION, EMULSION INTRAVENOUS at 04:05

## 2018-05-08 RX ADMIN — MIDAZOLAM 1 MG: 1 INJECTION INTRAMUSCULAR; INTRAVENOUS at 14:36

## 2018-05-08 RX ADMIN — INSULIN ASPART 6 UNITS: 100 INJECTION, SOLUTION INTRAVENOUS; SUBCUTANEOUS at 07:35

## 2018-05-08 RX ADMIN — HEPARIN SODIUM 5000 UNITS: 5000 INJECTION, SOLUTION INTRAVENOUS; SUBCUTANEOUS at 21:18

## 2018-05-08 RX ADMIN — MIDAZOLAM 1 MG: 1 INJECTION INTRAMUSCULAR; INTRAVENOUS at 17:46

## 2018-05-08 RX ADMIN — PROPOFOL 50 MCG/KG/MIN: 10 INJECTION, EMULSION INTRAVENOUS at 07:17

## 2018-05-08 RX ADMIN — INSULIN ASPART 8 UNITS: 100 INJECTION, SOLUTION INTRAVENOUS; SUBCUTANEOUS at 18:02

## 2018-05-08 RX ADMIN — PROPOFOL 50 MCG/KG/MIN: 10 INJECTION, EMULSION INTRAVENOUS at 16:01

## 2018-05-08 RX ADMIN — IPRATROPIUM BROMIDE 2 PUFF: 17 AEROSOL, METERED RESPIRATORY (INHALATION) at 10:45

## 2018-05-08 RX ADMIN — TRIAMCINOLONE ACETONIDE: 1 OINTMENT TOPICAL at 21:14

## 2018-05-08 RX ADMIN — INSULIN ASPART 8 UNITS: 100 INJECTION, SOLUTION INTRAVENOUS; SUBCUTANEOUS at 21:18

## 2018-05-08 RX ADMIN — IPRATROPIUM BROMIDE 2 PUFF: 17 AEROSOL, METERED RESPIRATORY (INHALATION) at 14:56

## 2018-05-08 RX ADMIN — MIDAZOLAM 1 MG: 1 INJECTION INTRAMUSCULAR; INTRAVENOUS at 19:33

## 2018-05-08 RX ADMIN — ALBUTEROL SULFATE 2 PUFF: 90 AEROSOL, METERED RESPIRATORY (INHALATION) at 06:55

## 2018-05-08 RX ADMIN — INSULIN DETEMIR 20 UNITS: 100 INJECTION, SOLUTION SUBCUTANEOUS at 21:21

## 2018-05-08 RX ADMIN — MIDAZOLAM 1 MG: 1 INJECTION INTRAMUSCULAR; INTRAVENOUS at 11:09

## 2018-05-08 NOTE — PLAN OF CARE
Problem: Patient Care Overview  Goal: Plan of Care Review   05/08/18 1819   OTHER   Outcome Summary Pt having periods of restlessness today. PRN versed given when needed. Pt tolerating tube feeding well. Wife at bedside, fall precautions in place.    Coping/Psychosocial   Plan of Care Reviewed With spouse

## 2018-05-08 NOTE — PROGRESS NOTES
Progress Note  Krishna Ayoub MD  Hospitalist    Date of visit: 5/8/2018     LOS: 2 days   Patient Care Team:  Jaxson Zepeda MD as PCP - General    Chief Complaint: dyspnea    Subjective     Interval History:     Patient Complaints: intubated for respiratory distress / managed to maintain O2 saturations after the IV Lasix (FiO2 down to 60%)    History taken from: nursing / chart    Medication Review:   Current Facility-Administered Medications   Medication Dose Route Frequency Provider Last Rate Last Dose   • albuterol (PROVENTIL HFA;VENTOLIN HFA) inhaler 2 puff  2 puff Inhalation 4x Daily - RT Mike Byrd MD   2 puff at 05/08/18 1045   • dextrose (D50W) solution 25 g  25 g Intravenous Q15 Min PRN Mike Byrd MD       • dextrose (GLUTOSE) oral gel 15 g  15 g Oral Q15 Min PRN Mike Byrd MD       • famotidine (PEPCID) tablet 20 mg  20 mg Oral Daily Rain Mcadams MD   20 mg at 05/08/18 0902   • furosemide (LASIX) injection 20 mg  20 mg Intravenous Q12H Krishna Ayoub MD       • glucagon (human recombinant) (GLUCAGEN DIAGNOSTIC) injection 1 mg  1 mg Subcutaneous PRN Mike Byrd MD       • heparin (porcine) 5000 UNIT/ML injection 5,000 Units  5,000 Units Subcutaneous Q8H Mike Byrd MD   5,000 Units at 05/08/18 0519   • HYDROmorphone (DILAUDID) injection 0.5 mg  0.5 mg Intravenous Q2H PRN Krishna Ayoub MD   0.5 mg at 05/07/18 2329   • insulin aspart (novoLOG) injection 0-9 Units  0-9 Units Subcutaneous 4x Daily AC & at Bedtime Mike Byrd MD   7 Units at 05/08/18 1054   • ipratropium (ATROVENT HFA) inhaler 2 puff  2 puff Inhalation 4x Daily - RT Rain Mcadams MD   2 puff at 05/08/18 1045   • levoFLOXacin (LEVAQUIN) 500 mg/100 mL D5W (premix) (LEVAQUIN) 500 mg  500 mg Intravenous Q24H Mike Byrd MD   500 mg at 05/08/18 0354   • methylPREDNISolone sodium succinate (SOLU-Medrol) injection 40 mg  40 mg Intravenous Q12H Rani Mcadams MD       • midazolam (VERSED) injection 1 mg  1 mg Intravenous  Q1H PRN Rain Mcadams MD   1 mg at 05/08/18 0920   • ondansetron (ZOFRAN) injection 4 mg  4 mg Intravenous Q6H PRN Mike Byrd MD       • Pharmacy to Dose LevoFLOXacin (LEVAQUIN)   Does not apply Continuous PRN Mike Byrd MD       • propofol (DIPRIVAN) infusion 10 mg/mL 100 mL  5-50 mcg/kg/min Intravenous Titrated Maxwell Carmona MD 31.2 mL/hr at 05/08/18 1010 50 mcg/kg/min at 05/08/18 1010   • sodium chloride 0.9 % flush 1-10 mL  1-10 mL Intravenous PRN Mike Byrd MD       • sodium chloride 0.9 % flush 10 mL  10 mL Intravenous PRN Maxwell Carmona MD       • sodium chloride 0.9 % flush 10 mL  10 mL Intravenous PRN Maxwell Carmona MD   10 mL at 05/08/18 0902   • triamcinolone (KENALOG) 0.1 % ointment   Topical Q12H Mike Byrd MD           Review of Systems:   Review of Systems   Unable to perform ROS: Intubated       Objective     Vital Signs  Temp:  [97.4 °F (36.3 °C)-98.2 °F (36.8 °C)] 97.7 °F (36.5 °C)  Heart Rate:  [65-88] 81  Resp:  [14-20] 16  BP: (112-149)/(52-79) 136/70  FiO2 (%):  [55 %-70 %] 55 %    Physical Exam:  Physical Exam   Constitutional: He is oriented to person, place, and time. He appears well-developed and well-nourished. No distress.   HENT:   Head: Normocephalic and atraumatic.   Eyes: EOM are normal. Pupils are equal, round, and reactive to light. No scleral icterus.   Neck: Normal range of motion. Neck supple.   Cardiovascular: Normal rate and regular rhythm.    Pulmonary/Chest: No respiratory distress. He has no wheezes. He has rales.   Abdominal: Soft. Bowel sounds are normal.   Musculoskeletal: Normal range of motion. He exhibits no edema, tenderness or deformity.   Neurological: He is alert and oriented to person, place, and time. He has normal reflexes. No cranial nerve deficit. Coordination normal.   Skin: Skin is warm and dry. There is pallor.   Psychiatric: He has a normal mood and affect. His behavior is normal.   Vitals  reviewed.       Results Review:    Lab Results (last 24 hours)     Procedure Component Value Units Date/Time    POC Glucose Once [[id number]]  (Abnormal) Collected:  05/08/18 0727    Specimen:  Blood Updated:  05/08/18 0739     Glucose 293 (H) mg/dL      Comment: Sliding Scale AdminMeter: [id number]: [id number] LUIS LORD       Blood Gas, Arterial With Co-Ox [[id number]]  (Abnormal) Collected:  05/08/18 0315    Specimen:  Arterial Blood Updated:  05/08/18 0323     Site Right Radial     Elder's Test N/A     pH, Arterial 7.413 pH units      pCO2, Arterial 44.6 mm Hg      pO2, Arterial 71.7 (L) mm Hg      HCO3, Arterial 28.4 (H) mmol/L      Base Excess, Arterial 3.2 (H) mmol/L      O2 Saturation, Arterial 94.4 %      Hemoglobin, Blood Gas 14.3 g/dL      Hematocrit, Blood Gas 43.9 %      Oxyhemoglobin 93.6 (L) %      Methemoglobin 0.50 %      Carboxyhemoglobin 0.4 %      Sodium, Arterial 139 mmol/L      Potassium, Arterial 4.0 mmol/L      Ionized Calcium 4.59 (L) mg/dL      Glucose, Arterial 301 (H) mmol/L      Barometric Pressure for Blood Gas 751 mmHg      Modality Ventilator     FIO2 70 %      Ventilator Mode AC     Set Tidal Volume 490     Set Mech Resp Rate 12.0     PEEP 10.0     PIP 23 cmH2O      Collected by yonny     pH, Temp Corrected -- pH Units      pCO2, Temperature Corrected -- mm Hg      pO2, Temperature Corrected -- mm Hg     Basic Metabolic Panel [[id number]]  (Abnormal) Collected:  05/08/18 0234    Specimen:  Blood Updated:  05/08/18 0312     Glucose 297 (H) mg/dL      BUN 27 (H) mg/dL      Creatinine 0.95 mg/dL      Sodium 137 mmol/L      Potassium 4.1 mmol/L      Chloride 101 mmol/L      CO2 26.0 mmol/L      Calcium 8.5 mg/dL      eGFR Non African Amer 78 mL/min/1.73      BUN/Creatinine Ratio 28.4 (H)     Anion Gap 10.0 mmol/L     Narrative:       The MDRD GFR formula is only valid for adults with stable renal function between ages 18 and 70.    CBC & Differential [[id number]] Collected:   05/08/18 0234    Specimen:  Blood Updated:  05/08/18 0251    Narrative:       The following orders were created for panel order CBC & Differential.  Procedure                               Abnormality         Status                     ---------                               -----------         ------                     CBC Auto Differential[[id number]]        Abnormal            Final result                 Please view results for these tests on the individual orders.    CBC Auto Differential [[id number]]  (Abnormal) Collected:  05/08/18 0234    Specimen:  Blood Updated:  05/08/18 0251     WBC 11.18 (H) 10*3/mm3      RBC 4.60 10*6/mm3      Hemoglobin 14.0 g/dL      Hematocrit 40.4 %      MCV 87.8 fL      MCH 30.4 pg      MCHC 34.7 g/dL      RDW 13.8 %      RDW-SD 44.0 (H) fl      MPV 9.3 fL      Platelets 239 10*3/mm3      Neutrophil % 90.8 (H) %      Lymphocyte % 6.6 (L) %      Monocyte % 2.1 %      Eosinophil % 0.0 %      Basophil % 0.1 %      Immature Grans % 0.4 %      Neutrophils, Absolute 10.14 (H) 10*3/mm3      Lymphocytes, Absolute 0.74 10*3/mm3      Monocytes, Absolute 0.24 10*3/mm3      Eosinophils, Absolute 0.00 10*3/mm3      Basophils, Absolute 0.01 10*3/mm3      Immature Grans, Absolute 0.05 (H) 10*3/mm3     POC Glucose Once [[id number]]  (Abnormal) Collected:  05/07/18 2031    Specimen:  Blood Updated:  05/07/18 2044     Glucose 255 (H) mg/dL      Comment: RN NotifiedMeter: [id number]: [id number] TANA FISH       POC Glucose Once [[id number]]  (Abnormal) Collected:  05/07/18 1650    Specimen:  Blood Updated:  05/07/18 1702     Glucose 266 (H) mg/dL      Comment: Sliding Scale AdminMeter: [id number]: [id number] LUIS LORD       POC Glucose Once [[id number]]  (Abnormal) Collected:  05/07/18 1107    Specimen:  Blood Updated:  05/07/18 1121     Glucose 236 (H) mg/dL      Comment: Sliding Scale AdminMeter: [id number]: [id number] LUIS LORD             Imaging Results  (last 24 hours)     Procedure Component Value Units Date/Time    XR Chest 1 View [[id number]] Collected:  05/07/18 1122     Updated:  05/07/18 1155    Narrative:       Chest single view.    CLINICAL INDICATION: Shortness of breath. Anaphylactic shock.    COMPARISON: May 6, 2018    FINDINGS: Borderline cardiomegaly. Elevated diaphragms, probably  expiratory phase. Endotracheal tube with tip 4 cm above the  bifurcation of the julieta. NG tube in stomach. No dense  consolidation. Minimal discoid atelectatic changes right lung  base. Lungs otherwise clear.      Impression:       CONCLUSION: As above.    Electronically signed by:  Ben Natarajan MD  5/7/2018 11:54 AM CDT  Workstation: [id number]          Assessment/Plan     Principal Problem:    Anaphylactic syndrome  Active Problems:    Acute respiratory failure    Hypertension    Diabetes mellitus    Continue with respiratory support, IV steroids, stop IV fluid, continue Lasix. Add Levemir Q HS as his glucose values are +/- 300 mg/dL. Pulmonary consult appreciated.     Krishna Ayoub MD  05/08/18  10:57 AM

## 2018-05-08 NOTE — CONSULTS
Nutrition Services    Patient Name:  Rolando Novoa  YOB: 1945  MRN: [id number]  Admit Date:  5/6/2018  Pt remains NPO on the vent.  Tube feeding started and currently increased t o 30cc/hr with good tolerance.  Rd will continue to monitor.     Electronically signed by:  Leelee Wilson RD  05/08/18 1:06 PM

## 2018-05-08 NOTE — PLAN OF CARE
Problem: Patient Care Overview  Goal: Plan of Care Review  Outcome: Ongoing (interventions implemented as appropriate)   05/08/18 0407   Plan of Care Review   Progress no change   OTHER   Outcome Summary Patient did well throughout night on current vent settings. Unable to wean FiO2.        Problem: Ventilation, Mechanical Invasive (Adult)  Intervention: Prevent Airway Displacement/Mechanical Dysfunction   05/08/18 0407   Prevent Airway Displacement/Mechanical Dysfunction   Airway Safety Measures manual resuscitator/mask/valve in room;suction at bedside     Intervention: Prevent Ventilator-Induced Lung Injury   05/08/18 0407   Respiratory Interventions   Lung Protection Measures lung compliance monitored;optimal PEEP applied;plateau/inspiratory pressure monitored;ventilator synchrony promoted     Intervention: Promote Early Weaning/Extubation   05/08/18 0407   Coping/Psychosocial Interventions   Environmental Support calm environment promoted;distractions minimized   Pain/Comfort/Sleep Interventions   Sleep/Rest Enhancement awakenings minimized;consistent schedule promoted;noise level reduced     Intervention: Optimize Oxygenation/Ventilation   05/08/18 0407   Respiratory Interventions   Airway/Ventilation Management airway patency maintained;calming measures promoted;pulmonary hygiene promoted;humidification applied

## 2018-05-08 NOTE — PROGRESS NOTES
CRITICAL CARE PROGRESS NOTE  Rain Mcadams MD    [facility] CRITICAL CARE  5/8/2018        Rolando oNvoa  [id number]  1945  73 y.o. male            LOS: 2 days   Mike Byrd MD    Chief Complaint/Reason for visit: F/u acute hypoxic respiratory failure    Subjective     Interval History:   History taken from: patient/ chart    No significant events. Remained on FiO2 70% yesterday; just decreased to 65%. Sats in low 90s. Net negative with lasix. Denies pain or difficulty breathing. Tolerating TFs.     Review of Systems:   Review of Systems   Unable to perform ROS: Intubated     All systems were reviewed and negative except as noted above in the HPI.    Medical history, surgical history, social history, family history reviewed    Objective     Intake/Output:    Intake/Output Summary (Last 24 hours) at 05/08/18 0735  Last data filed at 05/08/18 0433   Gross per 24 hour   Intake             1761 ml   Output             3195 ml   Net            -1434 ml       Nutrition: TFs    Infusions:    Pharmacy to Dose LevoFLOXacin (LEVAQUIN)     propofol 5-50 mcg/kg/min Last Rate: 50 mcg/kg/min (05/08/18 0717)       Respiratory:  FiO2 (%):  [65 %-95 %] 65 %  S RR:  [12-16] 12  PEEP/CPAP (cm H2O):  [8 cm H20-10 cm H20] 10 cm H20  CA SUP:  [0 cm H20] 0 cm H20  MAP (cm H2O):  [15-16] 16    Vital Sign Min/Max for last 24 hours:  Temp  Min: 97.4 °F (36.3 °C)  Max: 98.2 °F (36.8 °C)   BP  Min: 112/56  Max: 149/71   Pulse  Min: 65  Max: 80   Resp  Min: 14  Max: 20   SpO2  Min: 90 %  Max: 96 %   No Data Recorded   No Data Recorded     Physical Exam:  98.2 °F (36.8 °C) 77 142/79 16 95% 104 kg (230 lb 2.6 oz) Body mass index is 38.3 kg/m².  Physical Exam   Constitutional: Vital signs are normal. He appears well-developed and well-nourished. He is easily aroused. He is sedated and intubated.   obese   HENT:   Head: Normocephalic and atraumatic.   Nose: Nose normal.   Eyes: EOM are normal.   Cardiovascular:  Normal rate, regular rhythm and normal heart sounds.  Exam reveals no gallop.    No murmur heard.  Pulmonary/Chest: Effort normal and breath sounds normal. He is intubated. He has no wheezes. He has no rhonchi. He has no rales.   Abdominal: Soft. Normal appearance and bowel sounds are normal. There is no tenderness.   obese   Musculoskeletal:   Mild BLE edema     Vascular Status -  His right foot exhibits abnormal foot edema. His left foot exhibits abnormal foot edema.  Neurological: He is easily aroused.   Sedated but arousable and able to answer Y/N questions   Skin: Skin is warm and dry. No cyanosis. Nails show no clubbing.   Psychiatric: He has a normal mood and affect. His behavior is normal.       Central Lines/PICC: absent     Results Review:  I personally reviewed the patient's new clinical results.   Lab Results (last 24 hours)     Procedure Component Value Units Date/Time    Blood Gas, Arterial With Co-Ox [[id number]]  (Abnormal) Collected:  05/08/18 0315    Specimen:  Arterial Blood Updated:  05/08/18 0323     Site Right Radial     Elder's Test N/A     pH, Arterial 7.413 pH units      pCO2, Arterial 44.6 mm Hg      pO2, Arterial 71.7 (L) mm Hg      HCO3, Arterial 28.4 (H) mmol/L      Base Excess, Arterial 3.2 (H) mmol/L      O2 Saturation, Arterial 94.4 %      Hemoglobin, Blood Gas 14.3 g/dL      Hematocrit, Blood Gas 43.9 %      Oxyhemoglobin 93.6 (L) %      Methemoglobin 0.50 %      Carboxyhemoglobin 0.4 %      Sodium, Arterial 139 mmol/L      Potassium, Arterial 4.0 mmol/L      Ionized Calcium 4.59 (L) mg/dL      Glucose, Arterial 301 (H) mmol/L      Barometric Pressure for Blood Gas 751 mmHg      Modality Ventilator     FIO2 70 %      Ventilator Mode AC     Set Tidal Volume 490     Set Mech Resp Rate 12.0     PEEP 10.0     PIP 23 cmH2O      Collected by yonny     pH, Temp Corrected -- pH Units      pCO2, Temperature Corrected -- mm Hg      pO2, Temperature Corrected -- mm Hg     Basic Metabolic Panel  [[id number]]  (Abnormal) Collected:  05/08/18 0234    Specimen:  Blood Updated:  05/08/18 0312     Glucose 297 (H) mg/dL      BUN 27 (H) mg/dL      Creatinine 0.95 mg/dL      Sodium 137 mmol/L      Potassium 4.1 mmol/L      Chloride 101 mmol/L      CO2 26.0 mmol/L      Calcium 8.5 mg/dL      eGFR Non African Amer 78 mL/min/1.73      BUN/Creatinine Ratio 28.4 (H)     Anion Gap 10.0 mmol/L     Narrative:       The MDRD GFR formula is only valid for adults with stable renal function between ages 18 and 70.    CBC & Differential [[id number]] Collected:  05/08/18 0234    Specimen:  Blood Updated:  05/08/18 0251    Narrative:       The following orders were created for panel order CBC & Differential.  Procedure                               Abnormality         Status                     ---------                               -----------         ------                     CBC Auto Differential[[id number]]        Abnormal            Final result                 Please view results for these tests on the individual orders.    CBC Auto Differential [[id number]]  (Abnormal) Collected:  05/08/18 0234    Specimen:  Blood Updated:  05/08/18 0251     WBC 11.18 (H) 10*3/mm3      RBC 4.60 10*6/mm3      Hemoglobin 14.0 g/dL      Hematocrit 40.4 %      MCV 87.8 fL      MCH 30.4 pg      MCHC 34.7 g/dL      RDW 13.8 %      RDW-SD 44.0 (H) fl      MPV 9.3 fL      Platelets 239 10*3/mm3      Neutrophil % 90.8 (H) %      Lymphocyte % 6.6 (L) %      Monocyte % 2.1 %      Eosinophil % 0.0 %      Basophil % 0.1 %      Immature Grans % 0.4 %      Neutrophils, Absolute 10.14 (H) 10*3/mm3      Lymphocytes, Absolute 0.74 10*3/mm3      Monocytes, Absolute 0.24 10*3/mm3      Eosinophils, Absolute 0.00 10*3/mm3      Basophils, Absolute 0.01 10*3/mm3      Immature Grans, Absolute 0.05 (H) 10*3/mm3     POC Glucose Once [[id number]]  (Abnormal) Collected:  05/07/18 2031    Specimen:  Blood Updated:  05/07/18 2044     Glucose 255 (H) mg/dL      Comment: RN  NotifiedMeter: [id number]: [id number] TANA FISH       POC Glucose Once [[id number]]  (Abnormal) Collected:  05/07/18 1650    Specimen:  Blood Updated:  05/07/18 1702     Glucose 266 (H) mg/dL      Comment: Sliding Scale AdminMeter: [id number]: [id number] LUIS GONZALEZBIE       POC Glucose Once [[id number]]  (Abnormal) Collected:  05/07/18 1107    Specimen:  Blood Updated:  05/07/18 1121     Glucose 236 (H) mg/dL      Comment: Sliding Scale AdminMeter: [id number]: [id number] LUIS GONZALEZBIE       POC Glucose Once [[id number]]  (Abnormal) Collected:  05/07/18 0747    Specimen:  Blood Updated:  05/07/18 0758     Glucose 219 (H) mg/dL      Comment: Sliding Scale AdminMeter: [id number]: [id number] LUIS LORD             Results from last 7 days  Lab Units 05/08/18  0315   PH, ARTERIAL pH units 7.413   PO2 ART mm Hg 71.7*   PCO2, ARTERIAL mm Hg 44.6   HCO3 ART mmol/L 28.4*     No results found for: BLOODCX  Lab Results   Component Value Date    URINECX No growth at 24 hours 05/05/2018       I independently reviewed the patient's new imaging, including images and reports.  Imaging Results (last 24 hours)     Procedure Component Value Units Date/Time    XR Chest 1 View [[id number]] Collected:  05/07/18 1122     Updated:  05/07/18 1155    Narrative:       Chest single view.    CLINICAL INDICATION: Shortness of breath. Anaphylactic shock.    COMPARISON: May 6, 2018    FINDINGS: Borderline cardiomegaly. Elevated diaphragms, probably  expiratory phase. Endotracheal tube with tip 4 cm above the  bifurcation of the julieta. NG tube in stomach. No dense  consolidation. Minimal discoid atelectatic changes right lung  base. Lungs otherwise clear.      Impression:       CONCLUSION: As above.    Electronically signed by:  Ben Natarajan MD  5/7/2018 11:54 AM CDT  Workstation: [id number]          All medications reviewed.     albuterol 2 puff Inhalation 4x Daily - RT   famotidine 20 mg Oral Daily    furosemide 20 mg Intravenous Daily   heparin (porcine) 5,000 Units Subcutaneous Q8H   insulin aspart 0-9 Units Subcutaneous 4x Daily AC & at Bedtime   ipratropium 2 puff Inhalation 4x Daily - RT   levoFLOXacin 500 mg Intravenous Q24H   methylPREDNISolone sodium succinate 40 mg Intravenous Q12H   triamcinolone  Topical Q12H         Assessment/Plan     ASSESSMENT:  # Acute hypoxic respiratory failure  # Anaphylaxis- suspected due to toradol, but has had exposure to levaquin and morphine  # Recent lithotripsy  # Leukocytosis- likely steroid demargination  # Morbid obesity   # DM  # HTN      PLAN:  -Cont AC/VC. Decrease FiO2 to 60%; if tolerates, continue weaning FiO2 to 50%, then begin weaning PEEP. Goal sats >88%.  -Wean propofol as tolerated  -Added versed prn anxiety/ sedation  -Continue scheduled albuterol/ atrovent  -Decrease solumedrol to 40mg q12hrs  -Cont Levaquin for post-op abx  -Continue lasix 20mg IV daily. Follow I&Os and renal fxn  -Hyperglycemia management per primary team  -Toradol added to allergy list  -TFs  -PPX: Heparin TID, change protonix to pepcid PO  -FULL CODE      Critical Care Time Spent: 32 minutes  I personally provided care to this critically ill patient as documented above.  Critical care time does not include time spent on separately billed procedures.  None of my critical care time was concurrent with other critical care providers.         This document has been electronically signed by Rain Mcadams MD on May 8, 2018 7:35 AM      [phone]    Dictated using Dragon

## 2018-05-08 NOTE — PLAN OF CARE
Problem: Skin Injury Risk (Adult)  Goal: Skin Health and Integrity  Outcome: Ongoing (interventions implemented as appropriate)      Problem: Restraint, Nonbehavioral (Nonviolent)  Goal: Rationale and Justification  Outcome: Ongoing (interventions implemented as appropriate)    Goal: Nonbehavioral (Nonviolent) Restraint: Absence of Injury/Harm  Outcome: Ongoing (interventions implemented as appropriate)    Goal: Nonbehavioral (Nonviolent) Restraint: Achievement of Discontinuation Criteria  Outcome: Ongoing (interventions implemented as appropriate)    Goal: Nonbehavioral (Nonviolent) Restraint: Preservation of Dignity and Wellbeing  Outcome: Ongoing (interventions implemented as appropriate)      Problem: Fall Risk (Adult)  Goal: Identify Related Risk Factors and Signs and Symptoms  Outcome: Ongoing (interventions implemented as appropriate)      Problem: Ventilation, Mechanical Invasive (Adult)  Goal: Signs and Symptoms of Listed Potential Problems Will be Absent, Minimized or Managed (Ventilation, Mechanical Invasive)  Outcome: Ongoing (interventions implemented as appropriate)      Problem: Anaphylactic/Systemic Hypersensitivity Reaction (Adult)  Goal: Signs and Symptoms of Listed Potential Problems Will be Absent, Minimized or Managed (Anaphylactic/Systemic Hypersensitivity Reaction)  Outcome: Ongoing (interventions implemented as appropriate)

## 2018-05-08 NOTE — PLAN OF CARE
Problem: Patient Care Overview  Goal: Plan of Care Review   05/08/18 8168   Plan of Care Review   Progress improving   OTHER   Outcome Summary Fio2 weaned to 50% and peep reduced to 8 today. Maintaining Sao2 greater than 90%.

## 2018-05-09 LAB
ANION GAP SERPL CALCULATED.3IONS-SCNC: 12 MMOL/L (ref 5–15)
ARTERIAL PATENCY WRIST A: POSITIVE
ATMOSPHERIC PRESS: 749 MMHG
BASE EXCESS BLDA CALC-SCNC: 6.8 MMOL/L (ref 0–2)
BASOPHILS # BLD AUTO: 0.01 10*3/MM3 (ref 0–0.2)
BASOPHILS NFR BLD AUTO: 0.1 % (ref 0–2)
BDY SITE: ABNORMAL
BUN BLD-MCNC: 33 MG/DL (ref 7–21)
BUN/CREAT SERPL: 34 (ref 7–25)
CA-I BLD-MCNC: 4.57 MG/DL (ref 4.6–5.6)
CALCIUM SPEC-SCNC: 9.1 MG/DL (ref 8.4–10.2)
CHLORIDE SERPL-SCNC: 96 MMOL/L (ref 95–110)
CO2 SERPL-SCNC: 33 MMOL/L (ref 22–31)
COHGB MFR BLD: 0.6 % (ref 0–5)
CREAT BLD-MCNC: 0.97 MG/DL (ref 0.7–1.3)
DEPRECATED RDW RBC AUTO: 44.9 FL (ref 35.1–43.9)
EOSINOPHIL # BLD AUTO: 0 10*3/MM3 (ref 0–0.7)
EOSINOPHIL NFR BLD AUTO: 0 % (ref 0–7)
ERYTHROCYTE [DISTWIDTH] IN BLOOD BY AUTOMATED COUNT: 13.7 % (ref 11.5–14.5)
GAS FLOW AIRWAY: 6 LPM
GFR SERPL CREATININE-BSD FRML MDRD: 76 ML/MIN/1.73 (ref 60–98)
GLUCOSE BLD-MCNC: 291 MG/DL (ref 60–100)
GLUCOSE BLDA-MCNC: 360 MMOL/L (ref 65–95)
GLUCOSE BLDC GLUCOMTR-MCNC: 246 MG/DL (ref 70–130)
GLUCOSE BLDC GLUCOMTR-MCNC: 253 MG/DL (ref 70–130)
GLUCOSE BLDC GLUCOMTR-MCNC: 283 MG/DL (ref 70–130)
GLUCOSE BLDC GLUCOMTR-MCNC: 328 MG/DL (ref 70–130)
HCO3 BLDA-SCNC: 31 MMOL/L (ref 20–26)
HCT VFR BLD AUTO: 48.2 % (ref 39–49)
HCT VFR BLD CALC: 47.5 % (ref 38–51)
HGB BLD-MCNC: 16.4 G/DL (ref 13.7–17.3)
HGB BLDA-MCNC: 15.5 G/DL (ref 14–18)
IMM GRANULOCYTES # BLD: 0.16 10*3/MM3 (ref 0–0.02)
IMM GRANULOCYTES NFR BLD: 1.3 % (ref 0–0.5)
LYMPHOCYTES # BLD AUTO: 0.94 10*3/MM3 (ref 0.6–4.2)
LYMPHOCYTES NFR BLD AUTO: 7.5 % (ref 10–50)
Lab: ABNORMAL
MCH RBC QN AUTO: 30.8 PG (ref 26.5–34)
MCHC RBC AUTO-ENTMCNC: 34 G/DL (ref 31.5–36.3)
MCV RBC AUTO: 90.4 FL (ref 80–98)
METHGB BLD QL: 0 % (ref 0–3)
MODALITY: ABNORMAL
MONOCYTES # BLD AUTO: 0.99 10*3/MM3 (ref 0–0.9)
MONOCYTES NFR BLD AUTO: 7.9 % (ref 0–12)
NEUTROPHILS # BLD AUTO: 10.46 10*3/MM3 (ref 2–8.6)
NEUTROPHILS NFR BLD AUTO: 83.2 % (ref 37–80)
NRBC BLD MANUAL-RTO: 0 /100 WBC (ref 0–0)
OXYHGB MFR BLDV: 87.5 % (ref 94–99)
PCO2 BLDA: 41.5 MM HG (ref 35–45)
PCO2 TEMP ADJ BLD: ABNORMAL MM HG (ref 35–48)
PH BLDA: 7.48 PH UNITS (ref 7.35–7.45)
PH, TEMP CORRECTED: ABNORMAL PH UNITS
PLATELET # BLD AUTO: 346 10*3/MM3 (ref 150–450)
PMV BLD AUTO: 9.2 FL (ref 8–12)
PO2 BLDA: 51.7 MM HG (ref 83–108)
PO2 TEMP ADJ BLD: ABNORMAL MM HG (ref 83–108)
POTASSIUM BLD-SCNC: 3.3 MMOL/L (ref 3.5–5.1)
POTASSIUM BLD-SCNC: 4.5 MMOL/L (ref 3.5–5.1)
POTASSIUM BLDA-SCNC: 3.4 MMOL/L (ref 3.4–4.5)
RBC # BLD AUTO: 5.33 10*6/MM3 (ref 4.37–5.74)
SAO2 % BLDCOA: 88.1 % (ref 94–99)
SODIUM BLD-SCNC: 141 MMOL/L (ref 137–145)
SODIUM BLDA-SCNC: 140 MMOL/L (ref 136–146)
VENTILATOR MODE: ABNORMAL
WBC NRBC COR # BLD: 12.56 10*3/MM3 (ref 3.2–9.8)

## 2018-05-09 PROCEDURE — 83050 HGB METHEMOGLOBIN QUAN: CPT

## 2018-05-09 PROCEDURE — 85025 COMPLETE CBC W/AUTO DIFF WBC: CPT | Performed by: FAMILY MEDICINE

## 2018-05-09 PROCEDURE — 82805 BLOOD GASES W/O2 SATURATION: CPT

## 2018-05-09 PROCEDURE — 63710000001 INSULIN DETEMIR PER 5 UNITS: Performed by: INTERNAL MEDICINE

## 2018-05-09 PROCEDURE — 25010000002 METHYLPREDNISOLONE PER 40 MG: Performed by: INTERNAL MEDICINE

## 2018-05-09 PROCEDURE — G8988 SELF CARE GOAL STATUS: HCPCS

## 2018-05-09 PROCEDURE — 99232 SBSQ HOSP IP/OBS MODERATE 35: CPT | Performed by: INTERNAL MEDICINE

## 2018-05-09 PROCEDURE — 63710000001 INSULIN ASPART PER 5 UNITS: Performed by: FAMILY MEDICINE

## 2018-05-09 PROCEDURE — 82962 GLUCOSE BLOOD TEST: CPT

## 2018-05-09 PROCEDURE — 25010000002 FUROSEMIDE PER 20 MG: Performed by: INTERNAL MEDICINE

## 2018-05-09 PROCEDURE — 25010000002 LEVOFLOXACIN PER 250 MG: Performed by: FAMILY MEDICINE

## 2018-05-09 PROCEDURE — 36600 WITHDRAWAL OF ARTERIAL BLOOD: CPT

## 2018-05-09 PROCEDURE — 80048 BASIC METABOLIC PNL TOTAL CA: CPT | Performed by: FAMILY MEDICINE

## 2018-05-09 PROCEDURE — 97535 SELF CARE MNGMENT TRAINING: CPT

## 2018-05-09 PROCEDURE — 94640 AIRWAY INHALATION TREATMENT: CPT

## 2018-05-09 PROCEDURE — 82375 ASSAY CARBOXYHB QUANT: CPT

## 2018-05-09 PROCEDURE — G8987 SELF CARE CURRENT STATUS: HCPCS

## 2018-05-09 PROCEDURE — 97166 OT EVAL MOD COMPLEX 45 MIN: CPT

## 2018-05-09 PROCEDURE — 94799 UNLISTED PULMONARY SVC/PX: CPT

## 2018-05-09 PROCEDURE — 25010000002 HEPARIN (PORCINE) PER 1000 UNITS: Performed by: FAMILY MEDICINE

## 2018-05-09 PROCEDURE — 84132 ASSAY OF SERUM POTASSIUM: CPT | Performed by: FAMILY MEDICINE

## 2018-05-09 RX ORDER — POTASSIUM CHLORIDE 750 MG/1
40 CAPSULE, EXTENDED RELEASE ORAL AS NEEDED
Status: DISCONTINUED | OUTPATIENT
Start: 2018-05-09 | End: 2018-05-11 | Stop reason: HOSPADM

## 2018-05-09 RX ORDER — POTASSIUM CHLORIDE 1.5 G/1.77G
40 POWDER, FOR SOLUTION ORAL AS NEEDED
Status: DISCONTINUED | OUTPATIENT
Start: 2018-05-09 | End: 2018-05-11 | Stop reason: HOSPADM

## 2018-05-09 RX ORDER — IPRATROPIUM BROMIDE AND ALBUTEROL SULFATE 2.5; .5 MG/3ML; MG/3ML
3 SOLUTION RESPIRATORY (INHALATION)
Status: DISCONTINUED | OUTPATIENT
Start: 2018-05-09 | End: 2018-05-11 | Stop reason: HOSPADM

## 2018-05-09 RX ORDER — POTASSIUM CHLORIDE 29.8 MG/ML
20 INJECTION INTRAVENOUS
Status: DISCONTINUED | OUTPATIENT
Start: 2018-05-09 | End: 2018-05-11 | Stop reason: HOSPADM

## 2018-05-09 RX ORDER — FUROSEMIDE 10 MG/ML
20 INJECTION INTRAMUSCULAR; INTRAVENOUS DAILY
Status: DISCONTINUED | OUTPATIENT
Start: 2018-05-10 | End: 2018-05-10

## 2018-05-09 RX ADMIN — TRIAMCINOLONE ACETONIDE: 1 OINTMENT TOPICAL at 21:12

## 2018-05-09 RX ADMIN — FUROSEMIDE 20 MG: 10 INJECTION, SOLUTION INTRAMUSCULAR; INTRAVENOUS at 09:20

## 2018-05-09 RX ADMIN — HEPARIN SODIUM 5000 UNITS: 5000 INJECTION, SOLUTION INTRAVENOUS; SUBCUTANEOUS at 13:28

## 2018-05-09 RX ADMIN — HEPARIN SODIUM 5000 UNITS: 5000 INJECTION, SOLUTION INTRAVENOUS; SUBCUTANEOUS at 07:32

## 2018-05-09 RX ADMIN — METHYLPREDNISOLONE SODIUM SUCCINATE 40 MG: 40 INJECTION, POWDER, FOR SOLUTION INTRAMUSCULAR; INTRAVENOUS at 04:30

## 2018-05-09 RX ADMIN — HEPARIN SODIUM 5000 UNITS: 5000 INJECTION, SOLUTION INTRAVENOUS; SUBCUTANEOUS at 21:12

## 2018-05-09 RX ADMIN — POTASSIUM CHLORIDE 40 MEQ: 750 CAPSULE, EXTENDED RELEASE ORAL at 13:28

## 2018-05-09 RX ADMIN — INSULIN ASPART 6 UNITS: 100 INJECTION, SOLUTION INTRAVENOUS; SUBCUTANEOUS at 17:23

## 2018-05-09 RX ADMIN — IPRATROPIUM BROMIDE AND ALBUTEROL SULFATE 3 ML: .5; 3 SOLUTION RESPIRATORY (INHALATION) at 10:19

## 2018-05-09 RX ADMIN — IPRATROPIUM BROMIDE AND ALBUTEROL SULFATE 3 ML: .5; 3 SOLUTION RESPIRATORY (INHALATION) at 20:01

## 2018-05-09 RX ADMIN — FAMOTIDINE 20 MG: 20 TABLET ORAL at 09:20

## 2018-05-09 RX ADMIN — POTASSIUM CHLORIDE 40 MEQ: 750 CAPSULE, EXTENDED RELEASE ORAL at 07:55

## 2018-05-09 RX ADMIN — IPRATROPIUM BROMIDE AND ALBUTEROL SULFATE 3 ML: .5; 3 SOLUTION RESPIRATORY (INHALATION) at 15:44

## 2018-05-09 RX ADMIN — INSULIN ASPART 7 UNITS: 100 INJECTION, SOLUTION INTRAVENOUS; SUBCUTANEOUS at 11:18

## 2018-05-09 RX ADMIN — LEVOFLOXACIN 500 MG: 5 INJECTION, SOLUTION INTRAVENOUS at 04:30

## 2018-05-09 RX ADMIN — INSULIN ASPART 4 UNITS: 100 INJECTION, SOLUTION INTRAVENOUS; SUBCUTANEOUS at 07:28

## 2018-05-09 RX ADMIN — INSULIN DETEMIR 36 UNITS: 100 INJECTION, SOLUTION SUBCUTANEOUS at 21:12

## 2018-05-09 RX ADMIN — INSULIN ASPART 6 UNITS: 100 INJECTION, SOLUTION INTRAVENOUS; SUBCUTANEOUS at 21:12

## 2018-05-09 RX ADMIN — TRIAMCINOLONE ACETONIDE: 1 OINTMENT TOPICAL at 10:17

## 2018-05-09 NOTE — PROGRESS NOTES
CRITICAL CARE PROGRESS NOTE  Rain Mcadams MD    [facility] CRITICAL CARE  5/9/2018        Rolando Novoa  [id number]  1945  73 y.o. male            LOS: 3 days   Mike Byrd MD    Chief Complaint/Reason for visit: F/u acute hypoxic respiratory failure    Subjective     Interval History:   History taken from: patient/ chart    Self extubated last night around 2200. RA sats 87% so placed on O2. Now on 6lpm with sats 88%. Denies pain, throat/ tongue swelling, dysphagia, or dyspnea. Has had some cough. States he did not take levaquin or toradol PO prior to his episode. Believes he has taken toradol and morphine before without issue. Concerned about hyperglycemia.    Review of Systems:   Review of Systems   Constitutional: Negative for unexpected weight change.   HENT: Negative for trouble swallowing and voice change.    Respiratory: Positive for cough. Negative for shortness of breath and wheezing.    Cardiovascular: Negative for chest pain.   Gastrointestinal: Negative for abdominal pain.     All systems were reviewed and negative except as noted above in the HPI.    Medical history, surgical history, social history, family history reviewed    Objective     Intake/Output:    Intake/Output Summary (Last 24 hours) at 05/09/18 0752  Last data filed at 05/09/18 0650   Gross per 24 hour   Intake             1545 ml   Output             4150 ml   Net            -2605 ml       Nutrition: TFs    Infusions:    Pharmacy to Dose LevoFLOXacin (LEVAQUIN)        Respiratory:  FiO2 (%):  [50 %-60 %] 50 %  S RR:  [12] 12  PEEP/CPAP (cm H2O):  [8 cm H20-10 cm H20] 8 cm H20  MAP (cm H2O):  [14-18] 15    Vital Sign Min/Max for last 24 hours:  Temp  Min: 96.8 °F (36 °C)  Max: 98.4 °F (36.9 °C)   BP  Min: 119/60  Max: 176/87   Pulse  Min: 60  Max: 93   Resp  Min: 15  Max: 20   SpO2  Min: 87 %  Max: 95 %   Flow (L/min)  Min: 5  Max: 6   Weight  Min: 104 kg (229 lb 4.5 oz)  Max: 104 kg (229 lb 4.5 oz)      Physical Exam:  98.4 °F (36.9 °C) (Temporal Artery ) 74 159/86 20 90% 104 kg (229 lb 4.5 oz) Body mass index is 38.15 kg/m².  Physical Exam   Constitutional: He is oriented to person, place, and time. Vital signs are normal. He appears well-developed and well-nourished.   obese   HENT:   Head: Normocephalic and atraumatic.   Nose: Nose normal.   Mouth/Throat: Mucous membranes are normal.   Eyes: EOM are normal.   Cardiovascular: Normal rate, regular rhythm and normal heart sounds.  PMI is not displaced.  Exam reveals no gallop.    No murmur heard.  Pulmonary/Chest: Effort normal. No respiratory distress. He has wheezes (scattered expiratory ). He has no rhonchi. He has no rales.   Abdominal: Soft. Normal appearance and bowel sounds are normal. There is no tenderness.   obese   Musculoskeletal:   Mild BLE edema, s/p left forearm amputation     Vascular Status -  His right foot exhibits abnormal foot edema. His left foot exhibits abnormal foot edema.  Neurological: He is alert and oriented to person, place, and time.   Skin: Skin is warm and dry. No cyanosis. Nails show no clubbing.   Psychiatric: He has a normal mood and affect. His behavior is normal. Judgment normal. Cognition and memory are normal.       Central Lines/PICC: absent     Results Review:  I personally reviewed the patient's new clinical results.   Lab Results (last 24 hours)     Procedure Component Value Units Date/Time    Basic Metabolic Panel [[id number]]  (Abnormal) Collected:  05/09/18 0316    Specimen:  Blood Updated:  05/09/18 0351     Glucose 291 (H) mg/dL      BUN 33 (H) mg/dL      Creatinine 0.97 mg/dL      Sodium 141 mmol/L      Potassium 3.3 (L) mmol/L      Chloride 96 mmol/L      CO2 33.0 (H) mmol/L      Calcium 9.1 mg/dL      eGFR Non African Amer 76 mL/min/1.73      BUN/Creatinine Ratio 34.0 (H)     Anion Gap 12.0 mmol/L     Narrative:       The MDRD GFR formula is only valid for adults with stable renal function between ages 18 and  70.    CBC & Differential [[id number]] Collected:  05/09/18 0316    Specimen:  Blood Updated:  05/09/18 0330    Narrative:       The following orders were created for panel order CBC & Differential.  Procedure                               Abnormality         Status                     ---------                               -----------         ------                     Scan Slide[[id number]]                                                                  CBC Auto Differential[[id number]]        Abnormal            Final result                 Please view results for these tests on the individual orders.    CBC Auto Differential [[id number]]  (Abnormal) Collected:  05/09/18 0316    Specimen:  Blood Updated:  05/09/18 0330     WBC 12.56 (H) 10*3/mm3      RBC 5.33 10*6/mm3      Hemoglobin 16.4 g/dL      Hematocrit 48.2 %      MCV 90.4 fL      MCH 30.8 pg      MCHC 34.0 g/dL      RDW 13.7 %      RDW-SD 44.9 (H) fl      MPV 9.2 fL      Platelets 346 10*3/mm3      Neutrophil % 83.2 (H) %      Lymphocyte % 7.5 (L) %      Monocyte % 7.9 %      Eosinophil % 0.0 %      Basophil % 0.1 %      Immature Grans % 1.3 (H) %      Neutrophils, Absolute 10.46 (H) 10*3/mm3      Lymphocytes, Absolute 0.94 10*3/mm3      Monocytes, Absolute 0.99 (H) 10*3/mm3      Eosinophils, Absolute 0.00 10*3/mm3      Basophils, Absolute 0.01 10*3/mm3      Immature Grans, Absolute 0.16 (H) 10*3/mm3      nRBC 0.0 /100 WBC     Blood Gas, Arterial With Co-Ox [[id number]]  (Abnormal) Collected:  05/09/18 0004    Specimen:  Arterial Blood Updated:  05/09/18 0009     Site Right Radial     Elder's Test Positive     pH, Arterial 7.482 (H) pH units      pCO2, Arterial 41.5 mm Hg      pO2, Arterial 51.7 (L) mm Hg      HCO3, Arterial 31.0 (H) mmol/L      Base Excess, Arterial 6.8 (H) mmol/L      O2 Saturation, Arterial 88.1 (L) %      Hemoglobin, Blood Gas 15.5 g/dL      Hematocrit, Blood Gas 47.5 %      Oxyhemoglobin 87.5 (L) %      Methemoglobin 0.00 %       Carboxyhemoglobin 0.6 %      Sodium, Arterial 140 mmol/L      Potassium, Arterial 3.4 mmol/L      Ionized Calcium 4.57 (L) mg/dL      Glucose, Arterial 360 (H) mmol/L      Barometric Pressure for Blood Gas 749 mmHg      Modality Nasal Cannula     Flow Rate 6.0 lpm      Ventilator Mode NA     Collected by maddie     pH, Temp Corrected -- pH Units      pCO2, Temperature Corrected -- mm Hg      pO2, Temperature Corrected -- mm Hg     POC Glucose Once [[id number]]  (Abnormal) Collected:  05/08/18 2116    Specimen:  Blood Updated:  05/08/18 2147     Glucose 354 (H) mg/dL      Comment: RN NotifiedMeter: [id number]: [id number] PILO GONZALEZ       POC Glucose Once [[id number]]  (Abnormal) Collected:  05/08/18 1759    Specimen:  Blood Updated:  05/08/18 1817     Glucose 358 (H) mg/dL      Comment: Sliding Scale AdminMeter: [id number]: [id number] LUIS LORD       POC Glucose Once [[id number]]  (Abnormal) Collected:  05/08/18 1049    Specimen:  Blood Updated:  05/08/18 1101     Glucose 339 (H) mg/dL      Comment: Sliding Scale AdminMeter: [id number]: [id number] LUIS LORD             Results from last 7 days  Lab Units 05/09/18  0004   PH, ARTERIAL pH units 7.482*   PO2 ART mm Hg 51.7*   PCO2, ARTERIAL mm Hg 41.5   HCO3 ART mmol/L 31.0*     No results found for: BLOODCX  Lab Results   Component Value Date    URINECX No growth at 24 hours 05/05/2018       I independently reviewed the patient's new imaging, including images and reports.  Imaging Results (last 24 hours)     ** No results found for the last 24 hours. **          All medications reviewed.     famotidine 20 mg Oral Daily   furosemide 20 mg Intravenous Q12H   heparin (porcine) 5,000 Units Subcutaneous Q8H   insulin aspart 0-9 Units Subcutaneous 4x Daily AC & at Bedtime   insulin detemir 20 Units Subcutaneous Nightly   ipratropium-albuterol 3 mL Nebulization 4x Daily - RT   levoFLOXacin 500 mg Intravenous Q24H   triamcinolone  Topical  "Q12H         Assessment/Plan     ASSESSMENT:  # Acute hypoxic respiratory failure- s/p self extubation on 5/8  # Anaphylaxis- suspected due to toradol, but has had exposure to levaquin and morphine  # Recent lithotripsy  # Leukocytosis- likely steroid demargination  # Morbid obesity   # DM- steroid induced hyperglycemia  # HTN  # Mild hypervolemia  # \"Black lung\"      PLAN:  -Wean O2 to keep sats >88%.  -Change to scheduled duonebs  -Stop solumedrol  -Cont Levaquin for post-op abx  -Continue lasix 20mg IV daily; consider change to PO. Follow I&Os and renal fxn  -Hyperglycemia management per primary team  -Toradol added to allergy list  -Check bedside swallow and advance diet as tolerated  -Remove skinner  -OOB  -Incentive spirometry  -PPX: Heparin TID, change protonix to pepcid PO  -FULL CODE    Ok to transfer to floor per primary team.    I will sign off. Please call with questions or if I can be of further assistance.     D/w RN and RT. Updated wife.    Critical Care Time Spent: 22 minutes  I personally provided care to this critically ill patient as documented above.  Critical care time does not include time spent on separately billed procedures.  None of my critical care time was concurrent with other critical care providers.         This document has been electronically signed by Rain Mcadams MD on May 9, 2018 7:52 AM      [phone]    Dictated using Dragon    "

## 2018-05-09 NOTE — PLAN OF CARE
Problem: Skin Injury Risk (Adult)  Goal: Skin Health and Integrity  Outcome: Ongoing (interventions implemented as appropriate)      Problem: Fall Risk (Adult)  Goal: Identify Related Risk Factors and Signs and Symptoms  Outcome: Ongoing (interventions implemented as appropriate)    Goal: Absence of Fall  Outcome: Ongoing (interventions implemented as appropriate)      Problem: Patient Care Overview  Goal: Individualization and Mutuality  Outcome: Ongoing (interventions implemented as appropriate)   05/09/18 1804   Individualization   Patient Specific Preferences pt like ice water     Goal: Discharge Needs Assessment  Outcome: Ongoing (interventions implemented as appropriate)    Goal: Interprofessional Rounds/Family Conf  Outcome: Ongoing (interventions implemented as appropriate)      Problem: Anaphylactic/Systemic Hypersensitivity Reaction (Adult)  Goal: Signs and Symptoms of Listed Potential Problems Will be Absent, Minimized or Managed (Anaphylactic/Systemic Hypersensitivity Reaction)  Outcome: Ongoing (interventions implemented as appropriate)      Problem: Pain, Acute (Adult)  Goal: Identify Related Risk Factors and Signs and Symptoms  Outcome: Ongoing (interventions implemented as appropriate)    Goal: Acceptable Pain Control/Comfort Level  Outcome: Ongoing (interventions implemented as appropriate)

## 2018-05-09 NOTE — PLAN OF CARE
Problem: Patient Care Overview  Goal: Plan of Care Review  Outcome: Ongoing (interventions implemented as appropriate)   05/09/18 1700   OTHER   Outcome Summary Eval completed. Pt is a 74 yo male admitted for anaphylatic syndrome and acute hypoxic respiratory failure. PTA, pt was independent with ADLs and functional transfers/mobility. Pt currently is CGA/min A for in room ambulation and CGA for donning pants. Pt easily SOB and requires vc for pursed lip breathing on 6 L nc. Pt presents with decreased endurance/activity tolerance, strength, balance, and mobility limiting ability to safely and independently perform ADLs/IADls. Pt would benefit from continued skilled OT services to address areas of deficit, promote highest level of functioning, edu AE and ECT, assess for DME., and assist with safe d/c planning. Pt lives with spouse and 19 yo grandson and reports they can assist as needed upon return home. Anticipate home with HH pending progress and stable medical condition.    Coping/Psychosocial   Plan of Care Reviewed With patient

## 2018-05-09 NOTE — PROGRESS NOTES
Progress Note  Krishna Ayoub MD  Hospitalist    Date of visit: 5/9/2018     LOS: 3 days   Patient Care Team:  Jaxson Zepeda MD as PCP - General    Chief Complaint: dyspnea    Subjective     Interval History:     Extubated earlier, able to maintain O2 saturation on nasal cannula.     History taken from: nursing / chart    Medication Review:   Current Facility-Administered Medications   Medication Dose Route Frequency Provider Last Rate Last Dose   • dextrose (D50W) solution 25 g  25 g Intravenous Q15 Min PRN Mike Byrd MD       • dextrose (GLUTOSE) oral gel 15 g  15 g Oral Q15 Min PRN Mike Byrd MD       • famotidine (PEPCID) tablet 20 mg  20 mg Oral Daily Rain Mcadams MD   20 mg at 05/09/18 0920   • furosemide (LASIX) injection 20 mg  20 mg Intravenous Q12H Krishna Ayoub MD   20 mg at 05/09/18 0920   • glucagon (human recombinant) (GLUCAGEN DIAGNOSTIC) injection 1 mg  1 mg Subcutaneous PRN Mike Byrd MD       • heparin (porcine) 5000 UNIT/ML injection 5,000 Units  5,000 Units Subcutaneous Q8H Mike Byrd MD   5,000 Units at 05/09/18 0732   • HYDROmorphone (DILAUDID) injection 0.5 mg  0.5 mg Intravenous Q2H PRN Krishna Ayoub MD   0.5 mg at 05/08/18 1245   • insulin aspart (novoLOG) injection 0-9 Units  0-9 Units Subcutaneous 4x Daily AC & at Bedtime Mike Byrd MD   7 Units at 05/09/18 1118   • insulin detemir (LEVEMIR) injection 20 Units  20 Units Subcutaneous Nightly Krishna Ayoub MD   20 Units at 05/08/18 2121   • ipratropium-albuterol (DUO-NEB) nebulizer solution 3 mL  3 mL Nebulization 4x Daily - RT Rain Mcadams MD   3 mL at 05/09/18 1019   • levoFLOXacin (LEVAQUIN) 500 mg/100 mL D5W (premix) (LEVAQUIN) 500 mg  500 mg Intravenous Q24H Mike Byrd MD   500 mg at 05/09/18 0430   • ondansetron (ZOFRAN) injection 4 mg  4 mg Intravenous Q6H PRN Mike Byrd MD       • Pharmacy to Dose LevoFLOXacin (LEVAQUIN)   Does not apply Continuous PRN Mike Byrd MD       • potassium chloride  (MICRO-K) CR capsule 40 mEq  40 mEq Oral PRN Mike Byrd MD   40 mEq at 05/09/18 0755    Or   • potassium chloride (KLOR-CON) packet 40 mEq  40 mEq Oral PRN Mike Byrd MD        Or   • potassium chloride 20 mEq in 50 mL IVPB  20 mEq Intravenous Q1H PRN Mike Byrd MD       • sodium chloride 0.9 % flush 1-10 mL  1-10 mL Intravenous PRN Mike Byrd MD       • sodium chloride 0.9 % flush 10 mL  10 mL Intravenous PRN Maxwell Carmona MD       • sodium chloride 0.9 % flush 10 mL  10 mL Intravenous PRN Maxwell Carmona MD   10 mL at 05/08/18 0902   • triamcinolone (KENALOG) 0.1 % ointment   Topical Q12H Mike Byrd MD           Review of Systems:   Review of Systems   Constitutional: Positive for fatigue. Negative for fever.   Respiratory: Positive for cough. Negative for shortness of breath, wheezing and stridor.    Cardiovascular: Negative for chest pain, palpitations and leg swelling.   Gastrointestinal: Negative for abdominal distention, abdominal pain, anal bleeding, diarrhea, nausea and vomiting.   Musculoskeletal: Positive for back pain. Negative for arthralgias.   Skin: Positive for pallor.   Neurological: Positive for weakness. Negative for dizziness, tremors, seizures, syncope, facial asymmetry, light-headedness and headaches.   Psychiatric/Behavioral: Negative for agitation and behavioral problems.   All other systems reviewed and are negative.      Objective     Vital Signs  Temp:  [96.8 °F (36 °C)-98.4 °F (36.9 °C)] 98.1 °F (36.7 °C)  Heart Rate:  [] 107  Resp:  [15-20] 20  BP: (119-176)/(60-89) 125/72  FiO2 (%):  [50 %] 50 %    Physical Exam:  Physical Exam   Constitutional: He is oriented to person, place, and time. He appears well-developed and well-nourished. No distress.   HENT:   Head: Normocephalic and atraumatic.   Eyes: EOM are normal. Pupils are equal, round, and reactive to light. No scleral icterus.   Neck: Normal range of motion. Neck supple.   Cardiovascular:  Normal rate and regular rhythm.    Pulmonary/Chest: No respiratory distress. He has no wheezes. He has rales.   Abdominal: Soft. Bowel sounds are normal.   Musculoskeletal: Normal range of motion. He exhibits no edema, tenderness or deformity.   Neurological: He is alert and oriented to person, place, and time. He has normal reflexes. No cranial nerve deficit. Coordination normal.   Skin: Skin is warm and dry. There is pallor.   Psychiatric: He has a normal mood and affect. His behavior is normal.   Vitals reviewed.       Results Review:    Lab Results (last 24 hours)     Procedure Component Value Units Date/Time    POC Glucose Once [[id number]]  (Abnormal) Collected:  05/09/18 0724    Specimen:  Blood Updated:  05/09/18 0820     Glucose 246 (H) mg/dL      Comment: RN NotifiedMeter: [id number]: [id number] PILO GONZALEZ       Basic Metabolic Panel [[id number]]  (Abnormal) Collected:  05/09/18 0316    Specimen:  Blood Updated:  05/09/18 0351     Glucose 291 (H) mg/dL      BUN 33 (H) mg/dL      Creatinine 0.97 mg/dL      Sodium 141 mmol/L      Potassium 3.3 (L) mmol/L      Chloride 96 mmol/L      CO2 33.0 (H) mmol/L      Calcium 9.1 mg/dL      eGFR Non African Amer 76 mL/min/1.73      BUN/Creatinine Ratio 34.0 (H)     Anion Gap 12.0 mmol/L     Narrative:       The MDRD GFR formula is only valid for adults with stable renal function between ages 18 and 70.    CBC & Differential [[id number]] Collected:  05/09/18 0316    Specimen:  Blood Updated:  05/09/18 0330    Narrative:       The following orders were created for panel order CBC & Differential.  Procedure                               Abnormality         Status                     ---------                               -----------         ------                     Scan Slide[[id number]]                                                                  CBC Auto Differential[[id number]]        Abnormal            Final result                 Please view results for  these tests on the individual orders.    CBC Auto Differential [[id number]]  (Abnormal) Collected:  05/09/18 0316    Specimen:  Blood Updated:  05/09/18 0330     WBC 12.56 (H) 10*3/mm3      RBC 5.33 10*6/mm3      Hemoglobin 16.4 g/dL      Hematocrit 48.2 %      MCV 90.4 fL      MCH 30.8 pg      MCHC 34.0 g/dL      RDW 13.7 %      RDW-SD 44.9 (H) fl      MPV 9.2 fL      Platelets 346 10*3/mm3      Neutrophil % 83.2 (H) %      Lymphocyte % 7.5 (L) %      Monocyte % 7.9 %      Eosinophil % 0.0 %      Basophil % 0.1 %      Immature Grans % 1.3 (H) %      Neutrophils, Absolute 10.46 (H) 10*3/mm3      Lymphocytes, Absolute 0.94 10*3/mm3      Monocytes, Absolute 0.99 (H) 10*3/mm3      Eosinophils, Absolute 0.00 10*3/mm3      Basophils, Absolute 0.01 10*3/mm3      Immature Grans, Absolute 0.16 (H) 10*3/mm3      nRBC 0.0 /100 WBC     Blood Gas, Arterial With Co-Ox [[id number]]  (Abnormal) Collected:  05/09/18 0004    Specimen:  Arterial Blood Updated:  05/09/18 0009     Site Right Radial     Elder's Test Positive     pH, Arterial 7.482 (H) pH units      pCO2, Arterial 41.5 mm Hg      pO2, Arterial 51.7 (L) mm Hg      HCO3, Arterial 31.0 (H) mmol/L      Base Excess, Arterial 6.8 (H) mmol/L      O2 Saturation, Arterial 88.1 (L) %      Hemoglobin, Blood Gas 15.5 g/dL      Hematocrit, Blood Gas 47.5 %      Oxyhemoglobin 87.5 (L) %      Methemoglobin 0.00 %      Carboxyhemoglobin 0.6 %      Sodium, Arterial 140 mmol/L      Potassium, Arterial 3.4 mmol/L      Ionized Calcium 4.57 (L) mg/dL      Glucose, Arterial 360 (H) mmol/L      Barometric Pressure for Blood Gas 749 mmHg      Modality Nasal Cannula     Flow Rate 6.0 lpm      Ventilator Mode NA     Collected by maddie     pH, Temp Corrected -- pH Units      pCO2, Temperature Corrected -- mm Hg      pO2, Temperature Corrected -- mm Hg     POC Glucose Once [[id number]]  (Abnormal) Collected:  05/08/18 2116    Specimen:  Blood Updated:  05/08/18 2147     Glucose 354 (H) mg/dL       Comment: RN NotifiedMeter: [id number]: [id number] PILO CARLOS       POC Glucose Once [[id number]]  (Abnormal) Collected:  05/08/18 1759    Specimen:  Blood Updated:  05/08/18 1817     Glucose 358 (H) mg/dL      Comment: Sliding Scale AdminMeter: [id number]: [id number] LUIS LORD             Imaging Results (last 24 hours)     ** No results found for the last 24 hours. **          Assessment/Plan     Principal Problem:    Anaphylactic syndrome  Active Problems:    Acute respiratory failure    Hypertension    Diabetes mellitus    Can be transferred to a regular floor - try PT/OT, continue with supplemental Oxygen, nebulized treatments.    Krishna Ayoub MD  05/09/18  11:59 AM

## 2018-05-09 NOTE — THERAPY EVALUATION
Acute Care - Occupational Therapy Initial Evaluation  [facility]     Patient Name: Rolando Novoa  : 1945  MRN: [id number]  Today's Date: 2018  Onset of Illness/Injury or Date of Surgery: 18  Date of Referral to OT: 18  Referring Physician: Krishna Ayoub MD    Admit Date: 2018       ICD-10-CM ICD-9-CM   1. Anaphylaxis, initial encounter T78.2XXA 995.0   2. Hypoxia R09.02 799.02   3. Impaired mobility and activities of daily living Z74.09 799.89     Patient Active Problem List   Diagnosis   • Hyperlipemia   • SOB (shortness of breath)   • Hematuria syndrome   • Psoriasis   • Hypertension   • High cholesterol   • Enlarged prostate   • Diabetes mellitus   • Acid reflux   • Calculus of kidney   • Anaphylactic syndrome   • Kidney stones   • Acute respiratory failure     Past Medical History:   Diagnosis Date   • Acid reflux    • Diabetes    • Enlarged prostate    • High cholesterol    • Hypertension    • Kidney stone     multiple   • Psoriasis      Past Surgical History:   Procedure Laterality Date   • AMPUTATION Left     lower arm and hand   • CHOLECYSTECTOMY OPEN     • CYSTOSCOPY N/A 2017    Procedure: CYSTOSCOPY WITH CLOT EVACUATION;  Surgeon: Prosper Ariza MD;  Location: [facility];  Service:    • CYSTOSCOPY, URETEROSCOPY, RETROGRADE PYELOGRAM, STENT INSERTION Left 12/15/2017    Procedure: CYSTOSCOPY LEFT URETEROSCOPY RETROGRADE PYELOGRAM HOLMIUM LASER STENT INSERTION;  Surgeon: Prosper Ariza MD;  Location: [facility];  Service:    • EXTRACORPOREAL SHOCK WAVE LITHOTRIPSY (ESWL)     • EXTRACORPOREAL SHOCKWAVE LITHOTRIPSY (ESWL), STENT INSERTION/REMOVAL Left 2/3/2017    Procedure: LEFT EXTRACORPOREAL SHOCKWAVE LITHOTRIPSY, POSSIBLE CYSTOSCOPY, POSSIBLE STENT REMOVAL ;  Surgeon: Prosper Ariza MD;  Location: [facility];  Service:    • EXTRACORPOREAL SHOCKWAVE LITHOTRIPSY (ESWL), STENT INSERTION/REMOVAL Left 2018    Procedure: EXTRACORPOREAL SHOCKWAVE LITHOTRIPSY;   Surgeon: Prosper Ariza MD;  Location: [facility];  Service: Urology   • KIDNEY STONE SURGERY     • SCROTAL SURGERY      trauma          OT ASSESSMENT FLOWSHEET (last 72 hours)      Occupational Therapy Evaluation     Row Name 05/09/18 1407                   OT Evaluation Time/Intention    Subjective Information no complaints  -AS        Document Type evaluation  -AS        Mode of Treatment individual therapy;occupational therapy  -AS        Total Evaluation Minutes, Occupational Therapy 43  -AS        Patient Effort good  -AS        Symptoms Noted During/After Treatment shortness of breath  -AS           General Information    Patient Profile Reviewed? yes  -AS        Onset of Illness/Injury or Date of Surgery 05/06/18  -AS        Referring Physician Krishna Ayoub MD  -AS        Patient Observations alert;cooperative;agree to therapy  -AS        Patient/Family Observations --  -AS        General Observations of Patient Pt supine in bed, IV, supplemental oxygen 6L nc  -AS        Prior Level of Function independent:;transfer;community mobility;all household mobility;ADL's;driving;shopping   wife did laundry and cooking  -AS        Equipment Currently Used at Home --   built in shower bench; has FWW but not using  -AS        Pertinent History of Current Functional Problem Pt is a 72 yo male admitted for tongue swelling and dyspnea . Pt dx with anaphylatic syndrome and acute resporatory failure required intubation and sedation. Pt self extubated on 5/8. 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  -AS        Equipment Issued to Patient gait belt  -AS        Risks Reviewed patient:;dizziness;LOB;change in vital signs;increased discomfort;lines disloged  -AS        Benefits Reviewed patient:;increase strength;increase balance;increase independence;improve function  -AS            Relationship/Environment    Primary Source of Support/Comfort spouse;other (see comments)   19 yo grandson  -AS        Lives With spouse;other (see comments)   spouse and 19 yo grandson  -AS        Family Caregiver if Needed spouse;grandchild(sergio), adult  -AS        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  -AS           Resource/Environmental Concerns    Current Living Arrangements home/apartment/condo  -AS        Resource/Environmental Concerns home accessibility  -AS           Home Main Entrance    Number of Stairs, Main Entrance three   in front and back entrance  -AS        Stair Railings, Main Entrance railing on right side (ascending)   back entrance  -AS           Cognitive Assessment/Interventions    Additional Documentation Cognitive Assessment/Intervention (Group)  -AS           Cognitive Assessment/Intervention- PT/OT    Affect/Mental Status (Cognitive) WNL  -AS        Follows Commands (Cognition) WNL   a&O x4  -AS        Cognitive Function (Cognitive) WNL  -AS           Safety Issues, Functional Mobility    Safety Issues Affecting Function (Mobility) impulsivity  -AS        Impairments Affecting Function (Mobility) balance;endurance/activity tolerance;shortness of breath;strength  -AS        Comment, Safety Issues/Impairments (Mobility) unsteady gait; somewhat impulsive with cues to slow down  -AS           Bed Mobility Assessment/Treatment    Bed Mobility Assessment/Treatment supine-sit  -AS        Supine-Sit [place] (Bed Mobility) --   SBA  -AS        Bed Mobility, Safety Issues decreased use of arms for pushing/pulling  -AS           Functional Mobility    Functional Mobility- Ind. Level contact guard assist;minimum assist (75% patient effort)   in room amb; noted furniture walking for steadiness  -AS        Functional Mobility- Device --   no AD  -AS        Functional Mobility- Safety Issues supplemental O2;balance decreased during turns  -AS           Transfer  Assessment/Treatment    Transfer Assessment/Treatment bed-chair transfer;sit-stand transfer;stand-sit transfer  -AS        Bed-Chair [place] (Transfers) contact guard;minimum assist (75% patient effort)  -AS        Sit-Stand [place] (Transfers) minimum assist (75% patient effort)  -AS        Stand-Sit [place] (Transfers) contact guard  -AS           ADL Assessment/Intervention    BADL Assessment/Intervention lower body dressing  -AS           Lower Body Dressing Assessment/Training    Lower Body Dressing [place] Level don;pants/bottoms;contact guard assist  -AS        Comment (Lower Body Dressing) steadying for pant management in standing  -AS           BADL Safety/Performance    Impairments, BADL Safety/Performance balance;endurance/activity tolerance;shortness of breath;strength  -AS           General ROM    GENERAL ROM COMMENTS RUE WFL; LUE WFL shoulder/elbow (below elbow amputation)  -AS           MMT (Manual Muscle Testing)    Additional Documentation General Assessment (Manual Muscle Testing) (Group)  -AS           General Assessment (Manual Muscle Testing)    General Manual Muscle Testing (MMT) Assessment upper extremity strength deficits identified  -AS           Motor Assessment/Interventions    Additional Documentation Balance (Group)  -AS           Balance    Balance dynamic standing balance;static sitting balance;dynamic sitting balance  -AS           Static Sitting Balance    Level of [place] (Unsupported Sitting, Static Balance) standby assist  -AS        Sitting Position (Unsupported Sitting, Static Balance) sitting on edge of bed  -AS        Time Able to Maintain Position (Unsupported Sitting, Static Balance) more than 5 minutes  -AS           Dynamic Sitting Balance    Level of [place], Reaches Outside Midline (Sitting, Dynamic Balance) standby assist  -AS        Sitting Position, Reaches Outside Midline (Sitting, Dynamic Balance) sitting on edge of bed  -AS            Dynamic Standing Balance    Level of [place], Reaches Outside Midline (Standing, Dynamic Balance) contact guard assist;minimal assist, 75% patient effort  -AS        Time Able to Maintain Position, Reaches Outside Midline (Standing, Dynamic Balance) 1 to 2 minutes  -AS           Pain Assessment    Additional Documentation --   no complaints  -AS           Plan of Care Review    Plan of Care Reviewed With patient  -AS           Clinical Impression (OT)    Date of Referral to OT 05/09/18  -AS        OT Diagnosis impaired ADLs and functional mobility; impaired activity tolerance/endurance  -AS        Prognosis (OT Eval) good  -AS        Patient/Family Goals Statement (OT Eval) return home  -AS        Criteria for Skilled Therapeutic Interventions Met (OT Eval) yes;treatment indicated  -AS        Rehab Potential (OT Eval) good, to achieve stated therapy goals  -AS        Therapy Frequency (OT Eval) --   3-14x/wk  -AS        Care Plan Review (OT) evaluation/treatment results reviewed;care plan/treatment goals reviewed;risks/benefits reviewed;current/potential barriers reviewed;patient/other agree to care plan  -AS        Anticipated Equipment Needs at Discharge (OT) quad cane   may benefit from cane;however defer to PT assessment; GB  -AS        Anticipated Discharge Disposition (OT) home with home health   pending progress;pt reports wife and grandson can assist PRN  -AS           Vital Signs    Pre Systolic BP Rehab 143  -AS        Pre Treatment Diastolic BP 84  -AS        Post Systolic BP Rehab 134  -AS        Post Treatment Diastolic BP 79  -AS        Pretreatment Heart Rate (beats/min) 85  -AS        Posttreatment Heart Rate (beats/min) 95  -AS        Pre SpO2 (%) 91  -AS        O2 Delivery Pre Treatment nasal cannula   6L  -AS        Intra SpO2 (%) 92  -AS        O2 Delivery Intra Treatment supplemental O2  -AS        Post SpO2 (%) 94  -AS        O2 Delivery Post Treatment supplemental O2   6L nc  -AS         Pre Patient Position Supine   raised HOB  -AS        Post Patient Position Sitting   RN notified of pt positioning; call bell in reach  -AS           Planned OT Interventions    Planned Therapy Interventions (OT Eval) activity tolerance training;adaptive equipment training;BADL retraining;functional balance retraining;neuromuscular control/coordination retraining;occupation/activity based interventions;patient/caregiver education/training;ROM/therapeutic exercise;strengthening exercise;transfer/mobility retraining  -AS           OT Goals    Transfer Goal Selection (OT) transfer, OT goal 1  -AS        Bathing Goal Selection (OT) bathing, OT goal 1  -AS        Dressing Goal Selection (OT) dressing, OT goal 1  -AS        Toileting Goal Selection (OT) toileting, OT goal 1  -AS        Endurance Goal Selection (OT) --  -AS        Activity Tolerance Goal Selection (OT) activity tolerance, OT goal 1  -AS           Transfer Goal 1 (OT)    Activity/Assistive Device (Transfer Goal 1, OT) toilet;shower chair  -AS        [place] Level/Cues Needed (Transfer Goal 1, OT) supervision required  -AS        Time Frame (Transfer Goal 1, OT) long term goal (LTG);by discharge  -AS        Progress/Outcome (Transfer Goal 1, OT) goal not met  -AS           Bathing Goal 1 (OT)    Activity/Assistive Device (Bathing Goal 1, OT) bathing skills, all  -AS        [place] Level/Cues Needed (Bathing Goal 1, OT) minimum assist (75% or more patient effort)   using AE PRN  -AS        Time Frame (Bathing Goal 1, OT) long term goal (LTG);by discharge  -AS        Progress/Outcomes (Bathing Goal 1, OT) goal not met  -AS           Dressing Goal 1 (OT)    Activity/Assistive Device (Dressing Goal 1, OT) dressing skills, all   using AE PRN  -AS        [place]/Cues Needed (Dressing Goal 1, OT) supervision required;set-up required  -AS        Time Frame (Dressing Goal 1, OT) long term goal (LTG);by discharge  -AS        Progress/Outcome (Dressing  Goal 1, OT) goal not met  -AS           Toileting Goal 1 (OT)    Activity/Device (Toileting Goal 1, OT) toileting skills, all  -AS        [place] Level/Cues Needed (Toileting Goal 1, OT) conditional independence  -AS        Time Frame (Toileting Goal 1, OT) long term goal (LTG);by discharge  -AS        Progress/Outcome (Toileting Goal 1, OT) goal not met  -AS            Activity Tolerance Goal 1 (OT)    Activity Level (Endurance Goal 1, OT) 10 min activity   02 sat>90%  -AS        Time Frame (Activity Tolerance Goal 1, OT) long term goal (LTG);by discharge  -AS        Progress/Outcome (Activity Tolerance Goal 1, OT) goal not met  -AS           Living Environment    Home Accessibility stairs to enter home   walk-in shower with built in seat  -AS          User Key  (r) = Recorded By, (t) = Taken By, (c) = Cosigned By    Initials Name Effective Dates    AS Yani Piña OT 05/01/18 -            Occupational Therapy Education     Title: PT OT SLP Therapies (Active)     Topic: Occupational Therapy (Active)     Point: ADL training (Done)     Description: Instruct learner(s) on proper safety adaptation and remediation techniques during self care or transfers.   Instruct in proper use of assistive devices.   Learning Progress Summary     Learner Status Readiness Method Response Comment Documented by    Patient Done RENEE Ward role of OT, bed mobility, transfer training, ECT, ADL training AS 05/09/18 6189                      User Key     Initials Effective Dates Name Provider Type Discipline    AS 05/01/18 -  Yani Piña OT Occupational Therapist OT                  OT Recommendation and Plan  Outcome Summary/Treatment Plan (OT)  Anticipated Equipment Needs at Discharge (OT): quad cane (may benefit from cane;however defer to PT assessment; GB)  Anticipated Discharge Disposition (OT): home with home health (pending progress;pt reports wife and grandson can assist PRN)  Planned Therapy Interventions (OT Eval):  activity tolerance training, adaptive equipment training, BADL retraining, functional balance retraining, neuromuscular control/coordination retraining, occupation/activity based interventions, patient/caregiver education/training, ROM/therapeutic exercise, strengthening exercise, transfer/mobility retraining  Therapy Frequency (OT Eval):  (3-14x/wk)  Plan of Care Review  Plan of Care Reviewed With: patient  Plan of Care Reviewed With: patient  Outcome Summary: Eval completed. Pt is a 72 yo male admitted for anaphylatic syndrome and acute hypoxic respiratory failure. PTA, pt was independent with ADLs and functional transfers/mobility. Pt currently is CGA/min A for in room ambulation and  CGA for donning pants. Pt easily SOB and requires vc for pursed lip breathing on 6 L nc. Pt presents with decreased endurance/activity tolerance, strength, balance, and mobility limiting ability to safely and independently perform ADLs/IADls. Pt would benefit from continued skilled OT services to address areas of deficit, promote highest level of functioning, edu AE and ECT, assess for DME., and assist with safe d/c planning.  Pt lives with spouse and 17 yo grandson and reports they can assist as needed upon return home. Anticipate home with HH pending progress and stable medical condition.           Outcome Measures     Row Name 05/09/18 6668             How much help from another is currently needed...    Putting on and taking off regular lower body clothing? 2  -AS      Bathing (including washing, rinsing, and drying) 3  -AS      Toileting (which includes using toilet bed pan or urinal) 3  -AS      Putting on and taking off regular upper body clothing 3  -AS      Taking care of personal grooming (such as brushing teeth) 4  -AS      Eating meals 4  -AS      Score 19  -AS         Functional Assessment    Outcome Measure Options AM-PAC 6 Clicks Daily Activity (OT)  -AS        User Key  (r) = Recorded By, (t) = Taken By, (c) =  Cosigned By    Initials Name Provider Type    AS Yani Piña OT Occupational Therapist          Time Calculation:   OT Start Time: 1407  OT Stop Time: 1450  OT Time Calculation (min): 43 min    Therapy Charges for Today     Code Description Service Date Service Provider Modifiers Qty    35786954213 HC OT SELFCARE CURRENT 5/9/2018 Yani Piña OT GO, CK 1    85646616358 HC OT SELFCARE PROJECTED 5/9/2018 Yani Piña OT GO, CI 1    [id number]  OT EVAL MOD COMPLEXITY 2 5/9/2018 Yani iPña OT GO 1    [id number]  OT SELF CARE/MGMT/TRAIN EA 15 MIN 5/9/2018 Yani Piña OT GO 1          OT G-codes  OT Professional Judgement Used?: Yes  OT Functional Scales Options: AM-PAC 6 Clicks Daily Activity (OT)  Score: 19  Functional Limitation: Self care  Self Care Current Status (): At least 40 percent but less than 60 percent impaired, limited or restricted  Self Care Goal Status (): At least 1 percent but less than 20 percent impaired, limited or restricted    Yani Piña OT  5/9/2018

## 2018-05-09 NOTE — NURSING NOTE
Pt self extubated ET and OG with restrained amputated L stump and was talking to RT immediately with O2 sat at 87% on RAir. Placed on 6L NC to sat > 90% and took charge of self suctioning and continued to direct his care.  Tube feeding discontinued and NPO status continued until swallow eval conducted. Pt. Spouse called and advised.  She is coming to the hospital to spend the remaining part of the night with pt. in room.

## 2018-05-09 NOTE — PROGRESS NOTES
Discharge Planning Assessment  [facility]     Patient Name: Rolando Novoa  MRN: [id number]  Today's Date: 5/9/2018    Admit Date: 5/6/2018          Discharge Needs Assessment     Row Name 05/09/18 0920       Living Environment    Lives With spouse;grandchild(sergio)    Current Living Arrangements home/apartment/condo    Primary Care Provided by self    Provides Primary Care For no one    Family Caregiver if Needed spouse    Quality of Family Relationships helpful;supportive    Able to Return to Prior Arrangements yes    Living Arrangement Comments Pt resides at home with spouse and 18 year old grandson. Pt reports good support system.       Resource/Environmental Concerns    Resource/Environmental Concerns none    Transportation Concerns car, none       Transition Planning    Patient/Family Anticipates Transition to home with family    Patient/Family Anticipated Services at Transition home health care    Transportation Anticipated family or friend will provide       Discharge Needs Assessment    Concerns to be Addressed adjustment to diagnosis/illness    Equipment Currently Used at Home none    Discharge Facility/Level of Care Needs home with home health    Offered/Gave Vendor List yes    Patient's Choice of Community Agency(s) [facility]    Current Discharge Risk chronically ill            Discharge Plan     Row Name 05/09/18 0922       Plan    Plan Comments LSW assessment complete. pt resides at home with spouse and 18 year old grandson. pt reports good support system. pt informed LSW that he was independent pror to hospitalization. pt did not require any DME. Pt's goal is to return home at d/c. Pt agreeable to home health follow up if recomended by MD. LSW awaiting additional recomendations from MD and therapy. LSW/case mgt will follow up as consulted and complete arrangements as ordered.        Destination     No service coordination in this encounter.      Durable Medical Equipment     No service  coordination in this encounter.      Dialysis/Infusion     No service coordination in this encounter.      Home Medical Care     No service coordination in this encounter.      Social Care     No service coordination in this encounter.        Expected Discharge Date and Time     Expected Discharge Date Expected Discharge Time    May 12, 2018               Demographic Summary     Row Name 05/09/18 0918       General Information    Admission Type inpatient    Referral Source high risk screening    Reason for Consult discharge planning    Preferred Language English     Used During This Interaction no       Contact Information    Contact Information Obtained for             Functional Status     Row Name 05/09/18 0918       Functional Status    Usual Activity Tolerance moderate    Current Activity Tolerance fair       Functional Status, IADL    Medications independent    Meal Preparation assistive person    Housekeeping assistive person    Laundry assistive person    Shopping assistive person       Mental Status Summary    Recent Changes in Mental Status/Cognitive Functioning unable to assess            Psychosocial    No documentation.           Abuse/Neglect    No documentation.           Legal    No documentation.           Substance Abuse    No documentation.           Patient Forms    No documentation.         PATI Trejo

## 2018-05-09 NOTE — PLAN OF CARE
Problem: Skin Injury Risk (Adult)  Goal: Identify Related Risk Factors and Signs and Symptoms  Outcome: Ongoing (interventions implemented as appropriate)    Goal: Skin Health and Integrity  Outcome: Ongoing (interventions implemented as appropriate)      Problem: Fall Risk (Adult)  Goal: Identify Related Risk Factors and Signs and Symptoms  Outcome: Ongoing (interventions implemented as appropriate)    Goal: Absence of Fall  Outcome: Ongoing (interventions implemented as appropriate)      Problem: Patient Care Overview  Goal: Plan of Care Review  Outcome: Ongoing (interventions implemented as appropriate)   05/09/18 0613   Plan of Care Review   Progress improving   OTHER   Outcome Summary pt self extubated ETT and OG on 5/8/2018 at 2245 with VSS and sats of 88% on room air immediately following extubation. Pt placed on 6L NC and continues to stabelize during night with decreasing discomfort and VSS . Pt is AOx4 and extremely eager to discuss his care and next steps with Dr. Arnaldo milian to assess change to NPO precaution. Pt is directing care and engaged in all aspects of treatment.   Coping/Psychosocial   Plan of Care Reviewed With patient;spouse     Goal: Individualization and Mutuality  Outcome: Ongoing (interventions implemented as appropriate)    Goal: Interprofessional Rounds/Family Conf  Outcome: Ongoing (interventions implemented as appropriate)      Problem: Anaphylactic/Systemic Hypersensitivity Reaction (Adult)  Goal: Signs and Symptoms of Listed Potential Problems Will be Absent, Minimized or Managed (Anaphylactic/Systemic Hypersensitivity Reaction)  Outcome: Ongoing (interventions implemented as appropriate)      Problem: Pain, Acute (Adult)  Goal: Identify Related Risk Factors and Signs and Symptoms  Outcome: Ongoing (interventions implemented as appropriate)    Goal: Acceptable Pain Control/Comfort Level  Outcome: Ongoing (interventions implemented as appropriate)

## 2018-05-09 NOTE — SIGNIFICANT NOTE
At 2245 5/9/18 patient self extubated.  Patient was 87% on room air post self extubation and responding appropriately.  Dr. Tavarez notified of change in patients status and requested to start nasal cannula and monitor. Patient placed on 6LPM nasal cannula and maintaining saturations of 90-93%.

## 2018-05-10 ENCOUNTER — APPOINTMENT (OUTPATIENT)
Dept: GENERAL RADIOLOGY | Facility: HOSPITAL | Age: 73
End: 2018-05-10

## 2018-05-10 LAB
ANION GAP SERPL CALCULATED.3IONS-SCNC: 12 MMOL/L (ref 5–15)
BASOPHILS # BLD AUTO: 0.01 10*3/MM3 (ref 0–0.2)
BASOPHILS NFR BLD AUTO: 0.1 % (ref 0–2)
BUN BLD-MCNC: 33 MG/DL (ref 7–21)
BUN/CREAT SERPL: 35.9 (ref 7–25)
CALCIUM SPEC-SCNC: 8.5 MG/DL (ref 8.4–10.2)
CHLORIDE SERPL-SCNC: 101 MMOL/L (ref 95–110)
CO2 SERPL-SCNC: 29 MMOL/L (ref 22–31)
CREAT BLD-MCNC: 0.92 MG/DL (ref 0.7–1.3)
DEPRECATED RDW RBC AUTO: 45.9 FL (ref 35.1–43.9)
EOSINOPHIL # BLD AUTO: 0.01 10*3/MM3 (ref 0–0.7)
EOSINOPHIL NFR BLD AUTO: 0.1 % (ref 0–7)
ERYTHROCYTE [DISTWIDTH] IN BLOOD BY AUTOMATED COUNT: 14 % (ref 11.5–14.5)
GFR SERPL CREATININE-BSD FRML MDRD: 81 ML/MIN/1.73 (ref 42–98)
GLUCOSE BLD-MCNC: 191 MG/DL (ref 60–100)
GLUCOSE BLDC GLUCOMTR-MCNC: 172 MG/DL (ref 70–130)
GLUCOSE BLDC GLUCOMTR-MCNC: 230 MG/DL (ref 70–130)
GLUCOSE BLDC GLUCOMTR-MCNC: 237 MG/DL (ref 70–130)
GLUCOSE BLDC GLUCOMTR-MCNC: 239 MG/DL (ref 70–130)
GLUCOSE BLDC GLUCOMTR-MCNC: 239 MG/DL (ref 70–130)
HCT VFR BLD AUTO: 44.1 % (ref 39–49)
HGB BLD-MCNC: 14.8 G/DL (ref 13.7–17.3)
IMM GRANULOCYTES # BLD: 0.11 10*3/MM3 (ref 0–0.02)
IMM GRANULOCYTES NFR BLD: 1.3 % (ref 0–0.5)
LYMPHOCYTES # BLD AUTO: 1.79 10*3/MM3 (ref 0.6–4.2)
LYMPHOCYTES NFR BLD AUTO: 21.7 % (ref 10–50)
MCH RBC QN AUTO: 30.2 PG (ref 26.5–34)
MCHC RBC AUTO-ENTMCNC: 33.6 G/DL (ref 31.5–36.3)
MCV RBC AUTO: 90 FL (ref 80–98)
MONOCYTES # BLD AUTO: 0.83 10*3/MM3 (ref 0–0.9)
MONOCYTES NFR BLD AUTO: 10.1 % (ref 0–12)
NEUTROPHILS # BLD AUTO: 5.49 10*3/MM3 (ref 2–8.6)
NEUTROPHILS NFR BLD AUTO: 66.7 % (ref 37–80)
PLATELET # BLD AUTO: 247 10*3/MM3 (ref 150–450)
PMV BLD AUTO: 9.2 FL (ref 8–12)
POTASSIUM BLD-SCNC: 3.6 MMOL/L (ref 3.5–5.1)
RBC # BLD AUTO: 4.9 10*6/MM3 (ref 4.37–5.74)
SODIUM BLD-SCNC: 142 MMOL/L (ref 137–145)
WBC NRBC COR # BLD: 8.24 10*3/MM3 (ref 3.2–9.8)

## 2018-05-10 PROCEDURE — 71046 X-RAY EXAM CHEST 2 VIEWS: CPT

## 2018-05-10 PROCEDURE — G8979 MOBILITY GOAL STATUS: HCPCS

## 2018-05-10 PROCEDURE — 25010000002 HEPARIN (PORCINE) PER 1000 UNITS: Performed by: FAMILY MEDICINE

## 2018-05-10 PROCEDURE — 63710000001 INSULIN ASPART PER 5 UNITS: Performed by: FAMILY MEDICINE

## 2018-05-10 PROCEDURE — 97530 THERAPEUTIC ACTIVITIES: CPT

## 2018-05-10 PROCEDURE — 85025 COMPLETE CBC W/AUTO DIFF WBC: CPT | Performed by: FAMILY MEDICINE

## 2018-05-10 PROCEDURE — 80048 BASIC METABOLIC PNL TOTAL CA: CPT | Performed by: FAMILY MEDICINE

## 2018-05-10 PROCEDURE — 25010000002 FUROSEMIDE PER 20 MG: Performed by: INTERNAL MEDICINE

## 2018-05-10 PROCEDURE — 94760 N-INVAS EAR/PLS OXIMETRY 1: CPT

## 2018-05-10 PROCEDURE — G8978 MOBILITY CURRENT STATUS: HCPCS

## 2018-05-10 PROCEDURE — 97162 PT EVAL MOD COMPLEX 30 MIN: CPT

## 2018-05-10 PROCEDURE — 94799 UNLISTED PULMONARY SVC/PX: CPT

## 2018-05-10 PROCEDURE — 63710000001 INSULIN DETEMIR PER 5 UNITS: Performed by: INTERNAL MEDICINE

## 2018-05-10 PROCEDURE — 82962 GLUCOSE BLOOD TEST: CPT

## 2018-05-10 RX ORDER — FUROSEMIDE 20 MG/1
20 TABLET ORAL DAILY
Status: DISCONTINUED | OUTPATIENT
Start: 2018-05-11 | End: 2018-05-11 | Stop reason: HOSPADM

## 2018-05-10 RX ADMIN — FAMOTIDINE 20 MG: 20 TABLET ORAL at 08:48

## 2018-05-10 RX ADMIN — IPRATROPIUM BROMIDE AND ALBUTEROL SULFATE 3 ML: .5; 3 SOLUTION RESPIRATORY (INHALATION) at 21:38

## 2018-05-10 RX ADMIN — HEPARIN SODIUM 5000 UNITS: 5000 INJECTION, SOLUTION INTRAVENOUS; SUBCUTANEOUS at 13:37

## 2018-05-10 RX ADMIN — INSULIN ASPART 2 UNITS: 100 INJECTION, SOLUTION INTRAVENOUS; SUBCUTANEOUS at 08:48

## 2018-05-10 RX ADMIN — INSULIN DETEMIR 36 UNITS: 100 INJECTION, SOLUTION SUBCUTANEOUS at 21:00

## 2018-05-10 RX ADMIN — HEPARIN SODIUM 5000 UNITS: 5000 INJECTION, SOLUTION INTRAVENOUS; SUBCUTANEOUS at 21:00

## 2018-05-10 RX ADMIN — FUROSEMIDE 20 MG: 10 INJECTION, SOLUTION INTRAMUSCULAR; INTRAVENOUS at 08:48

## 2018-05-10 RX ADMIN — POTASSIUM CHLORIDE 40 MEQ: 750 CAPSULE, EXTENDED RELEASE ORAL at 18:18

## 2018-05-10 RX ADMIN — IPRATROPIUM BROMIDE AND ALBUTEROL SULFATE 3 ML: .5; 3 SOLUTION RESPIRATORY (INHALATION) at 16:21

## 2018-05-10 RX ADMIN — TRIAMCINOLONE ACETONIDE: 1 OINTMENT TOPICAL at 08:48

## 2018-05-10 RX ADMIN — INSULIN ASPART 4 UNITS: 100 INJECTION, SOLUTION INTRAVENOUS; SUBCUTANEOUS at 17:55

## 2018-05-10 RX ADMIN — INSULIN ASPART 4 UNITS: 100 INJECTION, SOLUTION INTRAVENOUS; SUBCUTANEOUS at 21:00

## 2018-05-10 RX ADMIN — TRIAMCINOLONE ACETONIDE: 1 OINTMENT TOPICAL at 21:05

## 2018-05-10 RX ADMIN — IPRATROPIUM BROMIDE AND ALBUTEROL SULFATE 3 ML: .5; 3 SOLUTION RESPIRATORY (INHALATION) at 11:16

## 2018-05-10 RX ADMIN — HEPARIN SODIUM 5000 UNITS: 5000 INJECTION, SOLUTION INTRAVENOUS; SUBCUTANEOUS at 06:19

## 2018-05-10 RX ADMIN — IPRATROPIUM BROMIDE AND ALBUTEROL SULFATE 3 ML: .5; 3 SOLUTION RESPIRATORY (INHALATION) at 06:57

## 2018-05-10 RX ADMIN — POTASSIUM CHLORIDE 40 MEQ: 750 CAPSULE, EXTENDED RELEASE ORAL at 10:50

## 2018-05-10 RX ADMIN — INSULIN ASPART 4 UNITS: 100 INJECTION, SOLUTION INTRAVENOUS; SUBCUTANEOUS at 10:50

## 2018-05-10 NOTE — PROGRESS NOTES
[facility] Medicine [facility]  INPATIENT PROGRESS NOTE     LOS: 4 days   Patient Care Team:  Jaxson Zepeda MD as PCP - General    Chief Complaint:  Anaphylactic syndrome      Subjective     Interval History:   Patient is seen for follow-up today.  He was transferred to the floor from the ICU yesterday following an admission for anaphylaxis (to Toradol ) complicated by respiratory failure requiring endotracheal intubation.  He is doing well, less short of air but remains on supplemental oxygen of 4 L nasal prongs and maintaining saturation of 90-95%.  He is less deconditioned and eager to be discharged.  He voices no new complaints.    Patient Complaints: As above    History taken from: Patient    Review of Systems:    Review of Systems   Constitutional: Negative for activity change, appetite change, chills, diaphoresis, fatigue and fever.   HENT: Negative for trouble swallowing and voice change.    Eyes: Negative for photophobia and visual disturbance.   Respiratory: Positive for shortness of breath. Negative for cough, choking, chest tightness, wheezing and stridor.    Cardiovascular: Negative for chest pain, palpitations and leg swelling.   Gastrointestinal: Negative for abdominal distention, abdominal pain, blood in stool, constipation, diarrhea, nausea and vomiting.   Endocrine: Negative for cold intolerance, heat intolerance, polydipsia, polyphagia and polyuria.   Genitourinary: Negative for decreased urine volume, difficulty urinating, dysuria, enuresis, flank pain, frequency, hematuria and urgency.   Musculoskeletal: Negative for arthralgias, gait problem, myalgias, neck pain and neck stiffness.   Skin: Negative for pallor, rash and wound.   Neurological: Negative for dizziness, tremors, seizures, syncope, facial asymmetry, speech difficulty, weakness, light-headedness, numbness and headaches.   Hematological: Does not bruise/bleed easily.    Psychiatric/Behavioral: Negative for agitation, behavioral problems and confusion.         Objective     Vital Signs  Temp:  [97.3 °F (36.3 °C)-98.1 °F (36.7 °C)] 97.9 °F (36.6 °C)  Heart Rate:  [] 101  Resp:  [18-20] 18  BP: (125-143)/(72-88) 130/80    Physical Exam:   Physical Exam   Constitutional: He is oriented to person, place, and time. He appears well-developed and well-nourished. No distress.   HENT:   Head: Normocephalic and atraumatic.   Eyes: EOM are normal. Pupils are equal, round, and reactive to light. No scleral icterus.   Neck: Normal range of motion. Neck supple. No JVD present. No thyromegaly present.   Cardiovascular: Normal rate, regular rhythm and normal heart sounds.  Exam reveals no gallop and no friction rub.    No murmur heard.  Pulmonary/Chest: Effort normal and breath sounds normal. He has no wheezes. He has no rales. He exhibits no tenderness.   He has distant breath sounds at the bases.   Abdominal: Soft. Bowel sounds are normal. He exhibits no distension and no mass. There is no tenderness. There is no rebound and no guarding.   Musculoskeletal: He exhibits no edema, tenderness or deformity.   Neurological: He is alert and oriented to person, place, and time. No cranial nerve deficit. He exhibits normal muscle tone. Coordination normal.   Skin: Skin is warm and dry. No rash noted. He is not diaphoretic. No erythema. No pallor.   Psychiatric: He has a normal mood and affect. His behavior is normal. Judgment and thought content normal.   Nursing note and vitals reviewed.   Extremities: He has left below the elbow amputation.       Results Review:       Results from last 7 days  Lab Units 05/10/18  0546 05/09/18  1759 05/09/18  0316 05/09/18  0004 05/08/18  0315 05/08/18  0234 05/07/18  0450 05/07/18  0228  05/06/18  0129 05/05/18  1512 05/04/18  1522   SODIUM mmol/L 142  --  141  --   --  137  --  136*  --  137 137 140   SODIUM, ARTERIAL mmol/L  --   --   --  140 139  --  138  --    < >  --   --   --    POTASSIUM mmol/L 3.6 4.5 3.3*  --   --  4.1  --  3.9  --  4.2 4.4 4.2   CHLORIDE mmol/L 101  --  96  --   --  101  --  102  --  97 100 100   CO2 mmol/L 29.0  --  33.0*  --   --  26.0  --  26.0  --  28.0 26.0 29.0   BUN mg/dL 33*  --  33*  --   --  27*  --  22*  --  18 15 15   CREATININE mg/dL 0.92  --  0.97  --   --  0.95  --  1.00  --  1.19 0.95 0.95   GLUCOSE mg/dL 191*  --  291*  --   --  297*  --  213*  --  181* 177* 125*   GLUCOSE, ARTERIAL mmol/L  --   --   --  360* 301*  --  226*  --   < >  --   --   --    CALCIUM mg/dL 8.5  --  9.1  --   --  8.5  --  8.4  --  8.9 8.8 9.0   BILIRUBIN mg/dL  --   --   --   --   --   --   --   --   --   --  0.5  --    ALK PHOS U/L  --   --   --   --   --   --   --   --   --   --  60  --    ALT (SGPT) U/L  --   --   --   --   --   --   --   --   --   --  56  --    AST (SGOT) U/L  --   --   --   --   --   --   --   --   --   --  30  --    < > = values in this interval not displayed.      Results from last 7 days  Lab Units 05/06/18  0129 05/05/18  1512   MAGNESIUM mg/dL 2.0 1.9   PHOSPHORUS mg/dL 3.6  --          Results from last 7 days  Lab Units 05/10/18  0546 05/09/18  0316 05/08/18  0234 05/07/18  0228 05/06/18  0129 05/05/18  1512   WBC 10*3/mm3 8.24 12.56* 11.18* 13.04* 9.91* 7.59   HEMOGLOBIN g/dL 14.8 16.4 14.0 13.8 16.6 15.0   HEMATOCRIT % 44.1 48.2 40.4 40.1 50.4* 44.8   PLATELETS 10*3/mm3 247 346 239 236 282 224       No results found for: CKTOTAL, CKMB, CKMBINDEX, TROPONINI, TROPONINT    pH, Arterial   Date Value Ref Range Status   05/09/2018 7.482 (H) 7.350 - 7.450 pH units Final     CO2   Date Value Ref Range Status   05/10/2018 29.0 22.0 - 31.0 mmol/L Final              Imaging Results (last 7 days)     Procedure Component Value Units Date/Time    XR Chest PA & Lateral [[id number]] Updated:  05/10/18 0954    XR Chest 1 View [[id number]] Collected:  05/07/18 1122     Updated:  05/07/18 1155    Narrative:       Chest single view.    CLINICAL  INDICATION: Shortness of breath. Anaphylactic shock.    COMPARISON: May 6, 2018    FINDINGS: Borderline cardiomegaly. Elevated diaphragms, probably  expiratory phase. Endotracheal tube with tip 4 cm above the  bifurcation of the julieta. NG tube in stomach. No dense  consolidation. Minimal discoid atelectatic changes right lung  base. Lungs otherwise clear.      Impression:       CONCLUSION: As above.    Electronically signed by:  Ben Natarajan MD  5/7/2018 11:54 AM CDT  Workstation: [id number]    XR Chest 1 View [[id number]] Collected:  05/06/18 0253     Updated:  05/06/18 0333    Narrative:       Exam: AP portable chest    INDICATION: ET tube placement    COMPARISON: 5/6/2018 at 2:06 AM    FINDINGS: AP portable chest. ET tube tip is 3.37 cm above the  julieta. NG tube tip is in the stomach. Lungs are hypoinflated.  There is linear bilateral atelectasis. Difficult to assess the  heart size.       Impression:       ET tube and NG tube are in good position.    Electronically signed by:  Valentino South MD  5/6/2018 3:31 AM CDT  Workstation: [id number]    XR Chest 1 View [[id number]] Collected:  05/06/18 0207     Updated:  05/06/18 0233    Narrative:       Exam: AP portable chest    INDICATION: Shortness of breath    COMPARISON: 12/16/2017    FINDINGS: AP portable chest. Bony structures are intact. The  lungs are hypoinflated which accentuates the heart size and  bronchovascular markings. There is bilateral linear atelectasis.  Difficult to assess the heart size. Cannot exclude infiltrate  and/or pleural effusion in the left lung base.      Impression:       1. Hypoinflated lungs.  2. Bilateral linear atelectasis.    Electronically signed by:  Valentino South MD  5/6/2018 2:32 AM CDT  Workstation: [id number]                           Urine Culture   Date Value Ref Range Status   05/05/2018 No growth at 24 hours  Final           Medication Review:   Current Facility-Administered Medications   Medication Dose Route  Frequency Provider Last Rate Last Dose   • dextrose (D50W) solution 25 g  25 g Intravenous Q15 Min PRN Mike Byrd MD       • dextrose (GLUTOSE) oral gel 15 g  15 g Oral Q15 Min PRN Mike Byrd MD       • famotidine (PEPCID) tablet 20 mg  20 mg Oral Daily Rain Mcadams MD   20 mg at 05/10/18 0848   • [START ON 5/11/2018] furosemide (LASIX) tablet 20 mg  20 mg Oral Daily Jeff Armstrong MD       • glucagon (human recombinant) (GLUCAGEN DIAGNOSTIC) injection 1 mg  1 mg Subcutaneous PRN Mike Byrd MD       • heparin (porcine) 5000 UNIT/ML injection 5,000 Units  5,000 Units Subcutaneous Q8H Mike Byrd MD   5,000 Units at 05/10/18 0619   • HYDROmorphone (DILAUDID) injection 0.5 mg  0.5 mg Intravenous Q2H PRN Krishna Ayoub MD   0.5 mg at 05/08/18 1245   • insulin aspart (novoLOG) injection 0-9 Units  0-9 Units Subcutaneous 4x Daily AC & at Bedtime Mike Byrd MD   2 Units at 05/10/18 0848   • insulin detemir (LEVEMIR) injection 36 Units  36 Units Subcutaneous Nightly Krishna Ayoub MD   36 Units at 05/09/18 2112   • ipratropium-albuterol (DUO-NEB) nebulizer solution 3 mL  3 mL Nebulization 4x Daily - RT Rain Mcadams MD   3 mL at 05/10/18 0657   • ondansetron (ZOFRAN) injection 4 mg  4 mg Intravenous Q6H PRN Mike Byrd MD       • potassium chloride (MICRO-K) CR capsule 40 mEq  40 mEq Oral PRN Mike Byrd MD   40 mEq at 05/09/18 1328    Or   • potassium chloride (KLOR-CON) packet 40 mEq  40 mEq Oral PRN Mike Byrd MD        Or   • potassium chloride 20 mEq in 50 mL IVPB  20 mEq Intravenous Q1H PRN Mike Byrd MD       • sodium chloride 0.9 % flush 1-10 mL  1-10 mL Intravenous PRN Mike Byrd MD       • sodium chloride 0.9 % flush 10 mL  10 mL Intravenous PRN Maxwell Carmona MD       • sodium chloride 0.9 % flush 10 mL  10 mL Intravenous PRN Maxwell Carmona MD   10 mL at 05/08/18 0902   • triamcinolone (KENALOG) 0.1 % ointment   Topical Q12H Mike Byrd MD              Assessment/Plan     Principal Problem:    Anaphylactic syndrome  Active Problems:    Hypertension    Diabetes mellitus    Acute respiratory failure  - acute respiratory failure secondary to anaphylaxis: Resolved.  - Hypertension: Stable.  Continue current medications.  - Diabetes mellitus: Stable.  - History of nephrolithiasis: Patient had recently had a lithotripsy.  He is to follow up with urologist as outpatient on discharge.  - History of 'black lungs' (patient is a previous coal mine worker): Continue supplemental oxygen, incentive spirometry and bronchodilators.  Continue on oxygen weaning process.  He may require supplemental oxygen if attempts to wean him off oxygen is unsuccessful.  - Continue GI and DVT prophylaxis.  - Discharge planning is ongoing.        Jeff Armstrong MD  05/10/18      EMR Dragon/Transcription disclaimer:   Much of this encounter note is an electronic transcription/translation of spoken language to printed text. The electronic translation of spoken language may permit erroneous, or at times, nonsensical words or phrases to be inadvertently transcribed; Although I have reviewed the note for such errors, some may still exist.

## 2018-05-10 NOTE — CONSULTS
Adult Nutrition  Assessment    Patient Name:  Rolando Novoa  YOB: 1945  MRN: [id number]  Admit Date:  5/6/2018    Assessment Date:  5/10/2018    Comments: This pt has been followed by RD.  He was NPO on the vent due to Anaphylactic syndrome.  During this time he was on tube feeding with Diprivan.   He self extubated himself and is currently doing very well.  Good intake at this time, with intake averaging ~75%.  RD will provide diet copies on diabetes at discharge along with RD name and number.            Adult Nutrition Assessment     Row Name 05/10/18 1613       Labs/Procedures/Meds    Lab Results Reviewed reviewed, pertinent       Nutrition Prescription PO    Current PO Diet Regular    Fluid Consistency Thin    Common Modifiers Cardiac;Consistent Carbohydrate    Row Name 05/10/18 1612       Nutrition/Diet History    Typical Food/Fluid Intake Pt sitting up in chair.  He reports that he is eating well and is ready to go home      Row Name 05/10/18 1611       Reason for Assessment    Reason For Assessment follow-up protocol          Electronically signed by:  Leelee Wilson RD  05/10/18 4:19 PM

## 2018-05-10 NOTE — THERAPY EVALUATION
Acute Care - Physical Therapy Initial Evaluation  [facility]     Patient Name: Rolando Novoa  : 1945  MRN: [id number]  Today's Date: 5/10/2018   Onset of Illness/Injury or Date of Surgery: 18  Date of Referral to PT: 18  Referring Physician: Dr Ayoub      Admit Date: 2018    Visit Dx:     ICD-10-CM ICD-9-CM   1. Anaphylaxis, initial encounter T78.2XXA 995.0   2. Hypoxia R09.02 799.02   3. Impaired mobility and activities of daily living Z74.09 799.89     Patient Active Problem List   Diagnosis   • Hyperlipemia   • SOB (shortness of breath)   • Hematuria syndrome   • Psoriasis   • Hypertension   • High cholesterol   • Enlarged prostate   • Diabetes mellitus   • Acid reflux   • Calculus of kidney   • Anaphylactic syndrome   • Kidney stones   • Acute respiratory failure     Past Medical History:   Diagnosis Date   • Acid reflux    • Diabetes    • Enlarged prostate    • High cholesterol    • Hypertension    • Kidney stone     multiple   • Psoriasis      Past Surgical History:   Procedure Laterality Date   • AMPUTATION Left     lower arm and hand   • CHOLECYSTECTOMY OPEN     • CYSTOSCOPY N/A 2017    Procedure: CYSTOSCOPY WITH CLOT EVACUATION;  Surgeon: Prosper Ariza MD;  Location: [facility];  Service:    • CYSTOSCOPY, URETEROSCOPY, RETROGRADE PYELOGRAM, STENT INSERTION Left 12/15/2017    Procedure: CYSTOSCOPY LEFT URETEROSCOPY RETROGRADE PYELOGRAM HOLMIUM LASER STENT INSERTION;  Surgeon: Prosper Ariza MD;  Location: [facility];  Service:    • EXTRACORPOREAL SHOCK WAVE LITHOTRIPSY (ESWL)     • EXTRACORPOREAL SHOCKWAVE LITHOTRIPSY (ESWL), STENT INSERTION/REMOVAL Left 2/3/2017    Procedure: LEFT EXTRACORPOREAL SHOCKWAVE LITHOTRIPSY, POSSIBLE CYSTOSCOPY, POSSIBLE STENT REMOVAL ;  Surgeon: Prosper Ariza MD;  Location: [facility];  Service:    • EXTRACORPOREAL SHOCKWAVE LITHOTRIPSY (ESWL), STENT INSERTION/REMOVAL Left 2018    Procedure: EXTRACORPOREAL SHOCKWAVE LITHOTRIPSY;   Surgeon: Prosper Ariza MD;  Location: [facility] OR;  Service: Urology   • KIDNEY STONE SURGERY     • SCROTAL SURGERY      trauma        PT ASSESSMENT (last 12 hours)      Physical Therapy Evaluation     Row Name 05/10/18 1019          PT Evaluation Time/Intention    Document Type evaluation  -GB     Mode of Treatment individual therapy;physical therapy  -GB     Patient Effort good  -GB     Row Name 05/10/18 1019          General Information    Patient Profile Reviewed? yes  -GB     Onset of Illness/Injury or Date of Surgery 05/06/18  -GB     Referring Physician Dr Ayoub  -GB     Patient Observations alert;cooperative;agree to therapy  -GB     General Observations of Patient propped up on L elbow in bed  -GB     Prior Level of Function independent:;all household mobility;community mobility;gait;ADL's;driving;shopping;using stairs;yard work  -GB     Equipment Currently Used at Home --   staes he does not use equpment at home  -GB     Existing Precautions/Restrictions fall;oxygen therapy device and L/min  -GB     Risks Reviewed patient:;LOB;dizziness  -GB     Benefits Reviewed patient:;improve function;increase independence  -GB     Row Name 05/10/18 1019          Relationship/Environment    Primary Source of Support/Comfort significant other;other (see comments)   grandhild  -GB     Lives With other relative(s);spouse  -GB     Row Name 05/10/18 1019          Resource/Environmental Concerns    Current Living Arrangements home/apartment/condo  -GB     Resource/Environmental Concerns home accessibility  -GB     Transportation Concerns car, none  -GB     Row Name 05/10/18 1019          Home Main Entrance    Number of Stairs, Main Entrance three  -GB     Stair Railings, Main Entrance railing on right side (ascending)  -GB     Row Name 05/10/18 1019          Cognitive Assessment/Intervention- PT/OT    Affect/Mental Status (Cognitive) WNL  -GB     Orientation Status (Cognition) oriented x 4  -GB     Follows Commands  (Cognition) WNL;follows multi-step commands;over 90% accuracy  -     Row Name 05/10/18 1019          Safety Issues, Functional Mobility    Safety Issues Affecting Function (Mobility) ability to follow commands  -[facility] Name 05/10/18 1019          Bed Mobility Assessment/Treatment    Bed Mobility Assessment/Treatment bed mobility (all) activities  -     [place] Level (Bed Mobility) independent  -[facility] Name 05/10/18 1019          Transfer Assessment/Treatment    Transfer Assessment/Treatment bed-chair transfer;chair-bed transfer;toilet transfer  -     Bed-Chair [place] (Transfers) independent  -     Chair-Bed [place] (Transfers) independent  -     Sit-Stand [place] (Transfers) independent  -     Stand-Sit [place] (Transfers) independent  -     [place] Level (Toilet Transfer) independent  -     Row Name 05/10/18 1019          Toilet Transfer    Type (Toilet Transfer) sit-stand  -[facility] Name 05/10/18 1019          Gait/Stairs Assessment/Training    Gait/Stairs Assessment/Training gait/ambulation independence;gait/ambulation assistive device;distance ambulated;gait pattern  -     [place] Level (Gait) conditional independence;contact guard  -     Assistive Device (Gait) walker, front-wheeled  -     Distance in Feet (Gait) 6,6, 150, 20,   -     Pattern (Gait) step-through  -     Comment (Gait/Stairs) pt tends to reach for surfaces in gait, in rothman reached for rails so given FWRW w/ assist due to amputation LUE; in TUG he reached for no surface and walked well; concern reaching is due to resp/HR fatigue w/ activity;   -     Row Name 05/10/18 1019          General ROM    GENERAL ROM COMMENTS AROM David UE/LE WFL w/out c/o; amp above elbow L arm, old  -[facility] Name 05/10/18 1019          General Assessment (Manual Muscle Testing)    General Manual Muscle Testing (MMT) Assessment no strength deficits identified  -[facility] Name 05/10/18 1019           Vision Assessment/Intervention    Visual Impairment/Limitations WFL  -GB     Row Name 05/10/18 1019          Light Touch Sensation Assessment    Left Upper Extremity: Light Touch Sensation Assessment intact  -GB     Right Upper Extremity: Light Touch Sensation Assessment intact  -GB     Left Lower Extremity: Light Touch Sensation Assessment intact  -GB     Right Lower Extremity: Light Touch Sensation Assessment intact  -GB     Row Name 05/10/18 1019          Physical Therapy Clinical Impression    Date of Referral to PT 05/09/18  -GB     PT Diagnosis (PT Clinical Impression) impaired functional mobility gait, balance and endurance  -GB     Prognosis (PT Clinical Impression) good  -GB     Functional Level at Time of Evaluation (PT Clinical Impression) amb in room indep; needs supervision/CGA for distance gait and monitoring due to non oxygen dep before admit, now still on 02;   -GB     Patient/Family Goals Statement (PT Clinical Impression) home w/ family  -GB     Criteria for Skilled Interventions Met (PT Clinical Impression) yes;treatment indicated  -GB     Pathology/Pathophysiology Noted (Describe Specifically for Each System) pulmonary;musculoskeletal  -GB     Impairments Found (describe specific impairments) aerobic capacity/endurance;gait, locomotion, and balance  -GB     Functional Limitations in Following Categories (Describe Specific Limitations) self-care  -GB     Rehab Potential (PT Clinical Summary) good, to achieve stated therapy goals  -GB     Predicted Duration of Therapy (PT) till d/c or goals met  -GB     Care Plan Review (PT) patient/other agree to care plan  -GB     Row Name 05/10/18 1019          Vital Signs    Pre Systolic BP Rehab 137  -GB     Pre Treatment Diastolic BP 82  -GB     Post Systolic BP Rehab 129  -GB     Post Treatment Diastolic BP 78  -GB     Pretreatment Heart Rate (beats/min) 90  -GB     Intratreatment Heart Rate (beats/min) 113   post 150 ft gait  -GB     Posttreatment Heart  Rate (beats/min) 102  -GB     Intra SpO2 (%) 92  -GB     O2 Delivery Intra Treatment supplemental O2  -GB     Post SpO2 (%) 97  -GB     O2 Delivery Post Treatment supplemental O2  -GB     Pre Patient Position Supine  -GB     Intra Patient Position Standing  -GB     Post Patient Position Sitting  -GB     Row Name 05/10/18 1019          Physical Therapy Goals    Gait Training Goal Selection (PT) gait training, PT goal 1;gait training, PT goal (free text)  -GB     Additional Documentation Stairs Goal Selection (PT) (Row)  -GB     Row Name 05/10/18 1019          Gait Training Goal 1 (PT)    Activity/Assistive Device (Gait Training Goal 1, PT) gait (walking locomotion);assistive device use;decrease fall risk;increase endurance/gait distance  -GB     [place] Level (Gait Training Goal 1, PT) independent;conditional independence  -GB     Distance (Gait Goal 1, PT) 600 ft; 1000 feet once 600 ft met  -GB     Time Frame (Gait Training Goal 1, PT) long term goal (LTG);by discharge  -GB     Progress/Outcome (Gait Training Goal 1, PT) goal ongoing  -     Row Name 05/10/18 1019          Gait Training Goal (PT)    Gait Training Goal (PT) pt will complete Tinetti w/ 28/28 score  -GB     Time Frame (Gait Training Goal, PT) by discharge  -GB     Progress/Outcome (Gait Training Goal, PT) goal ongoing  -     Row Name 05/10/18 1019          Patient Education Goal (PT)    Activity (Patient Education Goal, PT) pt will pace activity to keep VSS and ex michael to prevent LOB in gait  -GB     [place]/Cues/Accuracy (Memory Goal 2, PT) independent  -GB     Time Frame (Patient Education Goal, PT) by discharge  -GB     Progress/Outcome (Patient Education Goal, PT) goal ongoing  -     Row Name 05/10/18 1019          Positioning and Restraints    Pre-Treatment Position in bed  -GB     Post Treatment Position chair  -GB     In Chair sitting;call light within reach;encouraged to call for assist  -GB     Row Name 05/10/18 1019           Living Environment    Home Accessibility stairs to enter home  -       User Key  (r) = Recorded By, (t) = Taken By, (c) = Cosigned By    Initials Name Provider Type    HERMILO Kimball, JEFFERY Physical Therapist          Physical Therapy Education     Title: PT OT SLP Therapies (Active)     Topic: Physical Therapy (Done)     Point: Mobility training (Done)    Learning Progress Summary     Learner Status Readiness Method Response Comment Documented by    Patient Done Acceptance D,E VU,DU,NR POC; mobility; gait w/ AD  05/10/18 1415          Point: Home exercise program (Done)    Learning Progress Summary     Learner Status Readiness Method Response Comment Documented by    Patient Done Acceptance D,E VU,DU,NR POC; mobility; gait w/ AD  05/10/18 1415                      User Key     Initials Effective Dates Name Provider Type [facility] 04/03/18 -  Shireen Kimball PT Physical Therapist PT                PT Recommendation and Plan  Planned Therapy Interventions (PT Eval): gait training, stair training  Therapy Frequency (PT Clinical Impression): daily  Outcome Summary/Treatment Plan (PT)  Anticipated Equipment Needs at Discharge (PT): standard cane, tripod cane (possible, depending on progress)  Plan of Care Reviewed With: patient  Outcome Summary: PT eval completed; he was not on 02 prior to admit but still on it currently; indep mobile in room but prior to admit indep community mobility w/out falls;currently he reaches for external support in gait/endurance, and HR elevated with activity but recovers in 2-4 min; pt goal to return home so PT will follow for increased gait michael, indep and safety using gait training and ex to address these areas; may benefit from follow up rehab post d/c to home w/ assist.            Outcome Measures     Row Name 05/10/18 1418 05/10/18 1400 05/09/18 1407       How much help from another person do you currently need...    Turning from your back to your side  while in flat bed without using bedrails? 4  -GB  --  --    Moving from lying on back to sitting on the side of a flat bed without bedrails? 4  -GB  --  --    Moving to and from a bed to a chair (including a wheelchair)? 4  -GB  --  --    Standing up from a chair using your arms (e.g., wheelchair, bedside chair)? 4  -GB  --  --    Climbing 3-5 steps with a railing? 3  -GB  --  --    To walk in hospital room? 3   02 and IV lines  -GB  --  --    AM-PAC 6 Clicks Score 22  -GB  --  --       How much help from another is currently needed...    Putting on and taking off regular lower body clothing?  --  -- 2  -AS    Bathing (including washing, rinsing, and drying)  --  -- 3  -AS    Toileting (which includes using toilet bed pan or urinal)  --  -- 3  -AS    Putting on and taking off regular upper body clothing  --  -- 3  -AS    Taking care of personal grooming (such as brushing teeth)  --  -- 4  -AS    Eating meals  --  -- 4  -AS    Score  --  -- 19  -AS       Timed Up and Go (TUG)    TUG Test 1  -- 12 seconds  -GB  --    Timed Up and Go Comments  -- no Ad; no LOB;   -GB  --       Functional Assessment    Outcome Measure Options AM-PAC 6 Clicks Basic Mobility (PT)  -GB Timed Up and Go (TUG)  - AM-[facility] 6 Clicks Daily Activity (OT)  -AS      User Key  (r) = Recorded By, (t) = Taken By, (c) = Cosigned By    Initials Name Provider Type    HERMILO Kimball PT Physical Therapist    AS Yani Piña, OT Occupational Therapist           Time Calculation:         PT Charges     Row Name 05/10/18 1419             Time Calculation    Start Time 1019  -GB      Stop Time 1112  -GB      Time Calculation (min) 53 min  -GB      PT Received On 05/10/18  -GB      PT Goal Re-Cert Due Date 05/21/18  -GB        User Key  (r) = Recorded By, (t) = Taken By, (c) = Cosigned By    Initials Name Provider Type    HERMILO Kimball, JEFFERY Physical Therapist          Therapy Charges for Today     Code Description Service Date  Service Provider Modifiers Qty    04651204073 HC PT MOBILITY CURRENT 5/10/2018 Shireen Kimball, PT GP, CJ 1    35114712964 HC PT MOBILITY PROJECTED 5/10/2018 Shireen Kimball, PT GP, CI 1    84092531505 HC PT EVAL MOD COMPLEXITY 3 5/10/2018 Shireen Kimball, PT GP 1    99073319643 HC PT THER SUPP EA 15 MIN 5/10/2018 Shireen Kimball, PT GP 1    [id number] HC PT THERAPEUTIC ACT EA 15 MIN 5/10/2018 Shireen Kimball, PT GP 1          PT G-Codes  PT Professional Judgement Used?: Yes  Outcome Measure Options: AM-PAC 6 Clicks Basic Mobility (PT)  Score: 22  Functional Limitation: Mobility: Walking and moving around  Mobility: Walking and Moving Around Current Status (): At least 20 percent but less than 40 percent impaired, limited or restricted  Mobility: Walking and Moving Around Goal Status (): At least 1 percent but less than 20 percent impaired, limited or restricted      Shireen Kimball, PT  5/10/2018

## 2018-05-10 NOTE — PLAN OF CARE
Problem: Skin Injury Risk (Adult)  Intervention: Promote/Optimize Nutrition   05/10/18 1622   Nutrition Interventions   Oral Nutrition Promotion calorie dense foods provided;nutritional therapy counseling provided         Problem: Patient Care Overview  Goal: Plan of Care Review  Outcome: Ongoing (interventions implemented as appropriate)   05/10/18 1622   Plan of Care Review   Progress improving   OTHER   Outcome Summary Pt extubated and po started with good tolerance. RD will monitor prn   Coping/Psychosocial   Plan of Care Reviewed With patient

## 2018-05-10 NOTE — SIGNIFICANT NOTE
05/10/18 1433   Rehab Treatment   Discipline occupational therapy assistant   Reason Treatment Not Performed unavailable for treatment  (Pt checked on multiple times this date,x-ray, with PT staff , pt with noon meal, then pt not in room, will recheck tomorrow )

## 2018-05-10 NOTE — PLAN OF CARE
Problem: Skin Injury Risk (Adult)  Goal: Identify Related Risk Factors and Signs and Symptoms  Outcome: Ongoing (interventions implemented as appropriate)    Goal: Skin Health and Integrity  Outcome: Ongoing (interventions implemented as appropriate)      Problem: Fall Risk (Adult)  Goal: Identify Related Risk Factors and Signs and Symptoms  Outcome: Ongoing (interventions implemented as appropriate)    Goal: Absence of Fall  Outcome: Ongoing (interventions implemented as appropriate)      Problem: Patient Care Overview  Goal: Plan of Care Review  Outcome: Ongoing (interventions implemented as appropriate)   05/10/18 0330   Plan of Care Review   Progress no change   Coping/Psychosocial   Plan of Care Reviewed With patient     Goal: Individualization and Mutuality  Outcome: Ongoing (interventions implemented as appropriate)    Goal: Discharge Needs Assessment  Outcome: Ongoing (interventions implemented as appropriate)    Goal: Interprofessional Rounds/Family Conf  Outcome: Ongoing (interventions implemented as appropriate)      Problem: Anaphylactic/Systemic Hypersensitivity Reaction (Adult)  Goal: Signs and Symptoms of Listed Potential Problems Will be Absent, Minimized or Managed (Anaphylactic/Systemic Hypersensitivity Reaction)  Outcome: Ongoing (interventions implemented as appropriate)      Problem: Pain, Acute (Adult)  Goal: Identify Related Risk Factors and Signs and Symptoms  Outcome: Ongoing (interventions implemented as appropriate)    Goal: Acceptable Pain Control/Comfort Level  Outcome: Ongoing (interventions implemented as appropriate)

## 2018-05-11 VITALS
TEMPERATURE: 97.1 F | DIASTOLIC BLOOD PRESSURE: 90 MMHG | OXYGEN SATURATION: 92 % | SYSTOLIC BLOOD PRESSURE: 135 MMHG | BODY MASS INDEX: 36.22 KG/M2 | WEIGHT: 217.4 LBS | HEART RATE: 107 BPM | HEIGHT: 65 IN | RESPIRATION RATE: 18 BRPM

## 2018-05-11 LAB
BASOPHILS # BLD AUTO: 0.02 10*3/MM3 (ref 0–0.2)
BASOPHILS NFR BLD AUTO: 0.3 % (ref 0–2)
CALIF WALNUT POLN IGE QN: <0.1 KU/L
CLAM IGE QN: <0.1 KU/L
CODFISH IGE QN: <0.1 KU/L
CONV CLASS DESCRIPTION: NORMAL
CORN IGE QN: <0.1 KU/L
COW MILK IGE QN: <0.1 KU/L
DEPRECATED RDW RBC AUTO: 48.2 FL (ref 35.1–43.9)
EGG WHITE IGE QN: <0.1 KU/L
EOSINOPHIL # BLD AUTO: 0.2 10*3/MM3 (ref 0–0.7)
EOSINOPHIL NFR BLD AUTO: 3 % (ref 0–7)
ERYTHROCYTE [DISTWIDTH] IN BLOOD BY AUTOMATED COUNT: 14.3 % (ref 11.5–14.5)
GLUCOSE BLDC GLUCOMTR-MCNC: 135 MG/DL (ref 70–130)
GLUCOSE BLDC GLUCOMTR-MCNC: 197 MG/DL (ref 70–130)
HCT VFR BLD AUTO: 46.3 % (ref 39–49)
HGB BLD-MCNC: 15.2 G/DL (ref 13.7–17.3)
IMM GRANULOCYTES # BLD: 0.23 10*3/MM3 (ref 0–0.02)
IMM GRANULOCYTES NFR BLD: 3.4 % (ref 0–0.5)
LYMPHOCYTES # BLD AUTO: 1.78 10*3/MM3 (ref 0.6–4.2)
LYMPHOCYTES NFR BLD AUTO: 26.4 % (ref 10–50)
MCH RBC QN AUTO: 30.5 PG (ref 26.5–34)
MCHC RBC AUTO-ENTMCNC: 32.8 G/DL (ref 31.5–36.3)
MCV RBC AUTO: 92.8 FL (ref 80–98)
MONOCYTES # BLD AUTO: 0.7 10*3/MM3 (ref 0–0.9)
MONOCYTES NFR BLD AUTO: 10.4 % (ref 0–12)
NEUTROPHILS # BLD AUTO: 3.8 10*3/MM3 (ref 2–8.6)
NEUTROPHILS NFR BLD AUTO: 56.5 % (ref 37–80)
NRBC BLD MANUAL-RTO: 0 /100 WBC (ref 0–0)
PEANUT IGE QN: <0.1 KU/L
PLATELET # BLD AUTO: 244 10*3/MM3 (ref 150–450)
PMV BLD AUTO: 8.9 FL (ref 8–12)
RBC # BLD AUTO: 4.99 10*6/MM3 (ref 4.37–5.74)
SCALLOP IGE QN: <0.1 KU/L
SESAME SEED IGE: <0.1 KU/L
SHRIMP IGE: <0.1 KU/L
SOYBEAN IGE QN: <0.1 KU/L
WBC NRBC COR # BLD: 6.73 10*3/MM3 (ref 3.2–9.8)
WHEAT IGE QN: <0.1 KU/L

## 2018-05-11 PROCEDURE — 94799 UNLISTED PULMONARY SVC/PX: CPT

## 2018-05-11 PROCEDURE — 25010000002 HEPARIN (PORCINE) PER 1000 UNITS: Performed by: FAMILY MEDICINE

## 2018-05-11 PROCEDURE — 85025 COMPLETE CBC W/AUTO DIFF WBC: CPT | Performed by: FAMILY MEDICINE

## 2018-05-11 PROCEDURE — 94760 N-INVAS EAR/PLS OXIMETRY 1: CPT

## 2018-05-11 PROCEDURE — 97535 SELF CARE MNGMENT TRAINING: CPT

## 2018-05-11 PROCEDURE — 97116 GAIT TRAINING THERAPY: CPT

## 2018-05-11 PROCEDURE — 97530 THERAPEUTIC ACTIVITIES: CPT

## 2018-05-11 PROCEDURE — 97110 THERAPEUTIC EXERCISES: CPT

## 2018-05-11 PROCEDURE — 82962 GLUCOSE BLOOD TEST: CPT

## 2018-05-11 RX ORDER — FUROSEMIDE 20 MG/1
20 TABLET ORAL DAILY
Qty: 30 TABLET | Refills: 1 | Status: SHIPPED | OUTPATIENT
Start: 2018-05-11 | End: 2018-08-01 | Stop reason: SDUPTHER

## 2018-05-11 RX ORDER — IPRATROPIUM BROMIDE AND ALBUTEROL SULFATE 2.5; .5 MG/3ML; MG/3ML
3 SOLUTION RESPIRATORY (INHALATION)
Qty: 360 ML | Refills: 1 | Status: SHIPPED | OUTPATIENT
Start: 2018-05-11 | End: 2018-06-11

## 2018-05-11 RX ORDER — BUDESONIDE AND FORMOTEROL FUMARATE DIHYDRATE 80; 4.5 UG/1; UG/1
2 AEROSOL RESPIRATORY (INHALATION)
Qty: 2 INHALER | Refills: 1 | Status: SHIPPED | OUTPATIENT
Start: 2018-05-11 | End: 2018-06-11

## 2018-05-11 RX ADMIN — FAMOTIDINE 20 MG: 20 TABLET ORAL at 09:38

## 2018-05-11 RX ADMIN — IPRATROPIUM BROMIDE AND ALBUTEROL SULFATE 3 ML: .5; 3 SOLUTION RESPIRATORY (INHALATION) at 06:48

## 2018-05-11 RX ADMIN — IPRATROPIUM BROMIDE AND ALBUTEROL SULFATE 3 ML: .5; 3 SOLUTION RESPIRATORY (INHALATION) at 11:22

## 2018-05-11 RX ADMIN — FUROSEMIDE 20 MG: 20 TABLET ORAL at 09:38

## 2018-05-11 RX ADMIN — HEPARIN SODIUM 5000 UNITS: 5000 INJECTION, SOLUTION INTRAVENOUS; SUBCUTANEOUS at 05:48

## 2018-05-11 NOTE — THERAPY DISCHARGE NOTE
Acute Care - Physical Therapy Discharge Summary  [facility]       Patient Name: Rolando Novoa  : 1945  MRN: [id number]    Today's Date: 2018  Onset of Illness/Injury or Date of Surgery: 18    Date of Referral to PT: 18  Referring Physician: Dr Ayoub      Admit Date: 2018      PT Recommendation and Plan    Visit Dx:    ICD-10-CM ICD-9-CM   1. Anaphylaxis, initial encounter T78.2XXA 995.0   2. Hypoxia R09.02 799.02   3. Impaired mobility and activities of daily living Z74.09 799.89   4. SOB (shortness of breath) R06.02 786.05   5. Impaired gait and mobility R26.89 781.2             Outcome Measures     Row Name 18 1040 18 0840 05/10/18 1418       How much help from another person do you currently need...    Turning from your back to your side while in flat bed without using bedrails? 4  -LN  -- 4  -GB    Moving from lying on back to sitting on the side of a flat bed without bedrails? 4  -LN  -- 4  -GB    Moving to and from a bed to a chair (including a wheelchair)? 4  -LN  -- 4  -GB    Standing up from a chair using your arms (e.g., wheelchair, bedside chair)? 4  -LN  -- 4  -GB    Climbing 3-5 steps with a railing? 3  -LN  -- 3  -GB    To walk in hospital room? 4  -LN  -- 3   02 and IV lines  -GB    AM-PAC 6 Clicks Score 23  -LN  -- 22  -GB       How much help from another is currently needed...    Putting on and taking off regular lower body clothing?  -- 4  -JH  --    Bathing (including washing, rinsing, and drying)  -- 4  -JH  --    Toileting (which includes using toilet bed pan or urinal)  -- 4  -JH  --    Putting on and taking off regular upper body clothing  -- 4  -JH  --    Taking care of personal grooming (such as brushing teeth)  -- 4  -JH  --    Eating meals  -- 4  -JH  --    Score  -- 24  -JH  --       Functional Assessment    Outcome Measure Options AM-PAC 6 Clicks Basic Mobility (PT)  -LN  -- AM-PAC 6 Clicks Basic Mobility (PT)  -GB    Row Name 05/10/18  1400 05/09/18 1407          How much help from another is currently needed...    Putting on and taking off regular lower body clothing?  -- 2  -AS     Bathing (including washing, rinsing, and drying)  -- 3  -AS     Toileting (which includes using toilet bed pan or urinal)  -- 3  -AS     Putting on and taking off regular upper body clothing  -- 3  -AS     Taking care of personal grooming (such as brushing teeth)  -- 4  -AS     Eating meals  -- 4  -AS     Score  -- 19  -AS        Timed Up and Go (TUG)    TUG Test 1 12 seconds  -GB  --     Timed Up and Go Comments no Ad; no LOB;   -GB  --        Functional Assessment    Outcome Measure Options Timed Up and Go (TUG)  -GB AM-PAC 6 Clicks Daily Activity (OT)  -AS       User Key  (r) = Recorded By, (t) = Taken By, (c) = Cosigned By    Initials Name Provider Type    HERMILO Kimball, PT Physical Therapist    LN Daisy Pelayo, MATY Physical Therapy Assistant    ADIN Burnham Occupational Therapy Assistant    AS Yani Piña, OT Occupational Therapist                PT Charges     Row Name 05/11/18 1040             Time Calculation    Start Time 1040  -LN      Stop Time 1100  -LN      Time Calculation (min) 20 min  -LN      PT Received On 05/11/18  -LN         Time Calculation- PT    Total Timed Code Minutes- PT 20 minute(s)  -LN        User Key  (r) = Recorded By, (t) = Taken By, (c) = Cosigned By    Initials Name Provider Type    RICARDO Pelayo, MATY Physical Therapy Assistant                  PT Rehab Goals     Row Name 05/11/18 1040             Gait Training Goal 1 (PT)    Activity/Assistive Device (Gait Training Goal 1, PT) gait (walking locomotion);assistive device use;decrease fall risk;increase endurance/gait distance  -LN      [place] Level (Gait Training Goal 1, PT) independent;conditional independence  -LN      Distance (Gait Goal 1, PT) 600 ft; 1000 feet once 600 ft met  -LN      Time Frame (Gait Training Goal 1, PT) long term goal  (LTG);by discharge  -LN      Progress/Outcome (Gait Training Goal 1, PT) goal partially met  -LN         Gait Training Goal (PT)    Gait Training Goal (PT) pt will complete Tinetti w/ 28/28 score  -LN      Time Frame (Gait Training Goal, PT) by discharge  -LN      Progress/Outcome (Gait Training Goal, PT) goal ongoing  -LN         Patient Education Goal (PT)    Activity (Patient Education Goal, PT) pt will pace activity to keep VSS and ex michael to prevent LOB in gait  -LN      [place]/Cues/Accuracy (Memory Goal 2, PT) independent  -LN      Time Frame (Patient Education Goal, PT) by discharge  -LN      Progress/Outcome (Patient Education Goal, PT) goal met  -LN        User Key  (r) = Recorded By, (t) = Taken By, (c) = Cosigned By    Initials Name Provider Type Discipline    LN Daisy Pelayo, PTA Physical Therapy Assistant PT              PT Discharge Summary  Reason for Discharge: Discharge from facility, Per MD order  Outcomes Achieved: Patient able to partially acheive established goals  Discharge Destination: Home      Aniyah Monzon, PT   5/11/2018

## 2018-05-11 NOTE — THERAPY DISCHARGE NOTE
Acute Care - Occupational Therapy Discharge Summary  [facility]     Patient Name: Rolando Novoa  : 1945  MRN: [id number]    Today's Date: 2018  Onset of Illness/Injury or Date of Surgery: 18    Date of Referral to OT: 18  Referring Physician: Dr Ayoub      Admit Date: 2018        OT Recommendation and Plan    Visit Dx:    ICD-10-CM ICD-9-CM   1. Anaphylaxis, initial encounter T78.2XXA 995.0   2. Hypoxia R09.02 799.02   3. Impaired mobility and activities of daily living Z74.09 799.89   4. SOB (shortness of breath) R06.02 786.05   5. Impaired gait and mobility R26.89 781.2               Time Calculation- OT     Row Name 18 1401             Time Calculation- OT    OT Start Time 0840  -      OT Stop Time 1000  -      OT Time Calculation (min) 80 min  -      OT Received On 18  -        User Key  (r) = Recorded By, (t) = Taken By, (c) = Cosigned By    Initials Name Provider Type     MARÍA Howe/L Occupational Therapy Assistant                  OT Rehab Goals     Row Name 18 0840             Transfer Goal 1 (OT)    Activity/Assistive Device (Transfer Goal 1, OT) toilet;shower chair  -      [place] Level/Cues Needed (Transfer Goal 1, OT) supervision required  -      Time Frame (Transfer Goal 1, OT) long term goal (LTG);by discharge  -      Progress/Outcome (Transfer Goal 1, OT) goal not met  -         Bathing Goal 1 (OT)    Activity/Assistive Device (Bathing Goal 1, OT) bathing skills, all  -      [place] Level/Cues Needed (Bathing Goal 1, OT) minimum assist (75% or more patient effort)  -      Time Frame (Bathing Goal 1, OT) long term goal (LTG);by discharge  -      Progress/Outcomes (Bathing Goal 1, OT) goal not met  -         Dressing Goal 1 (OT)    Activity/Assistive Device (Dressing Goal 1, OT) dressing skills, all  -      [place]/Cues Needed (Dressing Goal 1, OT) supervision required;set-up required  -       Time Frame (Dressing Goal 1, OT) long term goal (LTG);by discharge  -      Progress/Outcome (Dressing Goal 1, OT) goal not met  -         Toileting Goal 1 (OT)    Activity/Device (Toileting Goal 1, OT) toileting skills, all  -      [place] Level/Cues Needed (Toileting Goal 1, OT) conditional independence  -      Time Frame (Toileting Goal 1, OT) long term goal (LTG);by discharge  -      Progress/Outcome (Toileting Goal 1, OT) goal not met  -        User Key  (r) = Recorded By, (t) = Taken By, (c) = Cosigned By    Initials Name Provider Type Discipline     MARÍA Howe/L Occupational Therapy Assistant OT                Outcome Measures     Row Name 05/11/18 1040 05/11/18 0840 05/10/18 1418       How much help from another person do you currently need...    Turning from your back to your side while in flat bed without using bedrails? 4  -LN  -- 4  -GB    Moving from lying on back to sitting on the side of a flat bed without bedrails? 4  -LN  -- 4  -GB    Moving to and from a bed to a chair (including a wheelchair)? 4  -LN  -- 4  -GB    Standing up from a chair using your arms (e.g., wheelchair, bedside chair)? 4  -LN  -- 4  -GB    Climbing 3-5 steps with a railing? 3  -LN  -- 3  -GB    To walk in hospital room? 4  -LN  -- 3   02 and IV lines  -GB    AM-PAC 6 Clicks Score 23  -LN  -- 22  -GB       How much help from another is currently needed...    Putting on and taking off regular lower body clothing?  -- 4  -JH  --    Bathing (including washing, rinsing, and drying)  -- 4  -JH  --    Toileting (which includes using toilet bed pan or urinal)  -- 4  -JH  --    Putting on and taking off regular upper body clothing  -- 4  -JH  --    Taking care of personal grooming (such as brushing teeth)  -- 4  -JH  --    Eating meals  -- 4  -  --    Score  -- 24  -JH  --       Functional Assessment    Outcome Measure Options AM-PAC 6 Clicks Basic Mobility (PT)  -LN  -- AM-PAC 6 Clicks Basic Mobility (PT)   -    Row Name 05/10/18 1400 05/09/18 1407          How much help from another is currently needed...    Putting on and taking off regular lower body clothing?  -- 2  -AS     Bathing (including washing, rinsing, and drying)  -- 3  -AS     Toileting (which includes using toilet bed pan or urinal)  -- 3  -AS     Putting on and taking off regular upper body clothing  -- 3  -AS     Taking care of personal grooming (such as brushing teeth)  -- 4  -AS     Eating meals  -- 4  -AS     Score  -- 19  -AS        Timed Up and Go (TUG)    TUG Test 1 12 seconds  -  --     Timed Up and Go Comments no Ad; no LOB;   -GB  --        Functional Assessment    Outcome Measure Options Timed Up and Go (TUG)  - AM-PAC 6 Clicks Daily Activity (OT)  -AS       User Key  (r) = Recorded By, (t) = Taken By, (c) = Cosigned By    Initials Name Provider Type    HERMILO Kimball, PT Physical Therapist    RICARDO Pelayo, MATY Physical Therapy Assistant    MARÍA Burnham/L Occupational Therapy Assistant    AS Yani Piña, OT Occupational Therapist              OT Discharge Summary  Anticipated Discharge Disposition (OT): home with home health  Reason for Discharge: Discharge from facility, Per MD order  Outcomes Achieved: Refer to plan of care for updates on goals achieved  Discharge Destination: Home with assist      Estefania Granados, OT  5/11/2018

## 2018-05-11 NOTE — THERAPY TREATMENT NOTE
Acute Care - Occupational Therapy Treatment Note  [facility]     Patient Name: Rolando Novoa  : 1945  MRN: [id number]  Today's Date: 2018  Onset of Illness/Injury or Date of Surgery: 18  Date of Referral to OT: 18  Referring Physician: Dr Ayoub    Admit Date: 2018       ICD-10-CM ICD-9-CM   1. Anaphylaxis, initial encounter T78.2XXA 995.0   2. Hypoxia R09.02 799.02   3. Impaired mobility and activities of daily living Z74.09 799.89   4. SOB (shortness of breath) R06.02 786.05   5. Impaired gait and mobility R26.89 781.2     Patient Active Problem List   Diagnosis   • Hyperlipemia   • SOB (shortness of breath)   • Hematuria syndrome   • Psoriasis   • Hypertension   • High cholesterol   • Enlarged prostate   • Diabetes mellitus   • Acid reflux   • Calculus of kidney   • Anaphylactic syndrome   • Kidney stones   • Acute respiratory failure     Past Medical History:   Diagnosis Date   • Acid reflux    • Diabetes    • Enlarged prostate    • High cholesterol    • Hypertension    • Kidney stone     multiple   • Psoriasis      Past Surgical History:   Procedure Laterality Date   • AMPUTATION Left     lower arm and hand   • CHOLECYSTECTOMY OPEN     • CYSTOSCOPY N/A 2017    Procedure: CYSTOSCOPY WITH CLOT EVACUATION;  Surgeon: Prosper Ariza MD;  Location: [facility];  Service:    • CYSTOSCOPY, URETEROSCOPY, RETROGRADE PYELOGRAM, STENT INSERTION Left 12/15/2017    Procedure: CYSTOSCOPY LEFT URETEROSCOPY RETROGRADE PYELOGRAM HOLMIUM LASER STENT INSERTION;  Surgeon: Prosper Ariza MD;  Location: [facility];  Service:    • EXTRACORPOREAL SHOCK WAVE LITHOTRIPSY (ESWL)     • EXTRACORPOREAL SHOCKWAVE LITHOTRIPSY (ESWL), STENT INSERTION/REMOVAL Left 2/3/2017    Procedure: LEFT EXTRACORPOREAL SHOCKWAVE LITHOTRIPSY, POSSIBLE CYSTOSCOPY, POSSIBLE STENT REMOVAL ;  Surgeon: Prosper Ariza MD;  Location: [facility];  Service:    • EXTRACORPOREAL SHOCKWAVE LITHOTRIPSY (ESWL), STENT  INSERTION/REMOVAL Left 5/4/2018    Procedure: EXTRACORPOREAL SHOCKWAVE LITHOTRIPSY;  Surgeon: Prosper Ariza MD;  Location: [facility];  Service: Urology   • KIDNEY STONE SURGERY     • SCROTAL SURGERY      trauma       Therapy Treatment          Rehabilitation Treatment Summary     Row Name 05/11/18 1040 05/11/18 0840          Treatment Time/Intention    Discipline physical therapy assistant  -LN occupational therapy assistant  -     Document Type therapy note (daily note)  - therapy note (daily note)  -     Subjective Information no complaints  - no complaints  -     Mode of Treatment physical therapy  -LN individual therapy;occupational therapy  -     Therapy Frequency (OT Eval)  -- other (see comments)   3-14x/wk  -     Patient Effort  -- excellent  -     Existing Precautions/Restrictions fall;oxygen therapy device and L/min  -  --     Recorded by [] Daisy Pelayo PTA 05/11/18 1149 05/11/18 1149 [] MARÍA Howe/DARÍO 05/11/18 1356 05/11/18 1356     Row Name 05/11/18 1040 05/11/18 0840          Vital Signs    Pre Systolic BP Rehab 141  -LN  --     Pre Treatment Diastolic BP 75  -LN  --     Post Systolic BP Rehab 159  -LN  --     Post Treatment Diastolic BP 93  -LN  --     Pretreatment Heart Rate (beats/min) 93  -LN  --     Intratreatment Heart Rate (beats/min) 90  -LN  --     Posttreatment Heart Rate (beats/min) 98  -LN  --     Pre SpO2 (%) 94  -LN 89  -     O2 Delivery Pre Treatment room air  - room air  -     Intra SpO2 (%) 92  -LN 84   post ambulation  -     O2 Delivery Intra Treatment  -- room air  -     Post SpO2 (%) 96  -LN 94  -     O2 Delivery Post Treatment  -- supplemental O2   Once back in his room O2 replaced   -     Pre Patient Position Side Lying  -LN  --     Intra Patient Position Standing  -LN  --     Post Patient Position Sitting  -LN  --     Recorded by [] Daisy Pelayo PTA 05/11/18 1149 05/11/18 1149 [] CUONG HoweA/DARÍO 05/11/18 1356 05/11/18  1356     Row Name 05/11/18 1040 05/11/18 0840          Cognitive Assessment/Intervention- PT/OT    Affect/Mental Status (Cognitive)  -- WNL  -JH     Orientation Status (Cognition) oriented x 3  -LN oriented x 4  -JH     Recorded by [LN] Daisy Pelayo, PTA 05/11/18 1149 05/11/18 1149 [JH] CUONG HoweA/L 05/11/18 1356 05/11/18 1356     Row Name 05/11/18 1040             Bed Mobility Assessment/Treatment    Supine-Sit [place] (Bed Mobility) independent  -LN      Recorded by [LN] Daisy Pelayo, PTA 05/11/18 1149 05/11/18 1149      Row Name 05/11/18 0840             Functional Mobility    Functional Mobility- Ind. Level conditional independence  -JH      Functional Mobility-Distance (Feet) 125  -JH      Recorded by [JH] CUONG HoweA/DARÍO 05/11/18 1356 05/11/18 1356      Row Name 05/11/18 1040 05/11/18 0840          Transfer Assessment/Treatment    Bed-Chair [place] (Transfers) not tested  -LN  --     Chair-Bed [place] (Transfers) not tested  -LN  --     Sit-Stand [place] (Transfers) independent  -LN independent  -JH     Stand-Sit [place] (Transfers) independent  -LN independent  -JH     [place] Level (Toilet Transfer) not tested  -LN independent  -JH     Recorded by [LN] Daisy Pelayo, PTA 05/11/18 1149 05/11/18 1149 [JH] CUONG HoweA/DARÍO 05/11/18 1356 05/11/18 1356     Row Name 05/11/18 0840             Toilet Transfer    Type (Toilet Transfer) sit-stand;stand-sit  -JH      Recorded by [JH] CUONG HoweA/L 05/11/18 1356 05/11/18 1356      Row Name 05/11/18 1040             Gait/Stairs Assessment/Training    [place] Level (Gait) independent  -LN      Assistive Device (Gait) --   none  -LN      Distance in Feet (Gait) 600  -LN      Pattern (Gait) step-through  -LN      Comment (Gait/Stairs) pt not reaching for hr and no lob noted this rx  -LN      Recorded by [LN] Daisy Pelayo, PTA 05/11/18 1149 05/11/18 1149      Row Name 05/11/18 0840             ADL  Assessment/Intervention    BADL Assessment/Intervention --   Pt decline adl's,awaiting spouse   -JH      Recorded by [JH] CUONG HoweA/L 05/11/18 1356 05/11/18 1356      Row Name 05/11/18 1040 05/11/18 0840          Static Sitting Balance    Level of [place] (Unsupported Sitting, Static Balance) independent  -LN independent  -JH     Sitting Position (Unsupported Sitting, Static Balance)  -- sitting on edge of bed  -JH     Recorded by [LN] Daisy Pelayo, PTA 05/11/18 1149 05/11/18 1149 [JH] CUONG HoweA/DARÍO 05/11/18 1356 05/11/18 1356     Row Name 05/11/18 0840             Dynamic Sitting Balance    Level of [place], Reaches Outside Midline (Sitting, Dynamic Balance) independent  -JH      Recorded by [JH] CUONG HoweA/DARÍO 05/11/18 1356 05/11/18 1356      Row Name 05/11/18 0840             Dynamic Standing Balance    Level of [place], Reaches Outside Midline (Standing, Dynamic Balance) independent  -JH      Time Able to Maintain Position, Reaches Outside Midline (Standing, Dynamic Balance) more than 5 minutes  -JH      Recorded by [JH] MARÍA Howe/DARÍO 05/11/18 1356 05/11/18 1356      Row Name 05/11/18 1040 05/11/18 0840          Positioning and Restraints    Pre-Treatment Position  -- sitting in chair/recliner  -JH     Post Treatment Position bed  -LN bathroom  -JH     In Bed sitting EOB;call light within reach;encouraged to call for assist;with family/caregiver  -LN  --     Bathroom  -- notified nsg;sitting;call light within reach;encouraged to call for assist  -JH     Recorded by [LN] Daisy Pelayo, PTA 05/11/18 1149 05/11/18 1149 [JH] CUONG HoweA/DARÍO 05/11/18 1356 05/11/18 1356     Row Name 05/11/18 1040             Plan of Care Review    Plan of Care Reviewed With patient  -LN      Recorded by [LN] Daisy Pelayo, PTA 05/11/18 1149 05/11/18 1149      Row Name 05/11/18 0840             Outcome Summary/Treatment Plan (OT)    Daily Summary of Progress (OT) progress toward  functional goals is good  -      Plan for Continued Treatment (OT) Continue per POC  -JH      Anticipated Discharge Disposition (OT) home with home health  -JH      Recorded by [JH] MARÍA Howe/DARÍO 05/11/18 1356 05/11/18 1356      Row Name 05/11/18 1040             Outcome Summary/Treatment Plan (PT)    Plan for Continued Treatment (PT) cont  -LN      Recorded by [LN] Daisy S Gita, PTA 05/11/18 1149 05/11/18 1149        User Key  (r) = Recorded By, (t) = Taken By, (c) = Cosigned By    Initials Name Effective Dates Discipline    LN Daisy S Gita, PTA 03/07/18 -  PT     MARÍA Howe/DARÍO 03/07/18 -  OT                   OT Rehab Goals     Row Name 05/11/18 0840             Transfer Goal 1 (OT)    Activity/Assistive Device (Transfer Goal 1, OT) toilet;shower chair  -      [place] Level/Cues Needed (Transfer Goal 1, OT) supervision required  -      Time Frame (Transfer Goal 1, OT) long term goal (LTG);by discharge  -      Progress/Outcome (Transfer Goal 1, OT) goal not met  -         Bathing Goal 1 (OT)    Activity/Assistive Device (Bathing Goal 1, OT) bathing skills, all  -      [place] Level/Cues Needed (Bathing Goal 1, OT) minimum assist (75% or more patient effort)  -      Time Frame (Bathing Goal 1, OT) long term goal (LTG);by discharge  -      Progress/Outcomes (Bathing Goal 1, OT) goal not met  -         Dressing Goal 1 (OT)    Activity/Assistive Device (Dressing Goal 1, OT) dressing skills, all  -JH      [place]/Cues Needed (Dressing Goal 1, OT) supervision required;set-up required  -      Time Frame (Dressing Goal 1, OT) long term goal (LTG);by discharge  -      Progress/Outcome (Dressing Goal 1, OT) goal not met  -         Toileting Goal 1 (OT)    Activity/Device (Toileting Goal 1, OT) toileting skills, all  -      [place] Level/Cues Needed (Toileting Goal 1, OT) conditional independence  -      Time Frame (Toileting Goal 1, OT) long term goal  (LTG);by discharge  -      Progress/Outcome (Toileting Goal 1, OT) goal not met  -        User Key  (r) = Recorded By, (t) = Taken By, (c) = Cosigned By    Initials Name Provider Type Discipline     ADIN Howe Occupational Therapy Assistant OT        Occupational Therapy Education     Title: PT OT SLP Therapies (Active)     Topic: Occupational Therapy (Active)     Point: ADL training (Done)     Description: Instruct learner(s) on proper safety adaptation and remediation techniques during self care or transfers.   Instruct in proper use of assistive devices.   Learning Progress Summary     Learner Status Readiness Method Response Comment Documented by    Patient Done Ivan HERRERA,RENEE role of OT, bed mobility, transfer training, ECT, ADL training AS 05/09/18 1659                      User Key     Initials Effective Dates Name Provider Type Discipline    AS 05/01/18 -  Yani Piña, OT Occupational Therapist OT                OT Recommendation and Plan  Outcome Summary/Treatment Plan (OT)  Daily Summary of Progress (OT): progress toward functional goals is good  Plan for Continued Treatment (OT): Continue per POC  Anticipated Discharge Disposition (OT): home with home health  Therapy Frequency (OT Eval): other (see comments) (3-14x/wk)  Daily Summary of Progress (OT): progress toward functional goals is good  Outcome Summary: (P) Pt declined adl's states he wanted to await on his spouse, no OT goals met to date, home with  OT/PT upon d/c         Outcome Measures     Row Name 05/11/18 1040 05/11/18 0840 05/10/18 1418       How much help from another person do you currently need...    Turning from your back to your side while in flat bed without using bedrails? 4  -LN  -- 4  -GB    Moving from lying on back to sitting on the side of a flat bed without bedrails? 4  -LN  -- 4  -GB    Moving to and from a bed to a chair (including a wheelchair)? 4  -LN  -- 4  -GB    Standing up from a chair using your  arms (e.g., wheelchair, bedside chair)? 4  -LN  -- 4  -GB    Climbing 3-5 steps with a railing? 3  -LN  -- 3  -GB    To walk in hospital room? 4  -LN  -- 3   02 and IV lines  -GB    AM-PAC 6 Clicks Score 23  -LN  -- 22  -GB       How much help from another is currently needed...    Putting on and taking off regular lower body clothing?  -- 4  -JH  --    Bathing (including washing, rinsing, and drying)  -- 4  -JH  --    Toileting (which includes using toilet bed pan or urinal)  -- 4  -JH  --    Putting on and taking off regular upper body clothing  -- 4  -JH  --    Taking care of personal grooming (such as brushing teeth)  -- 4  -JH  --    Eating meals  -- 4  -JH  --    Score  -- 24  -JH  --       Functional Assessment    Outcome Measure Options AM-PAC 6 Clicks Basic Mobility (PT)  -LN  -- AM-[facility] 6 Clicks Basic Mobility (PT)  -    Row Name 05/10/18 1400 05/09/18 1407          How much help from another is currently needed...    Putting on and taking off regular lower body clothing?  -- 2  -AS     Bathing (including washing, rinsing, and drying)  -- 3  -AS     Toileting (which includes using toilet bed pan or urinal)  -- 3  -AS     Putting on and taking off regular upper body clothing  -- 3  -AS     Taking care of personal grooming (such as brushing teeth)  -- 4  -AS     Eating meals  -- 4  -AS     Score  -- 19  -AS        Timed Up and Go (TUG)    TUG Test 1 12 seconds  -  --     Timed Up and Go Comments no Ad; no LOB;   -GB  --        Functional Assessment    Outcome Measure Options Timed Up and Go (TUG)  - AM-PAC 6 Clicks Daily Activity (OT)  -AS       User Key  (r) = Recorded By, (t) = Taken By, (c) = Cosigned By    Initials Name Provider Type    HERMILO Kimball, PT Physical Therapist    LN Daisy Pelayo, PTA Physical Therapy Assistant     Maria Elena Lay, DANIEL/L Occupational Therapy Assistant    AS Yani Piña, OT Occupational Therapist           Time Calculation:         Time Calculation- OT      Row Name 05/11/18 1401             Time Calculation- OT    OT Start Time 0840  -      OT Stop Time 1000  -      OT Time Calculation (min) 80 min  -      OT Received On 05/11/18  -        User Key  (r) = Recorded By, (t) = Taken By, (c) = Cosigned By    Initials Name Provider Type     CUONG HoweA/L Occupational Therapy Assistant           Therapy Charges for Today     Code Description Service Date Service Provider Modifiers Qty    28797300218 HC OT SELF CARE/MGMT/TRAIN EA 15 MIN 5/11/2018 CUONG HoweA/L GO 1    12410392062 HC OT THER PROC EA 15 MIN 5/11/2018 CUONG HoweA/L GO 1    40097865167 HC OT THERAPEUTIC ACT EA 15 MIN 5/11/2018 CUONG HoweA/L GO 2    49634686527 HC OT THER SUPP EA 15 MIN 5/11/2018 Maria Elena Lay DANIEL/L GO 1          OT G-codes  OT Professional Judgement Used?: Yes  OT Functional Scales Options: AM-PAC 6 Clicks Daily Activity (OT)  Score: 19  Functional Limitation: Self care  Self Care Current Status (): At least 40 percent but less than 60 percent impaired, limited or restricted  Self Care Goal Status (): At least 1 percent but less than 20 percent impaired, limited or restricted    MARÍA Howe/DARÍO  5/11/2018

## 2018-05-11 NOTE — PLAN OF CARE
Problem: Skin Injury Risk (Adult)  Goal: Skin Health and Integrity  Outcome: Ongoing (interventions implemented as appropriate)      Problem: Patient Care Overview  Goal: Plan of Care Review  Outcome: Ongoing (interventions implemented as appropriate)   05/11/18 0258   Plan of Care Review   Progress improving   OTHER   Outcome Summary pt still on 2L oxygen. no complaints of SOA or any pain. VSS. pt has slept well all night. will continue to monitor.    Coping/Psychosocial   Plan of Care Reviewed With patient       Problem: Anaphylactic/Systemic Hypersensitivity Reaction (Adult)  Goal: Signs and Symptoms of Listed Potential Problems Will be Absent, Minimized or Managed (Anaphylactic/Systemic Hypersensitivity Reaction)  Outcome: Ongoing (interventions implemented as appropriate)      Problem: Pain, Acute (Adult)  Goal: Acceptable Pain Control/Comfort Level  Outcome: Ongoing (interventions implemented as appropriate)

## 2018-05-11 NOTE — DISCHARGE SUMMARY
[facility] Medicine Services  DISCHARGE SUMMARY       Date of Admission: 5/6/2018  Date of Discharge:  5/11/2018  Primary Care Physician: Jaxson Zepeda MD    Presenting Problem/History of Present Illness:  Hypoxia [R09.02]  Anaphylaxis, initial encounter [T78.2XXA]   Patient is 73-year-old male for hypertension, diabetes mellitus, nephrolithiasis, BPH who presented to  ED because of tongue swelling and difficulty breathing which has been worsening through the day.  Patient had received SHOCKWAVE LITHOTRIPSY by Dr jain the day before, was supposed to start anabiotics according to the wife but she doesn't think that he picked them up.  He was seen at the emergency department earlier on the day of admission with complaints of pain and was given Toradol and morphine and discharged. Patient came back with worsening symptoms of tongue swelling and dyspnea and rash.     Final Discharge Diagnoses:  Hospital Problem List     * (Principal)Anaphylactic syndrome    Hypertension (Chronic)    Diabetes mellitus (Chronic)    Acute respiratory failure          Consults:   Consults     Date and Time Order Name Status Description    5/7/2018 1027 Inpatient Pulmonology Consult Completed           Procedures Performed: None                Pertinent Test Results:   Lab Results (last 7 days)     Procedure Component Value Units Date/Time    POC Glucose Once [[id number]]  (Abnormal) Collected:  05/11/18 0600    Specimen:  Blood Updated:  05/11/18 0618     Glucose 135 (H) mg/dL      Comment: RN NotifiedMeter: [id number]: [id number] MARTINEZ FERNANDEZ       CBC & Differential [[id number]] Collected:  05/11/18 0540    Specimen:  Blood Updated:  05/11/18 0559    Narrative:       The following orders were created for panel order CBC & Differential.  Procedure                               Abnormality         Status                     ---------                               -----------          ------                     CBC Auto Differential[[id number]]        Abnormal            Final result                 Please view results for these tests on the individual orders.    CBC Auto Differential [[id number]]  (Abnormal) Collected:  05/11/18 0540    Specimen:  Blood Updated:  05/11/18 0559     WBC 6.73 10*3/mm3      RBC 4.99 10*6/mm3      Hemoglobin 15.2 g/dL      Hematocrit 46.3 %      MCV 92.8 fL      MCH 30.5 pg      MCHC 32.8 g/dL      RDW 14.3 %      RDW-SD 48.2 (H) fl      MPV 8.9 fL      Platelets 244 10*3/mm3      Neutrophil % 56.5 %      Lymphocyte % 26.4 %      Monocyte % 10.4 %      Eosinophil % 3.0 %      Basophil % 0.3 %      Immature Grans % 3.4 (H) %      Neutrophils, Absolute 3.80 10*3/mm3      Lymphocytes, Absolute 1.78 10*3/mm3      Monocytes, Absolute 0.70 10*3/mm3      Eosinophils, Absolute 0.20 10*3/mm3      Basophils, Absolute 0.02 10*3/mm3      Immature Grans, Absolute 0.23 (H) 10*3/mm3      nRBC 0.0 /100 WBC     Food Allergy Profile [[id number]] Collected:  05/07/18 0228    Specimen:  Blood Updated:  05/11/18 0237     Class Description Comment     Comment:     Levels of Specific IgE       Class  Description of Class      ---------------------------  -----  --------------------                     < 0.10         0         Negative             0.10 -    0.31         0/I       Equivocal/Low             0.32 -    0.55         I         Low             0.56 -    1.40         II        Moderate             1.41 -    3.90         III       High             3.91 -   19.00         IV        Very High            19.01 -  100.00         V         Very High                    >100.00         VI        Very High        Egg White <0.10 kU/L      Peanut <0.10 kU/L      Soybean <0.10 kU/L      Milk, Cow's <0.10 kU/L      Clams <0.10 kU/L      Shrimp <0.10 kU/L      [place] <0.10 kU/L      CodFish <0.10 kU/L      Scallop <0.10 kU/L      Wheat <0.10 kU/L      Corn <0.10 kU/L      Sesame Seed <0.10 kU/L      Narrative:       Performed at:  [address]  [id number]  : Kilo Huerta MD, Phone:  [phone]    POC Glucose Once [[id number]]  (Abnormal) Collected:  05/10/18 1943    Specimen:  Blood Updated:  05/10/18 2030     Glucose 230 (H) mg/dL      Comment: RN NotifiedMeter: [id number]: [id number] KAYLA LEACH       POC Glucose Once [[id number]]  (Abnormal) Collected:  05/10/18 1603    Specimen:  Blood Updated:  05/10/18 1648     Glucose 239 (H) mg/dL      Comment: RN NotifiedMeter: [id number]: [id number] ZULEIMAHERMINIA MORALESIN       POC Glucose Once [[id number]]  (Abnormal) Collected:  05/10/18 1013    Specimen:  Blood Updated:  05/10/18 1040     Glucose 237 (H) mg/dL      Comment: RN NotifiedMeter: [id number]: [id number] STEPHANIE MCCULLOUGH       POC Glucose Once [[id number]]  (Abnormal) Collected:  05/10/18 0620    Specimen:  Blood Updated:  05/10/18 0634     Glucose 172 (H) mg/dL      Comment: RN NotifiedMeter: [id number]: [id number] J CARLOS PARKINSON       Basic Metabolic Panel [[id number]]  (Abnormal) Collected:  05/10/18 0546    Specimen:  Blood Updated:  05/10/18 0610     Glucose 191 (H) mg/dL      BUN 33 (H) mg/dL      Creatinine 0.92 mg/dL      Sodium 142 mmol/L      Potassium 3.6 mmol/L      Chloride 101 mmol/L      CO2 29.0 mmol/L      Calcium 8.5 mg/dL      eGFR Non African Amer 81 mL/min/1.73      BUN/Creatinine Ratio 35.9 (H)     Anion Gap 12.0 mmol/L     Narrative:       The MDRD GFR formula is only valid for adults with stable renal function between ages 18 and 70.    CBC & Differential [[id number]] Collected:  05/10/18 0546    Specimen:  Blood Updated:  05/10/18 0558    Narrative:       The following orders were created for panel order CBC & Differential.  Procedure                               Abnormality         Status                     ---------                               -----------         ------                      CBC Auto Differential[[id number]]        Abnormal            Final result                 Please view results for these tests on the individual orders.    CBC Auto Differential [[id number]]  (Abnormal) Collected:  05/10/18 0546    Specimen:  Blood Updated:  05/10/18 0558     WBC 8.24 10*3/mm3      RBC 4.90 10*6/mm3      Hemoglobin 14.8 g/dL      Hematocrit 44.1 %      MCV 90.0 fL      MCH 30.2 pg      MCHC 33.6 g/dL      RDW 14.0 %      RDW-SD 45.9 (H) fl      MPV 9.2 fL      Platelets 247 10*3/mm3      Neutrophil % 66.7 %      Lymphocyte % 21.7 %      Monocyte % 10.1 %      Eosinophil % 0.1 %      Basophil % 0.1 %      Immature Grans % 1.3 (H) %      Neutrophils, Absolute 5.49 10*3/mm3      Lymphocytes, Absolute 1.79 10*3/mm3      Monocytes, Absolute 0.83 10*3/mm3      Eosinophils, Absolute 0.01 10*3/mm3      Basophils, Absolute 0.01 10*3/mm3      Immature Grans, Absolute 0.11 (H) 10*3/mm3     POC Glucose Once [[id number]]  (Abnormal) Collected:  05/10/18 0033    Specimen:  Blood Updated:  05/10/18 0044     Glucose 239 (H) mg/dL      Comment: RN NotifiedMeter: [id number]: [id number] CHESNEL MILAN       POC Glucose Once [[id number]]  (Abnormal) Collected:  05/09/18 2022    Specimen:  Blood Updated:  05/09/18 2109     Glucose 253 (H) mg/dL      Comment: RN NotifiedMeter: [id number]: [id number] CHESNEL MILAN       Potassium [[id number]]  (Normal) Collected:  05/09/18 1759    Specimen:  Blood Updated:  05/09/18 1843     Potassium 4.5 mmol/L     POC Glucose Once [[id number]]  (Abnormal) Collected:  05/09/18 1517    Specimen:  Blood Updated:  05/09/18 1729     Glucose 283 (H) mg/dL      Comment: RN NotifiedMeter: [id number]: [id number] DIEGO CRISTINE       POC Glucose Once [[id number]]  (Abnormal) Collected:  05/09/18 1117    Specimen:  Blood Updated:  05/09/18 1211     Glucose 328 (H) mg/dL      Comment: RN NotifiedMeter: [id number]: [id number] DIEGO COLUNGA       POC Glucose Once  [[id number]]  (Abnormal) Collected:  05/09/18 0724    Specimen:  Blood Updated:  05/09/18 0820     Glucose 246 (H) mg/dL      Comment: RN NotifiedMeter: [id number]: [id number] PILO GONZALEZ       Basic Metabolic Panel [[id number]]  (Abnormal) Collected:  05/09/18 0316    Specimen:  Blood Updated:  05/09/18 0351     Glucose 291 (H) mg/dL      BUN 33 (H) mg/dL      Creatinine 0.97 mg/dL      Sodium 141 mmol/L      Potassium 3.3 (L) mmol/L      Chloride 96 mmol/L      CO2 33.0 (H) mmol/L      Calcium 9.1 mg/dL      eGFR Non African Amer 76 mL/min/1.73      BUN/Creatinine Ratio 34.0 (H)     Anion Gap 12.0 mmol/L     Narrative:       The MDRD GFR formula is only valid for adults with stable renal function between ages 18 and 70.    CBC & Differential [[id number]] Collected:  05/09/18 0316    Specimen:  Blood Updated:  05/09/18 0330    Narrative:       The following orders were created for panel order CBC & Differential.  Procedure                               Abnormality         Status                     ---------                               -----------         ------                     Scan Slide[[id number]]                                                                  CBC Auto Differential[[id number]]        Abnormal            Final result                 Please view results for these tests on the individual orders.    CBC Auto Differential [[id number]]  (Abnormal) Collected:  05/09/18 0316    Specimen:  Blood Updated:  05/09/18 0330     WBC 12.56 (H) 10*3/mm3      RBC 5.33 10*6/mm3      Hemoglobin 16.4 g/dL      Hematocrit 48.2 %      MCV 90.4 fL      MCH 30.8 pg      MCHC 34.0 g/dL      RDW 13.7 %      RDW-SD 44.9 (H) fl      MPV 9.2 fL      Platelets 346 10*3/mm3      Neutrophil % 83.2 (H) %      Lymphocyte % 7.5 (L) %      Monocyte % 7.9 %      Eosinophil % 0.0 %      Basophil % 0.1 %      Immature Grans % 1.3 (H) %      Neutrophils, Absolute 10.46 (H) 10*3/mm3      Lymphocytes, Absolute 0.94 10*3/mm3       Monocytes, Absolute 0.99 (H) 10*3/mm3      Eosinophils, Absolute 0.00 10*3/mm3      Basophils, Absolute 0.01 10*3/mm3      Immature Grans, Absolute 0.16 (H) 10*3/mm3      nRBC 0.0 /100 WBC     Blood Gas, Arterial With Co-Ox [[id number]]  (Abnormal) Collected:  05/09/18 0004    Specimen:  Arterial Blood Updated:  05/09/18 0009     Site Right Radial     Elder's Test Positive     pH, Arterial 7.482 (H) pH units      pCO2, Arterial 41.5 mm Hg      pO2, Arterial 51.7 (L) mm Hg      HCO3, Arterial 31.0 (H) mmol/L      Base Excess, Arterial 6.8 (H) mmol/L      O2 Saturation, Arterial 88.1 (L) %      Hemoglobin, Blood Gas 15.5 g/dL      Hematocrit, Blood Gas 47.5 %      Oxyhemoglobin 87.5 (L) %      Methemoglobin 0.00 %      Carboxyhemoglobin 0.6 %      Sodium, Arterial 140 mmol/L      Potassium, Arterial 3.4 mmol/L      Ionized Calcium 4.57 (L) mg/dL      Glucose, Arterial 360 (H) mmol/L      Barometric Pressure for Blood Gas 749 mmHg      Modality Nasal Cannula     Flow Rate 6.0 lpm      Ventilator Mode NA     Collected by maddie     pH, Temp Corrected -- pH Units      pCO2, Temperature Corrected -- mm Hg      pO2, Temperature Corrected -- mm Hg     POC Glucose Once [[id number]]  (Abnormal) Collected:  05/08/18 2116    Specimen:  Blood Updated:  05/08/18 2147     Glucose 354 (H) mg/dL      Comment: RN NotifiedMeter: [id number]: [id number] PILO GONZALEZ       POC Glucose Once [[id number]]  (Abnormal) Collected:  05/08/18 1759    Specimen:  Blood Updated:  05/08/18 1817     Glucose 358 (H) mg/dL      Comment: Sliding Scale AdminMeter: [id number]: [id number] LUIS LORD       POC Glucose Once [[id number]]  (Abnormal) Collected:  05/08/18 1049    Specimen:  Blood Updated:  05/08/18 1101     Glucose 339 (H) mg/dL      Comment: Sliding Scale AdminMeter: [id number]: [id number] LUIS CHAUDHRYE       POC Glucose Once [[id number]]  (Abnormal) Collected:  05/08/18 0727    Specimen:  Blood Updated:  05/08/18  0739     Glucose 293 (H) mg/dL      Comment: Sliding Scale AdminMeter: [id number]: [id number] LUIS LORD       Blood Gas, Arterial With Co-Ox [[id number]]  (Abnormal) Collected:  05/08/18 0315    Specimen:  Arterial Blood Updated:  05/08/18 0323     Site Right Radial     Elder's Test N/A     pH, Arterial 7.413 pH units      pCO2, Arterial 44.6 mm Hg      pO2, Arterial 71.7 (L) mm Hg      HCO3, Arterial 28.4 (H) mmol/L      Base Excess, Arterial 3.2 (H) mmol/L      O2 Saturation, Arterial 94.4 %      Hemoglobin, Blood Gas 14.3 g/dL      Hematocrit, Blood Gas 43.9 %      Oxyhemoglobin 93.6 (L) %      Methemoglobin 0.50 %      Carboxyhemoglobin 0.4 %      Sodium, Arterial 139 mmol/L      Potassium, Arterial 4.0 mmol/L      Ionized Calcium 4.59 (L) mg/dL      Glucose, Arterial 301 (H) mmol/L      Barometric Pressure for Blood Gas 751 mmHg      Modality Ventilator     FIO2 70 %      Ventilator Mode AC     Set Tidal Volume 490     Set Mech Resp Rate 12.0     PEEP 10.0     PIP 23 cmH2O      Collected by yonny     pH, Temp Corrected -- pH Units      pCO2, Temperature Corrected -- mm Hg      pO2, Temperature Corrected -- mm Hg     Basic Metabolic Panel [[id number]]  (Abnormal) Collected:  05/08/18 0234    Specimen:  Blood Updated:  05/08/18 0312     Glucose 297 (H) mg/dL      BUN 27 (H) mg/dL      Creatinine 0.95 mg/dL      Sodium 137 mmol/L      Potassium 4.1 mmol/L      Chloride 101 mmol/L      CO2 26.0 mmol/L      Calcium 8.5 mg/dL      eGFR Non African Amer 78 mL/min/1.73      BUN/Creatinine Ratio 28.4 (H)     Anion Gap 10.0 mmol/L     Narrative:       The MDRD GFR formula is only valid for adults with stable renal function between ages 18 and 70.    CBC & Differential [[id number]] Collected:  05/08/18 0234    Specimen:  Blood Updated:  05/08/18 0251    Narrative:       The following orders were created for panel order CBC & Differential.  Procedure                               Abnormality         Status                      ---------                               -----------         ------                     CBC Auto Differential[[id number]]        Abnormal            Final result                 Please view results for these tests on the individual orders.    CBC Auto Differential [[id number]]  (Abnormal) Collected:  05/08/18 0234    Specimen:  Blood Updated:  05/08/18 0251     WBC 11.18 (H) 10*3/mm3      RBC 4.60 10*6/mm3      Hemoglobin 14.0 g/dL      Hematocrit 40.4 %      MCV 87.8 fL      MCH 30.4 pg      MCHC 34.7 g/dL      RDW 13.8 %      RDW-SD 44.0 (H) fl      MPV 9.3 fL      Platelets 239 10*3/mm3      Neutrophil % 90.8 (H) %      Lymphocyte % 6.6 (L) %      Monocyte % 2.1 %      Eosinophil % 0.0 %      Basophil % 0.1 %      Immature Grans % 0.4 %      Neutrophils, Absolute 10.14 (H) 10*3/mm3      Lymphocytes, Absolute 0.74 10*3/mm3      Monocytes, Absolute 0.24 10*3/mm3      Eosinophils, Absolute 0.00 10*3/mm3      Basophils, Absolute 0.01 10*3/mm3      Immature Grans, Absolute 0.05 (H) 10*3/mm3     POC Glucose Once [[id number]]  (Abnormal) Collected:  05/07/18 2031    Specimen:  Blood Updated:  05/07/18 2044     Glucose 255 (H) mg/dL      Comment: RN NotifiedMeter: [id number]: [id number] TANA FISH       POC Glucose Once [[id number]]  (Abnormal) Collected:  05/07/18 1650    Specimen:  Blood Updated:  05/07/18 1702     Glucose 266 (H) mg/dL      Comment: Sliding Scale AdminMeter: [id number]: [id number] LUIS GONZALEZBIE       POC Glucose Once [[id number]]  (Abnormal) Collected:  05/07/18 1107    Specimen:  Blood Updated:  05/07/18 1121     Glucose 236 (H) mg/dL      Comment: Sliding Scale AdminMeter: [id number]: [id number] LUIS POP       POC Glucose Once [[id number]]  (Abnormal) Collected:  05/07/18 0747    Specimen:  Blood Updated:  05/07/18 0758     Glucose 219 (H) mg/dL      Comment: Sliding Scale AdminMeter: [id number]: [id number] LUIS LORD       Blood Gas, Arterial  With Co-Ox [[id number]]  (Abnormal) Collected:  05/07/18 0450    Specimen:  Arterial Blood Updated:  05/07/18 0459     Site Right Radial     Elder's Test N/A     pH, Arterial 7.425 pH units      pCO2, Arterial 40.0 mm Hg      pO2, Arterial 66.4 (L) mm Hg      HCO3, Arterial 26.2 (H) mmol/L      Base Excess, Arterial 1.7 mmol/L      O2 Saturation, Arterial 93.9 (L) %      Hemoglobin, Blood Gas 13.9 (L) g/dL      Hematocrit, Blood Gas 42.5 %      Oxyhemoglobin 93.0 (L) %      Methemoglobin 0.50 %      Carboxyhemoglobin 0.5 %      Sodium, Arterial 138 mmol/L      Potassium, Arterial 3.9 mmol/L      Ionized Calcium 4.54 (L) mg/dL      Glucose, Arterial 226 (H) mmol/L      Barometric Pressure for Blood Gas 749 mmHg      Modality N/A     FIO2 100 %      Ventilator Mode AC     Set Tidal Volume 550     Set Mech Resp Rate 16.0     PEEP 8.0     Collected by gold ramos     pH, Temp Corrected -- pH Units      pCO2, Temperature Corrected -- mm Hg      pO2, Temperature Corrected -- mm Hg     CBC & Differential [[id number]] Collected:  05/07/18 0228    Specimen:  Blood Updated:  05/07/18 0319    Narrative:       The following orders were created for panel order CBC & Differential.  Procedure                               Abnormality         Status                     ---------                               -----------         ------                     CBC Auto Differential[[id number]]        Abnormal            Final result                 Please view results for these tests on the individual orders.    CBC Auto Differential [[id number]]  (Abnormal) Collected:  05/07/18 0228    Specimen:  Blood Updated:  05/07/18 0319     WBC 13.04 (H) 10*3/mm3      RBC 4.54 10*6/mm3      Hemoglobin 13.8 g/dL      Hematocrit 40.1 %      MCV 88.3 fL      MCH 30.4 pg      MCHC 34.4 g/dL      RDW 13.5 %      RDW-SD 43.3 fl      MPV 9.3 fL      Platelets 236 10*3/mm3      Neutrophil % 91.6 (H) %      Lymphocyte % 5.9 (L) %      Monocyte % 2.1 %       Eosinophil % 0.0 %      Basophil % 0.0 %      Immature Grans % 0.4 %      Neutrophils, Absolute 11.94 (H) 10*3/mm3      Lymphocytes, Absolute 0.77 10*3/mm3      Monocytes, Absolute 0.28 10*3/mm3      Eosinophils, Absolute 0.00 10*3/mm3      Basophils, Absolute 0.00 10*3/mm3      Immature Grans, Absolute 0.05 (H) 10*3/mm3     Basic Metabolic Panel [[id number]]  (Abnormal) Collected:  05/07/18 0228    Specimen:  Blood Updated:  05/07/18 0305     Glucose 213 (H) mg/dL      BUN 22 (H) mg/dL      Creatinine 1.00 mg/dL      Sodium 136 (L) mmol/L      Potassium 3.9 mmol/L      Chloride 102 mmol/L      CO2 26.0 mmol/L      Calcium 8.4 mg/dL      eGFR Non African Amer 74 mL/min/1.73      BUN/Creatinine Ratio 22.0     Anion Gap 8.0 mmol/L     Narrative:       The MDRD GFR formula is only valid for adults with stable renal function between ages 18 and 70.    POC Glucose Once [[id number]]  (Abnormal) Collected:  05/06/18 2138    Specimen:  Blood Updated:  05/06/18 2152     Glucose 213 (H) mg/dL      Comment: RN NotifiedSliding Scale AdminMeter: [id number]: [id number] [facility]       Blood Gas, Arterial With Co-Ox [[id number]]  (Abnormal) Collected:  05/06/18 0440    Specimen:  Arterial Blood Updated:  05/06/18 0446     Site Right Radial     Elder's Test N/A     pH, Arterial 7.259 (L) pH units      pCO2, Arterial 60.8 (H) mm Hg      pO2, Arterial 79.4 (L) mm Hg      HCO3, Arterial 27.2 (H) mmol/L      Base Excess, Arterial -1.4 (L) mmol/L      O2 Saturation, Arterial 94.7 %      Hemoglobin, Blood Gas 15.3 g/dL      Hematocrit, Blood Gas 46.9 %      Oxyhemoglobin 93.6 (L) %      Methemoglobin 0.60 %      Carboxyhemoglobin 0.6 %      Sodium, Arterial 137 mmol/L      Potassium, Arterial 4.5 mmol/L      Ionized Calcium 4.53 (L) mg/dL      Glucose, Arterial 191 (H) mmol/L      Barometric Pressure for Blood Gas 748 mmHg      Modality PRB     FIO2 100 %      Ventilator Mode AC     Set Tidal Volume 550     Set Mech Resp Rate 12.0      PEEP 8.0     Collected by gold ramos     pH, Temp Corrected -- pH Units      pCO2, Temperature Corrected -- mm Hg      pO2, Temperature Corrected -- mm Hg     TSH [[id number]]  (Normal) Collected:  05/06/18 0129    Specimen:  Blood Updated:  05/06/18 0241     TSH 3.020 mIU/mL     Lambert Draw [[id number]] Collected:  05/06/18 0129    Specimen:  Blood Updated:  05/06/18 0230    Narrative:       The following orders were created for panel order Lambert Draw.  Procedure                               Abnormality         Status                     ---------                               -----------         ------                     Light Blue Top[[id number]]                                   Final result               Green Top (Gel)[[id number]]                                  Final result               Lavender Top[[id number]]                                     Final result               Gold Top - SST[[id number]]                                   Final result                 Please view results for these tests on the individual orders.    Light Blue Top [[id number]] Collected:  05/06/18 0129    Specimen:  Blood Updated:  05/06/18 0230     Extra Tube hold for add-on     Comment: Auto resulted       Green Top (Gel) [[id number]] Collected:  05/06/18 0129    Specimen:  Blood Updated:  05/06/18 0230     Extra Tube Hold for add-ons.     Comment: Auto resulted.       Lavender Top [[id number]] Collected:  05/06/18 0129    Specimen:  Blood Updated:  05/06/18 0230     Extra Tube hold for add-on     Comment: Auto resulted       Gold Top - SST [[id number]] Collected:  05/06/18 0129    Specimen:  Blood Updated:  05/06/18 0230     Extra Tube Hold for add-ons.     Comment: Auto resulted.       T4, Free [[id number]]  (Normal) Collected:  05/06/18 0129    Specimen:  Blood Updated:  05/06/18 0227     Free T4 1.21 ng/dL     CBC & Differential [[id number]] Collected:  05/06/18 0129    Specimen:  Blood Updated:  05/06/18 0219    Narrative:        The following orders were created for panel order CBC & Differential.  Procedure                               Abnormality         Status                     ---------                               -----------         ------                     CBC Auto Differential[[id number]]        Abnormal            Final result                 Please view results for these tests on the individual orders.    CBC Auto Differential [[id number]]  (Abnormal) Collected:  05/06/18 0129    Specimen:  Blood Updated:  05/06/18 0219     WBC 9.91 (H) 10*3/mm3      RBC 5.47 10*6/mm3      Hemoglobin 16.6 g/dL      Hematocrit 50.4 (H) %      MCV 92.1 fL      MCH 30.3 pg      MCHC 32.9 g/dL      RDW 13.7 %      RDW-SD 45.6 (H) fl      MPV 9.4 fL      Platelets 282 10*3/mm3      Neutrophil % 55.7 %      Lymphocyte % 33.8 %      Monocyte % 7.7 %      Eosinophil % 1.9 %      Basophil % 0.2 %      Immature Grans % 0.7 (H) %      Neutrophils, Absolute 5.52 10*3/mm3      Lymphocytes, Absolute 3.35 10*3/mm3      Monocytes, Absolute 0.76 10*3/mm3      Eosinophils, Absolute 0.19 10*3/mm3      Basophils, Absolute 0.02 10*3/mm3      Immature Grans, Absolute 0.07 (H) 10*3/mm3     Lipase [[id number]]  (Normal) Collected:  05/06/18 0129    Specimen:  Blood Updated:  05/06/18 0210     Lipase 52 U/L     Basic Metabolic Panel [[id number]]  (Abnormal) Collected:  05/06/18 0129    Specimen:  Blood Updated:  05/06/18 0210     Glucose 181 (H) mg/dL      BUN 18 mg/dL      Creatinine 1.19 mg/dL      Sodium 137 mmol/L      Potassium 4.2 mmol/L      Chloride 97 mmol/L      CO2 28.0 mmol/L      Calcium 8.9 mg/dL      eGFR Non African Amer 60 (L) mL/min/1.73      BUN/Creatinine Ratio 15.1     Anion Gap 12.0 mmol/L     Narrative:       The MDRD GFR formula is only valid for adults with stable renal function between ages 18 and 70.    Phosphorus [[id number]]  (Normal) Collected:  05/06/18 0129    Specimen:  Blood Updated:  05/06/18 0210     Phosphorus 3.6 mg/dL     Magnesium  [[id number]]  (Normal) Collected:  05/06/18 0129    Specimen:  Blood Updated:  05/06/18 0210     Magnesium 2.0 mg/dL     aPTT [[id number]]  (Normal) Collected:  05/06/18 0129    Specimen:  Blood Updated:  05/06/18 0208     PTT 23.0 seconds     Narrative:       The recommended Heparin therapeutic range is 68-97 seconds.        Imaging Results (last 7 days)     Procedure Component Value Units Date/Time    XR Chest PA & Lateral [[id number]] Collected:  05/10/18 0944     Updated:  05/10/18 1044    Narrative:       Chest PA and lateral     CLINICAL INDICATION: Shortness of breath. Anaphylactic shock.  Post extubation.    COMPARISON: May 7, 2018    FINDINGS: Cardiac silhouette is normal in size. Pulmonary  vascularity is unremarkable.     There are left lower lobe infiltrative changes either atelectasis  or pneumonitis slightly more pronounced than on prior exam. Lungs  are otherwise clear. Since prior examination is nasogastric tube  and endotracheal tube have been removed.      Impression:       CONCLUSION: Patchy retrocardiac left lower lobe infiltrative  changes either pneumonitis or atelectasis. Otherwise unremarkable  chest.    Electronically signed by:  Ben Natarajan MD  5/10/2018 10:43 AM CDT  Workstation: [employer] XR    XR Chest 1 View [[id number]] Collected:  05/07/18 1122     Updated:  05/07/18 1155    Narrative:       Chest single view.    CLINICAL INDICATION: Shortness of breath. Anaphylactic shock.    COMPARISON: May 6, 2018    FINDINGS: Borderline cardiomegaly. Elevated diaphragms, probably  expiratory phase. Endotracheal tube with tip 4 cm above the  bifurcation of the julieta. NG tube in stomach. No dense  consolidation. Minimal discoid atelectatic changes right lung  base. Lungs otherwise clear.      Impression:       CONCLUSION: As above.    Electronically signed by:  Ben Natarajan MD  5/7/2018 11:54 AM CDT  Workstation: Z[facility]m XR    XR Chest 1 View [[id number]] Collected:  05/06/18 0253     Updated:  05/06/18 0333     Narrative:       Exam: AP portable chest    INDICATION: ET tube placement    COMPARISON: 5/6/2018 at 2:06 AM    FINDINGS: AP portable chest. ET tube tip is 3.37 cm above the  julieta. NG tube tip is in the stomach. Lungs are hypoinflated.  There is linear bilateral atelectasis. Difficult to assess the  heart size.       Impression:       ET tube and NG tube are in good position.    Electronically signed by:  Valentino South MD  5/6/2018 3:31 AM CDT  Workstation: [id number]    XR Chest 1 View [[id number]] Collected:  05/06/18 0207     Updated:  05/06/18 0233    Narrative:       Exam: AP portable chest    INDICATION: Shortness of breath    COMPARISON: 12/16/2017    FINDINGS: AP portable chest. Bony structures are intact. The  lungs are hypoinflated which accentuates the heart size and  bronchovascular markings. There is bilateral linear atelectasis.  Difficult to assess the heart size. Cannot exclude infiltrate  and/or pleural effusion in the left lung base.      Impression:       1. Hypoinflated lungs.  2. Bilateral linear atelectasis.    Electronically signed by:  Valentino South MD  5/6/2018 2:32 AM CDT  Workstation: [id number]            Chief Complaint on Day of Discharge: None    Hospital Course:  - The patient was admitted to the intensive care unit for acute respiratory failure secondary to anaphylaxis (from Toradol).  He was intubated and commenced on steroids, bronchodilators and seen by the pulmonologist.  Patient was successfully extubated and transferred to the floor.  He remained on supplemental oxygen 1-2 L nasal prongs and maintaining saturation of 91-94%. He did desaturate into the low 80s with a walk test and was prescribed oxygen on discharge.  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  He had been on supplemental oxygen in the past.  He'll be scheduled for PFTs as outpatient and be seen by Dr. Mcadams for follow-up on discharge.   -  "History of diabetes mellitus: He was continued on anti-glycemic and placed on Accu-Cheks and sliding scale insulin.  Blood sugar control was optimal.   -  History of nephrolithiasis: Patient recently had a lithotripsy.  He is to follow-up with Dr. Ariza as outpatient on discharge.    Condition on Discharge:  Stable and improved    Physical Exam on Discharge:  /73 (BP Location: Right arm, Patient Position: Lying)   Pulse 95   Temp 97 °F (36.1 °C) (Tympanic)   Resp 20   Ht 165.1 cm (65\")   Wt 98.6 kg (217 lb 6.4 oz)   SpO2 93%   BMI 36.18 kg/m²   Physical Exam  Constitutional: He is oriented to person, place, and time. He appears well-developed and well-nourished. No distress.   HENT:   Head: Normocephalic and atraumatic.   Eyes: EOM are normal. Pupils are equal, round, and reactive to light. No scleral icterus.   Neck: Normal range of motion. Neck supple. No JVD present. No thyromegaly present.   Cardiovascular: Normal rate, regular rhythm and normal heart sounds.  Exam reveals no gallop and no friction rub.    No murmur heard.  Pulmonary/Chest: Effort normal and breath sounds normal. He has no wheezes. He has no rales. He exhibits no tenderness.   He has distant breath sounds at the bases.   Abdominal: Soft. Bowel sounds are normal. He exhibits no distension and no mass. There is no tenderness. There is no rebound and no guarding.   Musculoskeletal: He exhibits no edema, tenderness or deformity.   Neurological: He is alert and oriented to person, place, and time. No cranial nerve deficit. He exhibits normal muscle tone. Coordination normal.   Skin: Skin is warm and dry. No rash noted. He is not diaphoretic. No erythema. No pallor.   Psychiatric: He has a normal mood and affect. His behavior is normal. Judgment and thought content normal.   Nursing note and vitals reviewed.   Extremities: He has left below the elbow amputation.    Discharge Disposition:  Home or Self Care    Discharge Medications:   " Rolando Novoa [place]   Home Medication Instructions JOSÉ MIGUEL:[id number]    Printed on:05/11/18 1298   Medication Information                      aspirin 81 MG tablet  Take 81 mg by mouth Daily.             atorvastatin (LIPITOR) 20 MG tablet  Take 20 mg by mouth Every Night.             budesonide-formoterol (SYMBICORT) 80-4.5 MCG/ACT inhaler  Inhale 2 puffs 2 (Two) Times a Day.             furosemide (LASIX) 20 MG tablet  Take 1 tablet by mouth Daily.             glimepiride (AMARYL) 2 MG tablet  Take 2 mg by mouth Every Morning Before Breakfast.             ipratropium-albuterol (DUO-NEB) 0.5-2.5 mg/3 ml nebulizer  Take 3 mL by nebulization 4 (Four) Times a Day.             loperamide (IMODIUM) 2 MG capsule  Take 2 mg by mouth 2 (Two) Times a Day As Needed for diarrhea.             metFORMIN (GLUCOPHAGE) 500 MG tablet  Take 500 mg by mouth 2 (two) times a day. May take 3 depending on glucose reading             nebivolol (BYSTOLIC) 10 MG tablet  Take 10 mg by mouth Daily.             omeprazole (priLOSEC) 20 MG capsule  Take 20 mg by mouth Daily.             ondansetron ODT (ZOFRAN-ODT) 4 MG disintegrating tablet  Take 1 tablet by mouth Every 6 (Six) Hours As Needed for Nausea or Vomiting.             oxyCODONE-acetaminophen (PERCOCET) 7.5-325 MG per tablet  Take 1-2 tablets by mouth Every 4 (Four) Hours As Needed (Pain).             tamsulosin (FLOMAX) 0.4 MG capsule 24 hr capsule  Take 1 capsule by mouth Every Night.             TANZEUM 30 MG pen-injector  Inject 1 dose as directed 1 (One) Time Per Week.             triamcinolone (KENALOG) 0.1 % ointment  Apply  topically 2 (Two) Times a Day.                 Discharge Diet:  heart healthy and diabetic.    Activity at Discharge:  as tolerated.    Discharge Care Plan/Instructions:   Patient has been instructed to take his medications as prescribed including oxygen therapy and bronchodilators.  He is to return to the emergency department in the event of any worsening  symptoms.    Follow-up Appointments:   Future Appointments  Date Time Provider Department Center   3/29/2019 9:45 AM Adonay rGijalva MD [facility] None       Test Results Pending at Discharge:     Jeff Armstrong MD  05/11/18  9:41 AM    Time: 45 minutes.

## 2018-05-11 NOTE — PLAN OF CARE
Problem: Patient Care Overview  Goal: Plan of Care Review  Outcome: Ongoing (interventions implemented as appropriate)   05/11/18 0840 05/11/18 1040   Plan of Care Review   Progress --  improving   OTHER   Outcome Summary Pt declined adl's states he wanted to await on his spouse, no OT goals met to date, home with  OT/PT upon d/c  --    Coping/Psychosocial   Plan of Care Reviewed With --  patient

## 2018-05-11 NOTE — THERAPY TREATMENT NOTE
Acute Care - Physical Therapy Treatment Note  [facility]     Patient Name: Rolando Novoa  : 1945  MRN: [id number]  Today's Date: 2018  Onset of Illness/Injury or Date of Surgery: 18  Date of Referral to PT: 18  Referring Physician: Dr Ayoub    Admit Date: 2018    Visit Dx:    ICD-10-CM ICD-9-CM   1. Anaphylaxis, initial encounter T78.2XXA 995.0   2. Hypoxia R09.02 799.02   3. Impaired mobility and activities of daily living Z74.09 799.89   4. SOB (shortness of breath) R06.02 786.05   5. Impaired gait and mobility R26.89 781.2     Patient Active Problem List   Diagnosis   • Hyperlipemia   • SOB (shortness of breath)   • Hematuria syndrome   • Psoriasis   • Hypertension   • High cholesterol   • Enlarged prostate   • Diabetes mellitus   • Acid reflux   • Calculus of kidney   • Anaphylactic syndrome   • Kidney stones   • Acute respiratory failure       Therapy Treatment          Rehabilitation Treatment Summary     Row Name 18 1040             Treatment Time/Intention    Discipline physical therapy assistant  -LN      Document Type therapy note (daily note)  -LN      Subjective Information no complaints  -LN      Mode of Treatment physical therapy  -LN      Existing Precautions/Restrictions fall;oxygen therapy device and L/min  -LN      Recorded by [LN] Daisy Pelayo, MATY 18 1149 18 1149      Row Name 18 1040             Vital Signs    Pre Systolic BP Rehab 141  -LN      Pre Treatment Diastolic BP 75  -LN      Post Systolic BP Rehab 159  -LN      Post Treatment Diastolic BP 93  -LN      Pretreatment Heart Rate (beats/min) 93  -LN      Intratreatment Heart Rate (beats/min) 90  -LN      Posttreatment Heart Rate (beats/min) 98  -LN      Pre SpO2 (%) 94  -LN      O2 Delivery Pre Treatment room air  -LN      Intra SpO2 (%) 92  -LN      Post SpO2 (%) 96  -LN      Pre Patient Position Side Lying  -LN      Intra Patient Position Standing  -LN      Post Patient  Position Sitting  -LN      Recorded by [LN] Daisy Pelayo, PTA 05/11/18 1149 05/11/18 1149      Row Name 05/11/18 1040             Cognitive Assessment/Intervention- PT/OT    Orientation Status (Cognition) oriented x 3  -LN      Recorded by [LN] Daisy Pelayo, PTA 05/11/18 1149 05/11/18 1149      Row Name 05/11/18 1040             Bed Mobility Assessment/Treatment    Supine-Sit [place] (Bed Mobility) independent  -LN      Recorded by [LN] Daisy Pelayo, PTA 05/11/18 1149 05/11/18 1149      Row Name 05/11/18 1040             Transfer Assessment/Treatment    Bed-Chair [place] (Transfers) not tested  -LN      Chair-Bed [place] (Transfers) not tested  -LN      Sit-Stand [place] (Transfers) independent  -LN      Stand-Sit [place] (Transfers) independent  -LN      [place] Level (Toilet Transfer) not tested  -LN      Recorded by [LN] Daisy Pelayo, PTA 05/11/18 1149 05/11/18 1149      Row Name 05/11/18 1040             Gait/Stairs Assessment/Training    [place] Level (Gait) independent  -LN      Assistive Device (Gait) --   none  -LN      Distance in Feet (Gait) 600  -LN      Pattern (Gait) step-through  -LN      Comment (Gait/Stairs) pt not reaching for hr and no lob noted this rx  -LN      Recorded by [LN] Daisy Pelayo, PTA 05/11/18 1149 05/11/18 1149      Row Name 05/11/18 1040             Static Sitting Balance    Level of [place] (Unsupported Sitting, Static Balance) independent  -LN      Recorded by [LN] Daisy Pelayo, PTA 05/11/18 1149 05/11/18 1149      Row Name 05/11/18 1040             Positioning and Restraints    Post Treatment Position bed  -LN      In Bed sitting EOB;call light within reach;encouraged to call for assist;with family/caregiver  -LN      Recorded by [LN] Daisy Pelayo, PTA 05/11/18 1149 05/11/18 1149      Row Name 05/11/18 1040             Plan of Care Review    Plan of Care Reviewed With patient  -LN      Recorded by [LN] Daisy ePlayo, PTA 05/11/18 1149  05/11/18 1149      Row Name 05/11/18 1040             Outcome Summary/Treatment Plan (PT)    Plan for Continued Treatment (PT) cont  -LN      Recorded by [LN] Daisy AVALOS MATY Pelayo 05/11/18 1149 05/11/18 1149        User Key  (r) = Recorded By, (t) = Taken By, (c) = Cosigned By    Initials Name Effective Dates Discipline    LN Daisy AVALOS MATY Pelayo 03/07/18 -  PT                       PT Rehab Goals     Row Name 05/11/18 1040             Gait Training Goal 1 (PT)    Activity/Assistive Device (Gait Training Goal 1, PT) gait (walking locomotion);assistive device use;decrease fall risk;increase endurance/gait distance  -LN      [place] Level (Gait Training Goal 1, PT) independent;conditional independence  -LN      Distance (Gait Goal 1, PT) 600 ft; 1000 feet once 600 ft met  -LN      Time Frame (Gait Training Goal 1, PT) long term goal (LTG);by discharge  -LN      Progress/Outcome (Gait Training Goal 1, PT) goal partially met  -LN         Gait Training Goal (PT)    Gait Training Goal (PT) pt will complete Tinetti w/ 28/28 score  -LN      Time Frame (Gait Training Goal, PT) by discharge  -LN      Progress/Outcome (Gait Training Goal, PT) goal ongoing  -LN         Patient Education Goal (PT)    Activity (Patient Education Goal, PT) pt will pace activity to keep VSS and ex michael to prevent LOB in gait  -LN      [place]/Cues/Accuracy (Memory Goal 2, PT) independent  -LN      Time Frame (Patient Education Goal, PT) by discharge  -LN      Progress/Outcome (Patient Education Goal, PT) goal met  -LN        User Key  (r) = Recorded By, (t) = Taken By, (c) = Cosigned By    Initials Name Provider Type Discipline    LN Daisy AVALOS Gita, PTA Physical Therapy Assistant PT          Physical Therapy Education     Title: PT OT SLP Therapies (Active)     Topic: Physical Therapy (Done)     Point: Mobility training (Done)    Learning Progress Summary     Learner Status Readiness Method Response Comment Documented by    Patient Done Acceptance  E SHARON,DU to pace self with activity,rest breaks with fatigue LN 05/11/18 1151     Done Acceptance D,E VU,DU,NR POC; mobility; gait w/ AD  05/10/18 1415          Point: Home exercise program (Done)    Learning Progress Summary     Learner Status Readiness Method Response Comment Documented by    Patient Done Acceptance E SHARON,HARI to pace self with activity,rest breaks with fatigue LN 05/11/18 1151     Done Acceptance D,E VU,DU,NR POC; mobility; gait w/ AD  05/10/18 1415                      User Key     Initials Effective Dates Name Provider Type Discipline     04/03/18 -  Shireen Kimball, PT Physical Therapist PT     03/07/18 -  Daisy Pelayo, MATY Physical Therapy Assistant PT                    PT Recommendation and Plan     Outcome Summary/Treatment Plan (PT)  Plan for Continued Treatment (PT): cont  Plan of Care Reviewed With: patient  Progress: improving  Outcome Summary: t/f's ind,amb 600' ind no lob or reaching for rails-sats 92-95% with gt on ra-1/3 goals met          Outcome Measures     Row Name 05/11/18 1040 05/10/18 1418 05/10/18 1400       How much help from another person do you currently need...    Turning from your back to your side while in flat bed without using bedrails? 4  -LN 4  -GB  --    Moving from lying on back to sitting on the side of a flat bed without bedrails? 4  -LN 4  -GB  --    Moving to and from a bed to a chair (including a wheelchair)? 4  -LN 4  -GB  --    Standing up from a chair using your arms (e.g., wheelchair, bedside chair)? 4  -LN 4  -GB  --    Climbing 3-5 steps with a railing? 3  -LN 3  -GB  --    To walk in hospital room? 4  -LN 3   02 and IV lines  -GB  --    AM-PAC 6 Clicks Score 23  -LN 22  -GB  --       Timed Up and Go (TUG)    TUG Test 1  --  -- 12 seconds  -GB    Timed Up and Go Comments  --  -- no Ad; no LOB;   -GB       Functional Assessment    Outcome Measure Options AM-PAC 6 Clicks Basic Mobility (PT)  -LN AM-PAC 6 Clicks Basic Mobility (PT)  -GB  Timed Up and Go (TUG)  -    Row Name 05/09/18 1407             How much help from another is currently needed...    Putting on and taking off regular lower body clothing? 2  -AS      Bathing (including washing, rinsing, and drying) 3  -AS      Toileting (which includes using toilet bed pan or urinal) 3  -AS      Putting on and taking off regular upper body clothing 3  -AS      Taking care of personal grooming (such as brushing teeth) 4  -AS      Eating meals 4  -AS      Score 19  -AS         Functional Assessment    Outcome Measure Options AM-PAC 6 Clicks Daily Activity (OT)  -AS        User Key  (r) = Recorded By, (t) = Taken By, (c) = Cosigned By    Initials Name Provider Type     Shireen Kimball, PT Physical Therapist    LN Daisy Pelayo, MATY Physical Therapy Assistant    AS Yani Piña, OT Occupational Therapist           Time Calculation:         PT Charges     Row Name 05/11/18 1040             Time Calculation    Start Time 1040  -LN      Stop Time 1100  -LN      Time Calculation (min) 20 min  -LN      PT Received On 05/11/18  -LN         Time Calculation- PT    Total Timed Code Minutes- PT 20 minute(s)  -LN        User Key  (r) = Recorded By, (t) = Taken By, (c) = Cosigned By    Initials Name Provider Type    RICARDO Pelayo, MATY Physical Therapy Assistant          Therapy Charges for Today     Code Description Service Date Service Provider Modifiers Qty    87994713191 HC GAIT TRAINING EA 15 MIN 5/11/2018 Daisy Pelayo PTA GP 1          PT G-Codes  PT Professional Judgement Used?: Yes  Outcome Measure Options: AM-PAC 6 Clicks Basic Mobility (PT)  Score: 22  Functional Limitation: Mobility: Walking and moving around  Mobility: Walking and Moving Around Current Status (): At least 20 percent but less than 40 percent impaired, limited or restricted  Mobility: Walking and Moving Around Goal Status (): At least 1 percent but less than 20 percent impaired, limited or restricted    Daisy S  Gita, MATY  5/11/2018

## 2018-05-11 NOTE — PLAN OF CARE
Problem: Patient Care Overview  Goal: Plan of Care Review   05/11/18 1040   Plan of Care Review   Progress improving   OTHER   Outcome Summary t/f's ind,amb 600' ind no lob or reaching for rails-sats 92-95% with gt on ra-1/3 goals met   Coping/Psychosocial   Plan of Care Reviewed With patient     Goal: Discharge Needs Assessment   05/09/18 0920 05/10/18 1019   Discharge Needs Assessment   Concerns to be Addressed adjustment to diagnosis/illness --    Patient/Family Anticipates Transition to home with family --    Patient/Family Anticipated Services at Transition home health care --    Transportation Concerns --  car, none   Transportation Anticipated family or friend will provide --    Discharge Facility/Level of Care Needs home with home health --    Offered/Gave Vendor List yes --    Patient's Choice of Community Agency(s) [facility] --    Current Discharge Risk chronically ill --    Disability   Equipment Currently Used at Home none --

## 2018-05-11 NOTE — DISCHARGE PLACEMENT REQUEST
"Rolando Novoa (73 y.o. Male)     Date of Birth Social Security Number Address Home Phone MRN    1945  [address] [place] [id number] [phone] [id number]    Anabaptist Marital Status          Mormon        Admission Date Admission Type Admitting Provider Attending Provider Department, Room/Bed    5/6/18 Emergency Mike Byrd MD Bleibel, Fadi, MD [address], 309/1    Discharge Date Discharge Disposition Discharge Destination         Home or Self Care              Attending Provider:  Mike Byrd MD    Allergies:  Toradol [Ketorolac Tromethamine]    Isolation:  None   Infection:  None   Code Status:  FULL    Ht:  165.1 cm (65\")   Wt:  98.6 kg (217 lb 6.4 oz)    Admission Cmt:  None   Principal Problem:  Anaphylactic syndrome [T78.2XXA]                 Active Insurance as of 5/6/2018     Primary Coverage     Payor Plan Insurance Group Employer/Plan Group    ANTHEM MEDICARE REPLACEMENT [facility] MEDICARE ADVANTAGE [id number]     Payor Plan Address Payor Plan Phone Number Effective From Effective To    [address] [phone] 1/1/2016     [place] [address]       Subscriber Name Subscriber Birth Date Member ID       ROLANDO NOVOA 1945 [id number]                 Emergency Contacts      (Rel.) Home Phone Work Phone Mobile Phone    Solange Novoa (Spouse) [phone] -- [phone]            Insurance Information                ANTHEM MEDICARE REPLACEMENT/ANTHEM MEDICARE ADVANTAGE Phone: [phone]    Subscriber: Rolando Novoa Subscriber#: [id number]    Group#: [id number] Precert#:              History & Physical      Mike Byrd MD at 5/6/2018  3:07 AM                [facility] Medicine Admission      Date of Admission: 5/6/2018      Primary Care Physician: Jaxson Zepeda MD      Chief Complaint   Patient presents with   • Oral Swelling       HPI:  Patient is currently " intubated. HPI is obtained from family and chart ,  Patient is 73-year-old male came to  ED because of tongue swelling and difficulty breathing.  Which had been worsening through the day.  Patient had received SHOCKWAVE LITHOTRIPSY by Dr ariza the day before, was supposed to start anabiotics according to the wife but she doesn't think that he picked them up, complained of pain earlier today, came to the ED received Toradol and morphine sent home. Patient came back with worsening symptoms of tongue swelling and dyspnea and rash . Wife states symptoms present before the first ED visit.    Past Medical History:   Past Medical History:   Diagnosis Date   • Acid reflux    • Diabetes    • Enlarged prostate    • High cholesterol    • Hypertension    • Kidney stone     multiple   • Psoriasis        Past Surgical History:   Past Surgical History:   Procedure Laterality Date   • AMPUTATION Left     lower arm and hand   • CHOLECYSTECTOMY OPEN     • CYSTOSCOPY N/A 12/16/2017    Procedure: CYSTOSCOPY WITH CLOT EVACUATION;  Surgeon: Prosper Ariza MD;  Location: [facility];  Service:    • CYSTOSCOPY, URETEROSCOPY, RETROGRADE PYELOGRAM, STENT INSERTION Left 12/15/2017    Procedure: CYSTOSCOPY LEFT URETEROSCOPY RETROGRADE PYELOGRAM HOLMIUM LASER STENT INSERTION;  Surgeon: Prosper Ariza MD;  Location: [facility];  Service:    • EXTRACORPOREAL SHOCK WAVE LITHOTRIPSY (ESWL)     • EXTRACORPOREAL SHOCKWAVE LITHOTRIPSY (ESWL), STENT INSERTION/REMOVAL Left 2/3/2017    Procedure: LEFT EXTRACORPOREAL SHOCKWAVE LITHOTRIPSY, POSSIBLE CYSTOSCOPY, POSSIBLE STENT REMOVAL ;  Surgeon: Prosper Ariza MD;  Location: [facility];  Service:    • KIDNEY STONE SURGERY     • SCROTAL SURGERY      trauma       Family History: History reviewed. No pertinent family history.    Social History:   Social History     Social History   • Marital status:      Social History Main Topics   • Smoking status: Never Smoker   • Smokeless tobacco: Never Used    • Alcohol use No   • Drug use: No   • Sexual activity: Defer     Other Topics Concern   • Not on file       Allergies: No Known Allergies    Medications:   Prior to Admission medications    Medication Sig Start Date End Date Taking? Authorizing Provider   aspirin 81 MG tablet Take 81 mg by mouth Daily.    Historical Provider, MD   atorvastatin (LIPITOR) 20 MG tablet Take 20 mg by mouth Every Night. 8/2/16   Historical Provider, MD   doxycycline (VIBRAMYCIN) 100 MG capsule Take 1 capsule by mouth Daily for 7 days. 5/4/18 5/11/18  Prosper Ariza MD   glimepiride (AMARYL) 2 MG tablet Take 2 mg by mouth Every Morning Before Breakfast.    Historical Provider, MD   ketorolac (TORADOL) 10 MG tablet Take 1 tablet by mouth Every 6 (Six) Hours As Needed for Moderate Pain . 5/5/18   Moose Nam MD   loperamide (IMODIUM) 2 MG capsule Take 2 mg by mouth 2 (Two) Times a Day As Needed for diarrhea.    Historical Provider, MD   metFORMIN (GLUCOPHAGE) 500 MG tablet Take 500 mg by mouth 2 (two) times a day. May take 3 depending on glucose reading 6/3/16   Historical Provider, MD   nebivolol (BYSTOLIC) 10 MG tablet Take 10 mg by mouth Daily.    Historical Provider, MD   omeprazole (priLOSEC) 20 MG capsule Take 20 mg by mouth Daily.    Historical Provider, MD   ondansetron ODT (ZOFRAN-ODT) 4 MG disintegrating tablet Take 1 tablet by mouth Every 6 (Six) Hours As Needed for Nausea or Vomiting. 5/5/18   Moose Nam MD   oxyCODONE-acetaminophen (PERCOCET) 7.5-325 MG per tablet Take 1-2 tablets by mouth Every 4 (Four) Hours As Needed (Pain). 5/4/18   Prosper Ariza MD   tamsulosin (FLOMAX) 0.4 MG capsule 24 hr capsule Take 1 capsule by mouth Every Night.    Historical Provider, MD   TANZEUM 30 MG pen-injector Inject 1 dose as directed 1 (One) Time Per Week. 3/21/18   Historical Provider, MD       Review of Systems:    -Otherwise complete ROS is negative except as mentioned above.    Physical Exam:    Physical Exam      Temp:  [98 °F (36.7 °C)] 98 °F (36.7 °C)  Heart Rate:  [] 102  Resp:  [20-24] 24  BP: (112-172)/(58-80) 112/58    -General:intaubed . Ill looking .   -CVS: Normal rate, regular rhythm, normal heart sounds and intact distal pulses.  Exam reveals no gallop and no friction rub.    No murmur heard.  -Pulmonary: Effort normal and breath sounds normal. No stridor. No respiratory distress. He has no wheezes. No rales or tenderness.   -Abdominal: Soft, Bowel sounds are normal. No distension and no mass. There is no tenderness. There is no rebound and no guarding. No hernia.   -Musc: No Cyanosis clubbing or edema.  -Lymph: No cervical adenopathy.   -Skin: Skin is warm. No rash noted. Patient is not diaphoretic. No erythema. No pallor.     Nursing note and vitals reviewed.    Results Reviewed:  I have personally reviewed current lab, radiology, and data and agree with results.  Lab Results (last 24 hours)     Procedure Component Value Units Date/Time    TSH [[id number]]  (Normal) Collected:  05/06/18 0129    Specimen:  Blood Updated:  05/06/18 0241     TSH 3.020 mIU/mL     [place] Draw [[id number]] Collected:  05/06/18 0129    Specimen:  Blood Updated:  05/06/18 0230    Narrative:       The following orders were created for panel order [place] Draw.  Procedure                               Abnormality         Status                     ---------                               -----------         ------                     Light Blue Top[[id number]]                                   Final result               Green Top (Gel)[[id number]]                                  Final result               Lavender Top[[id number]]                                     Final result               Gold Top - SST[[id number]]                                   Final result                 Please view results for these tests on the individual orders.    Light Blue Top [[id number]] Collected:  05/06/18 0129    Specimen:  Blood Updated:  05/06/18  0230     Extra Tube hold for add-on     Comment: Auto resulted       Green Top (Gel) [[id number]] Collected:  05/06/18 0129    Specimen:  Blood Updated:  05/06/18 0230     Extra Tube Hold for add-ons.     Comment: Auto resulted.       Lavender Top [[id number]] Collected:  05/06/18 0129    Specimen:  Blood Updated:  05/06/18 0230     Extra Tube hold for add-on     Comment: Auto resulted       Gold Top - SST [[id number]] Collected:  05/06/18 0129    Specimen:  Blood Updated:  05/06/18 0230     Extra Tube Hold for add-ons.     Comment: Auto resulted.       T4, Free [[id number]]  (Normal) Collected:  05/06/18 0129    Specimen:  Blood Updated:  05/06/18 0227     Free T4 1.21 ng/dL     CBC & Differential [[id number]] Collected:  05/06/18 0129    Specimen:  Blood Updated:  05/06/18 0219    Narrative:       The following orders were created for panel order CBC & Differential.  Procedure                               Abnormality         Status                     ---------                               -----------         ------                     CBC Auto Differential[[id number]]        Abnormal            Final result                 Please view results for these tests on the individual orders.    CBC Auto Differential [[id number]]  (Abnormal) Collected:  05/06/18 0129    Specimen:  Blood Updated:  05/06/18 0219     WBC 9.91 (H) 10*3/mm3      RBC 5.47 10*6/mm3      Hemoglobin 16.6 g/dL      Hematocrit 50.4 (H) %      MCV 92.1 fL      MCH 30.3 pg      MCHC 32.9 g/dL      RDW 13.7 %      RDW-SD 45.6 (H) fl      MPV 9.4 fL      Platelets 282 10*3/mm3      Neutrophil % 55.7 %      Lymphocyte % 33.8 %      Monocyte % 7.7 %      Eosinophil % 1.9 %      Basophil % 0.2 %      Immature Grans % 0.7 (H) %      Neutrophils, Absolute 5.52 10*3/mm3      Lymphocytes, Absolute 3.35 10*3/mm3      Monocytes, Absolute 0.76 10*3/mm3      Eosinophils, Absolute 0.19 10*3/mm3      Basophils, Absolute 0.02 10*3/mm3      Immature Grans, Absolute 0.07  (H) 10*3/mm3     Lipase [[id number]]  (Normal) Collected:  05/06/18 0129    Specimen:  Blood Updated:  05/06/18 0210     Lipase 52 U/L     Basic Metabolic Panel [[id number]]  (Abnormal) Collected:  05/06/18 0129    Specimen:  Blood Updated:  05/06/18 0210     Glucose 181 (H) mg/dL      BUN 18 mg/dL      Creatinine 1.19 mg/dL      Sodium 137 mmol/L      Potassium 4.2 mmol/L      Chloride 97 mmol/L      CO2 28.0 mmol/L      Calcium 8.9 mg/dL      eGFR Non African Amer 60 (L) mL/min/1.73      BUN/Creatinine Ratio 15.1     Anion Gap 12.0 mmol/L     Narrative:       The MDRD GFR formula is only valid for adults with stable renal function between ages 18 and 70.    Phosphorus [[id number]]  (Normal) Collected:  05/06/18 0129    Specimen:  Blood Updated:  05/06/18 0210     Phosphorus 3.6 mg/dL     Magnesium [[id number]]  (Normal) Collected:  05/06/18 0129    Specimen:  Blood Updated:  05/06/18 0210     Magnesium 2.0 mg/dL     aPTT [[id number]]  (Normal) Collected:  05/06/18 0129    Specimen:  Blood Updated:  05/06/18 0208     PTT 23.0 seconds     Narrative:       The recommended Heparin therapeutic range is 68-97 seconds.        Imaging Results (last 24 hours)     Procedure Component Value Units Date/Time    XR Chest 1 View [[id number]] Updated:  05/06/18 0303    XR Chest 1 View [[id number]] Collected:  05/06/18 0207     Updated:  05/06/18 0233    Narrative:       Exam: AP portable chest    INDICATION: Shortness of breath    COMPARISON: 12/16/2017    FINDINGS: AP portable chest. Bony structures are intact. The  lungs are hypoinflated which accentuates the heart size and  bronchovascular markings. There is bilateral linear atelectasis.  Difficult to assess the heart size. Cannot exclude infiltrate  and/or pleural effusion in the left lung base.      Impression:       1. Hypoinflated lungs.  2. Bilateral linear atelectasis.    Electronically signed by:  Valentino South MD  5/6/2018 2:32 AM CDT  Workstation: [email or web address]     "    Hospital Problem List     * (Principal)Anaphylactic syndrome    Type 2 diabetes mellitus without complication    Hyperlipemia    Essential hypertension    Psoriasis    Kidney stones        Assessment/Plan           Assessment / Plan  --anaphylaxis   received epi and solumdrol in ED  Admit to ICU   Continue solumderol, vent management and close monitring     --kidney stones   Had SHOCKWAVE LITHOTRIPSY by Dr jain on 18  According to the wife Patient was supposed to start on some antibiotics , she is not very sure of the name.  Will start Levaquin .    --DM  ISS    --HLD  Lipitor. Held for now    --GERD  PPI    --HTN  bystolic    --GI/DVT prophylaxis    I discussed the patients findings and my recommendations with the patient, and the patient verbalized understanding of the plan, risks and benefits of the plan.    This document has been electronically signed by Mike Byrd MD on May 6, 2018 3:07 AM                        Electronically signed by Mike Byrd MD at 2018  4:12 AM         [address] [address]  Phone:  [phone]  Fax:   Date Ordered: May 11, 2018         Patient:  Rolando Novoa MRN:  [id number]   [address] [place], [place] [id number] :  1945  SSN:    Phone: [phone] Sex:  M     Weight: 98.6 kg (217 lb 6.4 oz)         Ht Readings from Last 1 Encounters:   18 165.1 cm (65\")         Cane    (Order ID: [id number])    Diagnosis:  Impaired mobility and activities of daily living (Z74.09 [ICD-10-CM] 799.89 [ICD-9-CM])   Quantity:  1     Equipment:  Quad or Three Prong Cane with Tips  Length of Need (99 Months = Lifetime): 99 Months = Lifetime            Authorizing Provider:Jeff Armstrong MD  Authorizing Provider's NPI: [id number]  Order Entered By: Jeff Armstrong MD 2018  9:59 AM     Electronically signed by: Jeff Armstrong MD 2018  9:59 AM           [facility] " "[address] [address]  Phone:  [phone]  Fax:   Date Ordered: May 11, 2018         Patient:  Rolando Novoa MRN:  [id number]   [address] [id number] :  1945  SSN:    Phone: [phone] Sex:  M     Weight: 98.6 kg (217 lb 6.4 oz)         Ht Readings from Last 1 Encounters:   18 165.1 cm (65\")         Home Nebulizer          (Order ID: [id number])    Diagnosis:  SOB (shortness of breath) (R06.02 [ICD-10-CM] 786.05 [ICD-9-CM])   Quantity:  1     Nebulizer Equipment:  Nebulizer w/ Compressor  Nebulizer Accessories:  Nebulizer Kit - Administration Set, Non-Disposable  Length of Need (99 Months = Lifetime): 99 Months = Lifetime            Authorizing Provider:Jeff Armstrong MD  Authorizing Provider's NPI: [id number]  Order Entered By: Jeff Armstrong MD 2018  9:40 AM     Electronically signed by: Jeff Armstrong MD 2018  9:40 AM           [address] [address]  Phone:  [phone]  Fax:   Date Ordered: May 11, 2018         Patient:  Rolando Novoa MRN:  [id number]   [address] [facility] [address] :  1945  SSN:    Phone: [phone] Sex:  M     Weight: 98.6 kg (217 lb 6.4 oz)         Ht Readings from Last 1 Encounters:   18 165.1 cm (65\")         Oxygen Therapy         (Order ID: [id number])    Diagnosis:  SOB (shortness of breath) (R06.02 [ICD-10-CM] 786.05 [ICD-9-CM])   Quantity:  1     Delivery Modality: Nasal Cannula  Liters Per Minute: 2  Duration: Continuous  Equipment:  Oxygen Concentrator &  &  Portable Gaseous Oxygen System & Portable Oxygen Contents Gaseous &  Conserving Regulator  Length of Need (99 Months = Lifetime): 99 Months = Lifetime            Authorizing Provider:Jeff Armstrong MD  Authorizing Provider's NPI: [id number]  Order Entered By: Jeff Armstrong, " MD 5/11/2018  9:41 AM     Electronically signed by: Jeff Armstrong MD 5/11/2018  9:41 AM         Vital Signs     Row Name 05/11/18  0959 05/11/18  0909 05/11/18  0907  05/11/18  0905  05/11/18  0900   Oxygen Therapy   SpO2  94 % 90 %  84 %  93 %     at rest ambulating       Device (Oxygen Therapy) nasal cannula nasal cannula nasal cannula  room air  room air        with ambulation  with therapy  at rest   Flow (L/min) 2 2 2       Row Name 05/11/18  0752 05/11/18  0732 05/11/18  0656  05/11/18  0648  05/11/18  0553   Vital Signs   Temp 97 °F (36.1 °C)         Temp src Tympanic         Pulse 95 93 84  83     Heart Rate Source Monitor Monitor        Resp 20  20  20     Resp Rate Source Visual         /73         BP Location Right arm         BP Method Automatic         Patient Position

## 2018-05-14 NOTE — PAYOR COMM NOTE
"Rolando Novoa (73 y.o. Male)     Date of Birth Social Security Number Address Home Phone MRN    1945  [address] [facility] [address] [phone] [id number]    Congregational Marital Status          Anabaptism        Admission Date Admission Type Admitting Provider Attending Provider Department, Room/Bed    5/6/18 Emergency Mike Byrd MD  [address], 309/1    Discharge Date Discharge Disposition Discharge Destination        5/11/2018 Home or Self Care              Attending Provider:  (none)   Allergies:  Toradol [Ketorolac Tromethamine]    Isolation:  None   Infection:  None   Code Status:  Prior    Ht:  165.1 cm (65\")   Wt:  98.6 kg (217 lb 6.4 oz)    Admission Cmt:  None   Principal Problem:  Anaphylactic syndrome [T78.2XXA]                 Active Insurance as of 5/6/2018     Primary Coverage     Payor Plan Insurance Group Employer/Plan Group    ANTHEM MEDICARE REPLACEMENT ANTH MEDICARE ADVANTAGE [id number]     Payor Plan Address Payor Plan Phone Number Effective From Effective To    [address] [phone] 1/1/2016     [place] [address]       Subscriber Name Subscriber Birth Date Member ID       ROLANDO NOVOA 1945 [id number]                 Emergency Contacts      (Rel.) Home Phone Work Phone Mobile Phone    Solange Novoa (Spouse) [phone] -- [phone]               Discharge Summary      Jeff Armstrong MD at 5/11/2018  9:41 AM              [facility]  DISCHARGE SUMMARY       Date of Admission: 5/6/2018  Date of Discharge:  5/11/2018  Primary Care Physician: Jaxson Zepeda MD    Presenting Problem/History of Present Illness:  Hypoxia [R09.02]  Anaphylaxis, initial encounter [T78.2XXA]   Patient is 73-year-old male for hypertension, diabetes mellitus, nephrolithiasis, BPH who presented to  ED because of tongue swelling and difficulty breathing which has " been worsening through the day.  Patient had received SHOCKWAVE LITHOTRIPSY by Dr jain the day before, was supposed to start anabiotics according to the wife but she doesn't think that he picked them up.  He was seen at the emergency department earlier on the day of admission with complaints of pain and was given Toradol and morphine and discharged. Patient came back with worsening symptoms of tongue swelling and dyspnea and rash.     Final Discharge Diagnoses:  Hospital Problem List     * (Principal)Anaphylactic syndrome    Hypertension (Chronic)    Diabetes mellitus (Chronic)    Acute respiratory failure          Consults:   Consults     Date and Time Order Name Status Description    5/7/2018 1027 Inpatient Pulmonology Consult Completed           Procedures Performed: None                Pertinent Test Results:   Lab Results (last 7 days)     Procedure Component Value Units Date/Time    POC Glucose Once [[id number]]  (Abnormal) Collected:  05/11/18 0600    Specimen:  Blood Updated:  05/11/18 0618     Glucose 135 (H) mg/dL      Comment: RN NotifiedMeter: [id number]: [id number] MARTINEZ FERNANDEZ       CBC & Differential [[id number]] Collected:  05/11/18 0540    Specimen:  Blood Updated:  05/11/18 0559    Narrative:       The following orders were created for panel order CBC & Differential.  Procedure                               Abnormality         Status                     ---------                               -----------         ------                     CBC Auto Differential[[id number]]        Abnormal            Final result                 Please view results for these tests on the individual orders.    CBC Auto Differential [[id number]]  (Abnormal) Collected:  05/11/18 0540    Specimen:  Blood Updated:  05/11/18 0559     WBC 6.73 10*3/mm3      RBC 4.99 10*6/mm3      Hemoglobin 15.2 g/dL      Hematocrit 46.3 %      MCV 92.8 fL      MCH 30.5 pg      MCHC 32.8 g/dL      RDW 14.3 %      RDW-SD 48.2 (H)  fl      MPV 8.9 fL      Platelets 244 10*3/mm3      Neutrophil % 56.5 %      Lymphocyte % 26.4 %      Monocyte % 10.4 %      Eosinophil % 3.0 %      Basophil % 0.3 %      Immature Grans % 3.4 (H) %      Neutrophils, Absolute 3.80 10*3/mm3      Lymphocytes, Absolute 1.78 10*3/mm3      Monocytes, Absolute 0.70 10*3/mm3      Eosinophils, Absolute 0.20 10*3/mm3      Basophils, Absolute 0.02 10*3/mm3      Immature Grans, Absolute 0.23 (H) 10*3/mm3      nRBC 0.0 /100 WBC     Food Allergy Profile [[id number]] Collected:  05/07/18 0228    Specimen:  Blood Updated:  05/11/18 0237     Class Description Comment     Comment:     Levels of Specific IgE       Class  Description of Class      ---------------------------  -----  --------------------                     < 0.10         0         Negative             0.10 -    0.31         0/I       Equivocal/Low             0.32 -    0.55         I         Low             0.56 -    1.40         II        Moderate             1.41 -    3.90         III       High             3.91 -   19.00         IV        Very High            19.01 -  100.00         V         Very High                    >100.00         VI        Very High        Egg White <0.10 kU/L      Peanut <0.10 kU/L      Soybean <0.10 kU/L      Milk, Cow's <0.10 kU/L      Clams <0.10 kU/L      Shrimp <0.10 kU/L      [place] <0.10 kU/L      CodFish <0.10 kU/L      Scallop <0.10 kU/L      Wheat <0.10 kU/L      Corn <0.10 kU/L      Sesame Seed <0.10 kU/L     Narrative:       Performed at:  [address]  [id number]  : Kilo Huerta MD, Phone:  [phone]    POC Glucose Once [[id number]]  (Abnormal) Collected:  05/10/18 1943    Specimen:  Blood Updated:  05/10/18 2030     Glucose 230 (H) mg/dL      Comment: RN NotifiedMeter: [id number]: [id number] KAYLA LEACH       POC Glucose Once [[id number]]  (Abnormal) Collected:  05/10/18 1603    Specimen:  Blood Updated:   05/10/18 1648     Glucose 239 (H) mg/dL      Comment: RN NotifiedMeter: [id number]: [id number] ZULEIMA HIGHTOWER       POC Glucose Once [[id number]]  (Abnormal) Collected:  05/10/18 1013    Specimen:  Blood Updated:  05/10/18 1040     Glucose 237 (H) mg/dL      Comment: RN NotifiedMeter: [id number]: [id number] STEPHANIE MCCULLOUGH       POC Glucose Once [[id number]]  (Abnormal) Collected:  05/10/18 0620    Specimen:  Blood Updated:  05/10/18 0634     Glucose 172 (H) mg/dL      Comment: RN NotifiedMeter: [id number]: [id number] J CARLOS PARKINSON       Basic Metabolic Panel [[id number]]  (Abnormal) Collected:  05/10/18 0546    Specimen:  Blood Updated:  05/10/18 0610     Glucose 191 (H) mg/dL      BUN 33 (H) mg/dL      Creatinine 0.92 mg/dL      Sodium 142 mmol/L      Potassium 3.6 mmol/L      Chloride 101 mmol/L      CO2 29.0 mmol/L      Calcium 8.5 mg/dL      eGFR Non African Amer 81 mL/min/1.73      BUN/Creatinine Ratio 35.9 (H)     Anion Gap 12.0 mmol/L     Narrative:       The MDRD GFR formula is only valid for adults with stable renal function between ages 18 and 70.    CBC & Differential [[id number]] Collected:  05/10/18 0546    Specimen:  Blood Updated:  05/10/18 0558    Narrative:       The following orders were created for panel order CBC & Differential.  Procedure                               Abnormality         Status                     ---------                               -----------         ------                     CBC Auto Differential[[id number]]        Abnormal            Final result                 Please view results for these tests on the individual orders.    CBC Auto Differential [[id number]]  (Abnormal) Collected:  05/10/18 0546    Specimen:  Blood Updated:  05/10/18 0558     WBC 8.24 10*3/mm3      RBC 4.90 10*6/mm3      Hemoglobin 14.8 g/dL      Hematocrit 44.1 %      MCV 90.0 fL      MCH 30.2 pg      MCHC 33.6 g/dL      RDW 14.0 %      RDW-SD 45.9 (H) fl      MPV 9.2 fL       Platelets 247 10*3/mm3      Neutrophil % 66.7 %      Lymphocyte % 21.7 %      Monocyte % 10.1 %      Eosinophil % 0.1 %      Basophil % 0.1 %      Immature Grans % 1.3 (H) %      Neutrophils, Absolute 5.49 10*3/mm3      Lymphocytes, Absolute 1.79 10*3/mm3      Monocytes, Absolute 0.83 10*3/mm3      Eosinophils, Absolute 0.01 10*3/mm3      Basophils, Absolute 0.01 10*3/mm3      Immature Grans, Absolute 0.11 (H) 10*3/mm3     POC Glucose Once [[id number]]  (Abnormal) Collected:  05/10/18 0033    Specimen:  Blood Updated:  05/10/18 0044     Glucose 239 (H) mg/dL      Comment: RN NotifiedMeter: [id number]: [id number] CHESNEL MILAN       POC Glucose Once [[id number]]  (Abnormal) Collected:  05/09/18 2022    Specimen:  Blood Updated:  05/09/18 2109     Glucose 253 (H) mg/dL      Comment: RN NotifiedMeter: [id number]: [id number] CHESNEL MILAN       Potassium [[id number]]  (Normal) Collected:  05/09/18 1759    Specimen:  Blood Updated:  05/09/18 1843     Potassium 4.5 mmol/L     POC Glucose Once [[id number]]  (Abnormal) Collected:  05/09/18 1517    Specimen:  Blood Updated:  05/09/18 1729     Glucose 283 (H) mg/dL      Comment: RN NotifiedMeter: [id number]: [id number] DIEGO LAMBISSA       POC Glucose Once [[id number]]  (Abnormal) Collected:  05/09/18 1117    Specimen:  Blood Updated:  05/09/18 1211     Glucose 328 (H) mg/dL      Comment: RN NotifiedMeter: [id number]: [id number] DIEGO COLUNGA       POC Glucose Once [[id number]]  (Abnormal) Collected:  05/09/18 0724    Specimen:  Blood Updated:  05/09/18 0820     Glucose 246 (H) mg/dL      Comment: RN NotifiedMeter: [id number]: [id number] PILO GONZALEZ       Basic Metabolic Panel [[id number]]  (Abnormal) Collected:  05/09/18 0316    Specimen:  Blood Updated:  05/09/18 0351     Glucose 291 (H) mg/dL      BUN 33 (H) mg/dL      Creatinine 0.97 mg/dL      Sodium 141 mmol/L      Potassium 3.3 (L) mmol/L      Chloride 96 mmol/L      CO2 33.0  (H) mmol/L      Calcium 9.1 mg/dL      eGFR Non African Amer 76 mL/min/1.73      BUN/Creatinine Ratio 34.0 (H)     Anion Gap 12.0 mmol/L     Narrative:       The MDRD GFR formula is only valid for adults with stable renal function between ages 18 and 70.    CBC & Differential [[id number]] Collected:  05/09/18 0316    Specimen:  Blood Updated:  05/09/18 0330    Narrative:       The following orders were created for panel order CBC & Differential.  Procedure                               Abnormality         Status                     ---------                               -----------         ------                     Scan Slide[[id number]]                                                                  CBC Auto Differential[[id number]]        Abnormal            Final result                 Please view results for these tests on the individual orders.    CBC Auto Differential [[id number]]  (Abnormal) Collected:  05/09/18 0316    Specimen:  Blood Updated:  05/09/18 0330     WBC 12.56 (H) 10*3/mm3      RBC 5.33 10*6/mm3      Hemoglobin 16.4 g/dL      Hematocrit 48.2 %      MCV 90.4 fL      MCH 30.8 pg      MCHC 34.0 g/dL      RDW 13.7 %      RDW-SD 44.9 (H) fl      MPV 9.2 fL      Platelets 346 10*3/mm3      Neutrophil % 83.2 (H) %      Lymphocyte % 7.5 (L) %      Monocyte % 7.9 %      Eosinophil % 0.0 %      Basophil % 0.1 %      Immature Grans % 1.3 (H) %      Neutrophils, Absolute 10.46 (H) 10*3/mm3      Lymphocytes, Absolute 0.94 10*3/mm3      Monocytes, Absolute 0.99 (H) 10*3/mm3      Eosinophils, Absolute 0.00 10*3/mm3      Basophils, Absolute 0.01 10*3/mm3      Immature Grans, Absolute 0.16 (H) 10*3/mm3      nRBC 0.0 /100 WBC     Blood Gas, Arterial With Co-Ox [[id number]]  (Abnormal) Collected:  05/09/18 0004    Specimen:  Arterial Blood Updated:  05/09/18 0009     Site Right Radial     Elder's Test Positive     pH, Arterial 7.482 (H) pH units      pCO2, Arterial 41.5 mm Hg      pO2, Arterial 51.7 (L) mm Hg       HCO3, Arterial 31.0 (H) mmol/L      Base Excess, Arterial 6.8 (H) mmol/L      O2 Saturation, Arterial 88.1 (L) %      Hemoglobin, Blood Gas 15.5 g/dL      Hematocrit, Blood Gas 47.5 %      Oxyhemoglobin 87.5 (L) %      Methemoglobin 0.00 %      Carboxyhemoglobin 0.6 %      Sodium, Arterial 140 mmol/L      Potassium, Arterial 3.4 mmol/L      Ionized Calcium 4.57 (L) mg/dL      Glucose, Arterial 360 (H) mmol/L      Barometric Pressure for Blood Gas 749 mmHg      Modality Nasal Cannula     Flow Rate 6.0 lpm      Ventilator Mode NA     Collected by maddie     pH, Temp Corrected -- pH Units      pCO2, Temperature Corrected -- mm Hg      pO2, Temperature Corrected -- mm Hg     POC Glucose Once [[id number]]  (Abnormal) Collected:  05/08/18 2116    Specimen:  Blood Updated:  05/08/18 2147     Glucose 354 (H) mg/dL      Comment: RN NotifiedMeter: [id number]: [id number] PILO GONZALEZ       POC Glucose Once [[id number]]  (Abnormal) Collected:  05/08/18 1759    Specimen:  Blood Updated:  05/08/18 1817     Glucose 358 (H) mg/dL      Comment: Sliding Scale AdminMeter: [id number]: [id number] LUIS LODR       POC Glucose Once [[id number]]  (Abnormal) Collected:  05/08/18 1049    Specimen:  Blood Updated:  05/08/18 1101     Glucose 339 (H) mg/dL      Comment: Sliding Scale AdminMeter: [id number]: [id number] LUIS LORD       POC Glucose Once [[id number]]  (Abnormal) Collected:  05/08/18 0727    Specimen:  Blood Updated:  05/08/18 0739     Glucose 293 (H) mg/dL      Comment: Sliding Scale AdminMeter: [id number]: [id number] LUIS LORD       Blood Gas, Arterial With Co-Ox [[id number]]  (Abnormal) Collected:  05/08/18 0315    Specimen:  Arterial Blood Updated:  05/08/18 0323     Site Right Radial     Elder's Test N/A     pH, Arterial 7.413 pH units      pCO2, Arterial 44.6 mm Hg      pO2, Arterial 71.7 (L) mm Hg      HCO3, Arterial 28.4 (H) mmol/L      Base Excess, Arterial 3.2 (H) mmol/L      O2  Saturation, Arterial 94.4 %      Hemoglobin, Blood Gas 14.3 g/dL      Hematocrit, Blood Gas 43.9 %      Oxyhemoglobin 93.6 (L) %      Methemoglobin 0.50 %      Carboxyhemoglobin 0.4 %      Sodium, Arterial 139 mmol/L      Potassium, Arterial 4.0 mmol/L      Ionized Calcium 4.59 (L) mg/dL      Glucose, Arterial 301 (H) mmol/L      Barometric Pressure for Blood Gas 751 mmHg      Modality Ventilator     FIO2 70 %      Ventilator Mode AC     Set Tidal Volume 490     Set Mech Resp Rate 12.0     PEEP 10.0     PIP 23 cmH2O      Collected by yonny     pH, Temp Corrected -- pH Units      pCO2, Temperature Corrected -- mm Hg      pO2, Temperature Corrected -- mm Hg     Basic Metabolic Panel [[id number]]  (Abnormal) Collected:  05/08/18 0234    Specimen:  Blood Updated:  05/08/18 0312     Glucose 297 (H) mg/dL      BUN 27 (H) mg/dL      Creatinine 0.95 mg/dL      Sodium 137 mmol/L      Potassium 4.1 mmol/L      Chloride 101 mmol/L      CO2 26.0 mmol/L      Calcium 8.5 mg/dL      eGFR Non African Amer 78 mL/min/1.73      BUN/Creatinine Ratio 28.4 (H)     Anion Gap 10.0 mmol/L     Narrative:       The MDRD GFR formula is only valid for adults with stable renal function between ages 18 and 70.    CBC & Differential [[id number]] Collected:  05/08/18 0234    Specimen:  Blood Updated:  05/08/18 0251    Narrative:       The following orders were created for panel order CBC & Differential.  Procedure                               Abnormality         Status                     ---------                               -----------         ------                     CBC Auto Differential[[id number]]        Abnormal            Final result                 Please view results for these tests on the individual orders.    CBC Auto Differential [[id number]]  (Abnormal) Collected:  05/08/18 0234    Specimen:  Blood Updated:  05/08/18 0251     WBC 11.18 (H) 10*3/mm3      RBC 4.60 10*6/mm3      Hemoglobin 14.0 g/dL      Hematocrit 40.4 %      MCV  87.8 fL      MCH 30.4 pg      MCHC 34.7 g/dL      RDW 13.8 %      RDW-SD 44.0 (H) fl      MPV 9.3 fL      Platelets 239 10*3/mm3      Neutrophil % 90.8 (H) %      Lymphocyte % 6.6 (L) %      Monocyte % 2.1 %      Eosinophil % 0.0 %      Basophil % 0.1 %      Immature Grans % 0.4 %      Neutrophils, Absolute 10.14 (H) 10*3/mm3      Lymphocytes, Absolute 0.74 10*3/mm3      Monocytes, Absolute 0.24 10*3/mm3      Eosinophils, Absolute 0.00 10*3/mm3      Basophils, Absolute 0.01 10*3/mm3      Immature Grans, Absolute 0.05 (H) 10*3/mm3     POC Glucose Once [[id number]]  (Abnormal) Collected:  05/07/18 2031    Specimen:  Blood Updated:  05/07/18 2044     Glucose 255 (H) mg/dL      Comment: RN NotifiedMeter: [id number]: [id number] TANA FISH       POC Glucose Once [[id number]]  (Abnormal) Collected:  05/07/18 1650    Specimen:  Blood Updated:  05/07/18 1702     Glucose 266 (H) mg/dL      Comment: Sliding Scale AdminMeter: [id number]: [id number] LUIS LORD       POC Glucose Once [[id number]]  (Abnormal) Collected:  05/07/18 1107    Specimen:  Blood Updated:  05/07/18 1121     Glucose 236 (H) mg/dL      Comment: Sliding Scale AdminMeter: [id number]: [id number] LUIS LORD       POC Glucose Once [[id number]]  (Abnormal) Collected:  05/07/18 0747    Specimen:  Blood Updated:  05/07/18 0758     Glucose 219 (H) mg/dL      Comment: Sliding Scale AdminMeter: [id number]: [id number] LUIS LORD       Blood Gas, Arterial With Co-Ox [[id number]]  (Abnormal) Collected:  05/07/18 0450    Specimen:  Arterial Blood Updated:  05/07/18 0459     Site Right Radial     Elder's Test N/A     pH, Arterial 7.425 pH units      pCO2, Arterial 40.0 mm Hg      pO2, Arterial 66.4 (L) mm Hg      HCO3, Arterial 26.2 (H) mmol/L      Base Excess, Arterial 1.7 mmol/L      O2 Saturation, Arterial 93.9 (L) %      Hemoglobin, Blood Gas 13.9 (L) g/dL      Hematocrit, Blood Gas 42.5 %      Oxyhemoglobin 93.0 (L) %       Methemoglobin 0.50 %      Carboxyhemoglobin 0.5 %      Sodium, Arterial 138 mmol/L      Potassium, Arterial 3.9 mmol/L      Ionized Calcium 4.54 (L) mg/dL      Glucose, Arterial 226 (H) mmol/L      Barometric Pressure for Blood Gas 749 mmHg      Modality N/A     FIO2 100 %      Ventilator Mode AC     Set Tidal Volume 550     Set [facility] Resp Rate 16.0     PEEP 8.0     Collected by gold ramos     pH, Temp Corrected -- pH Units      pCO2, Temperature Corrected -- mm Hg      pO2, Temperature Corrected -- mm Hg     CBC & Differential [[id number]] Collected:  05/07/18 0228    Specimen:  Blood Updated:  05/07/18 0319    Narrative:       The following orders were created for panel order CBC & Differential.  Procedure                               Abnormality         Status                     ---------                               -----------         ------                     CBC Auto Differential[[id number]]        Abnormal            Final result                 Please view results for these tests on the individual orders.    CBC Auto Differential [[id number]]  (Abnormal) Collected:  05/07/18 0228    Specimen:  Blood Updated:  05/07/18 0319     WBC 13.04 (H) 10*3/mm3      RBC 4.54 10*6/mm3      Hemoglobin 13.8 g/dL      Hematocrit 40.1 %      MCV 88.3 fL      MCH 30.4 pg      MCHC 34.4 g/dL      RDW 13.5 %      RDW-SD 43.3 fl      MPV 9.3 fL      Platelets 236 10*3/mm3      Neutrophil % 91.6 (H) %      Lymphocyte % 5.9 (L) %      Monocyte % 2.1 %      Eosinophil % 0.0 %      Basophil % 0.0 %      Immature Grans % 0.4 %      Neutrophils, Absolute 11.94 (H) 10*3/mm3      Lymphocytes, Absolute 0.77 10*3/mm3      Monocytes, Absolute 0.28 10*3/mm3      Eosinophils, Absolute 0.00 10*3/mm3      Basophils, Absolute 0.00 10*3/mm3      Immature Grans, Absolute 0.05 (H) 10*3/mm3     Basic Metabolic Panel [[id number]]  (Abnormal) Collected:  05/07/18 0228    Specimen:  Blood Updated:  05/07/18 0305     Glucose 213 (H) mg/dL      BUN 22 (H)  mg/dL      Creatinine 1.00 mg/dL      Sodium 136 (L) mmol/L      Potassium 3.9 mmol/L      Chloride 102 mmol/L      CO2 26.0 mmol/L      Calcium 8.4 mg/dL      eGFR Non African Amer 74 mL/min/1.73      BUN/Creatinine Ratio 22.0     Anion Gap 8.0 mmol/L     Narrative:       The MDRD GFR formula is only valid for adults with stable renal function between ages 18 and 70.    POC Glucose Once [[id number]]  (Abnormal) Collected:  05/06/18 2138    Specimen:  Blood Updated:  05/06/18 2152     Glucose 213 (H) mg/dL      Comment: RN NotifiedSliding Scale AdminMeter: [id number]: [id number] TEBBETT       Blood Gas, Arterial With Co-Ox [[id number]]  (Abnormal) Collected:  05/06/18 0440    Specimen:  Arterial Blood Updated:  05/06/18 0446     Site Right Radial     Elder's Test N/A     pH, Arterial 7.259 (L) pH units      pCO2, Arterial 60.8 (H) mm Hg      pO2, Arterial 79.4 (L) mm Hg      HCO3, Arterial 27.2 (H) mmol/L      Base Excess, Arterial -1.4 (L) mmol/L      O2 Saturation, Arterial 94.7 %      Hemoglobin, Blood Gas 15.3 g/dL      Hematocrit, Blood Gas 46.9 %      Oxyhemoglobin 93.6 (L) %      Methemoglobin 0.60 %      Carboxyhemoglobin 0.6 %      Sodium, Arterial 137 mmol/L      Potassium, Arterial 4.5 mmol/L      Ionized Calcium 4.53 (L) mg/dL      Glucose, Arterial 191 (H) mmol/L      Barometric Pressure for Blood Gas 748 mmHg      Modality PRB     FIO2 100 %      Ventilator Mode AC     Set Tidal Volume 550     Set Mech Resp Rate 12.0     PEEP 8.0     Collected by gold ramos     pH, Temp Corrected -- pH Units      pCO2, Temperature Corrected -- mm Hg      pO2, Temperature Corrected -- mm Hg     TSH [[id number]]  (Normal) Collected:  05/06/18 0129    Specimen:  Blood Updated:  05/06/18 0241     TSH 3.020 mIU/mL     [place] Draw [[id number]] Collected:  05/06/18 0129    Specimen:  Blood Updated:  05/06/18 0230    Narrative:       The following orders were created for panel order [place] Draw.  Procedure                                Abnormality         Status                     ---------                               -----------         ------                     Light Blue Top[[id number]]                                   Final result               Green Top (Gel)[[id number]]                                  Final result               Lavender Top[[id number]]                                     Final result               Gold Top - SST[[id number]]                                   Final result                 Please view results for these tests on the individual orders.    Light Blue Top [[id number]] Collected:  05/06/18 0129    Specimen:  Blood Updated:  05/06/18 0230     Extra Tube hold for add-on     Comment: Auto resulted       Green Top (Gel) [[id number]] Collected:  05/06/18 0129    Specimen:  Blood Updated:  05/06/18 0230     Extra Tube Hold for add-ons.     Comment: Auto resulted.       Lavender Top [[id number]] Collected:  05/06/18 0129    Specimen:  Blood Updated:  05/06/18 0230     Extra Tube hold for add-on     Comment: Auto resulted       Gold Top - SST [[id number]] Collected:  05/06/18 0129    Specimen:  Blood Updated:  05/06/18 0230     Extra Tube Hold for add-ons.     Comment: Auto resulted.       T4, Free [[id number]]  (Normal) Collected:  05/06/18 0129    Specimen:  Blood Updated:  05/06/18 0227     Free T4 1.21 ng/dL     CBC & Differential [[id number]] Collected:  05/06/18 0129    Specimen:  Blood Updated:  05/06/18 0219    Narrative:       The following orders were created for panel order CBC & Differential.  Procedure                               Abnormality         Status                     ---------                               -----------         ------                     CBC Auto Differential[[id number]]        Abnormal            Final result                 Please view results for these tests on the individual orders.    CBC Auto Differential [[id number]]  (Abnormal) Collected:  05/06/18 0129    Specimen:   Blood Updated:  05/06/18 0219     WBC 9.91 (H) 10*3/mm3      RBC 5.47 10*6/mm3      Hemoglobin 16.6 g/dL      Hematocrit 50.4 (H) %      MCV 92.1 fL      MCH 30.3 pg      MCHC 32.9 g/dL      RDW 13.7 %      RDW-SD 45.6 (H) fl      MPV 9.4 fL      Platelets 282 10*3/mm3      Neutrophil % 55.7 %      Lymphocyte % 33.8 %      Monocyte % 7.7 %      Eosinophil % 1.9 %      Basophil % 0.2 %      Immature Grans % 0.7 (H) %      Neutrophils, Absolute 5.52 10*3/mm3      Lymphocytes, Absolute 3.35 10*3/mm3      Monocytes, Absolute 0.76 10*3/mm3      Eosinophils, Absolute 0.19 10*3/mm3      Basophils, Absolute 0.02 10*3/mm3      Immature Grans, Absolute 0.07 (H) 10*3/mm3     Lipase [[id number]]  (Normal) Collected:  05/06/18 0129    Specimen:  Blood Updated:  05/06/18 0210     Lipase 52 U/L     Basic Metabolic Panel [[id number]]  (Abnormal) Collected:  05/06/18 0129    Specimen:  Blood Updated:  05/06/18 0210     Glucose 181 (H) mg/dL      BUN 18 mg/dL      Creatinine 1.19 mg/dL      Sodium 137 mmol/L      Potassium 4.2 mmol/L      Chloride 97 mmol/L      CO2 28.0 mmol/L      Calcium 8.9 mg/dL      eGFR Non African Amer 60 (L) mL/min/1.73      BUN/Creatinine Ratio 15.1     Anion Gap 12.0 mmol/L     Narrative:       The MDRD GFR formula is only valid for adults with stable renal function between ages 18 and 70.    Phosphorus [[id number]]  (Normal) Collected:  05/06/18 0129    Specimen:  Blood Updated:  05/06/18 0210     Phosphorus 3.6 mg/dL     Magnesium [[id number]]  (Normal) Collected:  05/06/18 0129    Specimen:  Blood Updated:  05/06/18 0210     Magnesium 2.0 mg/dL     aPTT [[id number]]  (Normal) Collected:  05/06/18 0129    Specimen:  Blood Updated:  05/06/18 0208     PTT 23.0 seconds     Narrative:       The recommended Heparin therapeutic range is 68-97 seconds.        Imaging Results (last 7 days)     Procedure Component Value Units Date/Time    XR Chest PA & Lateral [[id number]] Collected:  05/10/18 0944     Updated:   05/10/18 1044    Narrative:       Chest PA and lateral     CLINICAL INDICATION: Shortness of breath. Anaphylactic shock.  Post extubation.    COMPARISON: May 7, 2018    FINDINGS: Cardiac silhouette is normal in size. Pulmonary  vascularity is unremarkable.     There are left lower lobe infiltrative changes either atelectasis  or pneumonitis slightly more pronounced than on prior exam. Lungs  are otherwise clear. Since prior examination is nasogastric tube  and endotracheal tube have been removed.      Impression:       CONCLUSION: Patchy retrocardiac left lower lobe infiltrative  changes either pneumonitis or atelectasis. Otherwise unremarkable  chest.    Electronically signed by:  Ben Natarajan MD  5/10/2018 10:43 AM CDT  Workstation: [email or web address]    XR Chest 1 View [[id number]] Collected:  05/07/18 1122     Updated:  05/07/18 1155    Narrative:       Chest single view.    CLINICAL INDICATION: Shortness of breath. Anaphylactic shock.    COMPARISON: May 6, 2018    FINDINGS: Borderline cardiomegaly. Elevated diaphragms, probably  expiratory phase. Endotracheal tube with tip 4 cm above the  bifurcation of the julieta. NG tube in stomach. No dense  consolidation. Minimal discoid atelectatic changes right lung  base. Lungs otherwise clear.      Impression:       CONCLUSION: As above.    Electronically signed by:  Ben Natarajan MD  5/7/2018 11:54 AM CDT  Workstation: [email or web address]    XR Chest 1 View [[id number]] Collected:  05/06/18 0253     Updated:  05/06/18 0333    Narrative:       Exam: AP portable chest    INDICATION: ET tube placement    COMPARISON: 5/6/2018 at 2:06 AM    FINDINGS: AP portable chest. ET tube tip is 3.37 cm above the  julieta. NG tube tip is in the stomach. Lungs are hypoinflated.  There is linear bilateral atelectasis. Difficult to assess the  heart size.       Impression:       ET tube and NG tube are in good position.    Electronically signed by:  Valentino South MD  5/6/2018 3:31 AM CDT  Workstation: [id number]     XR Chest 1 View [[id number]] Collected:  05/06/18 0207     Updated:  05/06/18 0233    Narrative:       Exam: AP portable chest    INDICATION: Shortness of breath    COMPARISON: 12/16/2017    FINDINGS: AP portable chest. Bony structures are intact. The  lungs are hypoinflated which accentuates the heart size and  bronchovascular markings. There is bilateral linear atelectasis.  Difficult to assess the heart size. Cannot exclude infiltrate  and/or pleural effusion in the left lung base.      Impression:       1. Hypoinflated lungs.  2. Bilateral linear atelectasis.    Electronically signed by:  Valentino South MD  5/6/2018 2:32 AM CDT  Workstation: [id number]            Chief Complaint on Day of Discharge: None    Hospital Course:  - The patient was admitted to the intensive care unit for acute respiratory failure secondary to anaphylaxis (from Toradol).  He was intubated and commenced on steroids, bronchodilators and seen by the pulmonologist.  Patient was successfully extubated and transferred to the floor.  He remained on supplemental oxygen 1-2 L nasal prongs and maintaining saturation of 91-94%. He did desaturate into the low 80s with a walk test and was prescribed oxygen on discharge.  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  He had been on supplemental oxygen in the past.  He'll be scheduled for PFTs as outpatient and be seen by Dr. Mcadams for follow-up on discharge.   - History of diabetes mellitus: He was continued on anti-glycemic and placed on Accu-Cheks and sliding scale insulin.  Blood sugar control was optimal.   -  History of nephrolithiasis: Patient recently had a lithotripsy.  He is to follow-up with Dr. Ariza as outpatient on discharge.    Condition on Discharge:  Stable and improved    Physical Exam on Discharge:  /73 (BP Location: Right arm, Patient Position: Lying)   Pulse 95   Temp 97 °F (36.1 °C) (Tympanic)   Resp 20   " Ht 165.1 cm (65\")   Wt 98.6 kg (217 lb 6.4 oz)   SpO2 93%   BMI 36.18 kg/m²    Physical Exam  Constitutional: He is oriented to person, place, and time. He appears well-developed and well-nourished. No distress.   HENT:   Head: Normocephalic and atraumatic.   Eyes: EOM are normal. Pupils are equal, round, and reactive to light. No scleral icterus.   Neck: Normal range of motion. Neck supple. No JVD present. No thyromegaly present.   Cardiovascular: Normal rate, regular rhythm and normal heart sounds.  Exam reveals no gallop and no friction rub.    No murmur heard.  Pulmonary/Chest: Effort normal and breath sounds normal. He has no wheezes. He has no rales. He exhibits no tenderness.   He has distant breath sounds at the bases.   Abdominal: Soft. Bowel sounds are normal. He exhibits no distension and no mass. There is no tenderness. There is no rebound and no guarding.   Musculoskeletal: He exhibits no edema, tenderness or deformity.   Neurological: He is alert and oriented to person, place, and time. No cranial nerve deficit. He exhibits normal muscle tone. Coordination normal.   Skin: Skin is warm and dry. No rash noted. He is not diaphoretic. No erythema. No pallor.   Psychiatric: He has a normal mood and affect. His behavior is normal. Judgment and thought content normal.   Nursing note and vitals reviewed.   Extremities: He has left below the elbow amputation.    Discharge Disposition:  Home or Self Care    Discharge Medications:   Rolando Novoa   Home Medication Instructions JOSÉ MIGUEL:[id number]    Printed on:05/11/18 0962   Medication Information                      aspirin 81 MG tablet  Take 81 mg by mouth Daily.             atorvastatin (LIPITOR) 20 MG tablet  Take 20 mg by mouth Every Night.             budesonide-formoterol (SYMBICORT) 80-4.5 MCG/ACT inhaler  Inhale 2 puffs 2 (Two) Times a Day.             furosemide (LASIX) 20 MG tablet  Take 1 tablet by mouth Daily.             glimepiride " (AMARYL) 2 MG tablet  Take 2 mg by mouth Every Morning Before Breakfast.             ipratropium-albuterol (DUO-NEB) 0.5-2.5 mg/3 ml nebulizer  Take 3 mL by nebulization 4 (Four) Times a Day.             loperamide (IMODIUM) 2 MG capsule  Take 2 mg by mouth 2 (Two) Times a Day As Needed for diarrhea.             metFORMIN (GLUCOPHAGE) 500 MG tablet  Take 500 mg by mouth 2 (two) times a day. May take 3 depending on glucose reading             nebivolol (BYSTOLIC) 10 MG tablet  Take 10 mg by mouth Daily.             omeprazole (priLOSEC) 20 MG capsule  Take 20 mg by mouth Daily.             ondansetron ODT (ZOFRAN-ODT) 4 MG disintegrating tablet  Take 1 tablet by mouth Every 6 (Six) Hours As Needed for Nausea or Vomiting.             oxyCODONE-acetaminophen (PERCOCET) 7.5-325 MG per tablet  Take 1-2 tablets by mouth Every 4 (Four) Hours As Needed (Pain).             tamsulosin (FLOMAX) 0.4 MG capsule 24 hr capsule  Take 1 capsule by mouth Every Night.             TANZEUM 30 MG pen-injector  Inject 1 dose as directed 1 (One) Time Per Week.             triamcinolone (KENALOG) 0.1 % ointment  Apply  topically 2 (Two) Times a Day.                 Discharge Diet:  heart healthy and diabetic.    Activity at Discharge:  as tolerated.    Discharge Care Plan/Instructions:   Patient has been instructed to take his medications as prescribed including oxygen therapy and bronchodilators.  He is to return to the emergency department in the event of any worsening symptoms.    Follow-up Appointments:   Future Appointments  Date Time Provider Department Center   3/29/2019 9:45 AM Adonay Grijalva MD [facility] None       Test Results Pending at Discharge:     Jeff Armstrong MD  05/11/18  9:41 AM    Time: 45 minutes.                Electronically signed by Jeff Armstrong MD at 5/11/2018  9:56 AM

## 2018-06-11 ENCOUNTER — OFFICE VISIT (OUTPATIENT)
Dept: PULMONOLOGY | Facility: CLINIC | Age: 73
End: 2018-06-11

## 2018-06-11 ENCOUNTER — PROCEDURE VISIT (OUTPATIENT)
Dept: PULMONOLOGY | Facility: CLINIC | Age: 73
End: 2018-06-11

## 2018-06-11 VITALS
OXYGEN SATURATION: 92 % | BODY MASS INDEX: 37.3 KG/M2 | HEART RATE: 90 BPM | SYSTOLIC BLOOD PRESSURE: 118 MMHG | HEIGHT: 65 IN | DIASTOLIC BLOOD PRESSURE: 80 MMHG | WEIGHT: 223.9 LBS

## 2018-06-11 DIAGNOSIS — E66.09 CLASS 2 OBESITY DUE TO EXCESS CALORIES WITHOUT SERIOUS COMORBIDITY WITH BODY MASS INDEX (BMI) OF 36.0 TO 36.9 IN ADULT: ICD-10-CM

## 2018-06-11 DIAGNOSIS — J98.4 CHRONIC RESTRICTIVE LUNG DISEASE: ICD-10-CM

## 2018-06-11 DIAGNOSIS — J96.11 CHRONIC RESPIRATORY FAILURE WITH HYPOXIA (HCC): Primary | ICD-10-CM

## 2018-06-11 DIAGNOSIS — J60 BLACK LUNG (HCC): ICD-10-CM

## 2018-06-11 PROBLEM — T78.2XXA ANAPHYLACTIC SYNDROME: Status: RESOLVED | Noted: 2018-05-06 | Resolved: 2018-06-11

## 2018-06-11 PROCEDURE — 99214 OFFICE O/P EST MOD 30 MIN: CPT | Performed by: INTERNAL MEDICINE

## 2018-06-11 PROCEDURE — 94729 DIFFUSING CAPACITY: CPT | Performed by: INTERNAL MEDICINE

## 2018-06-11 PROCEDURE — 94060 EVALUATION OF WHEEZING: CPT | Performed by: INTERNAL MEDICINE

## 2018-06-11 PROCEDURE — 94727 GAS DIL/WSHOT DETER LNG VOL: CPT | Performed by: INTERNAL MEDICINE

## 2018-06-11 RX ORDER — DOXYCYCLINE HYCLATE 100 MG/1
CAPSULE ORAL
COMMUNITY
Start: 2018-05-07 | End: 2018-09-11

## 2018-06-11 NOTE — PROGRESS NOTES
Pulmonary Consultation    Jeff Armstrong MD,    Thank you for asking me to see Rolando Novoa for   Chief Complaint   Patient presents with   • Shortness of Breath   .    Subjective     History of Present Illness  Rolando Novoa is a 73 y.o. male with a PMH significant for black lung, obesity, DM, and HTN who presents for hospital follow-up. Pt was hospitalized 5/6-11 for acute hypoxemic respiratory failure due to suspected anaphylaxis.  Prior to admission the patient had undergone lithotripsy and received Levaquin empirically.  He had issues with pain following his procedure and came to the ED where he received Toradol as well as morphine.  After returning home the patient had rather sudden onset at rest were compromised so he returned to the ED where he was emergently intubated.  There is concern for angioedema versus anaphylaxis.  I was consulted to assist with ventilator management and respiratory failure.  He subsequently self extubated, but did well and only a low levels of supplemental oxygen following extubation.  He did well and ended up being discharged home on supplemental oxygen due to desaturations into the low 80s on room air.    Pt states it has taken him a little time to get back to normal after going home. He was discharged on oxygen and he reports he is wearing it most of the day. Pt checks his sats which run around 90s on RA at rest but it will drop with exertion. He denies dyspnea and he is gradually getting back to his normal activity. Pt does admit that he gets dizzy when his O2 sats are in the 70s. He denies wheeze or cough. Pt has occasional chest pain but he admits that it could be due to his chronic abdominal pain with diarrhea.  He is not using any bronchodilators, but he will use a red inhaler if his sats drop.    Review of Systems: History obtained from chart review and the patient.  Review of Systems   Constitutional: Positive for fatigue. Negative for unexpected weight  change.   Respiratory: Negative for cough, chest tightness, shortness of breath and wheezing.    Cardiovascular: Positive for chest pain. Negative for leg swelling.   Gastrointestinal: Positive for abdominal pain and diarrhea.   Neurological: Positive for weakness.     As described in the HPI. Otherwise, remainder of ROS (14 systems) were negative.    Patient Active Problem List   Diagnosis   • Hyperlipemia   • SOB (shortness of breath)   • Hematuria syndrome   • Psoriasis   • Hypertension   • High cholesterol   • Enlarged prostate   • Diabetes mellitus   • Acid reflux   • Calculus of kidney   • Anaphylactic syndrome   • Kidney stones   • Chronic respiratory failure with hypoxia   • Class 2 obesity due to excess calories without serious comorbidity with body mass index (BMI) of 36.0 to 36.9 in adult   • Black lung   • Chronic restrictive lung disease         Current Outpatient Prescriptions:   •  aspirin 81 MG tablet, Take 81 mg by mouth Daily., Disp: , Rfl:   •  atorvastatin (LIPITOR) 20 MG tablet, Take 20 mg by mouth Every Night., Disp: , Rfl:   •  doxycycline (VIBRAMYCIN) 100 MG capsule, , Disp: , Rfl:   •  furosemide (LASIX) 20 MG tablet, Take 1 tablet by mouth Daily., Disp: 30 tablet, Rfl: 1  •  glimepiride (AMARYL) 2 MG tablet, Take 2 mg by mouth Every Morning Before Breakfast., Disp: , Rfl:   •  loperamide (IMODIUM) 2 MG capsule, Take 2 mg by mouth 2 (Two) Times a Day As Needed for diarrhea., Disp: , Rfl:   •  metFORMIN (GLUCOPHAGE) 500 MG tablet, Take 500 mg by mouth 2 (two) times a day. May take 3 depending on glucose reading, Disp: , Rfl:   •  nebivolol (BYSTOLIC) 10 MG tablet, Take 10 mg by mouth Daily., Disp: , Rfl:   •  omeprazole (priLOSEC) 20 MG capsule, Take 20 mg by mouth Daily., Disp: , Rfl:   •  ondansetron ODT (ZOFRAN-ODT) 4 MG disintegrating tablet, Take 1 tablet by mouth Every 6 (Six) Hours As Needed for Nausea or Vomiting., Disp: 10 tablet, Rfl: 0  •  tamsulosin (FLOMAX) 0.4 MG capsule 24 hr  capsule, Take 1 capsule by mouth Every Night., Disp: , Rfl:   •  TANZEUM 30 MG pen-injector, Inject 1 dose as directed 1 (One) Time Per Week., Disp: , Rfl:   •  triamcinolone (KENALOG) 0.1 % ointment, Apply  topically 2 (Two) Times a Day., Disp: , Rfl:   •  oxyCODONE-acetaminophen (PERCOCET) 7.5-325 MG per tablet, Take 1-2 tablets by mouth Every 4 (Four) Hours As Needed (Pain)., Disp: 10 tablet, Rfl: 0    Past Medical History:   Diagnosis Date   • Acid reflux    • Diabetes    • Enlarged prostate    • High cholesterol    • Hypertension    • Kidney stone     multiple   • Psoriasis      Past Surgical History:   Procedure Laterality Date   • AMPUTATION Left     lower arm and hand   • CHOLECYSTECTOMY OPEN     • CYSTOSCOPY N/A 12/16/2017    Procedure: CYSTOSCOPY WITH CLOT EVACUATION;  Surgeon: Prosper Ariza MD;  Location: [facility];  Service:    • CYSTOSCOPY, URETEROSCOPY, RETROGRADE PYELOGRAM, STENT INSERTION Left 12/15/2017    Procedure: CYSTOSCOPY LEFT URETEROSCOPY RETROGRADE PYELOGRAM HOLMIUM LASER STENT INSERTION;  Surgeon: Prosper Ariza MD;  Location: [facility];  Service:    • EXTRACORPOREAL SHOCK WAVE LITHOTRIPSY (ESWL)     • EXTRACORPOREAL SHOCKWAVE LITHOTRIPSY (ESWL), STENT INSERTION/REMOVAL Left 2/3/2017    Procedure: LEFT EXTRACORPOREAL SHOCKWAVE LITHOTRIPSY, POSSIBLE CYSTOSCOPY, POSSIBLE STENT REMOVAL ;  Surgeon: Prosper Ariza MD;  Location: [facility];  Service:    • EXTRACORPOREAL SHOCKWAVE LITHOTRIPSY (ESWL), STENT INSERTION/REMOVAL Left 5/4/2018    Procedure: EXTRACORPOREAL SHOCKWAVE LITHOTRIPSY;  Surgeon: Prosper Ariza MD;  Location: [facility];  Service: Urology   • KIDNEY STONE SURGERY     • SCROTAL SURGERY      trauma     Social History     Social History   • Marital status:      Social History Main Topics   • Smoking status: Never Smoker   • Smokeless tobacco: Never Used   • Alcohol use No   • Drug use: No   • Sexual activity: Defer     Other Topics Concern   • Not on file     No  "family history on file.       Objective     Blood pressure 118/80, pulse 90, height 165.1 cm (65\"), weight 102 kg (223 lb 14.4 oz), SpO2 92 %.  Physical Exam   Constitutional: He is oriented to person, place, and time. Vital signs are normal. He appears well-developed and well-nourished.    Central obesity   HENT:   Head: Normocephalic and atraumatic.   Nose: Nose normal.   Mouth/Throat: Uvula is midline, oropharynx is clear and moist and mucous membranes are normal.    Mallampati 1   Eyes: Conjunctivae, EOM and lids are normal. Pupils are equal, round, and reactive to light.   Neck: Trachea normal and normal range of motion. No tracheal tenderness present. No thyroid mass present.   Cardiovascular: Normal rate, regular rhythm and normal heart sounds.  PMI is not displaced.  Exam reveals no gallop.    No murmur heard.  Pulmonary/Chest: Effort normal. No respiratory distress. He has no decreased breath sounds. He has no wheezes. He has no rhonchi. Chest wall is not dull to percussion. He exhibits no tenderness.   Abdominal: Soft. Normal appearance and bowel sounds are normal. There is no hepatomegaly. There is no tenderness.   obese   Musculoskeletal:   Normal gait, no extremity edema, s/p LUE distal amputation   Lymphadenopathy:        Head (right side): No submandibular adenopathy present.        Head (left side): No submandibular adenopathy present.     He has no cervical adenopathy.        Right: No supraclavicular adenopathy present.        Left: No supraclavicular adenopathy present.   Neurological: He is alert and oriented to person, place, and time. Gait normal.   Skin: Skin is warm and dry. No rash noted. No cyanosis. Nails show no clubbing.   Psychiatric: He has a normal mood and affect. His speech is normal and behavior is normal. Judgment normal.   Nursing note and vitals reviewed.      PFTs: 6/11/18 (independently reviewed and interpreted by me)  Ratio 77  FVC 1.55/ 43%  FEV1 1.19/ 45%  TLC 2.28/ " 37%  DLCO 10.89/ 42%  Severe restriction with no significant bronchodilator response.  Moderately reduced diffusing capacity.  No comparative data available.    Radiology (independently reviewed and interpreted by me): CXR 5/10/18 showed LLL atelectasis       Assessment/Plan     Rolando was seen today for shortness of breath.    Diagnoses and all orders for this visit:    Chronic respiratory failure with hypoxia  -     Full Pulmonary Function Test With Bronchodilator    Black lung    Class 2 obesity due to excess calories without serious comorbidity with body mass index (BMI) of 36.0 to 36.9 in adult    Chronic restrictive lung disease         Discussion/ Recommendations:   PFTs are consistent with restriction which is likely a combination of his obesity as well as underlying black lung.  Recent chest x-ray was reviewed.  I do not think the patient has underlying obstruction and therefore he does not require bronchodilators.  His chronic hypoxemic respiratory failure is likely due to his underlying black lung.  He is doing fairly well and has returned to baseline without any new acute issues.    -Stop Symbicort and DuoNeb's.  -Okay to use albuterol as needed only for dyspnea or wheeze.  -Continue supplemental oxygen at 2 L to maintain saturations >88%.  Provided with written prescriptions for smallest portable tanks with conserving regulator for more portability.  -Encouraged follow-up with the black lung clinic to discuss the possibility of benefits.  -Recommended increasing exercise as well as dietary changes to assist with weight loss.    Patient's Body mass index is 37.26 kg/m². BMI is above normal parameters. Recommendations include: exercise counseling and referral to primary care.           Return in about 3 months (around 9/11/2018) for Recheck.      Thank you for allowing me to participate in the care of Rolando Novoa. Please do not hesitate to contact me with any questions.         This document has  been electronically signed by Rain Mcadams MD on June 11, 2018 9:28 AM      Dictated using Dragon

## 2018-08-01 RX ORDER — FUROSEMIDE 20 MG/1
TABLET ORAL
Qty: 30 TABLET | Refills: 0 | Status: SHIPPED | OUTPATIENT
Start: 2018-08-01 | End: 2020-03-23 | Stop reason: SDUPTHER

## 2018-08-29 ENCOUNTER — TELEPHONE (OUTPATIENT)
Dept: PULMONOLOGY | Facility: CLINIC | Age: 73
End: 2018-08-29

## 2018-08-29 NOTE — TELEPHONE ENCOUNTER
PATIENT HAS BEEN INSTRUCTED TO CONTACT PCP FOR ONGOING CHEST/LUNG PAIN x3 WKS. IF PAIN WORSENS THEN PATIENT HAS BEEN INSTRUCTED TO GO TO URGENT CARE OR ER PER DR. ANTONY.

## 2018-09-11 ENCOUNTER — OFFICE VISIT (OUTPATIENT)
Dept: PULMONOLOGY | Facility: CLINIC | Age: 73
End: 2018-09-11

## 2018-09-11 VITALS
HEART RATE: 80 BPM | OXYGEN SATURATION: 97 % | WEIGHT: 230.4 LBS | BODY MASS INDEX: 38.39 KG/M2 | HEIGHT: 65 IN | SYSTOLIC BLOOD PRESSURE: 132 MMHG | DIASTOLIC BLOOD PRESSURE: 72 MMHG

## 2018-09-11 DIAGNOSIS — J98.4 CHRONIC RESTRICTIVE LUNG DISEASE: ICD-10-CM

## 2018-09-11 DIAGNOSIS — J96.11 CHRONIC RESPIRATORY FAILURE WITH HYPOXIA (HCC): Primary | ICD-10-CM

## 2018-09-11 DIAGNOSIS — E66.09 CLASS 2 OBESITY DUE TO EXCESS CALORIES WITHOUT SERIOUS COMORBIDITY WITH BODY MASS INDEX (BMI) OF 37.0 TO 37.9 IN ADULT: ICD-10-CM

## 2018-09-11 DIAGNOSIS — J60 BLACK LUNG (HCC): ICD-10-CM

## 2018-09-11 DIAGNOSIS — R07.89 LEFT-SIDED CHEST WALL PAIN: ICD-10-CM

## 2018-09-11 PROCEDURE — 99214 OFFICE O/P EST MOD 30 MIN: CPT | Performed by: INTERNAL MEDICINE

## 2018-09-11 NOTE — PROGRESS NOTES
Pulmonary Office Follow-up    Subjective     Rolando Novoa is seen today at the office for   Chief Complaint   Patient presents with   • Respiratory Distress     CHRONIC RESPIRATORY FAILURE W/ HYPOXIA - 3 MO F/U          HPI  Rolando Novoa is a 73 y.o. male with a PMH significant for chronic hypoxic respiratory failure, black lung, obesity, DM, and HTN who presents for follow-up of chronic respiratory failure.  He was last seen on 6/11/18, at which time I recommended stopping Symbicort and DuoNeb's, but continuing albuterol only as needed.  I also encouraged him to continue supplemental oxygen and follow up in the black lung clinic.  He reports that about a week ago he started having pain in his left side which was worse by moving his arm and better by pressing his arm against his chest wall.  The pain persisted for several days, but spontaneously resolved.  Patient is concerned it was pain from his lungs, but he denies any other respiratory symptoms like worsening dyspnea, cough, or fever.  He does continue to have dyspnea on exertion, but admits that he is quite sedentary in his lifestyle.  Patient is also had a gradual weight gain as he does have a good appetite.  He continues to use his supplemental oxygen, but reports that they have noticed episodes where his saturations are below 88% on his normal 2 L.  It actually seems to be better when he is using his portable oxygen with a conserving regulator.  He denies any issues with his blood pressure, but admits he does not check it at home.  His wife states that he has had some episodes of dizziness, but he has attributed to low oxygen levels.  He has tried the albuterol a few times, but again perceived no benefit.      Patient Active Problem List   Diagnosis   • Hyperlipemia   • SOB (shortness of breath)   • Hematuria syndrome   • Psoriasis   • Hypertension   • High cholesterol   • Enlarged prostate   • Diabetes mellitus (CMS/HCC)   • Acid reflux   •  Calculus of kidney   • Kidney stones   • Chronic respiratory failure with hypoxia (CMS/HCC)   • Class 2 obesity due to excess calories without serious comorbidity with body mass index (BMI) of 37.0 to 37.9 in adult   • Black lung (CMS/HCC)   • Chronic restrictive lung disease       Review of Systems  Review of Systems   Constitutional: Positive for fatigue and unexpected weight change. Negative for chills and fever.   Respiratory: Positive for shortness of breath. Negative for cough, chest tightness and wheezing.    Cardiovascular: Positive for chest pain. Negative for leg swelling.   Neurological: Positive for weakness.     As described in the HPI. Otherwise, remainder of ROS (14 systems) were negative.    Medications, Allergies, Social, and Family Histories reviewed as per EMR.    Objective     Vitals:    09/11/18 0944   BP: 132/72   Pulse: 80   SpO2: 97%     Physical Exam   Constitutional: He is oriented to person, place, and time. Vital signs are normal. He appears well-developed and well-nourished.    Central obesity   HENT:   Head: Normocephalic and atraumatic.   Nose: Nose normal.   Mouth/Throat: Uvula is midline, oropharynx is clear and moist and mucous membranes are normal.    Mallampati 1   Eyes: Pupils are equal, round, and reactive to light. Conjunctivae, EOM and lids are normal.   Neck: Trachea normal and normal range of motion. No tracheal tenderness present. No thyroid mass present.   Cardiovascular: Normal rate, regular rhythm and normal heart sounds.  PMI is not displaced.  Exam reveals no gallop.    No murmur heard.  Pulmonary/Chest: Effort normal. No respiratory distress. He has no decreased breath sounds. He has no wheezes. He has no rhonchi. He exhibits no tenderness.   Abdominal: Soft. Normal appearance and bowel sounds are normal. There is no hepatomegaly. There is no tenderness.   obese   Musculoskeletal:   Normal gait, no extremity edema, s/p LUE distal amputation     Vascular Status -  His  right foot exhibits no edema. His left foot exhibits no edema.  Lymphadenopathy:        Head (right side): No submandibular adenopathy present.        Head (left side): No submandibular adenopathy present.     He has no cervical adenopathy.        Right: No supraclavicular adenopathy present.        Left: No supraclavicular adenopathy present.   Neurological: He is alert and oriented to person, place, and time. Gait normal.   Skin: Skin is warm and dry. No rash noted. No cyanosis. Nails show no clubbing.   Psychiatric: He has a normal mood and affect. His speech is normal and behavior is normal. Judgment normal.   Nursing note and vitals reviewed.          Assessment/Plan     Rolando was seen today for respiratory distress.    Diagnoses and all orders for this visit:    Chronic respiratory failure with hypoxia (CMS/HCC)    Chronic restrictive lung disease    Black lung (CMS/[facility])    Class 2 obesity due to excess calories without serious comorbidity with body mass index (BMI) of 37.0 to 37.9 in adult    Left-sided chest wall pain         Discussion/ Recommendations:   I think for chest pain he felt was chest wall pain even the aggravators and alleviators.  Nevertheless, it has resolved.  He has noted some low oxygen saturations despite his supplemental oxygen, which is likely reflective of his underlying lung disease as well as hypoventilation from his obesity.  I do think he would benefit from regular exercise as well as efforts at weight loss, but in the meantime I counseled him on how to titrate his oxygen.  I do not feel that additional bronchodilators are necessary as he has not received any benefit in the past.  I did discuss with him possibly returning to the black lung clinic to be reassessed, but the patient is not interested in spending the time as he was previously told he did not qualify.  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    -Okay to use  albuterol nebs as needed.  -Continue supplemental oxygen.  Counseled on method of titrating his oxygen to maintain saturations over 88%.  Encourage deep breathing exercises, but if he continues to have hypoxemia despite increasing to 4 L, he should be assessed by a physician.  -Conservative measures for chest wall pain.  -Encouraged regular, progressive exercise as well as a proper diet for weight loss.  -Recommend he get the flu vaccine soon.    Patient's Body mass index is 38.34 kg/m². BMI is above normal parameters. Recommendations include: exercise counseling.        Return in about 3 months (around 12/11/2018) for Recheck lung disease.          This document has been electronically signed by Rain Mcadams MD on September 11, 2018 10:20 AM      Dictated using Dragon

## 2018-09-13 ENCOUNTER — OFFICE VISIT (OUTPATIENT)
Dept: GASTROENTEROLOGY | Facility: CLINIC | Age: 73
End: 2018-09-13

## 2018-09-13 VITALS
HEART RATE: 88 BPM | DIASTOLIC BLOOD PRESSURE: 70 MMHG | BODY MASS INDEX: 37.65 KG/M2 | HEIGHT: 65 IN | WEIGHT: 226 LBS | SYSTOLIC BLOOD PRESSURE: 130 MMHG

## 2018-09-13 DIAGNOSIS — Z86.010 HX OF COLONIC POLYPS: ICD-10-CM

## 2018-09-13 DIAGNOSIS — Z12.11 ENCOUNTER FOR SCREENING FOR MALIGNANT NEOPLASM OF COLON: Primary | ICD-10-CM

## 2018-09-13 PROCEDURE — S0260 H&P FOR SURGERY: HCPCS | Performed by: NURSE PRACTITIONER

## 2018-09-13 RX ORDER — DEXTROSE AND SODIUM CHLORIDE 5; .45 G/100ML; G/100ML
30 INJECTION, SOLUTION INTRAVENOUS CONTINUOUS PRN
Status: CANCELLED | OUTPATIENT
Start: 2018-11-09

## 2018-09-13 RX ORDER — SODIUM, POTASSIUM,MAG SULFATES 17.5-3.13G
1 SOLUTION, RECONSTITUTED, ORAL ORAL EVERY 12 HOURS
Qty: 2 BOTTLE | Refills: 0 | Status: SHIPPED | OUTPATIENT
Start: 2018-09-13 | End: 2018-11-09 | Stop reason: HOSPADM

## 2018-09-13 NOTE — PROGRESS NOTES
Chief Complaint   Patient presents with   • Colon Cancer Screening     recall letter       Subjective    Rolando Novoa is a 73 y.o. male. he is being seen for consultation today at the request of recall letter.    History of Present Illness  73-year-old male presents due to recall letter to discuss screening colonoscopy.  Prior note states he has family history of colon cancer however he states he is unsure of who had colon cancer in family.  Previous colonoscopy is reviewed and notes polyp removed from the sigmoid colon and rectum.  He denies any abdominal pain, nausea, vomiting or changes in his bowel habits.  His weight is stable.  he is a retired . he has chronic diarrhea since his gallbladder was removed and he takes Imodium daily.  he also has history of reflux and takes omeprazole as needed.  Plan; schedule patient for screening colonoscopy due to personal history of colonic polyps In sigmoid colon and rectum on previous colonoscopy.         The following portions of the patient's history were reviewed and updated as appropriate:   Past Medical History:   Diagnosis Date   • Acid reflux    • Diabetes (CMS/HCC)    • Enlarged prostate    • High cholesterol    • Hypertension    • Kidney stone     multiple   • Psoriasis      Past Surgical History:   Procedure Laterality Date   • AMPUTATION Left     lower arm and hand   • CHOLECYSTECTOMY OPEN     • CYSTOSCOPY N/A 12/16/2017    Procedure: CYSTOSCOPY WITH CLOT EVACUATION;  Surgeon: Prosper Ariza MD;  Location: [facility];  Service:    • CYSTOSCOPY, URETEROSCOPY, RETROGRADE PYELOGRAM, STENT INSERTION Left 12/15/2017    Procedure: CYSTOSCOPY LEFT URETEROSCOPY RETROGRADE PYELOGRAM HOLMIUM LASER STENT INSERTION;  Surgeon: Prosper Ariza MD;  Location: [facility];  Service:    • EXTRACORPOREAL SHOCK WAVE LITHOTRIPSY (ESWL)     • EXTRACORPOREAL SHOCKWAVE LITHOTRIPSY (ESWL), STENT INSERTION/REMOVAL Left 2/3/2017    Procedure: LEFT EXTRACORPOREAL SHOCKWAVE  LITHOTRIPSY, POSSIBLE CYSTOSCOPY, POSSIBLE STENT REMOVAL ;  Surgeon: Prosper Ariza MD;  Location: [facility] OR;  Service:    • EXTRACORPOREAL SHOCKWAVE LITHOTRIPSY (ESWL), STENT INSERTION/REMOVAL Left 5/4/2018    Procedure: EXTRACORPOREAL SHOCKWAVE LITHOTRIPSY;  Surgeon: Prosper Ariza MD;  Location: [facility];  Service: Urology   • KIDNEY STONE SURGERY     • SCROTAL SURGERY      trauma     History reviewed. No pertinent family history.    Current Outpatient Prescriptions   Medication Sig Dispense Refill   • aspirin 81 MG tablet Take 81 mg by mouth Daily.     • atorvastatin (LIPITOR) 20 MG tablet Take 20 mg by mouth Every Night.     • furosemide (LASIX) 20 MG tablet TAKE ONE TABLET BY MOUTH ONCE DAILY 30 tablet 0   • glimepiride (AMARYL) 2 MG tablet Take 2 mg by mouth Every Morning Before Breakfast.     • loperamide (IMODIUM) 2 MG capsule Take 2 mg by mouth 2 (Two) Times a Day As Needed for diarrhea.     • metFORMIN (GLUCOPHAGE) 500 MG tablet Take 500 mg by mouth 2 (two) times a day. May take 3 depending on glucose reading     • nebivolol (BYSTOLIC) 10 MG tablet Take 10 mg by mouth Daily.     • omeprazole (priLOSEC) 20 MG capsule Take 20 mg by mouth Daily.     • ondansetron ODT (ZOFRAN-ODT) 4 MG disintegrating tablet Take 1 tablet by mouth Every 6 (Six) Hours As Needed for Nausea or Vomiting. 10 tablet 0   • oxyCODONE-acetaminophen (PERCOCET) 7.5-325 MG per tablet Take 1-2 tablets by mouth Every 4 (Four) Hours As Needed (Pain). 10 tablet 0   • tamsulosin (FLOMAX) 0.4 MG capsule 24 hr capsule Take 1 capsule by mouth Every Night.     • TANZEUM 30 MG pen-injector Inject 1 dose as directed 1 (One) Time Per Week.     • triamcinolone (KENALOG) 0.1 % ointment Apply  topically 2 (Two) Times a Day.     • sodium-potassium-magnesium sulfates (SUPREP BOWEL PREP KIT) 17.5-3.13-1.6 GM/180ML solution oral solution Take 1 bottle by mouth Every 12 (Twelve) Hours. 2 bottle 0     No current facility-administered medications for this  "visit.      Allergies   Allergen Reactions   • Toradol [Ketorolac Tromethamine] Anaphylaxis     Social History     Social History   • Marital status:      Social History Main Topics   • Smoking status: Never Smoker   • Smokeless tobacco: Never Used   • Alcohol use No   • Drug use: No   • Sexual activity: Defer     Other Topics Concern   • Not on file       Review of Systems  Review of Systems   Constitutional: Negative for activity change, appetite change, chills, diaphoresis, fatigue, fever and unexpected weight change.   HENT: Negative for sore throat and trouble swallowing.    Respiratory: Negative for shortness of breath.    Gastrointestinal: Negative for abdominal distention, abdominal pain, anal bleeding, blood in stool, constipation, diarrhea, nausea, rectal pain and vomiting.   Musculoskeletal: Negative for arthralgias.   Skin: Negative for pallor.   Neurological: Negative for light-headedness.        /70 (BP Location: Right arm, Patient Position: Sitting)   Pulse 88   Ht 165.1 cm (65\")   Wt 103 kg (226 lb)   BMI 37.61 kg/m²     Objective    Physical Exam   Constitutional: He is oriented to person, place, and time. He appears well-developed and well-nourished. He is cooperative. No distress.   HENT:   Head: Normocephalic and atraumatic.   Neck: Normal range of motion. Neck supple. No thyromegaly present.   Cardiovascular: Normal rate, regular rhythm and normal heart sounds.    Pulmonary/Chest: Effort normal and breath sounds normal. He has no wheezes. He has no rhonchi. He has no rales.   Abdominal: Soft. Normal appearance and bowel sounds are normal. He exhibits no distension. There is no hepatosplenomegaly. There is no tenderness. There is no rigidity and no guarding. No hernia.   Lymphadenopathy:     He has no cervical adenopathy.   Neurological: He is alert and oriented to person, place, and time.   Skin: Skin is warm, dry and intact. No rash noted. No pallor.   Psychiatric: He has a " normal mood and affect. His speech is normal.     Admission on 05/06/2018, Discharged on 05/11/2018   No results displayed because visit has over 200 results.        Assessment/Plan      1. Encounter for screening for malignant neoplasm of colon    2. Hx of colonic polyps    .       Orders placed during this encounter include:  Orders Placed This Encounter   Procedures   • Follow Anesthesia Guidelines / Standing Orders     Standing Status:   Future       COLONOSCOPY (N/A)    Review and/or summary of lab tests, radiology, procedures, medications. Review and summary of old records and obtaining of history. The risks and benefits of my recommendations, as well as other treatment options were discussed with the patient today. Questions were answered.    New Medications Ordered This Visit   Medications   • sodium-potassium-magnesium sulfates (SUPREP BOWEL PREP KIT) 17.5-3.13-1.6 GM/180ML solution oral solution     Sig: Take 1 bottle by mouth Every 12 (Twelve) Hours.     Dispense:  2 bottle     Refill:  0       Follow-up: Return in about 4 weeks (around 10/11/2018) for Recheck after procedure .          This document has been electronically signed by RIVKA Fox on September 13, 2018 4:11 PM             Results for orders placed or performed during the hospital encounter of 05/06/18   Gold Top - SST   Result Value Ref Range    Extra Tube Hold for add-ons.    Green Top (Gel)   Result Value Ref Range    Extra Tube Hold for add-ons.    Blood Gas, Arterial With Co-Ox   Result Value Ref Range    Site Right Radial     Elder's Test Positive     pH, Arterial 7.482 (H) 7.350 - 7.450 pH units    pCO2, Arterial 41.5 35.0 - 45.0 mm Hg    pO2, Arterial 51.7 (L) 83.0 - 108.0 mm Hg    HCO3, Arterial 31.0 (H) 20.0 - 26.0 mmol/L    Base Excess, Arterial 6.8 (H) 0.0 - 2.0 mmol/L    O2 Saturation, Arterial 88.1 (L) 94.0 - 99.0 %    Hemoglobin, Blood Gas 15.5 14 - 18 g/dL    Hematocrit, Blood Gas 47.5 38.0 - 51.0 %    Oxyhemoglobin  87.5 (L) 94 - 99 %    Methemoglobin 0.00 0.00 - 3.00 %    Carboxyhemoglobin 0.6 0 - 5 %    Sodium, Arterial 140 136 - 146 mmol/L    Potassium, Arterial 3.4 3.4 - 4.5 mmol/L    Ionized Calcium 4.57 (L) 4.60 - 5.60 mg/dL    Glucose, Arterial 360 (H) 65 - 95 mmol/L    Barometric Pressure for Blood Gas 749 mmHg    Modality Nasal Cannula     Flow Rate 6.0 lpm    Ventilator Mode NA     Collected by maddie     pH, Temp Corrected  pH Units    pCO2, Temperature Corrected  35 - 48 mm Hg    pO2, Temperature Corrected  83 - 108 mm Hg   Blood Gas, Arterial With Co-Ox   Result Value Ref Range    Site Right Radial     Elder's Test N/A     pH, Arterial 7.413 7.350 - 7.450 pH units    pCO2, Arterial 44.6 35.0 - 45.0 mm Hg    pO2, Arterial 71.7 (L) 83.0 - 108.0 mm Hg    HCO3, Arterial 28.4 (H) 20.0 - 26.0 mmol/L    Base Excess, Arterial 3.2 (H) 0.0 - 2.0 mmol/L    O2 Saturation, Arterial 94.4 94.0 - 99.0 %    Hemoglobin, Blood Gas 14.3 14 - 18 g/dL    Hematocrit, Blood Gas 43.9 38.0 - 51.0 %    Oxyhemoglobin 93.6 (L) 94 - 99 %    Methemoglobin 0.50 0.00 - 3.00 %    Carboxyhemoglobin 0.4 0 - 5 %    Sodium, Arterial 139 136 - 146 mmol/L    Potassium, Arterial 4.0 3.4 - 4.5 mmol/L    Ionized Calcium 4.59 (L) 4.60 - 5.60 mg/dL    Glucose, Arterial 301 (H) 65 - 95 mmol/L    Barometric Pressure for Blood Gas 751 mmHg    Modality Ventilator     FIO2 70 %    Ventilator Mode AC     Set Tidal Volume 490     Set Mech Resp Rate 12.0     PEEP 10.0     PIP 23 cmH2O    Collected by yonny     pH, Temp Corrected  pH Units    pCO2, Temperature Corrected  35 - 48 mm Hg    pO2, Temperature Corrected  83 - 108 mm Hg   Blood Gas, Arterial With Co-Ox   Result Value Ref Range    Site Right Radial     Elder's Test N/A     pH, Arterial 7.425 7.350 - 7.450 pH units    pCO2, Arterial 40.0 35.0 - 45.0 mm Hg    pO2, Arterial 66.4 (L) 83.0 - 108.0 mm Hg    HCO3, Arterial 26.2 (H) 20.0 - 26.0 mmol/L    Base Excess, Arterial 1.7 0.0 - 2.0 mmol/L    O2 Saturation,  Arterial 93.9 (L) 94.0 - 99.0 %    Hemoglobin, Blood Gas 13.9 (L) 14 - 18 g/dL    Hematocrit, Blood Gas 42.5 38.0 - 51.0 %    Oxyhemoglobin 93.0 (L) 94 - 99 %    Methemoglobin 0.50 0.00 - 3.00 %    Carboxyhemoglobin 0.5 0 - 5 %    Sodium, Arterial 138 136 - 146 mmol/L    Potassium, Arterial 3.9 3.4 - 4.5 mmol/L    Ionized Calcium 4.54 (L) 4.60 - 5.60 mg/dL    Glucose, Arterial 226 (H) 65 - 95 mmol/L    Barometric Pressure for Blood Gas 749 mmHg    Modality N/A     FIO2 100 %    Ventilator Mode AC     Set Tidal Volume 550     Set Mech Resp Rate 16.0     PEEP 8.0     Collected by gold ramos     pH, Temp Corrected  pH Units    pCO2, Temperature Corrected  35 - 48 mm Hg    pO2, Temperature Corrected  83 - 108 mm Hg   Blood Gas, Arterial With Co-Ox   Result Value Ref Range    Site Right Radial     Elder's Test N/A     pH, Arterial 7.259 (L) 7.350 - 7.450 pH units    pCO2, Arterial 60.8 (H) 35.0 - 45.0 mm Hg    pO2, Arterial 79.4 (L) 83.0 - 108.0 mm Hg    HCO3, Arterial 27.2 (H) 20.0 - 26.0 mmol/L    Base Excess, Arterial -1.4 (L) 0.0 - 2.0 mmol/L    O2 Saturation, Arterial 94.7 94.0 - 99.0 %    Hemoglobin, Blood Gas 15.3 14 - 18 g/dL    Hematocrit, Blood Gas 46.9 38.0 - 51.0 %    Oxyhemoglobin 93.6 (L) 94 - 99 %    Methemoglobin 0.60 0.00 - 3.00 %    Carboxyhemoglobin 0.6 0 - 5 %    Sodium, Arterial 137 136 - 146 mmol/L    Potassium, Arterial 4.5 3.4 - 4.5 mmol/L    Ionized Calcium 4.53 (L) 4.60 - 5.60 mg/dL    Glucose, Arterial 191 (H) 65 - 95 mmol/L    Barometric Pressure for Blood Gas 748 mmHg    Modality PRB     FIO2 100 %    Ventilator Mode AC     Set Tidal Volume 550     Set Mech Resp Rate 12.0     PEEP 8.0     Collected by gold ramos     pH, Temp Corrected  pH Units    pCO2, Temperature Corrected  35 - 48 mm Hg    pO2, Temperature Corrected  83 - 108 mm Hg     *Note: Due to a large number of results and/or encounters for the requested time period, some results have not been displayed. A complete set of results can be  found in Results Review.

## 2018-09-13 NOTE — PATIENT INSTRUCTIONS
Colonoscopy, Adult  A colonoscopy is an exam to look at the entire large intestine. During the exam, a lubricated, bendable tube is inserted into the anus and then passed into the rectum, colon, and other parts of the large intestine.  A colonoscopy is often done as a part of normal colorectal screening or in response to certain symptoms, such as anemia, persistent diarrhea, abdominal pain, and blood in the stool. The exam can help screen for and diagnose medical problems, including:  · Tumors.  · Polyps.  · Inflammation.  · Areas of bleeding.    Tell a health care provider about:  · Any allergies you have.  · All medicines you are taking, including vitamins, herbs, eye drops, creams, and over-the-counter medicines.  · Any problems you or family members have had with anesthetic medicines.  · Any blood disorders you have.  · Any surgeries you have had.  · Any medical conditions you have.  · Any problems you have had passing stool.  What are the risks?  Generally, this is a safe procedure. However, problems may occur, including:  · Bleeding.  · A tear in the intestine.  · A reaction to medicines given during the exam.  · Infection (rare).    What happens before the procedure?  Eating and drinking restrictions  Follow instructions from your health care provider about eating and drinking, which may include:  · A few days before the procedure - follow a low-fiber diet. Avoid nuts, seeds, dried fruit, raw fruits, and vegetables.  · 1-3 days before the procedure - follow a clear liquid diet. Drink only clear liquids, such as clear broth or bouillon, black coffee or tea, clear juice, clear soft drinks or sports drinks, gelatin dessert, and popsicles. Avoid any liquids that contain red or purple dye.  · On the day of the procedure - do not eat or drink anything during the 2 hours before the procedure, or within the time period that your health care provider recommends.    Bowel prep  If you were prescribed an oral bowel prep  to clean out your colon:  · Take it as told by your health care provider. Starting the day before your procedure, you will need to drink a large amount of medicated liquid. The liquid will cause you to have multiple loose stools until your stool is almost clear or light green.  · If your skin or anus gets irritated from diarrhea, you may use these to relieve the irritation:  ? Medicated wipes, such as adult wet wipes with aloe and vitamin E.  ? A skin soothing-product like petroleum jelly.  · If you vomit while drinking the bowel prep, take a break for up to 60 minutes and then begin the bowel prep again. If vomiting continues and you cannot take the bowel prep without vomiting, call your health care provider.    General instructions  · Ask your health care provider about changing or stopping your regular medicines. This is especially important if you are taking diabetes medicines or blood thinners.  · Plan to have someone take you home from the hospital or clinic.  What happens during the procedure?  · An IV tube may be inserted into one of your veins.  · You will be given medicine to help you relax (sedative).  · To reduce your risk of infection:  ? Your health care team will wash or sanitize their hands.  ? Your anal area will be washed with soap.  · You will be asked to lie on your side with your knees bent.  · Your health care provider will lubricate a long, thin, flexible tube. The tube will have a camera and a light on the end.  · The tube will be inserted into your anus.  · The tube will be gently eased through your rectum and colon.  · Air will be delivered into your colon to keep it open. You may feel some pressure or cramping.  · The camera will be used to take images during the procedure.  · A small tissue sample may be removed from your body to be examined under a microscope (biopsy). If any potential problems are found, the tissue will be sent to a lab for testing.  · If small polyps are found, your  health care provider may remove them and have them checked for cancer cells.  · The tube that was inserted into your anus will be slowly removed.  The procedure may vary among health care providers and hospitals.  What happens after the procedure?  · Your blood pressure, heart rate, breathing rate, and blood oxygen level will be monitored until the medicines you were given have worn off.  · Do not drive for 24 hours after the exam.  · You may have a small amount of blood in your stool.  · You may pass gas and have mild abdominal cramping or bloating due to the air that was used to inflate your colon during the exam.  · It is up to you to get the results of your procedure. Ask your health care provider, or the department performing the procedure, when your results will be ready.  This information is not intended to replace advice given to you by your health care provider. Make sure you discuss any questions you have with your health care provider.  Document Released: 12/15/2001 Document Revised: 10/18/2017 Document Reviewed: 02/28/2017  [employer] Interactive Patient Education © 2018 [employer]

## 2018-11-09 ENCOUNTER — ANESTHESIA (OUTPATIENT)
Dept: GASTROENTEROLOGY | Facility: HOSPITAL | Age: 73
End: 2018-11-09

## 2018-11-09 ENCOUNTER — ANESTHESIA EVENT (OUTPATIENT)
Dept: GASTROENTEROLOGY | Facility: HOSPITAL | Age: 73
End: 2018-11-09

## 2018-11-09 ENCOUNTER — HOSPITAL ENCOUNTER (OUTPATIENT)
Facility: HOSPITAL | Age: 73
Setting detail: HOSPITAL OUTPATIENT SURGERY
Discharge: HOME | End: 2018-11-09
Attending: INTERNAL MEDICINE | Admitting: INTERNAL MEDICINE

## 2018-11-09 VITALS
TEMPERATURE: 97.2 F | SYSTOLIC BLOOD PRESSURE: 119 MMHG | BODY MASS INDEX: 37.57 KG/M2 | HEIGHT: 65 IN | OXYGEN SATURATION: 93 % | RESPIRATION RATE: 16 BRPM | DIASTOLIC BLOOD PRESSURE: 54 MMHG | WEIGHT: 225.5 LBS | HEART RATE: 85 BPM

## 2018-11-09 DIAGNOSIS — Z86.010 HX OF COLONIC POLYPS: ICD-10-CM

## 2018-11-09 DIAGNOSIS — Z12.11 ENCOUNTER FOR SCREENING FOR MALIGNANT NEOPLASM OF COLON: ICD-10-CM

## 2018-11-09 PROCEDURE — 25010000002 PROPOFOL 10 MG/ML EMULSION: Performed by: NURSE ANESTHETIST, CERTIFIED REGISTERED

## 2018-11-09 PROCEDURE — 45385 COLONOSCOPY W/LESION REMOVAL: CPT | Performed by: INTERNAL MEDICINE

## 2018-11-09 PROCEDURE — 88305 TISSUE EXAM BY PATHOLOGIST: CPT | Performed by: INTERNAL MEDICINE

## 2018-11-09 PROCEDURE — 88305 TISSUE EXAM BY PATHOLOGIST: CPT | Performed by: PATHOLOGY

## 2018-11-09 RX ORDER — PROPOFOL 10 MG/ML
VIAL (ML) INTRAVENOUS AS NEEDED
Status: DISCONTINUED | OUTPATIENT
Start: 2018-11-09 | End: 2018-11-09 | Stop reason: SURG

## 2018-11-09 RX ORDER — LIDOCAINE HYDROCHLORIDE 10 MG/ML
INJECTION, SOLUTION INFILTRATION; PERINEURAL AS NEEDED
Status: DISCONTINUED | OUTPATIENT
Start: 2018-11-09 | End: 2018-11-09 | Stop reason: SURG

## 2018-11-09 RX ORDER — ACETAMINOPHEN 325 MG/1
650 TABLET ORAL ONCE AS NEEDED
Status: DISCONTINUED | OUTPATIENT
Start: 2018-11-09 | End: 2018-11-09 | Stop reason: HOSPADM

## 2018-11-09 RX ORDER — LABETALOL HYDROCHLORIDE 5 MG/ML
5 INJECTION, SOLUTION INTRAVENOUS
Status: DISCONTINUED | OUTPATIENT
Start: 2018-11-09 | End: 2018-11-09 | Stop reason: HOSPADM

## 2018-11-09 RX ORDER — FLUMAZENIL 0.1 MG/ML
0.2 INJECTION INTRAVENOUS AS NEEDED
Status: DISCONTINUED | OUTPATIENT
Start: 2018-11-09 | End: 2018-11-09 | Stop reason: HOSPADM

## 2018-11-09 RX ORDER — ACETAMINOPHEN 650 MG/1
650 SUPPOSITORY RECTAL ONCE AS NEEDED
Status: DISCONTINUED | OUTPATIENT
Start: 2018-11-09 | End: 2018-11-09 | Stop reason: HOSPADM

## 2018-11-09 RX ORDER — DEXTROSE AND SODIUM CHLORIDE 5; .45 G/100ML; G/100ML
30 INJECTION, SOLUTION INTRAVENOUS CONTINUOUS PRN
Status: DISCONTINUED | OUTPATIENT
Start: 2018-11-09 | End: 2018-11-09 | Stop reason: HOSPADM

## 2018-11-09 RX ORDER — DIPHENHYDRAMINE HYDROCHLORIDE 50 MG/ML
12.5 INJECTION INTRAMUSCULAR; INTRAVENOUS
Status: DISCONTINUED | OUTPATIENT
Start: 2018-11-09 | End: 2018-11-09 | Stop reason: HOSPADM

## 2018-11-09 RX ORDER — EPHEDRINE SULFATE 50 MG/ML
5 INJECTION, SOLUTION INTRAVENOUS ONCE AS NEEDED
Status: DISCONTINUED | OUTPATIENT
Start: 2018-11-09 | End: 2018-11-09 | Stop reason: HOSPADM

## 2018-11-09 RX ORDER — NALOXONE HCL 0.4 MG/ML
0.2 VIAL (ML) INJECTION AS NEEDED
Status: DISCONTINUED | OUTPATIENT
Start: 2018-11-09 | End: 2018-11-09 | Stop reason: HOSPADM

## 2018-11-09 RX ORDER — ONDANSETRON 2 MG/ML
4 INJECTION INTRAMUSCULAR; INTRAVENOUS ONCE AS NEEDED
Status: DISCONTINUED | OUTPATIENT
Start: 2018-11-09 | End: 2018-11-09 | Stop reason: HOSPADM

## 2018-11-09 RX ADMIN — DEXTROSE AND SODIUM CHLORIDE 30 ML/HR: 5; 450 INJECTION, SOLUTION INTRAVENOUS at 10:39

## 2018-11-09 RX ADMIN — PROPOFOL 100 MG: 10 INJECTION, EMULSION INTRAVENOUS at 10:45

## 2018-11-09 RX ADMIN — LIDOCAINE HYDROCHLORIDE 50 MG: 10 INJECTION, SOLUTION INFILTRATION; PERINEURAL at 10:45

## 2018-11-09 NOTE — ANESTHESIA PREPROCEDURE EVALUATION
Anesthesia Evaluation     Patient summary reviewed and Nursing notes reviewed   NPO Solid Status: > 8 hours  NPO Liquid Status: > 8 hours           Airway   No difficulty expected  Dental      Pulmonary - normal exam   (+) COPD, shortness of breath,   Cardiovascular - normal exam    (+) hypertension poorly controlled,       Neuro/Psych  GI/Hepatic/Renal/Endo    (+) obesity, morbid obesity, GERD poorly controlled,  diabetes mellitus type 2,     Musculoskeletal     Abdominal  - normal exam   Substance History      OB/GYN          Other                        Anesthesia Plan    ASA 3     MAC     intravenous induction   Anesthetic plan, all risks, benefits, and alternatives have been provided, discussed and informed consent has been obtained with: patient.    Plan discussed with CRNA.

## 2018-11-09 NOTE — ANESTHESIA POSTPROCEDURE EVALUATION
Patient: Rolando Novoa    Procedure Summary     Date:  11/09/18 Room / Location:  [facility] ENDOSCOPY 1 / [facility] ENDOSCOPY    Anesthesia Start:  1044 Anesthesia Stop:  1056    Procedure:  COLONOSCOPY (N/A ) Diagnosis:       Hx of colonic polyps      Encounter for screening for malignant neoplasm of colon      (Hx of colonic polyps [Z86.010])      (Encounter for screening for malignant neoplasm of colon [Z12.11])    Surgeon:  Sukhdeep Mae MD Provider:  Prosper Vargas CRNA    Anesthesia Type:  MAC ASA Status:  3          Anesthesia Type: MAC  Last vitals  BP   146/93 (11/09/18 1022)   Temp   97.2 °F (36.2 °C) (11/09/18 1022)   Pulse   82 (11/09/18 1022)   Resp   20 (11/09/18 1022)     SpO2   95 % (11/09/18 1022)     Post Anesthesia Care and Evaluation    Patient location during evaluation: bedside  Patient participation: complete - patient participated  Level of consciousness: awake and alert  Pain score: 1  Pain management: adequate  Airway patency: patent  Anesthetic complications: No anesthetic complications  PONV Status: none  Cardiovascular status: acceptable  Respiratory status: acceptable  Hydration status: acceptable

## 2018-11-12 LAB
LAB AP CASE REPORT: NORMAL
PATH REPORT.FINAL DX SPEC: NORMAL
PATH REPORT.GROSS SPEC: NORMAL

## 2018-12-03 RX ORDER — NEBIVOLOL HYDROCHLORIDE 10 MG/1
TABLET ORAL
Qty: 30 TABLET | Refills: 6 | Status: SHIPPED | OUTPATIENT
Start: 2018-12-03 | End: 2019-07-09 | Stop reason: SDUPTHER

## 2019-03-29 ENCOUNTER — OFFICE VISIT (OUTPATIENT)
Dept: CARDIOLOGY | Facility: CLINIC | Age: 74
End: 2019-03-29

## 2019-03-29 VITALS
OXYGEN SATURATION: 95 % | WEIGHT: 229 LBS | HEIGHT: 65 IN | BODY MASS INDEX: 38.15 KG/M2 | DIASTOLIC BLOOD PRESSURE: 82 MMHG | SYSTOLIC BLOOD PRESSURE: 120 MMHG

## 2019-03-29 DIAGNOSIS — I10 ESSENTIAL HYPERTENSION: Primary | ICD-10-CM

## 2019-03-29 DIAGNOSIS — E78.00 PURE HYPERCHOLESTEROLEMIA: ICD-10-CM

## 2019-03-29 DIAGNOSIS — R06.02 SOB (SHORTNESS OF BREATH): ICD-10-CM

## 2019-03-29 DIAGNOSIS — E11.9 TYPE 2 DIABETES MELLITUS WITHOUT COMPLICATION, WITHOUT LONG-TERM CURRENT USE OF INSULIN (HCC): ICD-10-CM

## 2019-03-29 PROCEDURE — 99214 OFFICE O/P EST MOD 30 MIN: CPT | Performed by: INTERNAL MEDICINE

## 2019-03-29 NOTE — PROGRESS NOTES
[facility] Cardiology  OFFICE NOTE    Rolando Novoa  73 y.o. male    2019  1. Essential hypertension    2. Pure hypercholesterolemia    3. SOB (shortness of breath)    4. Type 2 diabetes mellitus without complication, without long-term current use of insulin (CMS/[facility])        Chief complaint -Follow-up shortness of breath      History of present Illness- 72-year-old gentleman who has history of diabetes, hypertension no history of CAD has complications of diabetes with neuropathy and retinopathy.  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 He has been having multiple kidney stones.  He does have some shortness of breath and his father  of MI at the age of 69 and had his first MI at the age of 60.  We will do a Lexiscan nuclear stress test.  He is on optimal medical therapy.          Allergies   Allergen Reactions   • Toradol [Ketorolac Tromethamine] Anaphylaxis         Past Medical History:   Diagnosis Date   • Acid reflux    • Diabetes (CMS/HCC)    • Enlarged prostate    • High cholesterol    • Hypertension    • Kidney stone     multiple   • Psoriasis          Past Surgical History:   Procedure Laterality Date   • AMPUTATION Left     lower arm and hand   • CHOLECYSTECTOMY OPEN     • COLONOSCOPY N/A 2018    Procedure: COLONOSCOPY;  Surgeon: Sukhdeep Mae MD;  Location: [facility] ENDOSCOPY;  Service: Gastroenterology   • CYSTOSCOPY N/A 2017    Procedure: CYSTOSCOPY WITH CLOT EVACUATION;  Surgeon: Prosper Ariza MD;  Location: [facility] OR;  Service:    • CYSTOSCOPY, URETEROSCOPY, RETROGRADE PYELOGRAM, STENT INSERTION Left 12/15/2017    Procedure: CYSTOSCOPY LEFT URETEROSCOPY RETROGRADE PYELOGRAM HOLMIUM LASER STENT INSERTION;  Surgeon: Prosper Ariza MD;  Location: [facility] OR;  Service:    • EXTRACORPOREAL SHOCK WAVE LITHOTRIPSY (ESWL)     • EXTRACORPOREAL SHOCKWAVE LITHOTRIPSY (ESWL), STENT INSERTION/REMOVAL Left 2/3/2017    Procedure: LEFT EXTRACORPOREAL SHOCKWAVE  LITHOTRIPSY, POSSIBLE CYSTOSCOPY, POSSIBLE STENT REMOVAL ;  Surgeon: Prosper Ariza MD;  Location: [facility];  Service:    • EXTRACORPOREAL SHOCKWAVE LITHOTRIPSY (ESWL), STENT INSERTION/REMOVAL Left 5/4/2018    Procedure: EXTRACORPOREAL SHOCKWAVE LITHOTRIPSY;  Surgeon: Prosper Ariza MD;  Location: [facility];  Service: Urology   • KIDNEY STONE SURGERY     • SCROTAL SURGERY      trauma         No family history on file.      Social History     Socioeconomic History   • Marital status:      Spouse name: Not on file   • Number of children: Not on file   • Years of education: Not on file   • Highest education level: Not on file   Tobacco Use   • Smoking status: Never Smoker   • Smokeless tobacco: Never Used   Substance and Sexual Activity   • Alcohol use: No   • Drug use: No   • Sexual activity: Defer         Current Outpatient Medications   Medication Sig Dispense Refill   • aspirin 81 MG tablet Take 81 mg by mouth Daily.     • atorvastatin (LIPITOR) 20 MG tablet Take 20 mg by mouth Every Night.     • BYSTOLIC 10 MG tablet TAKE ONE TABLET BY MOUTH ONCE DAILY 30 tablet 6   • furosemide (LASIX) 20 MG tablet TAKE ONE TABLET BY MOUTH ONCE DAILY 30 tablet 0   • glimepiride (AMARYL) 2 MG tablet Take 2 mg by mouth Every Morning Before Breakfast.     • loperamide (IMODIUM) 2 MG capsule Take 2 mg by mouth 2 (Two) Times a Day As Needed for diarrhea.     • metFORMIN (GLUCOPHAGE) 500 MG tablet Take 500 mg by mouth 2 (two) times a day. May take 3 depending on glucose reading     • nebivolol (BYSTOLIC) 10 MG tablet Take 10 mg by mouth Daily.     • omeprazole (priLOSEC) 20 MG capsule Take 20 mg by mouth Daily.     • ondansetron ODT (ZOFRAN-ODT) 4 MG disintegrating tablet Take 1 tablet by mouth Every 6 (Six) Hours As Needed for Nausea or Vomiting. 10 tablet 0   • tamsulosin (FLOMAX) 0.4 MG capsule 24 hr capsule Take 1 capsule by mouth Every Night.     • TANZEUM 30 MG pen-injector Inject 1 dose as directed 1 (One) Time Per  "Week.     • triamcinolone (KENALOG) 0.1 % ointment Apply  topically 2 (Two) Times a Day.       No current facility-administered medications for this visit.          Review of Systems     Constitution: Denies any fatigue, fever or chills    HENT: Denies any headache, hearing impairment,     Eyes: Has diabetic retinopathy     Cardivascular - As per history of present illness     Respiratory system-Has black lung with coal workers pneumoconiosis     Endocrine:   history of hyperlipidemia, diabetes,     Musculoskeletal:   history of arthritis with history of amputation of the left forearm    Gastrointestinal: No nausea, vomiting, or melena    Genitourinary: BPH    Neurological:   No history of seizure disorder, stroke, memory problems    Psychiatric/Behavioral:        No history of depression,      Hematological- no history of easy bruising             OBJECTIVE    /82   Ht 165.1 cm (65\")   Wt 104 kg (229 lb)   SpO2 95%   BMI 38.11 kg/m²       Physical Exam     Constitutional: is oriented to person, place, and time.     Skin-warm and dry    Well developed and nourished in no acute distress      Head: Normocephalic and atraumatic.     Eyes: Pupils are equal, round,     Neck: Neck supple. No bruit in the carotids,     Cardiovascular: [place] in the fifth intercostal space   Regular rate, and  Rhythm,    S1 greater than S2,    Pulmonary/Chest:   Air  Entry is equal on both sides  No wheezing or crackles,      Abdominal: Soft.  No hepatosplenomegaly,     Musculoskeletal: Arthritis of the left hip    Neurological: is alert and oriented to person, place, and time.    cranial nerve are intact .   Peripheral neuropathy    Extremities-no edema, amputation of the left forearm      Psychiatric: He has a normal mood and affect.                  His behavior is normal.           Procedures      Lab Results   Component Value Date    WBC 6.73 05/11/2018    HGB 15.2 05/11/2018    HCT 46.3 05/11/2018    MCV 92.8 05/11/2018    PLT " 244 05/11/2018     Lab Results   Component Value Date    GLUCOSE 191 (H) 05/10/2018    BUN 33 (H) 05/10/2018    CREATININE 0.92 05/10/2018    EGFRIFNONA 81 05/10/2018    BCR 35.9 (H) 05/10/2018    CO2 29.0 05/10/2018    CALCIUM 8.5 05/10/2018    ALBUMIN 4.20 05/05/2018    AST 30 05/05/2018    ALT 56 05/05/2018      Patient's Body mass index is 38.11 kg/m². BMI is above normal parameters. Recommendations include: exercise counseling.            A/P    Shortness of breath-with history of hypertension and diabetes-Also has some burning sensation on the left side of the lower  abdominal area, will do a Lexiscan nuclear stress to rule out ischemia.    Hyperlipidemia on atorvastatin doing okay followed by Dr. Zepeda    Diabetes on metformin and glipizide and is developing complications with neuropathy    BMI of 38.  Talked to him about exercise and watching the diet.    Follow-up in 10 months            This document has been electronically signed by Adonay Grijalva MD on March 29, 2019 9:50 AM       EMR Dragon/Transcription disclaimer:   Some of this note may be an electronic transcription/translation of spoken language to printed text. The electronic translation of spoken language may permit erroneous, or at times, nonsensical words or phrases to be inadvertently transcribed; Although I have reviewed the note for such errors, some may still exist.

## 2019-04-17 ENCOUNTER — HOSPITAL ENCOUNTER (OUTPATIENT)
Dept: CARDIOLOGY | Facility: HOSPITAL | Age: 74
Discharge: HOME | End: 2019-04-17

## 2019-04-17 ENCOUNTER — HOSPITAL ENCOUNTER (OUTPATIENT)
Dept: NUCLEAR MEDICINE | Facility: HOSPITAL | Age: 74
Discharge: HOME | End: 2019-04-17

## 2019-04-17 LAB
BH CV STRESS BP STAGE 1: NORMAL
BH CV STRESS COMMENTS STAGE 1: NORMAL
BH CV STRESS DOSE REGADENOSON STAGE 1: 0.4
BH CV STRESS DURATION MIN STAGE 1: 0
BH CV STRESS DURATION SEC STAGE 1: 10
BH CV STRESS HR STAGE 1: 67
BH CV STRESS PROTOCOL 1: NORMAL
BH CV STRESS RECOVERY BP: NORMAL MMHG
BH CV STRESS RECOVERY HR: 85 BPM
BH CV STRESS STAGE 1: 1
LV EF NUC BP: 82 %
MAXIMAL PREDICTED HEART RATE: 148 BPM
PERCENT MAX PREDICTED HR: 73.65 %
STRESS BASELINE BP: NORMAL MMHG
STRESS BASELINE HR: 73 BPM
STRESS PERCENT HR: 87 %
STRESS POST ESTIMATED WORKLOAD: 1 METS
STRESS POST PEAK BP: NORMAL MMHG
STRESS POST PEAK HR: 109 BPM
STRESS TARGET HR: 126 BPM

## 2019-04-17 PROCEDURE — 93016 CV STRESS TEST SUPVJ ONLY: CPT | Performed by: INTERNAL MEDICINE

## 2019-04-17 PROCEDURE — 78452 HT MUSCLE IMAGE SPECT MULT: CPT | Performed by: INTERNAL MEDICINE

## 2019-04-17 PROCEDURE — A9500 TC99M SESTAMIBI: HCPCS | Performed by: INTERNAL MEDICINE

## 2019-04-17 PROCEDURE — 25010000002 REGADENOSON 0.4 MG/5ML SOLUTION: Performed by: INTERNAL MEDICINE

## 2019-04-17 PROCEDURE — 93018 CV STRESS TEST I&R ONLY: CPT | Performed by: INTERNAL MEDICINE

## 2019-04-17 PROCEDURE — 0 TECHNETIUM SESTAMIBI: Performed by: INTERNAL MEDICINE

## 2019-04-17 PROCEDURE — 78452 HT MUSCLE IMAGE SPECT MULT: CPT

## 2019-04-17 PROCEDURE — 93017 CV STRESS TEST TRACING ONLY: CPT

## 2019-04-17 RX ORDER — 0.9 % SODIUM CHLORIDE 0.9 %
10 VIAL (ML) INJECTION AS NEEDED
Status: DISCONTINUED | OUTPATIENT
Start: 2019-04-17 | End: 2019-04-18 | Stop reason: HOSPADM

## 2019-04-17 RX ADMIN — REGADENOSON 0.4 MG: 0.08 INJECTION, SOLUTION INTRAVENOUS at 10:27

## 2019-04-17 RX ADMIN — TECHNETIUM TC 99M SESTAMIBI 1 DOSE: 1 INJECTION INTRAVENOUS at 10:27

## 2019-04-17 RX ADMIN — TECHNETIUM TC 99M SESTAMIBI 1 DOSE: 1 INJECTION INTRAVENOUS at 08:20

## 2019-04-17 RX ADMIN — SODIUM CHLORIDE, PRESERVATIVE FREE 10 ML: 5 INJECTION INTRAVENOUS at 10:27

## 2019-06-10 NOTE — ANESTHESIA PROCEDURE NOTES
Airway  Urgency: elective    Airway not difficult    General Information and Staff    Patient location during procedure: OR  CRNA: GABINO THURMAN    Indications and Patient Condition  Indications for airway management: airway protection    Preoxygenated: yes  MILS maintained throughout  Mask difficulty assessment: 0 - not attempted    Final Airway Details  Final airway type: supraglottic airway      Successful airway: I-gel  Size 4    Number of attempts at approach: 1              
yes

## 2019-07-09 RX ORDER — NEBIVOLOL HYDROCHLORIDE 10 MG/1
TABLET ORAL
Qty: 30 TABLET | Refills: 6 | Status: SHIPPED | OUTPATIENT
Start: 2019-07-09 | End: 2020-02-25 | Stop reason: SDUPTHER

## 2020-02-25 ENCOUNTER — TELEPHONE (OUTPATIENT)
Dept: CARDIOLOGY | Facility: CLINIC | Age: 75
End: 2020-02-25

## 2020-02-25 RX ORDER — NEBIVOLOL 10 MG/1
10 TABLET ORAL DAILY
Qty: 30 TABLET | Refills: 0 | Status: SHIPPED | OUTPATIENT
Start: 2020-02-25 | End: 2020-03-23 | Stop reason: SDUPTHER

## 2020-02-25 NOTE — TELEPHONE ENCOUNTER
----- Message from Rosie Ray MA sent at 2/25/2020 11:40 AM CST -----  Regarding: FW: [facility] PT-SCHEDULE WITH ALICE  Contact: [phone]      ----- Message -----  From: Peace Worrell  Sent: 2/25/2020   9:53 AM CST  To: [facility] [place]  Subject: [facility] PT-SCHEDULE WITH ALICE             Pt is a former [facility] pt, but is scheduled to see Dr. Lemus in March.  He is wanting to know if he can get a refill for Bystolic sent to [facility].  His phone number is [phone].

## 2020-03-23 ENCOUNTER — OFFICE VISIT (OUTPATIENT)
Dept: CARDIOLOGY | Facility: CLINIC | Age: 75
End: 2020-03-23

## 2020-03-23 VITALS
HEART RATE: 97 BPM | DIASTOLIC BLOOD PRESSURE: 84 MMHG | SYSTOLIC BLOOD PRESSURE: 124 MMHG | BODY MASS INDEX: 38.09 KG/M2 | WEIGHT: 228.6 LBS | OXYGEN SATURATION: 90 % | HEIGHT: 65 IN

## 2020-03-23 DIAGNOSIS — E78.00 PURE HYPERCHOLESTEROLEMIA: ICD-10-CM

## 2020-03-23 DIAGNOSIS — I10 ESSENTIAL HYPERTENSION: Primary | ICD-10-CM

## 2020-03-23 DIAGNOSIS — Z00.00 GENERAL MEDICAL EXAM: Primary | ICD-10-CM

## 2020-03-23 PROCEDURE — 99212 OFFICE O/P EST SF 10 MIN: CPT | Performed by: INTERNAL MEDICINE

## 2020-03-23 PROCEDURE — 93000 ELECTROCARDIOGRAM COMPLETE: CPT | Performed by: INTERNAL MEDICINE

## 2020-03-23 RX ORDER — NEBIVOLOL 10 MG/1
10 TABLET ORAL DAILY
Qty: 30 TABLET | Refills: 11 | Status: SHIPPED | OUTPATIENT
Start: 2020-03-23

## 2020-03-23 RX ORDER — FUROSEMIDE 20 MG/1
20 TABLET ORAL DAILY
Qty: 30 TABLET | Refills: 11 | Status: SHIPPED | OUTPATIENT
Start: 2020-03-23 | End: 2023-03-14

## 2020-03-23 RX ORDER — ATORVASTATIN CALCIUM 20 MG/1
20 TABLET, FILM COATED ORAL NIGHTLY
Qty: 30 TABLET | Refills: 11 | Status: SHIPPED | OUTPATIENT
Start: 2020-03-23

## 2020-03-23 NOTE — PROGRESS NOTES
Rolando Novoa  74 y.o. male    03/23/2020  Chief Complaint   Patient presents with   • Follow-up       History of Present Illness  Patient is here for follow-up of his high blood pressure.    -        SUBJECTIVE  Patient is doing well.  He is here for follow-up of his high blood pressure.  He is having no chest pain.  Shortness of breath is at baseline.  He is overall feeling well.  Allergies   Allergen Reactions   • Toradol [Ketorolac Tromethamine] Anaphylaxis         Past Medical History:   Diagnosis Date   • Acid reflux    • Diabetes (CMS/HCC)    • Enlarged prostate    • High cholesterol    • Hypertension    • Kidney stone     multiple   • Psoriasis          Past Surgical History:   Procedure Laterality Date   • AMPUTATION Left     lower arm and hand   • CHOLECYSTECTOMY OPEN     • COLONOSCOPY N/A 11/9/2018    Procedure: COLONOSCOPY;  Surgeon: Sukhdeep Mae MD;  Location: [facility] ENDOSCOPY;  Service: Gastroenterology   • CYSTOSCOPY N/A 12/16/2017    Procedure: CYSTOSCOPY WITH CLOT EVACUATION;  Surgeon: Prosper Ariza MD;  Location: [facility] OR;  Service:    • CYSTOSCOPY, URETEROSCOPY, RETROGRADE PYELOGRAM, STENT INSERTION Left 12/15/2017    Procedure: CYSTOSCOPY LEFT URETEROSCOPY RETROGRADE PYELOGRAM HOLMIUM LASER STENT INSERTION;  Surgeon: Prosper Ariza MD;  Location: [facility] OR;  Service:    • EXTRACORPOREAL SHOCK WAVE LITHOTRIPSY (ESWL)     • EXTRACORPOREAL SHOCKWAVE LITHOTRIPSY (ESWL), STENT INSERTION/REMOVAL Left 2/3/2017    Procedure: LEFT EXTRACORPOREAL SHOCKWAVE LITHOTRIPSY, POSSIBLE CYSTOSCOPY, POSSIBLE STENT REMOVAL ;  Surgeon: Prosper Ariza MD;  Location: [facility] OR;  Service:    • EXTRACORPOREAL SHOCKWAVE LITHOTRIPSY (ESWL), STENT INSERTION/REMOVAL Left 5/4/2018    Procedure: EXTRACORPOREAL SHOCKWAVE LITHOTRIPSY;  Surgeon: Prosper Ariza MD;  Location: [facility] OR;  Service: Urology   • KIDNEY STONE SURGERY     • SCROTAL SURGERY      trauma         History reviewed. No pertinent family  history.      Social History     Socioeconomic History   • Marital status:      Spouse name: Not on file   • Number of children: Not on file   • Years of education: Not on file   • Highest education level: Not on file   Tobacco Use   • Smoking status: Never Smoker   • Smokeless tobacco: Never Used   Substance and Sexual Activity   • Alcohol use: No   • Drug use: No   • Sexual activity: Defer         Prior to Admission medications    Medication Sig Start Date End Date Taking? Authorizing Provider   atorvastatin (LIPITOR) 20 MG tablet Take 1 tablet by mouth Every Night. 3/23/20  Yes Cassi Lemus MD   furosemide (LASIX) 20 MG tablet Take 1 tablet by mouth Daily. 3/23/20  Yes Cassi Lemus MD   glimepiride (AMARYL) 2 MG tablet Take 2 mg by mouth Every Morning Before Breakfast.   Yes Esvin Murray MD   loperamide (IMODIUM) 2 MG capsule Take 2 mg by mouth 2 (Two) Times a Day As Needed for diarrhea.   Yes Esvin Murray MD   metFORMIN (GLUCOPHAGE) 500 MG tablet Take 500 mg by mouth 2 (two) times a day. May take 3 depending on glucose reading 6/3/16  Yes Esvin Murray MD   nebivolol (Bystolic) 10 MG tablet Take 1 tablet by mouth Daily. 3/23/20  Yes Cassi Lemus MD   omeprazole (priLOSEC) 20 MG capsule Take 20 mg by mouth Daily.   Yes Esvin Murray MD   ondansetron ODT (ZOFRAN-ODT) 4 MG disintegrating tablet Take 1 tablet by mouth Every 6 (Six) Hours As Needed for Nausea or Vomiting. 5/5/18  Yes Moose Nam MD   tamsulosin (FLOMAX) 0.4 MG capsule 24 hr capsule Take 1 capsule by mouth Every Night.   Yes Esvin Murray MD   triamcinolone (KENALOG) 0.1 % ointment Apply  topically 2 (Two) Times a Day.   Yes Esvin Murray MD   atorvastatin (LIPITOR) 20 MG tablet Take 20 mg by mouth Every Night. 8/2/16 3/23/20 Yes Esvin Murray MD   furosemide (LASIX) 20 MG tablet TAKE ONE TABLET BY MOUTH ONCE DAILY 8/1/18 3/23/20 Yes Adonay Grijalva  "MD LEWIS   nebivolol (BYSTOLIC) 10 MG tablet Take 1 tablet by mouth Daily. 2/25/20 3/23/20 Yes Cassi Lemus MD   aspirin 81 MG tablet Take 81 mg by mouth Daily.    ProviderEsvin MD TANZEUM 30 MG pen-injector Inject 1 dose as directed 1 (One) Time Per Week. 3/21/18   Provider, MD Esvin         OBJECTIVE    /84 (BP Location: Right arm, Patient Position: Sitting)   Pulse 97   Ht 165.1 cm (65\")   Wt 104 kg (228 lb 9.6 oz)   SpO2 90%   BMI 38.04 kg/m²         Review of Systems     Constitutional:  Denies recent weight loss, weight gain, fever or chills, no change in exercise tolerance     HENT:  Denies any hearing loss, epistaxis, hoarseness, or difficulty speaking.     Eyes: Wears eyeglasses or contact lenses     Respiratory:  Denies dyspnea with exertion,no cough, wheezing, or hemoptysis.     Cardiovascular: Negative for palpations, chest pain, orthopnea, PND, peripheral edema, syncope, or claudication.     Gastrointestinal:  Denies change in bowel habits, dyspepsia, ulcer disease, hematochezia, or melena.     Endocrine: Negative for cold intolerance, heat intolerance, polydipsia, polyphagia and polyuria. Denies any history of weight change, heat/cold intolerance, polydipsia, polyuria     Genitourinary: Negative.      Musculoskeletal: Denies any history of arthritic symptoms or back problems     Skin:  Denies any change in hair or nails, rashes, or skin lesions.     Allergic/Immunologic: Negative.  Negative for environmental allergies, food allergies and immunocompromised state.     Neurological:  Denies any history of recurrent headaches, strokes, TIA, or seizure disorder.     Hematological: Denies any food allergies, seasonal allergies, bleeding disorders, or lymphadenopathy.     Psychiatric/Behavioral: Denies any history of depression, substance abuse, or change in cognitive function.         Physical Exam     Constitutional: Cooperative, alert and oriented, well-developed, " well-nourished, in no acute distress.     HENT:   Head: Normocephalic, normal hair patterns, no masses or tenderness.  Ears, Nose, and Throat: No gross abnormalities. No pallor or cyanosis. Dentition good.   Eyes: EOMS intact, PERRL, conjunctivae and lids unremarkable. Fundoscopic exam and visual fields not performed.   Neck: No palpable masses or adenopathy, no thyromegaly, no JVD, carotid pulses are full and equal bilaterally and without  Bruits.     Cardiovascular: Regular rhythm, S1 and S2 normal, no S3 or S4. Apical impulse not displaced. No murmurs, gallops, or rubs detected.     Pulmonary/Chest: Chest: normal symmetry, no tenderness to palpation, normal respiratory excursion, no intercostal retraction, no use of accessory muscles.            Pulmonary: Normal breath sounds. No rales or ronchi.    Abdominal: Abdomen soft, bowel sounds normoactive, no masses, no hepatosplenomegaly, non-tender, no bruits.     Musculoskeletal: No deformities, clubbing, cyanosis, erythema, or edema observed. There are no spinal abnormalities noted. Normal muscle strength and tone. Pulses full and equal in all extremities, no bruits auscultated.     Neurological: No gross motor or sensory deficits noted, affect appropriate, oriented to time, person, place.     Skin: Warm and dry to the touch, no apparent skin lesions or masses noted.     Psychiatric: He has a normal mood and affect. His behavior is normal. Judgment and thought content normal.         Procedures      Lab Results   Component Value Date    WBC 6.9 11/16/2018    HGB 15.5 11/16/2018    HCT 48.4 11/16/2018    MCV 92 11/16/2018     11/16/2018     Lab Results   Component Value Date    GLUCOSE 191 (H) 05/10/2018    BUN 18 05/07/2019    CREATININE 1.5 (H) 05/07/2019    EGFRIFNONA 81 05/10/2018    BCR 35.9 (H) 05/10/2018    CO2 29.0 05/10/2018    CALCIUM 9.4 05/07/2019    ALBUMIN 3.8 05/07/2019    AST 37 05/07/2019    ALT 47 05/07/2019     Lab Results   Component Value  Date    CHOL 203 (H) 11/16/2018    CHOL 179 05/16/2018     Lab Results   Component Value Date    TRIG 196 11/16/2018    TRIG 273 (H) 05/16/2018     Lab Results   Component Value Date    HDL 43 11/16/2018    HDL 40 05/16/2018     No components found for: LDLCALC  Lab Results   Component Value Date     (H) 11/16/2018     (H) 05/16/2018     No results found for: HDLLDLRATIO  No components found for: CHOLHDL  Lab Results   Component Value Date    HGBA1C 7.5 (H) 05/07/2019     Lab Results   Component Value Date    TSH 3.020 05/06/2018           ASSESSMENT AND PLAN      Rolando was seen today for follow-up.    Diagnoses and all orders for this visit:    Essential hypertension  We will keep on his Bystolic.    Pure hypercholesterolemia  We will refill his atorvastatin and check lipids.  Other orders  -     atorvastatin (LIPITOR) 20 MG tablet; Take 1 tablet by mouth Every Night.  -     nebivolol (Bystolic) 10 MG tablet; Take 1 tablet by mouth Daily.  -     furosemide (LASIX) 20 MG tablet; Take 1 tablet by mouth Daily.        Patient's Body mass index is 38.04 kg/m². BMI is above normal parameters. Recommendations include: referral to primary care.                Cassi Lemus MD  3/23/2020  11:39

## 2020-08-10 ENCOUNTER — TELEPHONE (OUTPATIENT)
Dept: CARDIOLOGY | Facility: CLINIC | Age: 75
End: 2020-08-10

## 2020-08-10 NOTE — TELEPHONE ENCOUNTER
Called [phone] to let him know about his lab work cindi 03/23/20,   Talked to wife, she voiced understanding

## 2020-09-10 ENCOUNTER — TELEPHONE (OUTPATIENT)
Dept: CARDIOLOGY | Facility: CLINIC | Age: 75
End: 2020-09-10

## 2021-01-19 ENCOUNTER — LAB (OUTPATIENT)
Dept: LAB | Facility: HOSPITAL | Age: 76
End: 2021-01-19

## 2021-01-19 DIAGNOSIS — Z01.818 PREOP TESTING: Primary | ICD-10-CM

## 2021-01-19 PROCEDURE — U0004 COV-19 TEST NON-CDC HGH THRU: HCPCS

## 2021-01-19 PROCEDURE — C9803 HOPD COVID-19 SPEC COLLECT: HCPCS

## 2021-01-20 LAB — SARS-COV-2 ORF1AB RESP QL NAA+PROBE: NOT DETECTED

## 2021-01-22 ENCOUNTER — ANESTHESIA (OUTPATIENT)
Dept: PERIOP | Facility: HOSPITAL | Age: 76
End: 2021-01-22

## 2021-01-22 ENCOUNTER — ANESTHESIA EVENT (OUTPATIENT)
Dept: PERIOP | Facility: HOSPITAL | Age: 76
End: 2021-01-22

## 2021-01-22 ENCOUNTER — HOSPITAL ENCOUNTER (OUTPATIENT)
Facility: HOSPITAL | Age: 76
Setting detail: HOSPITAL OUTPATIENT SURGERY
Discharge: HOME | End: 2021-01-22
Attending: OPHTHALMOLOGY | Admitting: OPHTHALMOLOGY

## 2021-01-22 VITALS
HEIGHT: 65 IN | HEART RATE: 68 BPM | OXYGEN SATURATION: 93 % | SYSTOLIC BLOOD PRESSURE: 130 MMHG | RESPIRATION RATE: 18 BRPM | WEIGHT: 220.02 LBS | DIASTOLIC BLOOD PRESSURE: 71 MMHG | TEMPERATURE: 97.1 F | BODY MASS INDEX: 36.66 KG/M2

## 2021-01-22 LAB — GLUCOSE BLDC GLUCOMTR-MCNC: 190 MG/DL (ref 70–130)

## 2021-01-22 PROCEDURE — 25010000002 MIDAZOLAM PER 1 MG: Performed by: NURSE ANESTHETIST, CERTIFIED REGISTERED

## 2021-01-22 PROCEDURE — 82962 GLUCOSE BLOOD TEST: CPT

## 2021-01-22 PROCEDURE — 25010000002 FENTANYL CITRATE (PF) 100 MCG/2ML SOLUTION: Performed by: NURSE ANESTHETIST, CERTIFIED REGISTERED

## 2021-01-22 PROCEDURE — 25010000002 VANCOMYCIN 1 G RECONSTITUTED SOLUTION 1 EACH VIAL: Performed by: OPHTHALMOLOGY

## 2021-01-22 PROCEDURE — 25010000002 EPINEPHRINE PER 0.1 MG: Performed by: OPHTHALMOLOGY

## 2021-01-22 PROCEDURE — V2632 POST CHMBR INTRAOCULAR LENS: HCPCS | Performed by: OPHTHALMOLOGY

## 2021-01-22 DEVICE — LENS ACRYSOF IQ 6X13MM SN60WF 20.5: Type: IMPLANTABLE DEVICE | Site: EYE | Status: FUNCTIONAL

## 2021-01-22 RX ORDER — SODIUM CHLORIDE 0.9 % (FLUSH) 0.9 %
10 SYRINGE (ML) INJECTION AS NEEDED
Status: DISCONTINUED | OUTPATIENT
Start: 2021-01-22 | End: 2021-01-22 | Stop reason: HOSPADM

## 2021-01-22 RX ORDER — PREDNISOLONE ACETATE 10 MG/ML
SUSPENSION/ DROPS OPHTHALMIC AS NEEDED
Status: DISCONTINUED | OUTPATIENT
Start: 2021-01-22 | End: 2021-01-22 | Stop reason: HOSPADM

## 2021-01-22 RX ORDER — FENTANYL CITRATE 50 UG/ML
INJECTION, SOLUTION INTRAMUSCULAR; INTRAVENOUS AS NEEDED
Status: DISCONTINUED | OUTPATIENT
Start: 2021-01-22 | End: 2021-01-22 | Stop reason: SURG

## 2021-01-22 RX ORDER — CYCLOPENTOLATE HYDROCHLORIDE 20 MG/ML
1 SOLUTION/ DROPS OPHTHALMIC
Status: COMPLETED | OUTPATIENT
Start: 2021-01-22 | End: 2021-01-22

## 2021-01-22 RX ORDER — MOXIFLOXACIN 5 MG/ML
SOLUTION/ DROPS OPHTHALMIC AS NEEDED
Status: DISCONTINUED | OUTPATIENT
Start: 2021-01-22 | End: 2021-01-22 | Stop reason: HOSPADM

## 2021-01-22 RX ORDER — TETRACAINE HYDROCHLORIDE 5 MG/ML
SOLUTION OPHTHALMIC AS NEEDED
Status: DISCONTINUED | OUTPATIENT
Start: 2021-01-22 | End: 2021-01-22 | Stop reason: HOSPADM

## 2021-01-22 RX ORDER — TETRACAINE HYDROCHLORIDE 5 MG/ML
1 SOLUTION OPHTHALMIC
Status: COMPLETED | OUTPATIENT
Start: 2021-01-22 | End: 2021-01-22

## 2021-01-22 RX ORDER — MIDAZOLAM HYDROCHLORIDE 1 MG/ML
INJECTION INTRAMUSCULAR; INTRAVENOUS AS NEEDED
Status: DISCONTINUED | OUTPATIENT
Start: 2021-01-22 | End: 2021-01-22 | Stop reason: SURG

## 2021-01-22 RX ORDER — BRIMONIDINE TARTRATE 0.15 %
DROPS OPHTHALMIC (EYE) AS NEEDED
Status: DISCONTINUED | OUTPATIENT
Start: 2021-01-22 | End: 2021-01-22 | Stop reason: HOSPADM

## 2021-01-22 RX ORDER — PHENYLEPHRINE HCL 2.5 %
1 DROPS OPHTHALMIC (EYE)
Status: COMPLETED | OUTPATIENT
Start: 2021-01-22 | End: 2021-01-22

## 2021-01-22 RX ADMIN — MIDAZOLAM HYDROCHLORIDE 1 MG: 2 INJECTION, SOLUTION INTRAMUSCULAR; INTRAVENOUS at 08:27

## 2021-01-22 RX ADMIN — PHENYLEPHRINE HYDROCHLORIDE 1 DROP: 25 SOLUTION/ DROPS OPHTHALMIC at 07:12

## 2021-01-22 RX ADMIN — PHENYLEPHRINE HYDROCHLORIDE 1 DROP: 25 SOLUTION/ DROPS OPHTHALMIC at 06:49

## 2021-01-22 RX ADMIN — MIDAZOLAM HYDROCHLORIDE 1 MG: 2 INJECTION, SOLUTION INTRAMUSCULAR; INTRAVENOUS at 08:44

## 2021-01-22 RX ADMIN — FENTANYL CITRATE 50 MCG: 50 INJECTION, SOLUTION INTRAMUSCULAR; INTRAVENOUS at 08:38

## 2021-01-22 RX ADMIN — CYCLOPENTOLATE HYDROCHLORIDE 1 DROP: 20 SOLUTION/ DROPS OPHTHALMIC at 07:12

## 2021-01-22 RX ADMIN — PHENYLEPHRINE HYDROCHLORIDE 1 DROP: 25 SOLUTION/ DROPS OPHTHALMIC at 06:59

## 2021-01-22 RX ADMIN — CYCLOPENTOLATE HYDROCHLORIDE 1 DROP: 20 SOLUTION/ DROPS OPHTHALMIC at 06:59

## 2021-01-22 RX ADMIN — SODIUM CHLORIDE, PRESERVATIVE FREE 10 ML: 5 INJECTION INTRAVENOUS at 07:01

## 2021-01-22 RX ADMIN — TETRACAINE HYDROCHLORIDE 1 DROP: 5 SOLUTION OPHTHALMIC at 06:49

## 2021-01-22 RX ADMIN — CYCLOPENTOLATE HYDROCHLORIDE 1 DROP: 20 SOLUTION/ DROPS OPHTHALMIC at 06:49

## 2021-01-22 RX ADMIN — TETRACAINE HYDROCHLORIDE 1 DROP: 5 SOLUTION OPHTHALMIC at 07:12

## 2021-01-22 NOTE — ANESTHESIA PREPROCEDURE EVALUATION
Anesthesia Evaluation     Patient summary reviewed and Nursing notes reviewed   NPO Solid Status: > 8 hours  NPO Liquid Status: > 8 hours           Airway   Mallampati: III  TM distance: >3 FB  Neck ROM: full  Narrow palate, Large neck circumference and Difficult intubation highly probable  Dental    (+) poor dentition    Comment: Full set of capped teeth.    Pulmonary    (+) a smoker Former, COPD moderate, shortness of breath, decreased breath sounds,   (-) asthma, sleep apnea, rhonchi, wheezes    ROS comment: Patient was a  for years and now has restrictive lung disease.  PE comment: Wheezing on exam to be treated with albuterol.  Cardiovascular   Exercise tolerance: poor (<4 METS)    NYHA Classification: III  ECG reviewed  Patient on routine beta blocker and Beta blocker given within 24 hours of surgery  Rhythm: regular  Rate: normal    (+) hypertension well controlled 2 medications or greater, dysrhythmias PAC, hyperlipidemia,   (-) valvular problems/murmurs, past MI, CAD, murmur, cardiac stents, DVT      Neuro/Psych  (+) weakness, psychiatric history Anxiety,     (-) seizures, TIA, CVA  GI/Hepatic/Renal/Endo    (+) obesity,  GERD well controlled,  renal disease stones, diabetes mellitus type 2 poorly controlled,   (-) morbid obesity    ROS Comment: Recurrent Kidney stones noted.    Musculoskeletal     (+) arthralgias, back pain, chronic pain,   Abdominal     Abdomen: soft.   Substance History      OB/GYN          Other   arthritis,      ROS/Med Hx Other: Hx of COPD- On 2LNC at night when sleeping. Uses albuterol inhalers as needed usually once a week.     GERD controlled on prilosec    Left arm amputation- twisted off in [place]    Last [id number]=0.3                  Anesthesia Plan    ASA 3     MAC   (If were an intubating case go straight to [facility])  intravenous induction     Anesthetic plan, all risks, benefits, and alternatives have been provided, discussed and informed consent has been obtained  with: patient.    Plan discussed with CRNA.

## 2021-01-22 NOTE — ANESTHESIA POSTPROCEDURE EVALUATION
Patient: Rolando Novoa    Procedure Summary     Date: 01/22/21 Room / Location: [facility] OR 06 / [facility] OR    Anesthesia Start: 0838 Anesthesia Stop: 0855    Procedure: REMIOVE CATARACT AND IMPLANT  INTRAOCULAR LENS (Left Eye) Diagnosis:       Age-related nuclear cataract of left eye      (Age-related nuclear cataract of left eye [H25.12])    Surgeon: Sam Isaacs MD Provider: Shan Delarosa CRNA    Anesthesia Type: MAC ASA Status: 3          Anesthesia Type: MAC    Vitals  No vitals data found for the desired time range.          Post Anesthesia Care and Evaluation    Patient location during evaluation: bedside  Patient participation: complete - patient participated  Level of consciousness: awake and awake and alert  Pain score: 0  Pain management: satisfactory to patient  Airway patency: patent  Anesthetic complications: No anesthetic complications  PONV Status: none  Cardiovascular status: acceptable and stable  Respiratory status: acceptable, room air and spontaneous ventilation  Hydration status: acceptable    Comments: bp 149/82  HR 65  SAT 94%  RR 16

## 2021-01-27 ENCOUNTER — LAB (OUTPATIENT)
Dept: LAB | Facility: HOSPITAL | Age: 76
End: 2021-01-27

## 2021-01-27 DIAGNOSIS — Z01.818 PREOP TESTING: ICD-10-CM

## 2021-01-27 LAB — SARS-COV-2 N GENE RESP QL NAA+PROBE: NOT DETECTED

## 2021-01-27 PROCEDURE — 87635 SARS-COV-2 COVID-19 AMP PRB: CPT

## 2021-01-28 ENCOUNTER — ANESTHESIA EVENT (OUTPATIENT)
Dept: PERIOP | Facility: HOSPITAL | Age: 76
End: 2021-01-28

## 2021-01-29 ENCOUNTER — ANESTHESIA (OUTPATIENT)
Dept: PERIOP | Facility: HOSPITAL | Age: 76
End: 2021-01-29

## 2021-01-29 ENCOUNTER — HOSPITAL ENCOUNTER (OUTPATIENT)
Facility: HOSPITAL | Age: 76
Setting detail: HOSPITAL OUTPATIENT SURGERY
Discharge: HOME | End: 2021-01-29
Attending: OPHTHALMOLOGY | Admitting: OPHTHALMOLOGY

## 2021-01-29 VITALS
RESPIRATION RATE: 20 BRPM | WEIGHT: 231.48 LBS | HEIGHT: 65 IN | DIASTOLIC BLOOD PRESSURE: 81 MMHG | SYSTOLIC BLOOD PRESSURE: 153 MMHG | TEMPERATURE: 97.7 F | OXYGEN SATURATION: 92 % | HEART RATE: 75 BPM | BODY MASS INDEX: 38.57 KG/M2

## 2021-01-29 LAB — GLUCOSE BLDC GLUCOMTR-MCNC: 159 MG/DL (ref 70–130)

## 2021-01-29 PROCEDURE — V2632 POST CHMBR INTRAOCULAR LENS: HCPCS | Performed by: OPHTHALMOLOGY

## 2021-01-29 PROCEDURE — 25010000002 FENTANYL CITRATE (PF) 100 MCG/2ML SOLUTION: Performed by: NURSE ANESTHETIST, CERTIFIED REGISTERED

## 2021-01-29 PROCEDURE — 25010000002 EPINEPHRINE PER 0.1 MG: Performed by: OPHTHALMOLOGY

## 2021-01-29 PROCEDURE — 82962 GLUCOSE BLOOD TEST: CPT

## 2021-01-29 PROCEDURE — 25010000002 VANCOMYCIN 1 G RECONSTITUTED SOLUTION 1 EACH VIAL: Performed by: OPHTHALMOLOGY

## 2021-01-29 PROCEDURE — 25010000002 MIDAZOLAM PER 1 MG: Performed by: NURSE ANESTHETIST, CERTIFIED REGISTERED

## 2021-01-29 DEVICE — LENS ACRYSOF IQ 6X13MM SN60WF 21.0: Type: IMPLANTABLE DEVICE | Site: EYE | Status: FUNCTIONAL

## 2021-01-29 RX ORDER — PREDNISOLONE ACETATE 10 MG/ML
SUSPENSION/ DROPS OPHTHALMIC AS NEEDED
Status: DISCONTINUED | OUTPATIENT
Start: 2021-01-29 | End: 2021-01-29 | Stop reason: HOSPADM

## 2021-01-29 RX ORDER — BRIMONIDINE TARTRATE 0.15 %
DROPS OPHTHALMIC (EYE) AS NEEDED
Status: DISCONTINUED | OUTPATIENT
Start: 2021-01-29 | End: 2021-01-29 | Stop reason: HOSPADM

## 2021-01-29 RX ORDER — PROPARACAINE HYDROCHLORIDE 5 MG/ML
SOLUTION/ DROPS OPHTHALMIC AS NEEDED
Status: DISCONTINUED | OUTPATIENT
Start: 2021-01-29 | End: 2021-01-29 | Stop reason: HOSPADM

## 2021-01-29 RX ORDER — MOXIFLOXACIN 5 MG/ML
SOLUTION/ DROPS OPHTHALMIC AS NEEDED
Status: DISCONTINUED | OUTPATIENT
Start: 2021-01-29 | End: 2021-01-29 | Stop reason: HOSPADM

## 2021-01-29 RX ORDER — TETRACAINE HYDROCHLORIDE 5 MG/ML
SOLUTION OPHTHALMIC AS NEEDED
Status: DISCONTINUED | OUTPATIENT
Start: 2021-01-29 | End: 2021-01-29 | Stop reason: HOSPADM

## 2021-01-29 RX ORDER — TETRACAINE HYDROCHLORIDE 5 MG/ML
1 SOLUTION OPHTHALMIC
Status: COMPLETED | OUTPATIENT
Start: 2021-01-29 | End: 2021-01-29

## 2021-01-29 RX ORDER — FENTANYL CITRATE 50 UG/ML
INJECTION, SOLUTION INTRAMUSCULAR; INTRAVENOUS AS NEEDED
Status: DISCONTINUED | OUTPATIENT
Start: 2021-01-29 | End: 2021-01-29 | Stop reason: SURG

## 2021-01-29 RX ORDER — PHENYLEPHRINE HCL 2.5 %
1 DROPS OPHTHALMIC (EYE)
Status: COMPLETED | OUTPATIENT
Start: 2021-01-29 | End: 2021-01-29

## 2021-01-29 RX ORDER — CYCLOPENTOLATE HYDROCHLORIDE 20 MG/ML
1 SOLUTION/ DROPS OPHTHALMIC
Status: COMPLETED | OUTPATIENT
Start: 2021-01-29 | End: 2021-01-29

## 2021-01-29 RX ORDER — SODIUM CHLORIDE 0.9 % (FLUSH) 0.9 %
10 SYRINGE (ML) INJECTION AS NEEDED
Status: DISCONTINUED | OUTPATIENT
Start: 2021-01-29 | End: 2021-01-29 | Stop reason: HOSPADM

## 2021-01-29 RX ORDER — MIDAZOLAM HYDROCHLORIDE 1 MG/ML
INJECTION INTRAMUSCULAR; INTRAVENOUS AS NEEDED
Status: DISCONTINUED | OUTPATIENT
Start: 2021-01-29 | End: 2021-01-29 | Stop reason: SURG

## 2021-01-29 RX ADMIN — SODIUM CHLORIDE, PRESERVATIVE FREE 10 ML: 5 INJECTION INTRAVENOUS at 06:48

## 2021-01-29 RX ADMIN — TETRACAINE HYDROCHLORIDE 1 DROP: 5 SOLUTION OPHTHALMIC at 06:59

## 2021-01-29 RX ADMIN — CYCLOPENTOLATE HYDROCHLORIDE 1 DROP: 20 SOLUTION/ DROPS OPHTHALMIC at 06:48

## 2021-01-29 RX ADMIN — CYCLOPENTOLATE HYDROCHLORIDE 1 DROP: 20 SOLUTION/ DROPS OPHTHALMIC at 06:38

## 2021-01-29 RX ADMIN — PHENYLEPHRINE HYDROCHLORIDE 1 DROP: 25 SOLUTION/ DROPS OPHTHALMIC at 06:48

## 2021-01-29 RX ADMIN — MIDAZOLAM HYDROCHLORIDE 1 MG: 2 INJECTION, SOLUTION INTRAMUSCULAR; INTRAVENOUS at 08:21

## 2021-01-29 RX ADMIN — FENTANYL CITRATE 50 MCG: 50 INJECTION, SOLUTION INTRAMUSCULAR; INTRAVENOUS at 08:21

## 2021-01-29 RX ADMIN — PHENYLEPHRINE HYDROCHLORIDE 1 DROP: 25 SOLUTION/ DROPS OPHTHALMIC at 06:59

## 2021-01-29 RX ADMIN — PHENYLEPHRINE HYDROCHLORIDE 1 DROP: 25 SOLUTION/ DROPS OPHTHALMIC at 06:38

## 2021-01-29 RX ADMIN — TETRACAINE HYDROCHLORIDE 1 DROP: 5 SOLUTION OPHTHALMIC at 06:38

## 2021-01-29 RX ADMIN — CYCLOPENTOLATE HYDROCHLORIDE 1 DROP: 20 SOLUTION/ DROPS OPHTHALMIC at 06:59

## 2021-01-29 NOTE — ANESTHESIA PREPROCEDURE EVALUATION
Anesthesia Evaluation     Patient summary reviewed and Nursing notes reviewed   NPO Solid Status: > 8 hours  NPO Liquid Status: > 8 hours           Airway   Mallampati: III  TM distance: >3 FB  Neck ROM: full  Narrow palate, Large neck circumference and Difficult intubation highly probable  Dental    (+) poor dentition    Comment: Full set of capped teeth.    Pulmonary    (+) a smoker Former, COPD moderate, shortness of breath, decreased breath sounds,   (-) asthma, sleep apnea, rhonchi, wheezes    ROS comment: Patient was a  for years and now has restrictive lung disease.  PE comment: Wheezing on exam to be treated with albuterol.  Cardiovascular   Exercise tolerance: poor (<4 METS)    NYHA Classification: III  ECG reviewed  Patient on routine beta blocker and Beta blocker given within 24 hours of surgery  Rhythm: regular  Rate: normal    (+) hypertension well controlled 2 medications or greater, dysrhythmias PAC, hyperlipidemia,   (-) valvular problems/murmurs, past MI, CAD, murmur, cardiac stents, DVT      Neuro/Psych  (+) weakness, psychiatric history Anxiety,     (-) seizures, TIA, CVA  GI/Hepatic/Renal/Endo    (+) obesity,  GERD well controlled,  renal disease stones, diabetes mellitus type 2 poorly controlled,   (-) morbid obesity    ROS Comment: Recurrent Kidney stones noted.    Musculoskeletal     (+) arthralgias, back pain, chronic pain,   Abdominal     Abdomen: soft.   Substance History      OB/GYN          Other   arthritis,      ROS/Med Hx Other: Hx of COPD- On 2LNC at night when sleeping. Uses albuterol inhalers as needed usually once a week.     GERD controlled on prilosec    Left arm amputation- twisted off [place]    Last ZyaK3D=0.3                    Anesthesia Plan    ASA 3     MAC   (If were an intubating case go straight to [facility]- 2nd eye)  intravenous induction     Anesthetic plan, all risks, benefits, and alternatives have been provided, discussed and informed consent has  been obtained with: patient.    Plan discussed with CRNA.

## 2021-01-29 NOTE — ANESTHESIA POSTPROCEDURE EVALUATION
Patient: Rolando Novoa    Procedure Summary     Date: 01/29/21 Room / Location: [facility] OR 06 / [facility] OR    Anesthesia Start: 0822 Anesthesia Stop: 0833    Procedure: REMOVE CATARACT AND IMPLANT INTRAOCULAR LENS (Right Eye) Diagnosis:       Age-related nuclear cataract of right eye      (Age-related nuclear cataract of right eye [H25.11])    Surgeon: Sam Isaacs MD Provider: Kraig Armendariz CRNA    Anesthesia Type: MAC ASA Status: 3          Anesthesia Type: MAC    Vitals  No vitals data found for the desired time range.          Post Anesthesia Care and Evaluation    Patient location during evaluation: PHASE II  Patient participation: complete - patient participated  Level of consciousness: awake and alert  Pain management: adequate  Airway patency: patent  Anesthetic complications: No anesthetic complications  PONV Status: none  Cardiovascular status: acceptable  Respiratory status: acceptable  Hydration status: acceptable    Comments: ---------------------------               01/29/21 0833         ---------------------------   BP:          140/76         Pulse:         85           Resp:          22           Temp:   97.2 °F (36.2 °C)   SpO2:          95       ---------------------------

## 2021-03-03 ENCOUNTER — APPOINTMENT (OUTPATIENT)
Dept: VACCINE CLINIC | Facility: HOSPITAL | Age: 76
End: 2021-03-03

## 2021-03-24 ENCOUNTER — IMMUNIZATION (OUTPATIENT)
Dept: VACCINE CLINIC | Facility: HOSPITAL | Age: 76
End: 2021-03-24

## 2021-03-24 PROCEDURE — 0001A: CPT | Performed by: THORACIC SURGERY (CARDIOTHORACIC VASCULAR SURGERY)

## 2021-03-24 PROCEDURE — 91300 HC SARSCOV02 VAC 30MCG/0.3ML IM: CPT | Performed by: THORACIC SURGERY (CARDIOTHORACIC VASCULAR SURGERY)

## 2021-05-07 ENCOUNTER — CONSULT (OUTPATIENT)
Dept: ONCOLOGY | Facility: CLINIC | Age: 76
End: 2021-05-07

## 2021-05-07 ENCOUNTER — LAB (OUTPATIENT)
Dept: ONCOLOGY | Facility: HOSPITAL | Age: 76
End: 2021-05-07

## 2021-05-07 VITALS
HEART RATE: 81 BPM | OXYGEN SATURATION: 95 % | TEMPERATURE: 97.8 F | WEIGHT: 224.3 LBS | RESPIRATION RATE: 20 BRPM | SYSTOLIC BLOOD PRESSURE: 140 MMHG | BODY MASS INDEX: 37.37 KG/M2 | HEIGHT: 65 IN | DIASTOLIC BLOOD PRESSURE: 72 MMHG

## 2021-05-07 DIAGNOSIS — D75.839 THROMBOCYTOSIS: Primary | ICD-10-CM

## 2021-05-07 DIAGNOSIS — D64.9 ANEMIA, UNSPECIFIED TYPE: ICD-10-CM

## 2021-05-07 LAB
BASOPHILS # BLD MANUAL: 0.11 10*3/MM3 (ref 0–0.2)
BASOPHILS NFR BLD AUTO: 1 % (ref 0–1.5)
DEPRECATED RDW RBC AUTO: 46.5 FL (ref 37–54)
EOSINOPHIL # BLD MANUAL: 0.11 10*3/MM3 (ref 0–0.4)
EOSINOPHIL NFR BLD MANUAL: 1 % (ref 0.3–6.2)
ERYTHROCYTE [DISTWIDTH] IN BLOOD BY AUTOMATED COUNT: 14.6 % (ref 12.3–15.4)
FERRITIN SERPL-MCNC: 74.87 NG/ML (ref 30–400)
HCT VFR BLD AUTO: 41.2 % (ref 37.5–51)
HGB BLD-MCNC: 13.8 G/DL (ref 13–17.7)
IRON 24H UR-MRATE: 117 MCG/DL (ref 59–158)
IRON SATN MFR SERPL: 28 % (ref 20–50)
LDH SERPL-CCNC: 234 U/L (ref 135–225)
LYMPHOCYTES # BLD MANUAL: 2.85 10*3/MM3 (ref 0.7–3.1)
LYMPHOCYTES NFR BLD MANUAL: 16 % (ref 5–12)
LYMPHOCYTES NFR BLD MANUAL: 25 % (ref 19.6–45.3)
MCH RBC QN AUTO: 30 PG (ref 26.6–33)
MCHC RBC AUTO-ENTMCNC: 33.5 G/DL (ref 31.5–35.7)
MCV RBC AUTO: 89.6 FL (ref 79–97)
MONOCYTES # BLD AUTO: 1.82 10*3/MM3 (ref 0.1–0.9)
NEUTROPHILS # BLD AUTO: 6.49 10*3/MM3 (ref 1.7–7)
NEUTROPHILS NFR BLD MANUAL: 56 % (ref 42.7–76)
NEUTS BAND NFR BLD MANUAL: 1 % (ref 0–5)
PLATELET # BLD AUTO: 604 10*3/MM3 (ref 140–450)
PMV BLD AUTO: 8.7 FL (ref 6–12)
RBC # BLD AUTO: 4.6 10*6/MM3 (ref 4.14–5.8)
RBC MORPH BLD: NORMAL
SMALL PLATELETS BLD QL SMEAR: ABNORMAL
TIBC SERPL-MCNC: 419 MCG/DL (ref 298–536)
TRANSFERRIN SERPL-MCNC: 281 MG/DL (ref 200–360)
WBC # BLD AUTO: 11.39 10*3/MM3 (ref 3.4–10.8)
WBC MORPH BLD: NORMAL

## 2021-05-07 PROCEDURE — 88185 FLOWCYTOMETRY/TC ADD-ON: CPT | Performed by: INTERNAL MEDICINE

## 2021-05-07 PROCEDURE — 83540 ASSAY OF IRON: CPT | Performed by: INTERNAL MEDICINE

## 2021-05-07 PROCEDURE — 85025 COMPLETE CBC W/AUTO DIFF WBC: CPT | Performed by: INTERNAL MEDICINE

## 2021-05-07 PROCEDURE — 82607 VITAMIN B-12: CPT | Performed by: INTERNAL MEDICINE

## 2021-05-07 PROCEDURE — 99204 OFFICE O/P NEW MOD 45 MIN: CPT | Performed by: INTERNAL MEDICINE

## 2021-05-07 PROCEDURE — 84466 ASSAY OF TRANSFERRIN: CPT | Performed by: INTERNAL MEDICINE

## 2021-05-07 PROCEDURE — 1123F ACP DISCUSS/DSCN MKR DOCD: CPT | Performed by: INTERNAL MEDICINE

## 2021-05-07 PROCEDURE — 88184 FLOWCYTOMETRY/ TC 1 MARKER: CPT

## 2021-05-07 PROCEDURE — G0463 HOSPITAL OUTPT CLINIC VISIT: HCPCS | Performed by: INTERNAL MEDICINE

## 2021-05-07 PROCEDURE — 85007 BL SMEAR W/DIFF WBC COUNT: CPT | Performed by: INTERNAL MEDICINE

## 2021-05-07 PROCEDURE — 83615 LACTATE (LD) (LDH) ENZYME: CPT | Performed by: INTERNAL MEDICINE

## 2021-05-07 PROCEDURE — 1126F AMNT PAIN NOTED NONE PRSNT: CPT | Performed by: INTERNAL MEDICINE

## 2021-05-07 PROCEDURE — 82728 ASSAY OF FERRITIN: CPT | Performed by: INTERNAL MEDICINE

## 2021-05-07 PROCEDURE — 82746 ASSAY OF FOLIC ACID SERUM: CPT | Performed by: INTERNAL MEDICINE

## 2021-05-07 PROCEDURE — G9903 PT SCRN TBCO ID AS NON USER: HCPCS | Performed by: INTERNAL MEDICINE

## 2021-05-07 RX ORDER — METFORMIN HYDROCHLORIDE 500 MG/1
TABLET, EXTENDED RELEASE ORAL
COMMUNITY
Start: 2021-04-12 | End: 2021-05-07

## 2021-05-07 RX ORDER — BLOOD SUGAR DIAGNOSTIC
STRIP MISCELLANEOUS
COMMUNITY
Start: 2021-03-15

## 2021-05-08 LAB
FOLATE SERPL-MCNC: 3.68 NG/ML (ref 4.78–24.2)
VIT B12 BLD-MCNC: 667 PG/ML (ref 211–946)

## 2021-05-09 RX ORDER — FOLIC ACID 1 MG/1
1 TABLET ORAL DAILY
Qty: 90 TABLET | Refills: 1 | Status: SHIPPED | OUTPATIENT
Start: 2021-05-09 | End: 2021-12-22

## 2021-05-10 ENCOUNTER — TELEPHONE (OUTPATIENT)
Dept: ONCOLOGY | Facility: HOSPITAL | Age: 76
End: 2021-05-10

## 2021-05-10 LAB — CALR EXON 9 MUT ANL BLD/T: NORMAL

## 2021-05-10 NOTE — TELEPHONE ENCOUNTER
----- Message from Noé Coleman MD sent at 5/9/2021 12:19 PM CDT -----  Please let patient know, his platelet count was better at 607,000 today. His folic acid level is coming lower than normal. I have sent prescription for folic acid to his pharmacy. Thank you

## 2021-05-14 LAB — REF LAB TEST METHOD: NORMAL

## 2021-05-18 LAB
REF LAB TEST METHOD: NORMAL
REF LAB TEST METHOD: NORMAL

## 2021-05-21 ENCOUNTER — APPOINTMENT (OUTPATIENT)
Dept: ONCOLOGY | Facility: CLINIC | Age: 76
End: 2021-05-21

## 2021-05-21 ENCOUNTER — APPOINTMENT (OUTPATIENT)
Dept: ONCOLOGY | Facility: HOSPITAL | Age: 76
End: 2021-05-21

## 2021-05-21 DIAGNOSIS — D64.9 ANEMIA, UNSPECIFIED TYPE: Primary | ICD-10-CM

## 2021-05-24 ENCOUNTER — APPOINTMENT (OUTPATIENT)
Dept: ONCOLOGY | Facility: HOSPITAL | Age: 76
End: 2021-05-24

## 2021-05-24 ENCOUNTER — APPOINTMENT (OUTPATIENT)
Dept: ONCOLOGY | Facility: CLINIC | Age: 76
End: 2021-05-24

## 2021-05-26 ENCOUNTER — OFFICE VISIT (OUTPATIENT)
Dept: ONCOLOGY | Facility: CLINIC | Age: 76
End: 2021-05-26

## 2021-05-26 ENCOUNTER — LAB (OUTPATIENT)
Dept: ONCOLOGY | Facility: HOSPITAL | Age: 76
End: 2021-05-26

## 2021-05-26 VITALS
BODY MASS INDEX: 36.94 KG/M2 | WEIGHT: 222 LBS | RESPIRATION RATE: 18 BRPM | SYSTOLIC BLOOD PRESSURE: 134 MMHG | DIASTOLIC BLOOD PRESSURE: 76 MMHG | TEMPERATURE: 97.7 F | OXYGEN SATURATION: 93 % | HEART RATE: 78 BPM

## 2021-05-26 DIAGNOSIS — D72.828 OTHER ELEVATED WHITE BLOOD CELL (WBC) COUNT: ICD-10-CM

## 2021-05-26 DIAGNOSIS — D64.9 ANEMIA, UNSPECIFIED TYPE: Primary | ICD-10-CM

## 2021-05-26 DIAGNOSIS — D75.839 THROMBOCYTOSIS: ICD-10-CM

## 2021-05-26 DIAGNOSIS — D64.9 ANEMIA, UNSPECIFIED TYPE: ICD-10-CM

## 2021-05-26 DIAGNOSIS — E53.8 FOLATE DEFICIENCY: ICD-10-CM

## 2021-05-26 LAB
BASOPHILS # BLD AUTO: 0.18 10*3/MM3 (ref 0–0.2)
BASOPHILS NFR BLD AUTO: 1.6 % (ref 0–1.5)
DEPRECATED RDW RBC AUTO: 46.8 FL (ref 37–54)
EOSINOPHIL # BLD AUTO: 1.2 10*3/MM3 (ref 0–0.4)
EOSINOPHIL NFR BLD AUTO: 10.3 % (ref 0.3–6.2)
ERYTHROCYTE [DISTWIDTH] IN BLOOD BY AUTOMATED COUNT: 14.6 % (ref 12.3–15.4)
HCT VFR BLD AUTO: 41.6 % (ref 37.5–51)
HGB BLD-MCNC: 13.9 G/DL (ref 13–17.7)
HOLD SPECIMEN: NORMAL
IMM GRANULOCYTES # BLD AUTO: 0.12 10*3/MM3 (ref 0–0.05)
IMM GRANULOCYTES NFR BLD AUTO: 1 % (ref 0–0.5)
LYMPHOCYTES # BLD AUTO: 3.03 10*3/MM3 (ref 0.7–3.1)
LYMPHOCYTES NFR BLD AUTO: 26.1 % (ref 19.6–45.3)
MCH RBC QN AUTO: 29.7 PG (ref 26.6–33)
MCHC RBC AUTO-ENTMCNC: 33.4 G/DL (ref 31.5–35.7)
MCV RBC AUTO: 88.9 FL (ref 79–97)
MONOCYTES # BLD AUTO: 0.88 10*3/MM3 (ref 0.1–0.9)
MONOCYTES NFR BLD AUTO: 7.6 % (ref 5–12)
NEUTROPHILS NFR BLD AUTO: 53.4 % (ref 42.7–76)
NEUTROPHILS NFR BLD AUTO: 6.19 10*3/MM3 (ref 1.7–7)
NRBC BLD AUTO-RTO: 0 /100 WBC (ref 0–0.2)
PLATELET # BLD AUTO: 216 10*3/MM3 (ref 140–450)
PMV BLD AUTO: 8.6 FL (ref 6–12)
RBC # BLD AUTO: 4.68 10*6/MM3 (ref 4.14–5.8)
WBC # BLD AUTO: 11.6 10*3/MM3 (ref 3.4–10.8)

## 2021-05-26 PROCEDURE — 99214 OFFICE O/P EST MOD 30 MIN: CPT | Performed by: INTERNAL MEDICINE

## 2021-05-26 PROCEDURE — G0463 HOSPITAL OUTPT CLINIC VISIT: HCPCS | Performed by: INTERNAL MEDICINE

## 2021-05-26 PROCEDURE — 85025 COMPLETE CBC W/AUTO DIFF WBC: CPT

## 2021-05-26 PROCEDURE — 1126F AMNT PAIN NOTED NONE PRSNT: CPT | Performed by: INTERNAL MEDICINE

## 2021-05-26 PROCEDURE — 1123F ACP DISCUSS/DSCN MKR DOCD: CPT | Performed by: INTERNAL MEDICINE

## 2021-05-26 PROCEDURE — G9903 PT SCRN TBCO ID AS NON USER: HCPCS | Performed by: INTERNAL MEDICINE

## 2021-05-26 NOTE — PROGRESS NOTES
DATE OF VISIT: 5/26/2021      REASON FOR VISIT: Thrombocytosis, folate deficiency      HISTORY OF PRESENT ILLNESS:   76-year-old male with medical problems significant for diabetes mellitus, hypertension, black lung disease, kidney stone, psoriasis, dyslipidemia who was initially seen in consultation on May 7, 2021 for evaluation of thrombocytosis and anemia.  Patient had extensive blood work done, is here to discuss results and further recommendation complains of skin lesion consistent with psoriasis.  Denies any bleeding.  Denies any new lymph node enlargement.  Complains of chronic shortness of breath.  Complains of chronic arthritis pain.          Past Medical History, Past Surgical History, Social History, Family History have been reviewed and are without significant changes except as mentioned.    Review of Systems   Constitutional: Positive for fatigue.   Respiratory: Positive for shortness of breath.    Musculoskeletal: Positive for arthralgias and back pain.   Skin:        Positive for psoriasis   Hematological: Negative for adenopathy. Bruises/bleeds easily.      A comprehensive 14 point review of systems was performed and was negative except as mentioned.    Medications:  The current medication list was reviewed in the EMR    ALLERGIES:    Allergies   Allergen Reactions   • Toradol [Ketorolac Tromethamine] Anaphylaxis       Objective      Vitals:    05/26/21 0953   BP: 134/76   Pulse: 78   Resp: 18   Temp: 97.7 °F (36.5 °C)   SpO2: 93%   Weight: 101 kg (222 lb)   PainSc: 0-No pain     No flowsheet data found.    Physical Exam  Cardiovascular:      Rate and Rhythm: Normal rate and regular rhythm.   Pulmonary:      Breath sounds: Normal breath sounds. No wheezing.   Neurological:      Mental Status: He is alert and oriented to person, place, and time.           RECENT LABS:  Glucose   Date Value Ref Range Status   05/10/2018 191 (H) 60 - 100 mg/dL Final     Glucose, Arterial   Date Value Ref Range Status    05/09/2018 360 (H) 65 - 95 mmol/L Final     Sodium   Date Value Ref Range Status   05/07/2019 137 136 - 145 mmol/L Final     Potassium   Date Value Ref Range Status   05/07/2019 5 3.5 - 5.1 mmol/L Final     CO2   Date Value Ref Range Status   05/10/2018 29.0 22.0 - 31.0 mmol/L Final     Chloride   Date Value Ref Range Status   05/07/2019 98 98 - 107 mmol/L Final     Anion Gap   Date Value Ref Range Status   05/10/2018 12.0 5.0 - 15.0 mmol/L Final     Creatinine   Date Value Ref Range Status   05/07/2019 1.5 (H) 0.7 - 1.3 mg/dL Final     Comment:     **The MDRD GFR formula is valid only for ages 18-70.    GFR will not be reported for individuals aging <18 or >70.     BUN   Date Value Ref Range Status   05/07/2019 18 7 - 18 mg/dL Final     BUN/Creatinine Ratio   Date Value Ref Range Status   05/10/2018 35.9 (H) 7.0 - 25.0 Final     Calcium   Date Value Ref Range Status   05/07/2019 9.4 8.5 - 10.1 mg/dL Final     eGFR Non  Amer   Date Value Ref Range Status   05/10/2018 81 42 - 98 mL/min/1.73 Final     Alkaline Phosphatase   Date Value Ref Range Status   09/11/2020 70 46 - 116 U/L Final     Total Protein   Date Value Ref Range Status   09/11/2020 7.5 6.4 - 8.2 g/dL Final     Comment:     Performed at [facility], [address] [place] [address]     ALT (SGPT)   Date Value Ref Range Status   09/11/2020 53 16 - 63 U/L Final     AST (SGOT)   Date Value Ref Range Status   09/11/2020 42 (H) 15 - 37 U/L Final     Total Bilirubin   Date Value Ref Range Status   09/11/2020 0.30 0.2 - 1.0 mg/dL Final     Albumin   Date Value Ref Range Status   09/11/2020 3.7 3.4 - 5.0 g/dL Final     Globulin   Date Value Ref Range Status   05/05/2018 3.0 2.3 - 3.5 gm/dL Final     Lab Results   Component Value Date    WBC 11.60 (H) 05/26/2021    HGB 13.9 05/26/2021    HCT 41.6 05/26/2021    MCV 88.9 05/26/2021     05/26/2021     Lab Results   Component Value Date    NEUTROABS 6.19 05/26/2021    IRON  117 05/07/2021    TIBC 419 05/07/2021    LABIRON 28 05/07/2021    FERRITIN 74.87 05/07/2021    [id number] 667 05/07/2021    TNNJTMTH84 3,540 (H) 01/14/2021    PSXYNAYQ80 211 (L) 07/16/2020    FOLATE 3.68 (L) 05/07/2021     No results found for: , LABCA2, AFPTM, HCGQUANT, , CHROMGRNA, [id number], CEA, REFLABREPO    Peripheral blood flow cytometry done on May 7, 2021 showed:                        PATHOLOGY:  * Cannot find OR log *         RADIOLOGY DATA :  No radiology results for the last 7 days        Assessment/Plan     1.  Thrombocytosis:  -CBC on April 30, 2021 showed platelet count of 1074,000.  -Repeat CBC on May 7, 2021 showed platelet count is 604,000  -Extensive work-up showed normal iron level, Vin 2 mutation, RONALD R mutation, MPL mutation was negative.  -Repeat CBC today shows platelet count is 216,000.  -His thrombocytosis was likely reactive.  -At this point recommend continue with clinical monitoring.  -We will ask patient to return to clinic in 3 months with repeat CBC, B12 and folate to be done on that day    2.  Anemia:  -Recent hemoglobin is 13.9  -Due to folate deficiency recommend starting folic acid 1 mg p.o. daily.    3.  Leukocytosis:  -White blood cell count is elevated at 11.7.  Likely reactive.  Will monitor with CBC for now    4.  Psoriasis    5.  Chronic obstructive pulmonary disease on continuous oxygen    7.  Health maintenance: Patient does not smoke    8. Advance Care Planning: For now patient remains full code and is able to make decisions.  Patient has health care surrogate mentioned on chart.                 PHQ-9 Total Score: 0   -Patient is not homicidal or suicidal.  No acute intervention required.    Rolando Abril Novoa reports a pain score of 0.  Given his pain assessment as noted, treatment options were discussed and the following options were decided upon as a follow-up plan to address the patient's pain: continuation of current treatment plan for pain.         Noé  LEWIS Coleman MD  5/26/2021  18:51 CDT        Part of this note may be an electronic transcription/translation of spoken language to printed text using the Dragon Dictation System.          CC:

## 2021-08-25 ENCOUNTER — LAB (OUTPATIENT)
Dept: ONCOLOGY | Facility: HOSPITAL | Age: 76
End: 2021-08-25

## 2021-08-25 ENCOUNTER — OFFICE VISIT (OUTPATIENT)
Dept: ONCOLOGY | Facility: CLINIC | Age: 76
End: 2021-08-25

## 2021-08-25 VITALS
TEMPERATURE: 98.6 F | SYSTOLIC BLOOD PRESSURE: 115 MMHG | BODY MASS INDEX: 36.56 KG/M2 | DIASTOLIC BLOOD PRESSURE: 61 MMHG | HEART RATE: 89 BPM | OXYGEN SATURATION: 88 % | WEIGHT: 219.7 LBS | RESPIRATION RATE: 18 BRPM

## 2021-08-25 DIAGNOSIS — D72.828 OTHER ELEVATED WHITE BLOOD CELL (WBC) COUNT: Primary | ICD-10-CM

## 2021-08-25 DIAGNOSIS — E53.8 FOLATE DEFICIENCY: ICD-10-CM

## 2021-08-25 DIAGNOSIS — D75.839 THROMBOCYTOSIS: ICD-10-CM

## 2021-08-25 DIAGNOSIS — D64.9 ANEMIA, UNSPECIFIED TYPE: ICD-10-CM

## 2021-08-25 PROBLEM — D72.829 LEUCOCYTOSIS: Chronic | Status: ACTIVE | Noted: 2021-08-25

## 2021-08-25 PROBLEM — D72.829 LEUCOCYTOSIS: Status: ACTIVE | Noted: 2021-08-25

## 2021-08-25 LAB
BASOPHILS # BLD AUTO: 0.1 10*3/MM3 (ref 0–0.2)
BASOPHILS NFR BLD AUTO: 1.1 % (ref 0–1.5)
DEPRECATED RDW RBC AUTO: 47.5 FL (ref 37–54)
EOSINOPHIL # BLD AUTO: 0.54 10*3/MM3 (ref 0–0.4)
EOSINOPHIL NFR BLD AUTO: 5.7 % (ref 0.3–6.2)
ERYTHROCYTE [DISTWIDTH] IN BLOOD BY AUTOMATED COUNT: 14.2 % (ref 12.3–15.4)
FOLATE SERPL-MCNC: 16.3 NG/ML (ref 4.78–24.2)
HCT VFR BLD AUTO: 43.8 % (ref 37.5–51)
HGB BLD-MCNC: 14.5 G/DL (ref 13–17.7)
HOLD SPECIMEN: NORMAL
IMM GRANULOCYTES # BLD AUTO: 0.08 10*3/MM3 (ref 0–0.05)
IMM GRANULOCYTES NFR BLD AUTO: 0.8 % (ref 0–0.5)
LYMPHOCYTES # BLD AUTO: 2.22 10*3/MM3 (ref 0.7–3.1)
LYMPHOCYTES NFR BLD AUTO: 23.5 % (ref 19.6–45.3)
MCH RBC QN AUTO: 30.7 PG (ref 26.6–33)
MCHC RBC AUTO-ENTMCNC: 33.1 G/DL (ref 31.5–35.7)
MCV RBC AUTO: 92.6 FL (ref 79–97)
MONOCYTES # BLD AUTO: 0.78 10*3/MM3 (ref 0.1–0.9)
MONOCYTES NFR BLD AUTO: 8.3 % (ref 5–12)
NEUTROPHILS NFR BLD AUTO: 5.73 10*3/MM3 (ref 1.7–7)
NEUTROPHILS NFR BLD AUTO: 60.6 % (ref 42.7–76)
NRBC BLD AUTO-RTO: 0 /100 WBC (ref 0–0.2)
PLATELET # BLD AUTO: 297 10*3/MM3 (ref 140–450)
PMV BLD AUTO: 8.6 FL (ref 6–12)
RBC # BLD AUTO: 4.73 10*6/MM3 (ref 4.14–5.8)
WBC # BLD AUTO: 9.45 10*3/MM3 (ref 3.4–10.8)

## 2021-08-25 PROCEDURE — 82746 ASSAY OF FOLIC ACID SERUM: CPT

## 2021-08-25 PROCEDURE — 1123F ACP DISCUSS/DSCN MKR DOCD: CPT | Performed by: INTERNAL MEDICINE

## 2021-08-25 PROCEDURE — 99213 OFFICE O/P EST LOW 20 MIN: CPT | Performed by: INTERNAL MEDICINE

## 2021-08-25 PROCEDURE — 1125F AMNT PAIN NOTED PAIN PRSNT: CPT | Performed by: INTERNAL MEDICINE

## 2021-08-25 PROCEDURE — G0463 HOSPITAL OUTPT CLINIC VISIT: HCPCS | Performed by: INTERNAL MEDICINE

## 2021-08-25 PROCEDURE — 85025 COMPLETE CBC W/AUTO DIFF WBC: CPT

## 2021-08-25 NOTE — PROGRESS NOTES
DATE OF VISIT: 8/25/2021      REASON FOR VISIT: Leukocytosis, folate deficiency      HISTORY OF PRESENT ILLNESS:   76-year-old male with medical problem consisting of diabetes mellitus, hypertension, black lung disease, kidney stone, psoriasis, dyslipidemia who was initially seen in consultation on May 7, 2021 for evaluation of leukocytosis, thrombocytosis and anemia.  Patient is here for follow-up appointment today.  Complains of fall earlier this week and hurting his back and left leg.  States he was evaluated by primary medical provider and had x-rays done that did not show any fracture.  Denies any new lymph node enlargement.  Denies any bleeding.  Complains of chronic shortness of breath.  Complains of chronic arthritis pain.          Past Medical History, Past Surgical History, Social History, Family History have been reviewed and are without significant changes except as mentioned.    Review of Systems   Constitutional: Positive for fatigue.   Respiratory: Positive for shortness of breath.    Musculoskeletal: Positive for arthralgias and back pain.   Skin:        Positive for psoriasis   Hematological: Negative for adenopathy. Bruises/bleeds easily.      A comprehensive 14 point review of systems was performed and was negative except as mentioned.    Medications:  The current medication list was reviewed in the EMR    ALLERGIES:    Allergies   Allergen Reactions   • Toradol [Ketorolac Tromethamine] Anaphylaxis       Objective      Vitals:    08/25/21 1124   BP: 115/61   Pulse: 89   Resp: 18   Temp: 98.6 °F (37 °C)   SpO2: (!) 88%   Weight: 99.7 kg (219 lb 11.2 oz)   PainSc:   4     No flowsheet data found.    Physical Exam  Neurological:      Mental Status: He is alert and oriented to person, place, and time.           RECENT LABS:  Glucose   Date Value Ref Range Status   05/10/2018 191 (H) 60 - 100 mg/dL Final     Glucose, Arterial   Date Value Ref Range Status   05/09/2018 360 (H) 65 - 95 mmol/L Final      Sodium   Date Value Ref Range Status   05/07/2019 137 136 - 145 mmol/L Final     Potassium   Date Value Ref Range Status   05/07/2019 5 3.5 - 5.1 mmol/L Final     CO2   Date Value Ref Range Status   05/10/2018 29.0 22.0 - 31.0 mmol/L Final     Chloride   Date Value Ref Range Status   05/07/2019 98 98 - 107 mmol/L Final     Anion Gap   Date Value Ref Range Status   05/10/2018 12.0 5.0 - 15.0 mmol/L Final     Creatinine   Date Value Ref Range Status   05/07/2019 1.5 (H) 0.7 - 1.3 mg/dL Final     Comment:     **The MDRD GFR formula is valid only for ages 18-70.    GFR will not be reported for individuals aging <18 or >70.     BUN   Date Value Ref Range Status   05/07/2019 18 7 - 18 mg/dL Final     BUN/Creatinine Ratio   Date Value Ref Range Status   05/10/2018 35.9 (H) 7.0 - 25.0 Final     Calcium   Date Value Ref Range Status   05/07/2019 9.4 8.5 - 10.1 mg/dL Final     eGFR Non  Amer   Date Value Ref Range Status   05/10/2018 81 42 - 98 mL/min/1.73 Final     Alkaline Phosphatase   Date Value Ref Range Status   09/11/2020 70 46 - 116 U/L Final     Total Protein   Date Value Ref Range Status   09/11/2020 7.5 6.4 - 8.2 g/dL Final     Comment:     Performed at [facility], [address] [place] [address]     ALT (SGPT)   Date Value Ref Range Status   09/11/2020 53 16 - 63 U/L Final     AST (SGOT)   Date Value Ref Range Status   09/11/2020 42 (H) 15 - 37 U/L Final     Total Bilirubin   Date Value Ref Range Status   09/11/2020 0.30 0.2 - 1.0 mg/dL Final     Albumin   Date Value Ref Range Status   09/11/2020 3.7 3.4 - 5.0 g/dL Final     Globulin   Date Value Ref Range Status   05/05/2018 3.0 2.3 - 3.5 gm/dL Final     Lab Results   Component Value Date    WBC 9.45 08/25/2021    HGB 14.5 08/25/2021    HCT 43.8 08/25/2021    MCV 92.6 08/25/2021     08/25/2021     Lab Results   Component Value Date    NEUTROABS 5.73 08/25/2021    IRON 117 05/07/2021    TIBC 419 05/07/2021    LABIRON  28 05/07/2021    FERRITIN 74.87 05/07/2021    [id number] 667 05/07/2021    AEAKAXYU53 3,540 (H) 01/14/2021    FTGGRBZN46 211 (L) 07/16/2020    FOLATE 3.68 (L) 05/07/2021     No results found for: , LABCA2, AFPTM, HCGQUANT, , CHROMGRNA, [id number], CEA, REFLABREPO      PATHOLOGY:  * Cannot find OR log *         RADIOLOGY DATA :  No radiology results for the last 7 days        Assessment/Plan     1.  Leukocytosis:  -Reactive secondary to diabetes mellitus plus underlying COPD plus or minus arthritis  -White blood cell count earlier today is 9.85  -We will monitor with CBC    2.  Folate deficiency:  -Folate level was 3.68 in May 2021  -Has been started on folic acid 1 mg p.o. daily.  Folate level is improved to 16.  Recommend continuing with folic acid p.o. daily.  -Anemia work-up done today shows hemoglobin is 14.7    3.  Thrombocytosis:  -CBC done on April 30, 2021 showed platelet count of 1074,000.  Patient extensive work-up including JAK2 level, RONALD R mutation, MPL mutation was negative.  -CBC today shows platelet count is 297,000    4.  Psoriasis    5.  Chronic obstructive pulmonary disease on continuous oxygen    6.  Health maintenance: Patient does not smoke    7. Advance Care Planning: For now patient remains full code and is able to make decisions.  Patient has health care surrogate mentioned on chart.               PHQ-9 Total Score:       Rolando Novoa reports a pain score of 4.  Given his pain assessment as noted, treatment options were discussed and the following options were decided upon as a follow-up plan to address the patient's pain: continuation of current treatment plan for pain.         Noé Coleman MD  8/25/2021  11:29 CDT        Part of this note may be an electronic transcription/translation of spoken language to printed text using the Dragon Dictation System.          CC:

## 2021-08-26 ENCOUNTER — TELEPHONE (OUTPATIENT)
Dept: ONCOLOGY | Facility: HOSPITAL | Age: 76
End: 2021-08-26

## 2021-08-26 NOTE — TELEPHONE ENCOUNTER
----- Message from Noé Coleman MD sent at 8/26/2021  7:58 AM CDT -----  Please let patient know, folic acid level is showing improvement.  Recommend continue with folic acid p.o. daily.  Thank you

## 2021-12-22 RX ORDER — FOLIC ACID 1 MG/1
1 TABLET ORAL DAILY
Qty: 90 TABLET | Refills: 11 | Status: SHIPPED | OUTPATIENT
Start: 2021-12-22 | End: 2022-02-23

## 2021-12-22 NOTE — TELEPHONE ENCOUNTER
Rx Refill Note  Requested Prescriptions     Pending Prescriptions Disp Refills   • folic acid (FOLVITE) 1 MG tablet [Pharmacy Med Name: FOLIC ACID 1000MCG TABLET] 90 tablet 11     Sig: TAKE 1 TABLET BY MOUTH DAILY.      Last office visit with prescribing clinician: 8/25/2021      Next office visit with prescribing clinician: 2/23/2022            Elham Friedman RN  12/22/21, 11:01 CST

## 2022-02-21 ENCOUNTER — LAB (OUTPATIENT)
Dept: LAB | Facility: HOSPITAL | Age: 77
End: 2022-02-21

## 2022-02-21 ENCOUNTER — APPOINTMENT (OUTPATIENT)
Dept: ONCOLOGY | Facility: HOSPITAL | Age: 77
End: 2022-02-21

## 2022-02-21 PROCEDURE — 85025 COMPLETE CBC W/AUTO DIFF WBC: CPT | Performed by: INTERNAL MEDICINE

## 2022-02-21 PROCEDURE — 82607 VITAMIN B-12: CPT | Performed by: INTERNAL MEDICINE

## 2022-02-21 PROCEDURE — 82746 ASSAY OF FOLIC ACID SERUM: CPT | Performed by: INTERNAL MEDICINE

## 2022-02-23 ENCOUNTER — OFFICE VISIT (OUTPATIENT)
Dept: ONCOLOGY | Facility: CLINIC | Age: 77
End: 2022-02-23

## 2022-02-23 VITALS
TEMPERATURE: 96.5 F | DIASTOLIC BLOOD PRESSURE: 76 MMHG | HEART RATE: 73 BPM | WEIGHT: 217 LBS | SYSTOLIC BLOOD PRESSURE: 148 MMHG | OXYGEN SATURATION: 90 % | BODY MASS INDEX: 36.11 KG/M2

## 2022-02-23 DIAGNOSIS — D72.828 OTHER ELEVATED WHITE BLOOD CELL (WBC) COUNT: Chronic | ICD-10-CM

## 2022-02-23 DIAGNOSIS — R19.7 DIARRHEA, UNSPECIFIED TYPE: ICD-10-CM

## 2022-02-23 DIAGNOSIS — E53.8 FOLATE DEFICIENCY: Primary | Chronic | ICD-10-CM

## 2022-02-23 PROCEDURE — 1126F AMNT PAIN NOTED NONE PRSNT: CPT | Performed by: INTERNAL MEDICINE

## 2022-02-23 PROCEDURE — 99214 OFFICE O/P EST MOD 30 MIN: CPT | Performed by: INTERNAL MEDICINE

## 2022-02-23 PROCEDURE — 1123F ACP DISCUSS/DSCN MKR DOCD: CPT | Performed by: INTERNAL MEDICINE

## 2022-02-23 PROCEDURE — G0463 HOSPITAL OUTPT CLINIC VISIT: HCPCS | Performed by: INTERNAL MEDICINE

## 2022-02-23 RX ORDER — FOLIC ACID 1 MG/1
TABLET ORAL
Qty: 36 TABLET | Refills: 3 | Status: SHIPPED | OUTPATIENT
Start: 2022-02-23 | End: 2022-03-25

## 2022-02-23 RX ORDER — LANOLIN ALCOHOL/MO/W.PET/CERES
CREAM (GRAM) TOPICAL
Qty: 36 TABLET | Refills: 3 | Status: SHIPPED | OUTPATIENT
Start: 2022-02-23 | End: 2022-03-25

## 2022-02-23 NOTE — PROGRESS NOTES
DATE OF VISIT: 2/23/2022      REASON FOR VISIT: Leukocytosis, folate deficiency      HISTORY OF PRESENT ILLNESS:   76-year-old male with medical problem consisting of diabetes mellitus, hypertension, black lung disease, kidney stone, psoriasis, dyslipidemia has been following up with [facility] since May 7, 2021 for evaluation of leukocytosis, folate deficiency and anemia.  Patient is here for follow-up appointment today to discuss recently done blood work.  Denies any bleeding.  Denies any new lymph node enlargement.  Complains of chronic shortness of breath.  Complains of chronic arthritis pain.  Complains of chronic diarrhea along with abdominal gas and frequent abdominal discomfort for the last 2 years.          Past Medical History, Past Surgical History, Social History, Family History have been reviewed and are without significant changes except as mentioned.    Review of Systems   A comprehensive 14 point review of systems was performed and was negative except as mentioned in HPI.    Medications:  The current medication list was reviewed in the EMR    ALLERGIES:    Allergies   Allergen Reactions   • Toradol [Ketorolac Tromethamine] Anaphylaxis       Objective      Vitals:    02/23/22 1311   BP: 148/76   Pulse: 73   Temp: 96.5 °F (35.8 °C)   TempSrc: Temporal   SpO2: 90%   Weight: 98.4 kg (217 lb)   PainSc: 0-No pain     No flowsheet data found.    Physical Exam  Pulmonary:      Breath sounds: Normal breath sounds.   Neurological:      Mental Status: He is alert and oriented to person, place, and time.           RECENT LABS:  Glucose   Date Value Ref Range Status   05/10/2018 191 (H) 60 - 100 mg/dL Final     Glucose, Arterial   Date Value Ref Range Status   05/09/2018 360 (H) 65 - 95 mmol/L Final     Sodium   Date Value Ref Range Status   10/20/2021 140 136 - 145 mmol/L Final     Potassium   Date Value Ref Range Status   10/20/2021 4.0 3.5 - 5.1 mmol/L Final     Total CO2   Date Value Ref Range Status    10/20/2021 33 (H) 21 - 31 mmol/L Final     Chloride   Date Value Ref Range Status   10/20/2021 101 98 - 107 mmol/L Final     Anion Gap   Date Value Ref Range Status   10/20/2021 10 4 - 16 mmol/L Final     Creatinine   Date Value Ref Range Status   10/20/2021 1.3 0.7 - 1.3 mg/dL Final     BUN   Date Value Ref Range Status   10/20/2021 14 7 - 25 mg/dL Final     BUN/Creatinine Ratio   Date Value Ref Range Status   05/10/2018 35.9 (H) 7.0 - 25.0 Final     Calcium   Date Value Ref Range Status   10/20/2021 9.0 8.6 - 10.3 mg/dL Final     eGFR Non  Amer   Date Value Ref Range Status   05/10/2018 81 42 - 98 mL/min/1.73 Final     Alkaline Phosphatase   Date Value Ref Range Status   09/11/2020 70 46 - 116 U/L Final     Total Protein   Date Value Ref Range Status   09/11/2020 7.5 6.4 - 8.2 g/dL Final     Comment:     Performed at [facility], [address] [place] [address]     ALT (SGPT)   Date Value Ref Range Status   09/11/2020 53 16 - 63 U/L Final     AST (SGOT)   Date Value Ref Range Status   09/11/2020 42 (H) 15 - 37 U/L Final     Total Bilirubin   Date Value Ref Range Status   09/11/2020 0.30 0.2 - 1.0 mg/dL Final     Albumin   Date Value Ref Range Status   09/11/2020 3.7 3.4 - 5.0 g/dL Final     Globulin   Date Value Ref Range Status   05/05/2018 3.0 2.3 - 3.5 gm/dL Final     Lab Results   Component Value Date    WBC 7.92 02/21/2022    HGB 14.3 02/21/2022    HCT 44.1 02/21/2022    MCV 93.6 02/21/2022     02/21/2022     Lab Results   Component Value Date    NEUTROABS 3.90 02/21/2022    IRON 117 05/07/2021    TIBC 419 05/07/2021    LABIRON 28 05/07/2021    FERRITIN 74.87 05/07/2021    [id number] 492 02/21/2022    JKGZVZIM74 667 05/07/2021    EUECQIQQ93 3,540 (H) 01/14/2021    FOLATE >20.00 02/21/2022    FOLATE 16.30 08/25/2021    FOLATE 3.68 (L) 05/07/2021     No results found for: , LABCA2, AFPTM, HCGQUANT, , CHROMGRNA, [id number], CEA, REFLABREPO      PATHOLOGY:  *  Cannot find OR log *         RADIOLOGY DATA :  No radiology results for the last 7 days        Assessment/Plan     1.  Leukocytosis:  History of to be secondary to diabetes plus underlying COPD plus or minus inflammation from arthritis  -Most recent white blood cell count is 7.92  -We will monitor with CBC  -We will have patient return to clinic in 1 year with repeat CBC, B12, folate to be done prior to that    2.  Folate deficiency:  -Folate level was 3.68 in May 2021  -Anemia work-up done on February 21, 2022 shows hemoglobin is 14.1.  Folate level is showing significant improvement.  Recommend decreasing folic acid to 3 times a week.  -B12 level is decreased to 400, recommend taking B12 3 times a week.      3.  Psoriasis    4.  Health maintenance: Patient does not smoke    5. Advance Care Planning: For now patient remains full code and is able to make decisions.  Patient has health care surrogate mentioned on chart.    6.  Chronic diarrhea with abdominal discomfort for the last 2 years  -We will refer patient to gastroenterology clinic for further evaluation.  Referral has been placed today    7.  Prescriptions: Prescription for B12 and folic acid has been sent to his pharmacy today             PHQ-9 Total Score: 0   -Patient is not homicidal or suicidal.  No acute intervention required.    Rolando Abril Novoa reports a pain score of 0.  Given his pain assessment as noted, treatment options were discussed and the following options were decided upon as a follow-up plan to address the patient's pain: continuation of current treatment plan for pain.         Noé Coleman MD  2/23/2022  14:16 CST        Part of this note may be an electronic transcription/translation of spoken language to printed text using the Dragon Dictation System.          CC:

## 2022-03-07 ENCOUNTER — OFFICE VISIT (OUTPATIENT)
Dept: GASTROENTEROLOGY | Facility: CLINIC | Age: 77
End: 2022-03-07

## 2022-03-07 VITALS
BODY MASS INDEX: 36.46 KG/M2 | DIASTOLIC BLOOD PRESSURE: 66 MMHG | SYSTOLIC BLOOD PRESSURE: 127 MMHG | WEIGHT: 218.8 LBS | HEART RATE: 69 BPM | HEIGHT: 65 IN

## 2022-03-07 DIAGNOSIS — R19.7 DIARRHEA, UNSPECIFIED TYPE: ICD-10-CM

## 2022-03-07 DIAGNOSIS — R19.4 CHANGE IN BOWEL HABITS: Primary | ICD-10-CM

## 2022-03-07 DIAGNOSIS — Z86.010 PERSONAL HISTORY OF COLONIC POLYPS: ICD-10-CM

## 2022-03-07 PROBLEM — R07.89 CHEST WALL PAIN: Status: ACTIVE | Noted: 2020-01-06

## 2022-03-07 PROBLEM — G47.34 NOCTURNAL HYPOXEMIA: Status: ACTIVE | Noted: 2020-12-11

## 2022-03-07 PROBLEM — Z99.81 SUPPLEMENTAL OXYGEN DEPENDENT: Status: ACTIVE | Noted: 2020-12-11

## 2022-03-07 PROBLEM — M54.6 ACUTE RIGHT-SIDED THORACIC BACK PAIN: Status: ACTIVE | Noted: 2020-01-06

## 2022-03-07 PROBLEM — N18.2 CKD (CHRONIC KIDNEY DISEASE) STAGE 2, GFR 60-89 ML/MIN: Status: ACTIVE | Noted: 2021-01-15

## 2022-03-07 PROCEDURE — 99203 OFFICE O/P NEW LOW 30 MIN: CPT | Performed by: NURSE PRACTITIONER

## 2022-03-07 RX ORDER — ASPIRIN 81 MG/1
81 TABLET, CHEWABLE ORAL DAILY
COMMUNITY
End: 2022-03-25

## 2022-03-07 RX ORDER — DEXTROSE AND SODIUM CHLORIDE 5; .45 G/100ML; G/100ML
30 INJECTION, SOLUTION INTRAVENOUS CONTINUOUS PRN
Status: CANCELLED | OUTPATIENT
Start: 2022-03-28

## 2022-03-07 NOTE — PROGRESS NOTES
Chief Complaint   Patient presents with   • Diarrhea       Subjective    Rolando Novoa is a 76 y.o. male. he is here today for follow-up.  76-year-old male presents to discuss chronic diarrhea.  He was last seen in this office 9/13/2018 to plan colonoscopy underwent colonoscopy with polypectomy repeat recommended in 3 years for surveillance.  States he has diarrhea 4-8 times per day after most any intake and associated urgency.  Denies any melena or hematochezia.  Denies any significant abdominal pain.  He reports frequent abdominal bloating and abdominal pain occurring prior to when he has a bowel movement.  States he takes Imodium daily which typically does not help but today he had a formed bowel movement which is abnormal for him.    Diarrhea   This is a chronic problem. The current episode started more than 1 year ago. The problem occurs 5 to 10 times per day (approx ). The problem has been waxing and waning. The stool consistency is described as watery. The patient states that diarrhea awakens him from sleep. Associated symptoms include abdominal pain, bloating, coughing and increased flatus. Pertinent negatives include no arthralgias, chills, fever, headaches, myalgias, sweats, URI, vomiting or weight loss. He has tried increased fluids, change of diet and anti-motility drug for the symptoms. The treatment provided mild relief. cholecystectomy      Plan; schedule patient for colonoscopy due to chronic diarrhea will obtain biopsy to rule out microscopic colitis.       The following portions of the patient's history were reviewed and updated as appropriate:   Past Medical History:   Diagnosis Date   • Acid reflux    • Black lung disease (HCC)    • Cataract    • Diabetes (HCC)    • Enlarged prostate    • High cholesterol    • Hypertension    • Kidney stone     multiple   • Psoriasis      Past Surgical History:   Procedure Laterality Date   • AMPUTATION Left     lower arm and hand   • CATARACT EXTRACTION W/  INTRAOCULAR LENS IMPLANT Left 1/22/2021    Procedure: REMIOVE CATARACT AND IMPLANT  INTRAOCULAR LENS;  Surgeon: Sam Isaacs MD;  Location: [facility] OR;  Service: Ophthalmology;  Laterality: Left;   • CATARACT EXTRACTION W/ INTRAOCULAR LENS IMPLANT Right 1/29/2021    Procedure: REMOVE CATARACT AND IMPLANT INTRAOCULAR LENS;  Surgeon: Sam Isaacs MD;  Location: [facility] OR;  Service: Ophthalmology;  Laterality: Right;   • CHOLECYSTECTOMY OPEN     • COLONOSCOPY N/A 11/9/2018    Procedure: COLONOSCOPY;  Surgeon: Sukhdeep Mae MD;  Location: [facility] ENDOSCOPY;  Service: Gastroenterology   • CYSTOSCOPY N/A 12/16/2017    Procedure: CYSTOSCOPY WITH CLOT EVACUATION;  Surgeon: Prosper Ariza MD;  Location: [facility] OR;  Service:    • CYSTOSCOPY, URETEROSCOPY, RETROGRADE PYELOGRAM, STENT INSERTION Left 12/15/2017    Procedure: CYSTOSCOPY LEFT URETEROSCOPY RETROGRADE PYELOGRAM HOLMIUM LASER STENT INSERTION;  Surgeon: Prosper Ariza MD;  Location: [facility] OR;  Service:    • EXTRACORPOREAL SHOCK WAVE LITHOTRIPSY (ESWL)     • EXTRACORPOREAL SHOCKWAVE LITHOTRIPSY (ESWL), STENT INSERTION/REMOVAL Left 2/3/2017    Procedure: LEFT EXTRACORPOREAL SHOCKWAVE LITHOTRIPSY, POSSIBLE CYSTOSCOPY, POSSIBLE STENT REMOVAL ;  Surgeon: Prosper Ariza MD;  Location: [facility] OR;  Service:    • EXTRACORPOREAL SHOCKWAVE LITHOTRIPSY (ESWL), STENT INSERTION/REMOVAL Left 5/4/2018    Procedure: EXTRACORPOREAL SHOCKWAVE LITHOTRIPSY;  Surgeon: Prosper Ariza MD;  Location: [facility];  Service: Urology   • KIDNEY STONE SURGERY     • SCROTAL SURGERY      trauma     Family History   Problem Relation Age of Onset   • Stroke Mother    • Heart attack Father    • Cancer Brother        Prior to Admission medications    Medication Sig Start Date End Date Taking? Authorizing Provider   aspirin 81 MG chewable tablet Chew 81 mg Daily.   Yes Provider, MD Esvin   atorvastatin (LIPITOR) 20 MG tablet Take 1 tablet by mouth Every Night. 3/23/20   Yes Cassi Lemus MD   esomeprazole (nexIUM) 20 MG capsule Take 20 mg by mouth Every Morning Before Breakfast.   Yes ProviderEsvin MD   folic acid (FOLVITE) 1 MG tablet Take 1 tablet by mouth on Monday, Wednesday and Friday 2/23/22  Yes Noé Coleman MD   furosemide (LASIX) 20 MG tablet Take 1 tablet by mouth Daily. 3/23/20  Yes Cassi Lemus MD   glimepiride (AMARYL) 2 MG tablet Take 2 mg by mouth Every Morning Before Breakfast.   Yes Esvin Murray MD   loperamide (IMODIUM) 2 MG capsule Take 2 mg by mouth 2 (Two) Times a Day As Needed for diarrhea.   Yes Esvin Murray MD   metFORMIN (GLUCOPHAGE) 500 MG tablet Take 500 mg by mouth 4 (Four) Times a Day. May take 3 depending on glucose reading 6/3/16  Yes Esvin Murray MD   nebivolol (Bystolic) 10 MG tablet Take 1 tablet by mouth Daily. 3/23/20  Yes Cassi Lemus MD   OneTouch Ultra test strip  3/15/21  Yes Esvin Murray MD   tamsulosin (FLOMAX) 0.4 MG capsule 24 hr capsule Take 1 capsule by mouth Every Night.   Yes Esvin Murray MD   umeclidinium-vilanterol (ANORO ELLIPTA) 62.5-25 MCG/INH aerosol powder  inhaler Inhale 1 puff Daily. 4/14/21 4/15/22 Yes Esvin Murray MD   vitamin B-12 (CYANOCOBALAMIN) 1000 MCG tablet 1 tablet by mouth on Monday, Wednesday and Friday 2/23/22  Yes Noé Coleman MD   omeprazole (priLOSEC) 20 MG capsule Take 20 mg by mouth Daily.  3/7/22  Esvin Murray MD     Allergies   Allergen Reactions   • Toradol [Ketorolac Tromethamine] Anaphylaxis     Social History     Socioeconomic History   • Marital status:    Tobacco Use   • Smoking status: Never Smoker   • Smokeless tobacco: Never Used   Vaping Use   • Vaping Use: Never used   Substance and Sexual Activity   • Alcohol use: No   • Drug use: No   • Sexual activity: Defer       Review of Systems  Review of Systems   Constitutional: Negative for chills, fever and weight loss.   Respiratory: Positive for  "cough.    Gastrointestinal: Positive for abdominal pain, bloating, diarrhea and flatus. Negative for vomiting.   Musculoskeletal: Negative for arthralgias and myalgias.   Neurological: Negative for headaches.        /66 (BP Location: Left arm)   Pulse 69   Ht 165.1 cm (65\")   Wt 99.2 kg (218 lb 12.8 oz)   BMI 36.41 kg/m²     Objective    Physical Exam  Constitutional:       General: He is not in acute distress.     Appearance: Normal appearance. He is normal weight. He is not ill-appearing.   HENT:      Head: Normocephalic and atraumatic.   Pulmonary:      Effort: Pulmonary effort is normal.   Abdominal:      General: Abdomen is flat. Bowel sounds are normal. There is no distension.      Palpations: Abdomen is soft. There is no mass.      Tenderness: There is no abdominal tenderness.   Neurological:      Mental Status: He is alert.       Lab on 02/21/2022   Component Date Value Ref Range Status   • Folate 02/21/2022 >20.00  4.78 - 24.20 ng/mL Final   • Vitamin B-12 02/21/2022 492  211 - 946 pg/mL Final   • WBC 02/21/2022 7.92  3.40 - 10.80 10*3/mm3 Final   • RBC 02/21/2022 4.71  4.14 - 5.80 10*6/mm3 Final   • Hemoglobin 02/21/2022 14.3  13.0 - 17.7 g/dL Final   • Hematocrit 02/21/2022 44.1  37.5 - 51.0 % Final   • MCV 02/21/2022 93.6  79.0 - 97.0 fL Final   • MCH 02/21/2022 30.4  26.6 - 33.0 pg Final   • MCHC 02/21/2022 32.4  31.5 - 35.7 g/dL Final   • RDW 02/21/2022 13.2  12.3 - 15.4 % Final   • RDW-SD 02/21/2022 44.9  37.0 - 54.0 fl Final   • MPV 02/21/2022 8.8  6.0 - 12.0 fL Final   • Platelets 02/21/2022 300  140 - 450 10*3/mm3 Final   • Neutrophil % 02/21/2022 49.3  42.7 - 76.0 % Final   • Lymphocyte % 02/21/2022 32.8  19.6 - 45.3 % Final   • Monocyte % 02/21/2022 7.4  5.0 - 12.0 % Final   • Eosinophil % 02/21/2022 9.0 (A) 0.3 - 6.2 % Final   • Basophil % 02/21/2022 1.0  0.0 - 1.5 % Final   • Immature Grans % 02/21/2022 0.5  0.0 - 0.5 % Final   • Neutrophils, Absolute 02/21/2022 3.90  1.70 - 7.00 " 10*3/mm3 Final   • Lymphocytes, Absolute 02/21/2022 2.60  0.70 - 3.10 10*3/mm3 Final   • Monocytes, Absolute 02/21/2022 0.59  0.10 - 0.90 10*3/mm3 Final   • Eosinophils, Absolute 02/21/2022 0.71 (A) 0.00 - 0.40 10*3/mm3 Final   • Basophils, Absolute 02/21/2022 0.08  0.00 - 0.20 10*3/mm3 Final   • Immature Grans, Absolute 02/21/2022 0.04  0.00 - 0.05 10*3/mm3 Final   • nRBC 02/21/2022 0.0  0.0 - 0.2 /100 WBC Final     Assessment/Plan      1. Change in bowel habits    2. Diarrhea, unspecified type    3. Personal history of colonic polyps    .       Orders placed during this encounter include:  Orders Placed This Encounter   Procedures   • Follow Anesthesia Guidelines / Standing Orders     Standing Status:   Future   • Obtain Informed Consent     Standing Status:   Future     Order Specific Question:   Informed Consent Given For     Answer:   COLONOSCOPY       COLONOSCOPY (N/A)    Review and/or summary of lab tests, radiology, procedures, medications. Review and summary of old records and obtaining of history. The risks and benefits of my recommendations, as well as other treatment options were discussed with the patient today. Questions were answered.    New Medications Ordered This Visit   Medications   • polyethylene glycol (GoLYTELY) 236 g solution     Sig: Starting at noon on day prior to procedure, drink 8 ounces every 30 minutes until all gone or stools are clear. May add flavor packet.     Dispense:  4000 mL     Refill:  0       Follow-up: Return in about 4 weeks (around 4/4/2022) for Recheck, After test.          This document has been electronically signed by RIVKA Fox on March 7, 2022 15:34 CST           I spent 19 minutes caring for Rolando on this date of service. This time includes time spent by me in the following activities:preparing for the visit, reviewing tests, obtaining and/or reviewing a separately obtained history, performing a medically appropriate examination and/or evaluation ,  counseling and educating the patient/family/caregiver, ordering medications, tests, or procedures, referring and communicating with other health care professionals , documenting information in the medical record and care coordination    Results for orders placed or performed in visit on 02/21/22   CBC Auto Differential    Specimen: Blood   Result Value Ref Range    WBC 7.92 3.40 - 10.80 10*3/mm3    RBC 4.71 4.14 - 5.80 10*6/mm3    Hemoglobin 14.3 13.0 - 17.7 g/dL    Hematocrit 44.1 37.5 - 51.0 %    MCV 93.6 79.0 - 97.0 fL    MCH 30.4 26.6 - 33.0 pg    MCHC 32.4 31.5 - 35.7 g/dL    RDW 13.2 12.3 - 15.4 %    RDW-SD 44.9 37.0 - 54.0 fl    MPV 8.8 6.0 - 12.0 fL    Platelets 300 140 - 450 10*3/mm3    Neutrophil % 49.3 42.7 - 76.0 %    Lymphocyte % 32.8 19.6 - 45.3 %    Monocyte % 7.4 5.0 - 12.0 %    Eosinophil % 9.0 (H) 0.3 - 6.2 %    Basophil % 1.0 0.0 - 1.5 %    Immature Grans % 0.5 0.0 - 0.5 %    Neutrophils, Absolute 3.90 1.70 - 7.00 10*3/mm3    Lymphocytes, Absolute 2.60 0.70 - 3.10 10*3/mm3    Monocytes, Absolute 0.59 0.10 - 0.90 10*3/mm3    Eosinophils, Absolute 0.71 (H) 0.00 - 0.40 10*3/mm3    Basophils, Absolute 0.08 0.00 - 0.20 10*3/mm3    Immature Grans, Absolute 0.04 0.00 - 0.05 10*3/mm3    nRBC 0.0 0.0 - 0.2 /100 WBC   Folate    Specimen: Blood   Result Value Ref Range    Folate >20.00 4.78 - 24.20 ng/mL   Vitamin B12    Specimen: Blood   Result Value Ref Range    Vitamin B-12 492 211 - 946 pg/mL   Results for orders placed or performed in visit on 08/25/21   Gold Top - SST   Result Value Ref Range    Extra Tube Hold for add-ons.    CBC Auto Differential    Specimen: Blood   Result Value Ref Range    WBC 9.45 3.40 - 10.80 10*3/mm3    RBC 4.73 4.14 - 5.80 10*6/mm3    Hemoglobin 14.5 13.0 - 17.7 g/dL    Hematocrit 43.8 37.5 - 51.0 %    MCV 92.6 79.0 - 97.0 fL    MCH 30.7 26.6 - 33.0 pg    MCHC 33.1 31.5 - 35.7 g/dL    RDW 14.2 12.3 - 15.4 %    RDW-SD 47.5 37.0 - 54.0 fl    MPV 8.6 6.0 - 12.0 fL    Platelets  297 140 - 450 10*3/mm3    Neutrophil % 60.6 42.7 - 76.0 %    Lymphocyte % 23.5 19.6 - 45.3 %    Monocyte % 8.3 5.0 - 12.0 %    Eosinophil % 5.7 0.3 - 6.2 %    Basophil % 1.1 0.0 - 1.5 %    Immature Grans % 0.8 (H) 0.0 - 0.5 %    Neutrophils, Absolute 5.73 1.70 - 7.00 10*3/mm3    Lymphocytes, Absolute 2.22 0.70 - 3.10 10*3/mm3    Monocytes, Absolute 0.78 0.10 - 0.90 10*3/mm3    Eosinophils, Absolute 0.54 (H) 0.00 - 0.40 10*3/mm3    Basophils, Absolute 0.10 0.00 - 0.20 10*3/mm3    Immature Grans, Absolute 0.08 (H) 0.00 - 0.05 10*3/mm3    nRBC 0.0 0.0 - 0.2 /100 WBC   Folate    Specimen: Blood   Result Value Ref Range    Folate 16.30 4.78 - 24.20 ng/mL   Results for orders placed or performed in visit on 05/26/21   [facility]   Result Value Ref Range    Extra Tube Hold for add-ons.    CBC Auto Differential    Specimen: Blood   Result Value Ref Range    WBC 11.60 (H) 3.40 - 10.80 10*3/mm3    RBC 4.68 4.14 - 5.80 10*6/mm3    Hemoglobin 13.9 13.0 - 17.7 g/dL    Hematocrit 41.6 37.5 - 51.0 %    MCV 88.9 79.0 - 97.0 fL    MCH 29.7 26.6 - 33.0 pg    MCHC 33.4 31.5 - 35.7 g/dL    RDW 14.6 12.3 - 15.4 %    RDW-SD 46.8 37.0 - 54.0 fl    MPV 8.6 6.0 - 12.0 fL    Platelets 216 140 - 450 10*3/mm3    Neutrophil % 53.4 42.7 - 76.0 %    Lymphocyte % 26.1 19.6 - 45.3 %    Monocyte % 7.6 5.0 - 12.0 %    Eosinophil % 10.3 (H) 0.3 - 6.2 %    Basophil % 1.6 (H) 0.0 - 1.5 %    Immature Grans % 1.0 (H) 0.0 - 0.5 %    Neutrophils, Absolute 6.19 1.70 - 7.00 10*3/mm3    Lymphocytes, Absolute 3.03 0.70 - 3.10 10*3/mm3    Monocytes, Absolute 0.88 0.10 - 0.90 10*3/mm3    Eosinophils, Absolute 1.20 (H) 0.00 - 0.40 10*3/mm3    Basophils, Absolute 0.18 0.00 - 0.20 10*3/mm3    Immature Grans, Absolute 0.12 (H) 0.00 - 0.05 10*3/mm3    nRBC 0.0 0.0 - 0.2 /100 WBC   Results for orders placed or performed in visit on 05/07/21   JAK2 [id number] Qual w/Reflex to JAK2 Exon12    Specimen: Blood   Result Value Ref Range    Reference Lab Report     MPL Mutation  Analysis    Specimen: Blood   Result Value Ref Range    Reference Lab Report     Flow Cytometry, Blood    Specimen: Blood   Result Value Ref Range    Reference Lab Report     Calreticulin (CALR) Mutation Analysis    Specimen: Blood   Result Value Ref Range    CALR Mutation Detection Result scanned results    CBC Auto Differential    Specimen: Blood   Result Value Ref Range    WBC 11.39 (H) 3.40 - 10.80 10*3/mm3    RBC 4.60 4.14 - 5.80 10*6/mm3    Hemoglobin 13.8 13.0 - 17.7 g/dL    Hematocrit 41.2 37.5 - 51.0 %    MCV 89.6 79.0 - 97.0 fL    MCH 30.0 26.6 - 33.0 pg    MCHC 33.5 31.5 - 35.7 g/dL    RDW 14.6 12.3 - 15.4 %    RDW-SD 46.5 37.0 - 54.0 fl    MPV 8.7 6.0 - 12.0 fL    Platelets 604 (H) 140 - 450 10*3/mm3   Iron and TIBC    Specimen: Blood   Result Value Ref Range    Iron 117 59 - 158 mcg/dL    Iron Saturation 28 20 - 50 %    Transferrin 281 200 - 360 mg/dL    TIBC 419 298 - 536 mcg/dL   Manual Differential    Specimen: Blood   Result Value Ref Range    Neutrophil % 56.0 42.7 - 76.0 %    Lymphocyte % 25.0 19.6 - 45.3 %    Monocyte % 16.0 (H) 5.0 - 12.0 %    Eosinophil % 1.0 0.3 - 6.2 %    Basophil % 1.0 0.0 - 1.5 %    Bands %  1.0 0.0 - 5.0 %    Neutrophils Absolute 6.49 1.70 - 7.00 10*3/mm3    Lymphocytes Absolute 2.85 0.70 - 3.10 10*3/mm3    Monocytes Absolute 1.82 (H) 0.10 - 0.90 10*3/mm3    Eosinophils Absolute 0.11 0.00 - 0.40 10*3/mm3    Basophils Absolute 0.11 0.00 - 0.20 10*3/mm3    RBC Morphology Normal Normal    WBC Morphology Normal Normal    Platelet Estimate Increased Normal     *Note: Due to a large number of results and/or encounters for the requested time period, some results have not been displayed. A complete set of results can be found in Results Review.

## 2022-03-25 ENCOUNTER — TELEPHONE (OUTPATIENT)
Dept: GASTROENTEROLOGY | Facility: CLINIC | Age: 77
End: 2022-03-25

## 2022-03-25 RX ORDER — LANOLIN ALCOHOL/MO/W.PET/CERES
1000 CREAM (GRAM) TOPICAL TAKE AS DIRECTED
COMMUNITY
End: 2022-12-05

## 2022-03-25 RX ORDER — FOLIC ACID 1 MG/1
1 TABLET ORAL TAKE AS DIRECTED
COMMUNITY

## 2022-03-25 RX ORDER — ACETAMINOPHEN 500 MG
500 TABLET ORAL NIGHTLY
COMMUNITY

## 2022-03-25 NOTE — TELEPHONE ENCOUNTER
Attempted to contact patient to see which prep patient was given as someone has d/c'd the prep that was originally put in for him. There was no answer at either number on patients chart       ----- Message from Amena Caal MA sent at 3/23/2022 12:03 PM CDT -----  Please e-mail a copy of the patients instructions with the date and time for his upcoming colonoscopy to [email or web address]

## 2022-03-28 ENCOUNTER — ANESTHESIA EVENT (OUTPATIENT)
Dept: GASTROENTEROLOGY | Facility: HOSPITAL | Age: 77
End: 2022-03-28

## 2022-03-28 ENCOUNTER — HOSPITAL ENCOUNTER (OUTPATIENT)
Facility: HOSPITAL | Age: 77
Setting detail: HOSPITAL OUTPATIENT SURGERY
Discharge: HOME | End: 2022-03-28
Attending: INTERNAL MEDICINE | Admitting: INTERNAL MEDICINE

## 2022-03-28 ENCOUNTER — ANESTHESIA (OUTPATIENT)
Dept: GASTROENTEROLOGY | Facility: HOSPITAL | Age: 77
End: 2022-03-28

## 2022-03-28 VITALS
RESPIRATION RATE: 20 BRPM | HEIGHT: 65 IN | TEMPERATURE: 96.8 F | WEIGHT: 214.73 LBS | SYSTOLIC BLOOD PRESSURE: 136 MMHG | DIASTOLIC BLOOD PRESSURE: 54 MMHG | HEART RATE: 68 BPM | OXYGEN SATURATION: 96 % | BODY MASS INDEX: 35.78 KG/M2

## 2022-03-28 DIAGNOSIS — Z86.010 PERSONAL HISTORY OF COLONIC POLYPS: ICD-10-CM

## 2022-03-28 DIAGNOSIS — R19.4 CHANGE IN BOWEL HABITS: ICD-10-CM

## 2022-03-28 DIAGNOSIS — R19.7 DIARRHEA, UNSPECIFIED TYPE: ICD-10-CM

## 2022-03-28 PROCEDURE — 25010000002 PROPOFOL 10 MG/ML EMULSION: Performed by: NURSE ANESTHETIST, CERTIFIED REGISTERED

## 2022-03-28 PROCEDURE — 45385 COLONOSCOPY W/LESION REMOVAL: CPT | Performed by: INTERNAL MEDICINE

## 2022-03-28 PROCEDURE — 82962 GLUCOSE BLOOD TEST: CPT

## 2022-03-28 PROCEDURE — 88305 TISSUE EXAM BY PATHOLOGIST: CPT

## 2022-03-28 PROCEDURE — 45380 COLONOSCOPY AND BIOPSY: CPT | Performed by: INTERNAL MEDICINE

## 2022-03-28 RX ORDER — PROPOFOL 10 MG/ML
VIAL (ML) INTRAVENOUS AS NEEDED
Status: DISCONTINUED | OUTPATIENT
Start: 2022-03-28 | End: 2022-03-28 | Stop reason: SURG

## 2022-03-28 RX ORDER — LIDOCAINE HYDROCHLORIDE 20 MG/ML
INJECTION, SOLUTION INTRAVENOUS AS NEEDED
Status: DISCONTINUED | OUTPATIENT
Start: 2022-03-28 | End: 2022-03-28 | Stop reason: SURG

## 2022-03-28 RX ORDER — DEXTROSE AND SODIUM CHLORIDE 5; .45 G/100ML; G/100ML
30 INJECTION, SOLUTION INTRAVENOUS CONTINUOUS PRN
Status: DISCONTINUED | OUTPATIENT
Start: 2022-03-28 | End: 2022-03-28 | Stop reason: HOSPADM

## 2022-03-28 RX ADMIN — PROPOFOL 20 MG: 10 INJECTION, EMULSION INTRAVENOUS at 15:33

## 2022-03-28 RX ADMIN — DEXTROSE AND SODIUM CHLORIDE 30 ML/HR: 5; 450 INJECTION, SOLUTION INTRAVENOUS at 14:48

## 2022-03-28 RX ADMIN — PROPOFOL 100 MG: 10 INJECTION, EMULSION INTRAVENOUS at 15:30

## 2022-03-28 RX ADMIN — LIDOCAINE HYDROCHLORIDE 20 MG: 20 INJECTION, SOLUTION INTRAVENOUS at 15:32

## 2022-03-28 NOTE — ANESTHESIA POSTPROCEDURE EVALUATION
Patient: Rolando Novoa    Procedure Summary     Date: 03/28/22 Room / Location: [facility] ENDOSCOPY 1 / [facility] ENDOSCOPY    Anesthesia Start: 1512 Anesthesia Stop: 1536    Procedure: COLONOSCOPY (N/A ) Diagnosis:       Change in bowel habits      Diarrhea, unspecified type      Personal history of colonic polyps      (Change in bowel habits [R19.4])      (Diarrhea, unspecified type [R19.7])      (Personal history of colonic polyps [Z86.010])    Surgeons: Sukhdeep Mae MD Provider: Erickson Guadalupe CRNA    Anesthesia Type: MAC ASA Status: 3          Anesthesia Type: MAC    Vitals  No vitals data found for the desired time range.          Post Anesthesia Care and Evaluation    Patient location during evaluation: bedside  Patient participation: complete - patient participated  Level of consciousness: sleepy but conscious  Pain score: 0  Pain management: adequate  Airway patency: patent  Anesthetic complications: No anesthetic complications  PONV Status: none  Cardiovascular status: acceptable  Respiratory status: acceptable  Hydration status: acceptable    Comments: -------------------------              03/28/22                    1536        -------------------------   BP:         123/76        Pulse:        70          Resp:         22          Temp:   97 °F (36.1 °C)   SpO2:         94%        -------------------------

## 2022-03-28 NOTE — ANESTHESIA PREPROCEDURE EVALUATION
Anesthesia Evaluation     Patient summary reviewed and Nursing notes reviewed   NPO Solid Status: > 8 hours  NPO Liquid Status: > 8 hours           Airway   Mallampati: III  TM distance: >3 FB  Neck ROM: full  Narrow palate, Large neck circumference and Difficult intubation highly probable  Dental    (+) poor dentition    Comment: Full set of capped teeth.    Pulmonary    (+) a smoker Former, COPD moderate, shortness of breath, decreased breath sounds,   (-) asthma, sleep apnea, rhonchi, wheezes    ROS comment: Patient was a  for years and now has restrictive lung disease.  PE comment: Wheezing on exam to be treated with albuterol.  Cardiovascular   Exercise tolerance: poor (<4 METS)    NYHA Classification: III  ECG reviewed  Patient on routine beta blocker and Beta blocker given within 24 hours of surgery  Rhythm: regular  Rate: normal    (+) hypertension well controlled 2 medications or greater, dysrhythmias PAC, hyperlipidemia,   (-) valvular problems/murmurs, past MI, CAD, murmur, cardiac stents, DVT      Neuro/Psych  (+) weakness, psychiatric history Anxiety,    (-) seizures, TIA, CVA  GI/Hepatic/Renal/Endo    (+) obesity,  GERD well controlled,  renal disease stones, diabetes mellitus type 2 poorly controlled,   (-) morbid obesity    ROS Comment: Recurrent Kidney stones noted.    Musculoskeletal     (+) arthralgias, back pain, chronic pain,   Abdominal     Abdomen: soft.   Substance History      OB/GYN          Other   arthritis, blood dyscrasia anemia,     ROS/Med Hx Other: Hx of COPD- On 2LNC at night when sleeping. Uses albuterol inhalers as needed usually once a week.     GERD controlled on prilosec    Left arm amputation- [place]    Last [id number]                    Anesthesia Plan    ASA 3     MAC   (If were an intubating case go straight to [facility]- 2nd eye)  intravenous induction     Anesthetic plan, all risks, benefits, and alternatives have been provided, discussed and  informed consent has been obtained with: patient.    Plan discussed with CRNA.

## 2022-03-29 LAB — GLUCOSE BLDC GLUCOMTR-MCNC: 139 MG/DL (ref 70–130)

## 2022-03-31 LAB
LAB AP CASE REPORT: NORMAL
PATH REPORT.FINAL DX SPEC: NORMAL

## 2022-04-12 ENCOUNTER — OFFICE VISIT (OUTPATIENT)
Dept: GASTROENTEROLOGY | Facility: CLINIC | Age: 77
End: 2022-04-12

## 2022-04-12 VITALS
SYSTOLIC BLOOD PRESSURE: 127 MMHG | BODY MASS INDEX: 36.25 KG/M2 | HEIGHT: 65 IN | DIASTOLIC BLOOD PRESSURE: 80 MMHG | WEIGHT: 217.6 LBS | HEART RATE: 87 BPM

## 2022-04-12 DIAGNOSIS — K58.0 IRRITABLE BOWEL SYNDROME WITH DIARRHEA: ICD-10-CM

## 2022-04-12 DIAGNOSIS — K57.90 DIVERTICULOSIS: ICD-10-CM

## 2022-04-12 DIAGNOSIS — K63.5 CECAL POLYP: Primary | ICD-10-CM

## 2022-04-12 PROCEDURE — 99213 OFFICE O/P EST LOW 20 MIN: CPT | Performed by: NURSE PRACTITIONER

## 2022-04-12 NOTE — PROGRESS NOTES
Chief Complaint   Patient presents with   • Diarrhea   • Colon Polyps       Subjective    Rolando Novoa is a 77 y.o. male. he is here today for follow-up.  77-year-old male presents for follow-up after colonoscopy.  Reports diarrhea is still 5-10 times per day with no melena or hematochezia.  Exacerbated by movement denies any significant abdominal pain.    Diarrhea   This is a chronic problem. The current episode started more than 1 year ago. The stool consistency is described as watery. Associated symptoms include bloating. Pertinent negatives include no abdominal pain, arthralgias, chills, coughing, fever, headaches, increased  flatus, myalgias or vomiting.     Colonoscopy completed 3/28/2022 noted diverticula in the sigmoid colon small polyp removed from the cecum and several biopsies were obtained.  Biopsy noted no significant [place] abnormality polyp found to be tubular adenoma repeat recommended in 3 years for surveillance.       The following portions of the patient's history were reviewed and updated as appropriate:   Past Medical History:   Diagnosis Date   • Acid reflux    • Black lung disease (HCC)    • Cataract    • Diabetes (HCC)    • Enlarged prostate    • High cholesterol    • Hypertension    • Kidney stone     multiple   • Psoriasis      Past Surgical History:   Procedure Laterality Date   • AMPUTATION Left     lower arm and hand   • CATARACT EXTRACTION W/ INTRAOCULAR LENS IMPLANT Left 1/22/2021    Procedure: REMIOVE CATARACT AND IMPLANT  INTRAOCULAR LENS;  Surgeon: Sam Isaacs MD;  Location: [facility];  Service: Ophthalmology;  Laterality: Left;   • CATARACT EXTRACTION W/ INTRAOCULAR LENS IMPLANT Right 1/29/2021    Procedure: REMOVE CATARACT AND IMPLANT INTRAOCULAR LENS;  Surgeon: Sam Isaacs MD;  Location: [facility];  Service: Ophthalmology;  Laterality: Right;   • CHOLECYSTECTOMY OPEN     • COLONOSCOPY N/A 11/9/2018    Procedure: COLONOSCOPY;  Surgeon: Sukhdeep Mae  MD FRANTZ;  Location: [facility] ENDOSCOPY;  Service: Gastroenterology   • COLONOSCOPY N/A 3/28/2022    Procedure: COLONOSCOPY;  Surgeon: Sukhdeep Mae MD;  Location: [facility] ENDOSCOPY;  Service: Gastroenterology;  Laterality: N/A;   • CYSTOSCOPY N/A 12/16/2017    Procedure: CYSTOSCOPY WITH CLOT EVACUATION;  Surgeon: Prosper Ariza MD;  Location: [facility] OR;  Service:    • CYSTOSCOPY, URETEROSCOPY, RETROGRADE PYELOGRAM, STENT INSERTION Left 12/15/2017    Procedure: CYSTOSCOPY LEFT URETEROSCOPY RETROGRADE PYELOGRAM HOLMIUM LASER STENT INSERTION;  Surgeon: Prosper Ariza MD;  Location: [facility] OR;  Service:    • EXTRACORPOREAL SHOCK WAVE LITHOTRIPSY (ESWL)     • EXTRACORPOREAL SHOCKWAVE LITHOTRIPSY (ESWL), STENT INSERTION/REMOVAL Left 2/3/2017    Procedure: LEFT EXTRACORPOREAL SHOCKWAVE LITHOTRIPSY, POSSIBLE CYSTOSCOPY, POSSIBLE STENT REMOVAL ;  Surgeon: Prosper Ariza MD;  Location: [facility] OR;  Service:    • EXTRACORPOREAL SHOCKWAVE LITHOTRIPSY (ESWL), STENT INSERTION/REMOVAL Left 5/4/2018    Procedure: EXTRACORPOREAL SHOCKWAVE LITHOTRIPSY;  Surgeon: Prosper Ariza MD;  Location: [facility] OR;  Service: Urology   • KIDNEY STONE SURGERY     • SCROTAL SURGERY      trauma     Family History   Problem Relation Age of Onset   • Stroke Mother    • Heart attack Father    • Cancer Brother        Prior to Admission medications    Medication Sig Start Date End Date Taking? Authorizing Provider   acetaminophen (TYLENOL) 500 MG tablet Take 500 mg by mouth Every Night.    ProviderEsvin MD   atorvastatin (LIPITOR) 20 MG tablet Take 1 tablet by mouth Every Night. 3/23/20   Cassi Lemus MD   esomeprazole (nexIUM) 20 MG capsule Take 20 mg by mouth Every Morning Before Breakfast.    Esvin Murray MD   folic acid (FOLVITE) 1 MG tablet Take 1 mg by mouth Take As Directed. Monday, Wednesday, and Friday    Esvin Murray MD   furosemide (LASIX) 20 MG tablet Take 1 tablet by mouth Daily. 3/23/20   Cassi Lemus  MD Anton   glimepiride (AMARYL) 2 MG tablet Take 2 mg by mouth Every Morning Before Breakfast.    Esvin Murray MD   loperamide (IMODIUM) 2 MG capsule Take 2 mg by mouth 2 (Two) Times a Day As Needed for diarrhea.    Esvin Murray MD   metFORMIN (GLUCOPHAGE) 500 MG tablet Take 500 mg by mouth 4 (Four) Times a Day. May take 3 depending on glucose reading 6/3/16   Esvin Murray MD   nebivolol (Bystolic) 10 MG tablet Take 1 tablet by mouth Daily. 3/23/20   Cassi Lemus MD   OneTouch Ultra test strip  3/15/21   Esvin Murray MD   tamsulosin (FLOMAX) 0.4 MG capsule 24 hr capsule Take 1 capsule by mouth Every Night.    Esvin Murray MD   umeclidinium-vilanterol (ANORO ELLIPTA) 62.5-25 MCG/INH aerosol powder  inhaler Inhale 1 puff Daily. 4/14/21 4/15/22  Esvin Murray MD   vitamin B-12 (CYANOCOBALAMIN) 1000 MCG tablet Take 1,000 mcg by mouth Take As Directed. Monday, Wednesday, and Friday    Esvin Murray MD     Allergies   Allergen Reactions   • Toradol [Ketorolac Tromethamine] Anaphylaxis     Social History     Socioeconomic History   • Marital status:    Tobacco Use   • Smoking status: Never Smoker   • Smokeless tobacco: Never Used   Vaping Use   • Vaping Use: Never used   Substance and Sexual Activity   • Alcohol use: No   • Drug use: No   • Sexual activity: Defer       Review of Systems  Review of Systems   Constitutional: Negative for activity change, appetite change, chills, diaphoresis, fatigue, fever and unexpected weight change.   HENT: Negative for sore throat and trouble swallowing.    Respiratory: Negative for cough and shortness of breath.    Gastrointestinal: Positive for bloating and diarrhea. Negative for abdominal distention, abdominal pain, anal bleeding, blood in stool, constipation, flatus, nausea, rectal pain and vomiting.   Musculoskeletal: Negative for arthralgias and myalgias.   Skin: Negative for pallor.   Neurological:  "Negative for light-headedness and headaches.        /80 (BP Location: Left arm)   Pulse 87   Ht 165.1 cm (65\")   Wt 98.7 kg (217 lb 9.6 oz)   BMI 36.21 kg/m²     Objective    Physical Exam  Constitutional:       General: He is not in acute distress.     Appearance: Normal appearance. He is normal weight. He is not ill-appearing.   HENT:      Head: Normocephalic and atraumatic.   Pulmonary:      Effort: Pulmonary effort is normal.   Abdominal:      General: Abdomen is flat. Bowel sounds are normal. There is no distension.      Palpations: Abdomen is soft. There is no mass.      Tenderness: There is no abdominal tenderness.   Neurological:      Mental Status: He is alert.       Admission on 03/28/2022, Discharged on 03/28/2022   Component Date Value Ref Range Status   • Case Report 03/28/2022    Final                    Value:Surgical Pathology Report                         Case: [id number]                                  Authorizing Provider:  Sukhdeep Mae MD        Collected:           03/28/2022 03:40 PM          Ordering Location:     [facility]   Received:            03/29/2022 06:59 AM                                 [facility]                                                     Pathologist:           Silverio Zamudio MD                                                           Specimens:   1) - Large Intestine, Cecum, cecum polyp  (hot snare)                                               2) - Large Intestine, colonic mucosa bx                                                   • Final Diagnosis 03/28/2022    Final                    Value:This result contains rich text formatting which cannot be displayed here.   • Glucose 03/28/2022 139 (A) 70 - 130 mg/dL Final    RN NotifiedOperator: [id number] DIAMOND RODRIGUEZ MMeter ID: [id number]     Assessment/Plan      1. Cecal polyp    2. Irritable bowel syndrome with diarrhea    3. Diverticulosis    .   Repeat colonoscopy in 3 " years for surveillance.  Discuss treatment options such as Xifaxan, dicyclomine or Elavil patient prefers not to add on any more daily medications and is going to take Imodium as needed.  If you would like to try medication management he will follow back up in office or contact office if symptoms persist or worsen  Recommend fiber submit daily for diverticulosis  Orders placed during this encounter include:  No orders of the defined types were placed in this encounter.      * Surgery not found *    Review and/or summary of lab tests, radiology, procedures, medications. Review and summary of old records and obtaining of history. The risks and benefits of my recommendations, as well as other treatment options were discussed with the patient today. Questions were answered.    No orders of the defined types were placed in this encounter.      Follow-up: Return if symptoms worsen or fail to improve, for Recheck.          This document has been electronically signed by RIVKA Fox on April 12, 2022 14:54 CDT           I spent 16 minutes caring for Rolando on this date of service. This time includes time spent by me in the following activities:preparing for the visit, reviewing tests, obtaining and/or reviewing a separately obtained history, performing a medically appropriate examination and/or evaluation , counseling and educating the patient/family/caregiver, ordering medications, tests, or procedures, referring and communicating with other health care professionals , documenting information in the medical record and care coordination    Results for orders placed or performed during the hospital encounter of 03/28/22   Tissue Pathology Exam    Specimen: A: Large Intestine, Cecum; Polyp    B: Large Intestine; Polyp   Result Value Ref Range    Case Report       Surgical Pathology Report                         Case: [id number]                                  Authorizing Provider:  Sukhdeep Mae MD         Collected:           03/28/2022 03:40 PM          Ordering Location:     [facility]   Received:            03/29/2022 06:59 AM                                 [facility]                                                     Pathologist:           Silverio Zamudio MD                                                           Specimens:   1) - Large Intestine, Cecum, cecum polyp  (hot snare)                                               2) - Large Intestine, colonic mucosa bx                                                    Final Diagnosis       SEE SCANNED REPORT       POC Glucose Once    Specimen: Blood   Result Value Ref Range    Glucose 139 (H) 70 - 130 mg/dL   Results for orders placed or performed in visit on 02/21/22   CBC Auto Differential    Specimen: Blood   Result Value Ref Range    WBC 7.92 3.40 - 10.80 10*3/mm3    RBC 4.71 4.14 - 5.80 10*6/mm3    Hemoglobin 14.3 13.0 - 17.7 g/dL    Hematocrit 44.1 37.5 - 51.0 %    MCV 93.6 79.0 - 97.0 fL    MCH 30.4 26.6 - 33.0 pg    MCHC 32.4 31.5 - 35.7 g/dL    RDW 13.2 12.3 - 15.4 %    RDW-SD 44.9 37.0 - 54.0 fl    MPV 8.8 6.0 - 12.0 fL    Platelets 300 140 - 450 10*3/mm3    Neutrophil % 49.3 42.7 - 76.0 %    Lymphocyte % 32.8 19.6 - 45.3 %    Monocyte % 7.4 5.0 - 12.0 %    Eosinophil % 9.0 (H) 0.3 - 6.2 %    Basophil % 1.0 0.0 - 1.5 %    Immature Grans % 0.5 0.0 - 0.5 %    Neutrophils, Absolute 3.90 1.70 - 7.00 10*3/mm3    Lymphocytes, Absolute 2.60 0.70 - 3.10 10*3/mm3    Monocytes, Absolute 0.59 0.10 - 0.90 10*3/mm3    Eosinophils, Absolute 0.71 (H) 0.00 - 0.40 10*3/mm3    Basophils, Absolute 0.08 0.00 - 0.20 10*3/mm3    Immature Grans, Absolute 0.04 0.00 - 0.05 10*3/mm3    nRBC 0.0 0.0 - 0.2 /100 WBC   Folate    Specimen: Blood   Result Value Ref Range    Folate >20.00 4.78 - 24.20 ng/mL   Vitamin B12    Specimen: Blood   Result Value Ref Range    Vitamin B-12 492 211 - 946 pg/mL   Results for orders placed or performed in visit on  08/25/21   Counts include 234 beds at the [facility]   Result Value Ref Range    Extra Tube Hold for add-ons.    CBC Auto Differential    Specimen: Blood   Result Value Ref Range    WBC 9.45 3.40 - 10.80 10*3/mm3    RBC 4.73 4.14 - 5.80 10*6/mm3    Hemoglobin 14.5 13.0 - 17.7 g/dL    Hematocrit 43.8 37.5 - 51.0 %    MCV 92.6 79.0 - 97.0 fL    MCH 30.7 26.6 - 33.0 pg    MCHC 33.1 31.5 - 35.7 g/dL    RDW 14.2 12.3 - 15.4 %    RDW-SD 47.5 37.0 - 54.0 fl    MPV 8.6 6.0 - 12.0 fL    Platelets 297 140 - 450 10*3/mm3    Neutrophil % 60.6 42.7 - 76.0 %    Lymphocyte % 23.5 19.6 - 45.3 %    Monocyte % 8.3 5.0 - 12.0 %    Eosinophil % 5.7 0.3 - 6.2 %    Basophil % 1.1 0.0 - 1.5 %    Immature Grans % 0.8 (H) 0.0 - 0.5 %    Neutrophils, Absolute 5.73 1.70 - 7.00 10*3/mm3    Lymphocytes, Absolute 2.22 0.70 - 3.10 10*3/mm3    Monocytes, Absolute 0.78 0.10 - 0.90 10*3/mm3    Eosinophils, Absolute 0.54 (H) 0.00 - 0.40 10*3/mm3    Basophils, Absolute 0.10 0.00 - 0.20 10*3/mm3    Immature Grans, Absolute 0.08 (H) 0.00 - 0.05 10*3/mm3    nRBC 0.0 0.0 - 0.2 /100 WBC   Folate    Specimen: Blood   Result Value Ref Range    Folate 16.30 4.78 - 24.20 ng/mL   Results for orders placed or performed in visit on 05/26/21   Counts include 234 beds at the [facility]   Result Value Ref Range    Extra Tube Hold for add-ons.    CBC Auto Differential    Specimen: Blood   Result Value Ref Range    WBC 11.60 (H) 3.40 - 10.80 10*3/mm3    RBC 4.68 4.14 - 5.80 10*6/mm3    Hemoglobin 13.9 13.0 - 17.7 g/dL    Hematocrit 41.6 37.5 - 51.0 %    MCV 88.9 79.0 - 97.0 fL    MCH 29.7 26.6 - 33.0 pg    MCHC 33.4 31.5 - 35.7 g/dL    RDW 14.6 12.3 - 15.4 %    RDW-SD 46.8 37.0 - 54.0 fl    MPV 8.6 6.0 - 12.0 fL    Platelets 216 140 - 450 10*3/mm3    Neutrophil % 53.4 42.7 - 76.0 %    Lymphocyte % 26.1 19.6 - 45.3 %    Monocyte % 7.6 5.0 - 12.0 %    Eosinophil % 10.3 (H) 0.3 - 6.2 %    Basophil % 1.6 (H) 0.0 - 1.5 %    Immature Grans % 1.0 (H) 0.0 - 0.5 %    Neutrophils, Absolute 6.19 1.70 - 7.00 10*3/mm3    Lymphocytes,  Absolute 3.03 0.70 - 3.10 10*3/mm3    Monocytes, Absolute 0.88 0.10 - 0.90 10*3/mm3    Eosinophils, Absolute 1.20 (H) 0.00 - 0.40 10*3/mm3    Basophils, Absolute 0.18 0.00 - 0.20 10*3/mm3    Immature Grans, Absolute 0.12 (H) 0.00 - 0.05 10*3/mm3    nRBC 0.0 0.0 - 0.2 /100 WBC   Results for orders placed or performed in visit on 05/07/21   JAK2 [id number] Qual w/Reflex to JAK2 Exon12    Specimen: Blood   Result Value Ref Range    Reference Lab Report     MPL Mutation Analysis    Specimen: Blood   Result Value Ref Range    Reference Lab Report     Flow Cytometry, Blood    Specimen: Blood   Result Value Ref Range    Reference Lab Report     Calreticulin (CALR) Mutation Analysis    Specimen: Blood   Result Value Ref Range    CALR Mutation Detection Result scanned results    CBC Auto Differential    Specimen: Blood   Result Value Ref Range    WBC 11.39 (H) 3.40 - 10.80 10*3/mm3    RBC 4.60 4.14 - 5.80 10*6/mm3    Hemoglobin 13.8 13.0 - 17.7 g/dL    Hematocrit 41.2 37.5 - 51.0 %    MCV 89.6 79.0 - 97.0 fL    MCH 30.0 26.6 - 33.0 pg    MCHC 33.5 31.5 - 35.7 g/dL    RDW 14.6 12.3 - 15.4 %    RDW-SD 46.5 37.0 - 54.0 fl    MPV 8.7 6.0 - 12.0 fL    Platelets 604 (H) 140 - 450 10*3/mm3   Iron and TIBC    Specimen: Blood   Result Value Ref Range    Iron 117 59 - 158 mcg/dL    Iron Saturation 28 20 - 50 %    Transferrin 281 200 - 360 mg/dL    TIBC 419 298 - 536 mcg/dL   Manual Differential    Specimen: Blood   Result Value Ref Range    Neutrophil % 56.0 42.7 - 76.0 %    Lymphocyte % 25.0 19.6 - 45.3 %    Monocyte % 16.0 (H) 5.0 - 12.0 %    Eosinophil % 1.0 0.3 - 6.2 %    Basophil % 1.0 0.0 - 1.5 %    Bands %  1.0 0.0 - 5.0 %    Neutrophils Absolute 6.49 1.70 - 7.00 10*3/mm3    Lymphocytes Absolute 2.85 0.70 - 3.10 10*3/mm3    Monocytes Absolute 1.82 (H) 0.10 - 0.90 10*3/mm3    Eosinophils Absolute 0.11 0.00 - 0.40 10*3/mm3    Basophils Absolute 0.11 0.00 - 0.20 10*3/mm3    RBC Morphology Normal Normal    WBC Morphology Normal Normal     Platelet Estimate Increased Normal     *Note: Due to a large number of results and/or encounters for the requested time period, some results have not been displayed. A complete set of results can be found in Results Review.

## 2022-07-19 ENCOUNTER — PREP FOR SURGERY (OUTPATIENT)
Dept: OTHER | Facility: HOSPITAL | Age: 77
End: 2022-07-19

## 2022-07-19 DIAGNOSIS — N20.0 KIDNEY STONE: Primary | ICD-10-CM

## 2022-08-05 ENCOUNTER — PRE-ADMISSION TESTING (OUTPATIENT)
Dept: PREADMISSION TESTING | Facility: HOSPITAL | Age: 77
End: 2022-08-05

## 2022-08-05 VITALS
OXYGEN SATURATION: 93 % | SYSTOLIC BLOOD PRESSURE: 132 MMHG | WEIGHT: 215 LBS | HEIGHT: 65 IN | RESPIRATION RATE: 16 BRPM | DIASTOLIC BLOOD PRESSURE: 78 MMHG | HEART RATE: 60 BPM | BODY MASS INDEX: 35.82 KG/M2

## 2022-08-05 LAB
ANION GAP SERPL CALCULATED.3IONS-SCNC: 14 MMOL/L (ref 5–15)
BUN SERPL-MCNC: 19 MG/DL (ref 8–23)
BUN/CREAT SERPL: 12.5 (ref 7–25)
CALCIUM SPEC-SCNC: 8.9 MG/DL (ref 8.6–10.5)
CHLORIDE SERPL-SCNC: 99 MMOL/L (ref 98–107)
CO2 SERPL-SCNC: 29 MMOL/L (ref 22–29)
CREAT SERPL-MCNC: 1.52 MG/DL (ref 0.76–1.27)
EGFRCR SERPLBLD CKD-EPI 2021: 47.5 ML/MIN/1.73
GLUCOSE SERPL-MCNC: 118 MG/DL (ref 65–99)
POTASSIUM SERPL-SCNC: 3.9 MMOL/L (ref 3.5–5.2)
SODIUM SERPL-SCNC: 142 MMOL/L (ref 136–145)

## 2022-08-05 PROCEDURE — 93010 ELECTROCARDIOGRAM REPORT: CPT | Performed by: INTERNAL MEDICINE

## 2022-08-05 PROCEDURE — 93005 ELECTROCARDIOGRAM TRACING: CPT

## 2022-08-05 PROCEDURE — 36415 COLL VENOUS BLD VENIPUNCTURE: CPT

## 2022-08-05 PROCEDURE — 80048 BASIC METABOLIC PNL TOTAL CA: CPT

## 2022-08-05 RX ORDER — OXYCODONE AND ACETAMINOPHEN 7.5; 325 MG/1; MG/1
1 TABLET ORAL EVERY 6 HOURS PRN
COMMUNITY
End: 2022-08-30

## 2022-08-05 RX ORDER — SODIUM CHLORIDE 9 MG/ML
1000 INJECTION, SOLUTION INTRAVENOUS CONTINUOUS
Status: CANCELLED | OUTPATIENT
Start: 2022-08-10

## 2022-08-05 RX ORDER — ASPIRIN 81 MG/1
81 TABLET, CHEWABLE ORAL DAILY
COMMUNITY

## 2022-08-05 RX ORDER — OMEPRAZOLE 20 MG/1
20 CAPSULE, DELAYED RELEASE ORAL DAILY
COMMUNITY

## 2022-08-05 RX ORDER — ALBUTEROL SULFATE 90 UG/1
2 AEROSOL, METERED RESPIRATORY (INHALATION) EVERY 4 HOURS PRN
COMMUNITY

## 2022-08-06 LAB
QT INTERVAL: 404 MS
QTC INTERVAL: 474 MS

## 2022-08-09 NOTE — H&P
[facility] History and Physical  JAMIA WHITAKER  77-year-old; :1945;;male  [address];[place] [address]  Ins: 1) KORTNEY/[facility]  Employer: [employer];  MAE SALMON MD  [facility] [place]  Allergies  Toradol Severe  Current Medications  tamsulosin 0.4 mg oral capsule TAKE ONE CAPSULE BY MOUTH AT BEDTIME FOR 90 DAYS  lisinopril 40 mg tablet 1 oral  Prilosec 20 mg capsule,delayed release 1 oral  * Medications Reconciled  Problem List  Kidney stone 2022  Left flank pain 2022  Medical History  DIABETES UNSP  CALCULUS KIDNEY  HYPERTENSION  Heart Disease, Unspecified  HAS HAD COLONSCOPY IN LAST 9 YEARS  Surgical History  KIDNEY STONE SURGERY  Gallbladder removed  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 No depression, anxiety or thoughts of suicide.  Family History  HEART DISEASE  DIABETES UNSP  HYPERTENSION  Mother   Father   Brother Alive  Brother   Brother Alive  Sister   Sister   Social History  . Unemployed. Does not drink. Never smoker. LIves with his wife.  Imm/Inj/Office Meds History Comments  PT HAS HAD FLU AND PNEUMONIA VACCINES. COVID 2  Chief Complaint  left flank pain  History of Present Illness  JAMIA WHITAKER is a 77-year-old male here for follow up bilateral renal stones, flank pain left side, KUB on 22 showed bilateral renal stones: 3 large stones and 2 smaller ones LEFT kidney; right kidney has single 14 mm right stone. UA moderate blood and trace leukocytes.  Location: left kidney/flank  Severity: moderate  Onset / Duration: days  Timing: intermittent  Modifying factors: Percocet   Associated signs and symptoms: flank pain  Review of Systems  Eyes:Denies: blurry vision, pain in the eyes and double vision  ENMT:Reports: sinus problems ; Denies: ear pain and sore  throat  Cardiovascular:Denies: chest pain, varicose veins, angina and syncope  Respiratory:Reports: shortness of breath ; Denies: wheezing and frequent cough  Gastrointestinal:Denies: abdominal pain, nausea, vomiting, indigestion and heartburn  Genitourinary:Denies: other symptoms (see HPI)  Musculoskeletal:Denies: joint pain, neck pain and back pain  Integumentary:Reports: skin rash ; Denies: boils and persistent itch  Neurological:Denies: tremors, dizzy spells and numbness / tingling  Psychiatric:Denies: generally satisfied with life, depression, suicidal ideation and anxiety  Endocrine:Denies: Excessive thirst, cold intolerance, heat intolerance and  tired/sluggish  Hematologic/Lymphatic:Denies: swollen glands and blood clotting problem  Allergic/Immunologic:Denies: hayfever  Constitutional:Denies: fever, chills and weight loss  Vital Signs  VITAL SIGNS NOT TAKEN DUE TO COVID 19 RESTRICTIONS  Weight: 224 (lb) Resp Rate: 18 (/min)  Height: 65 (in) BMI: 37.28  Never smoker  Exam  General appearance: The patient appears well developed and well nourished, in no apparent acute distress.  Examination of pupils and irises: No redness or drainage noted.  Assessment of hearing: Responds to verbal command.  Examination of neck: Neck appears supple. No masses noted.  Assessment of respiratory effort: Respiratory effort appears normal. No apparent distress.  Examination of abdomen: mild LEFT CVA tenderness  Examination of gait and station: Normal gait and stature.  Head and neck (Assessment of range of motion): Adequate ROM noted in all extremities.  Head and neck (Assessment of muscle strength and tone): Muscle strength and tone normal for age.  Inspection of skin and subcutaneous tissue: Exam of the skin is within normal limits. The color and skin turgor are normal. No lesions, bruises, rashes, or jaundice.  Orientation to time, place and person: Oriented to person, place, and time.  Mood and affect: Normal mood and  affect.  Data Review  Medications and chart reviewed. History and physical form/ROS reviewed and no changes noted. Previous encounter reviewed. Last form done 07/13/2022.  Lab Results  07/18/2022  GLUCOSE NEGATIVE, BILIRUBIN NEGATIVE, KETONE NEGATIVE, SPECIFIC GRAVITY 1.015, BLOOD MODERATE, PH 5.5, PROTEIN NEGATIVE, Urobilinogen 0.1 E.U./dl- 1.0 E.U./dl, NITRITE NEGATIVE, LEUKOCYTES TRACE  Assessment - Kidney stone  DX:  Kidney stone  ICD-10: N20.0  Left flank pain  ICD-10: R10.9  Assessment Notes  several left renal stones causing left flank pain  Plan  Plan Notes:  Cystoscopy left retrograde ureteroscopy laser lithotripsy stent placement.  Education:  Kidney Stones  Discussion:  Discussed my findings and plan of action and reasoning behind decision making. All questions were answered. FINDINGS WERE DISCUSSED WITH PATIENT. RISKS AND BENEFITS EXPLAINED. PATIENT WISHES TO PROCEED.  Instructions:  Patient is instructed to call with any problems. Patient is instructed to call if the condition worsens. Patient is instructed to call with any changes in condition. Patient is instructed to call if he has any intractable pain, fever, chills, nausea, or vomiting.

## 2022-08-10 ENCOUNTER — APPOINTMENT (OUTPATIENT)
Dept: GENERAL RADIOLOGY | Facility: HOSPITAL | Age: 77
End: 2022-08-10

## 2022-08-10 ENCOUNTER — ANESTHESIA (OUTPATIENT)
Dept: PERIOP | Facility: HOSPITAL | Age: 77
End: 2022-08-10

## 2022-08-10 ENCOUNTER — HOSPITAL ENCOUNTER (OUTPATIENT)
Facility: HOSPITAL | Age: 77
Setting detail: HOSPITAL OUTPATIENT SURGERY
Discharge: HOME | End: 2022-08-10
Attending: UROLOGY | Admitting: UROLOGY

## 2022-08-10 ENCOUNTER — ANESTHESIA EVENT (OUTPATIENT)
Dept: PERIOP | Facility: HOSPITAL | Age: 77
End: 2022-08-10

## 2022-08-10 VITALS
HEART RATE: 88 BPM | TEMPERATURE: 96.1 F | BODY MASS INDEX: 35.26 KG/M2 | OXYGEN SATURATION: 93 % | RESPIRATION RATE: 20 BRPM | HEIGHT: 65 IN | WEIGHT: 211.64 LBS | DIASTOLIC BLOOD PRESSURE: 75 MMHG | SYSTOLIC BLOOD PRESSURE: 143 MMHG

## 2022-08-10 DIAGNOSIS — N20.0 KIDNEY STONES: Primary | ICD-10-CM

## 2022-08-10 DIAGNOSIS — N20.0 KIDNEY STONE: ICD-10-CM

## 2022-08-10 LAB
BILIRUB UR QL STRIP: NEGATIVE
CLARITY UR: ABNORMAL
COLOR UR: YELLOW
GLUCOSE BLDC GLUCOMTR-MCNC: 107 MG/DL (ref 70–130)
GLUCOSE BLDC GLUCOMTR-MCNC: 97 MG/DL (ref 70–130)
GLUCOSE UR STRIP-MCNC: NEGATIVE MG/DL
HGB UR QL STRIP.AUTO: ABNORMAL
KETONES UR QL STRIP: ABNORMAL
LEUKOCYTE ESTERASE UR QL STRIP.AUTO: ABNORMAL
NITRITE UR QL STRIP: NEGATIVE
PH UR STRIP.AUTO: 5.5 [PH] (ref 5–9)
PROT UR QL STRIP: ABNORMAL
SP GR UR STRIP: 1.02 (ref 1–1.03)
UROBILINOGEN UR QL STRIP: ABNORMAL

## 2022-08-10 PROCEDURE — C2617 STENT, NON-COR, TEM W/O DEL: HCPCS | Performed by: UROLOGY

## 2022-08-10 PROCEDURE — 25010000002 HYDROMORPHONE 1 MG/ML SOLUTION

## 2022-08-10 PROCEDURE — 25010000002 FENTANYL CITRATE (PF) 50 MCG/ML SOLUTION

## 2022-08-10 PROCEDURE — 81003 URINALYSIS AUTO W/O SCOPE: CPT | Performed by: UROLOGY

## 2022-08-10 PROCEDURE — 82962 GLUCOSE BLOOD TEST: CPT

## 2022-08-10 PROCEDURE — C1758 CATHETER, URETERAL: HCPCS | Performed by: UROLOGY

## 2022-08-10 PROCEDURE — C1769 GUIDE WIRE: HCPCS | Performed by: UROLOGY

## 2022-08-10 PROCEDURE — 25010000002 MIDAZOLAM PER 1 MG

## 2022-08-10 PROCEDURE — 74420 UROGRAPHY RTRGR +-KUB: CPT

## 2022-08-10 PROCEDURE — 25010000002 PROPOFOL 10 MG/ML EMULSION

## 2022-08-10 PROCEDURE — 25010000002 CEFTRIAXONE PER 250 MG: Performed by: UROLOGY

## 2022-08-10 PROCEDURE — 25010000002 IOPAMIDOL 61 % SOLUTION: Performed by: UROLOGY

## 2022-08-10 PROCEDURE — 25010000002 ONDANSETRON PER 1 MG

## 2022-08-10 DEVICE — URETERAL STENT
Type: IMPLANTABLE DEVICE | Site: URETER | Status: FUNCTIONAL
Brand: POLARIS™ ULTRA

## 2022-08-10 RX ORDER — FLUMAZENIL 0.1 MG/ML
0.2 INJECTION INTRAVENOUS AS NEEDED
Status: DISCONTINUED | OUTPATIENT
Start: 2022-08-10 | End: 2022-08-10 | Stop reason: HOSPADM

## 2022-08-10 RX ORDER — ALBUTEROL SULFATE 2.5 MG/3ML
2.5 SOLUTION RESPIRATORY (INHALATION) ONCE
Status: COMPLETED | OUTPATIENT
Start: 2022-08-10 | End: 2022-08-10

## 2022-08-10 RX ORDER — ACETAMINOPHEN 325 MG/1
650 TABLET ORAL ONCE AS NEEDED
Status: DISCONTINUED | OUTPATIENT
Start: 2022-08-10 | End: 2022-08-10 | Stop reason: HOSPADM

## 2022-08-10 RX ORDER — OXYCODONE AND ACETAMINOPHEN 7.5; 325 MG/1; MG/1
1-2 TABLET ORAL EVERY 4 HOURS PRN
Qty: 10 TABLET | Refills: 0 | Status: SHIPPED | OUTPATIENT
Start: 2022-08-10 | End: 2022-08-30

## 2022-08-10 RX ORDER — MIDAZOLAM HYDROCHLORIDE 1 MG/ML
INJECTION INTRAMUSCULAR; INTRAVENOUS AS NEEDED
Status: DISCONTINUED | OUTPATIENT
Start: 2022-08-10 | End: 2022-08-10 | Stop reason: SURG

## 2022-08-10 RX ORDER — EPHEDRINE SULFATE 50 MG/ML
5 INJECTION, SOLUTION INTRAVENOUS ONCE AS NEEDED
Status: DISCONTINUED | OUTPATIENT
Start: 2022-08-10 | End: 2022-08-10 | Stop reason: HOSPADM

## 2022-08-10 RX ORDER — ONDANSETRON 2 MG/ML
INJECTION INTRAMUSCULAR; INTRAVENOUS AS NEEDED
Status: DISCONTINUED | OUTPATIENT
Start: 2022-08-10 | End: 2022-08-10 | Stop reason: SURG

## 2022-08-10 RX ORDER — SODIUM CHLORIDE 9 MG/ML
1000 INJECTION, SOLUTION INTRAVENOUS CONTINUOUS
Status: DISCONTINUED | OUTPATIENT
Start: 2022-08-10 | End: 2022-08-10 | Stop reason: HOSPADM

## 2022-08-10 RX ORDER — OXYCODONE AND ACETAMINOPHEN 7.5; 325 MG/1; MG/1
1 TABLET ORAL ONCE
Status: COMPLETED | OUTPATIENT
Start: 2022-08-10 | End: 2022-08-10

## 2022-08-10 RX ORDER — NALOXONE HCL 0.4 MG/ML
0.4 VIAL (ML) INJECTION AS NEEDED
Status: DISCONTINUED | OUTPATIENT
Start: 2022-08-10 | End: 2022-08-10 | Stop reason: HOSPADM

## 2022-08-10 RX ORDER — DIPHENHYDRAMINE HYDROCHLORIDE 50 MG/ML
12.5 INJECTION INTRAMUSCULAR; INTRAVENOUS
Status: DISCONTINUED | OUTPATIENT
Start: 2022-08-10 | End: 2022-08-10 | Stop reason: HOSPADM

## 2022-08-10 RX ORDER — ACETAMINOPHEN 650 MG/1
650 SUPPOSITORY RECTAL ONCE AS NEEDED
Status: DISCONTINUED | OUTPATIENT
Start: 2022-08-10 | End: 2022-08-10 | Stop reason: HOSPADM

## 2022-08-10 RX ORDER — LIDOCAINE HYDROCHLORIDE 20 MG/ML
INJECTION, SOLUTION INFILTRATION; PERINEURAL AS NEEDED
Status: DISCONTINUED | OUTPATIENT
Start: 2022-08-10 | End: 2022-08-10 | Stop reason: SURG

## 2022-08-10 RX ORDER — PROMETHAZINE HYDROCHLORIDE 25 MG/1
25 SUPPOSITORY RECTAL ONCE AS NEEDED
Status: DISCONTINUED | OUTPATIENT
Start: 2022-08-10 | End: 2022-08-10 | Stop reason: HOSPADM

## 2022-08-10 RX ORDER — FENTANYL CITRATE 50 UG/ML
INJECTION, SOLUTION INTRAMUSCULAR; INTRAVENOUS AS NEEDED
Status: DISCONTINUED | OUTPATIENT
Start: 2022-08-10 | End: 2022-08-10 | Stop reason: SURG

## 2022-08-10 RX ORDER — PROMETHAZINE HYDROCHLORIDE 25 MG/1
25 TABLET ORAL ONCE AS NEEDED
Status: DISCONTINUED | OUTPATIENT
Start: 2022-08-10 | End: 2022-08-10 | Stop reason: HOSPADM

## 2022-08-10 RX ORDER — PROPOFOL 10 MG/ML
VIAL (ML) INTRAVENOUS AS NEEDED
Status: DISCONTINUED | OUTPATIENT
Start: 2022-08-10 | End: 2022-08-10 | Stop reason: SURG

## 2022-08-10 RX ORDER — ONDANSETRON 2 MG/ML
4 INJECTION INTRAMUSCULAR; INTRAVENOUS ONCE AS NEEDED
Status: DISCONTINUED | OUTPATIENT
Start: 2022-08-10 | End: 2022-08-10 | Stop reason: HOSPADM

## 2022-08-10 RX ADMIN — HYDROMORPHONE HYDROCHLORIDE 0.5 MG: 1 INJECTION, SOLUTION INTRAMUSCULAR; INTRAVENOUS; SUBCUTANEOUS at 09:00

## 2022-08-10 RX ADMIN — FENTANYL CITRATE 25 MCG: 50 INJECTION INTRAMUSCULAR; INTRAVENOUS at 08:17

## 2022-08-10 RX ADMIN — OXYCODONE HYDROCHLORIDE AND ACETAMINOPHEN 1 TABLET: 7.5; 325 TABLET ORAL at 09:44

## 2022-08-10 RX ADMIN — PROPOFOL 30 MG: 10 INJECTION, EMULSION INTRAVENOUS at 08:04

## 2022-08-10 RX ADMIN — FENTANYL CITRATE 25 MCG: 50 INJECTION INTRAMUSCULAR; INTRAVENOUS at 08:20

## 2022-08-10 RX ADMIN — SODIUM CHLORIDE 1000 ML: 9 INJECTION, SOLUTION INTRAVENOUS at 07:07

## 2022-08-10 RX ADMIN — ALBUTEROL SULFATE 2.5 MG: 2.5 SOLUTION RESPIRATORY (INHALATION) at 07:07

## 2022-08-10 RX ADMIN — PROPOFOL 100 MG: 10 INJECTION, EMULSION INTRAVENOUS at 08:03

## 2022-08-10 RX ADMIN — FENTANYL CITRATE 50 MCG: 50 INJECTION INTRAMUSCULAR; INTRAVENOUS at 07:59

## 2022-08-10 RX ADMIN — ONDANSETRON 4 MG: 2 INJECTION INTRAMUSCULAR; INTRAVENOUS at 08:26

## 2022-08-10 RX ADMIN — MIDAZOLAM HYDROCHLORIDE 1 MG: 1 INJECTION, SOLUTION INTRAMUSCULAR; INTRAVENOUS at 07:57

## 2022-08-10 RX ADMIN — FENTANYL CITRATE 50 MCG: 50 INJECTION INTRAMUSCULAR; INTRAVENOUS at 08:30

## 2022-08-10 RX ADMIN — LIDOCAINE HYDROCHLORIDE 100 MG: 20 INJECTION, SOLUTION INFILTRATION; PERINEURAL at 08:03

## 2022-08-10 NOTE — OP NOTE
CYSTOSCOPY URETEROSCOPY RETROGRADE PYELOGRAM HOLMIUM LASER STENT INSERTION  Procedure Note    Rolando Novoa  8/10/2022    Pre-op Diagnosis:   Kidney stone [N20.0]    Post-op Diagnosis:     Post-Op Diagnosis Codes:     * Kidney stone [N20.0]    Procedure(s):  CYSTOSCOPY LEFT RETROGRADE URETEROSCOPY LASER LITHOTRIPSY STENT PLACMENT    Surgeon(s):  Prosper Ariza MD    Anesthesia: Choice    Staff:   Circulator: Nieves Rudd RN; Suad Estrada RN  Radiology Technologist: Dona Rondon  Scrub Person: Lauren Up          Estimated Blood Loss: minimal    Specimens:                None      Drains: * No LDAs found *    Findings: Upper pole stones left kidney small stones distal left ureter.    Complications: None    Indications: Same    Description of Procedure: Patient taken to surgery placed placed on the operating room table.  After general anesthetic and a sterile prep and drape the genitalia routine fashion.  I passed a 22 [place] cystoscope into the bladder.  The ureter was displaced laterally.  I was under reaching the rigid scope and subsequently a flexible cystoscope and placed today in the bladder identified the ureter and placed a 038 guidewire up the ureter.  I put a retrograde catheter over top of that shot 6 cc of contrast distal left ureteral stone flushed out.  I put the 038 guidewire back up the ureter on the left-hand side then over the top guidewire through-the-scope I placed a 6 x 28 double-J stent.  Stent string was shortened in the urethra in routine fashion and the patient was taken to recovery having tolerated procedure well.    Prosper Ariza MD     Date: 8/10/2022  Time: 08:37 CDT

## 2022-08-10 NOTE — ANESTHESIA PREPROCEDURE EVALUATION
Anesthesia Evaluation     Patient summary reviewed and Nursing notes reviewed   history of anesthetic complications: difficult airway  NPO Solid Status: > 8 hours  NPO Liquid Status: > 8 hours           Airway   Mallampati: III  TM distance: >3 FB  Neck ROM: full  Narrow palate, Large neck circumference and Difficult intubation highly probable  Comment: Patient location during procedure: ED     Consent for Airway (if performed for an anesthetic, see related documentation for consents)  Patient identity confirmed: verbally with patient, arm band and hospital-assigned identification number  Consent: No emergent situation. Verbal consent obtained.  Consent given by: patient and spouse        Indications and Patient Condition  Indications for airway management: airway protection, respiratory distress/failure and CNS depression    Preoxygenated: yes  MILS maintained throughout  Mask difficulty assessment: 3 - difficult mask (inadequate, unstable or two providers) +/- NMBA     Final Airway Details  Final airway type: endotracheal airway        Successful airway: ETT  Cuffed: yes   Successful intubation technique: video laryngoscopy  Facilitating devices/methods: intubating stylet  Endotracheal tube insertion site: oral  Blade: Wilcox  Blade size: #3  ETT size: 7.0 mm  Cormack-Lehane Classification: grade III - view of epiglottis only  Placement verified by: chest auscultation and capnometry   Inital cuff pressure (cm H2O): 5  Measured from: gums  ETT to gums (cm): 22  Number of attempts at approach: 3 or more     Additional Comments  Ctsp by er physician dr. Carmona; patient was in obvious respiratory distress secondary to worsening angioedema.  Patient was on face mask oxygen with declining saturation and mental status.  Initially failed attempts at DL necessitated the use of the wilcox to place a 7.0 ett with use of blue bougie.  Multiple attempts finally succeeded with etco2.  Patient remained HD thruout the  procedure.Airway not difficult     General Information and Staff    Patient location during procedure: OR     Indications and Patient Condition  Indications for airway management: airway protection    Preoxygenated: yes  Mask difficulty assessment: 2 - vent by mask + OA or adjuvant +/- NMBA     Final Airway Details  Final airway type: supraglottic airway        Successful airway: I-gel  Size 4    Cormack-Lehane Classification: grade IIa - partial view of glottis  Number of attempts at approach: 1  Dental    (+) poor dentition    Comment: Full set of capped teeth.    Pulmonary    (+) a smoker Former, COPD moderate, shortness of breath, sleep apnea on CPAP, decreased breath sounds,   (-) asthma, rhonchi, wheezes    ROS comment: Patient was a  for years and now has restrictive lung disease.  PE comment: Wheezing on exam to be treated with albuterol.  Cardiovascular   Exercise tolerance: poor (<4 METS)    NYHA Classification: III  ECG reviewed  Patient on routine beta blocker and Beta blocker given within 24 hours of surgery  Rhythm: regular  Rate: normal    (+) hypertension well controlled 2 medications or greater, dysrhythmias PAC, hyperlipidemia,   (-) valvular problems/murmurs, past MI, CAD, murmur, cardiac stents, DVT    ROS comment:   Normal sinus rhythm  Right bundle branch block  Abnormal ECG  When compared with ECG of 23-MAR-2020 10:13,  Minimal criteria for Inferior infarct are no longer Present     Referred By:            Confirmed By: EMMA GIBSON MD    Neuro/Psych  (+) weakness, psychiatric history Anxiety,    (-) seizures, TIA, CVA  GI/Hepatic/Renal/Endo    (+) obesity,  GERD well controlled,  renal disease (Creatinine 1.52) stones, diabetes mellitus type 2 poorly controlled,   (-) morbid obesity    ROS Comment: Recurrent Kidney stones noted.    Musculoskeletal     (+) arthralgias, back pain, chronic pain,   Abdominal     Abdomen: soft.   Substance History      OB/GYN          Other    arthritis,      ROS/Med Hx Other: Hx of COPD- On 2LNC at night when sleeping. Uses albuterol inhalers as needed usually once a week.     GERD controlled on prilosec    Left arm amputation- [place]                        Anesthesia Plan    ASA 3     MAC and general   total IV anesthesia  (If were an intubating case go straight to [facility])  intravenous induction     Anesthetic plan, risks, benefits, and alternatives have been provided, discussed and informed consent has been obtained with: patient.    Plan discussed with CRNA.

## 2022-08-10 NOTE — ANESTHESIA POSTPROCEDURE EVALUATION
Patient: Rolando Novoa    Procedure Summary     Date: 08/10/22 Room / Location: [facility] OR 02 / [facility] OR    Anesthesia Start: 0758 Anesthesia Stop: 0846    Procedure: CYSTOSCOPY LEFT RETROGRADE URETEROSCOPY LASER LITHOTRIPSY STENT PLACMENT (Left ) Diagnosis:       Kidney stone      (Kidney stone [N20.0])    Surgeons: Annita, Prosper WALKER MD Provider: Uday Gr MD    Anesthesia Type: MAC, general ASA Status: 3          Anesthesia Type: MAC, general    Vitals  No vitals data found for the desired time range.          Post Anesthesia Care and Evaluation    Patient location during evaluation: PACU  Patient participation: waiting for patient participation  Level of consciousness: sleepy but conscious  Pain score: 0  Pain management: adequate    Airway patency: patent  Anesthetic complications: No anesthetic complications  PONV Status: none  Cardiovascular status: acceptable  Respiratory status: acceptable and face mask (lma)  Hydration status: acceptable    Comments: ---------------------------               08/10/22                      0700       0847  ---------------------------   BP:          141/79      156/76   Pulse:         70        96   Resp:          18        16   Temp:   97.1 °F (36.2 °C)97.1   SpO2:          93%       91  ---------------------------

## 2022-08-10 NOTE — ANESTHESIA PROCEDURE NOTES
Airway  Urgency: elective    Date/Time: 8/10/2022 8:06 AM  Airway not difficult    General Information and Staff    Patient location during procedure: OR  CRNA/CAA: Crystal Francois CRNA    Indications and Patient Condition  Indications for airway management: airway protection    Preoxygenated: yes  Mask difficulty assessment: 0 - not attempted    Final Airway Details  Final airway type: supraglottic airway      Successful airway: I-gel  Size 4    Number of attempts at approach: 1  Assessment: lips, teeth, and gum same as pre-op

## 2022-08-30 ENCOUNTER — HOSPITAL ENCOUNTER (OUTPATIENT)
Facility: HOSPITAL | Age: 77
Setting detail: OBSERVATION
Discharge: HOME | End: 2022-09-01
Attending: EMERGENCY MEDICINE | Admitting: INTERNAL MEDICINE

## 2022-08-30 ENCOUNTER — APPOINTMENT (OUTPATIENT)
Dept: GENERAL RADIOLOGY | Facility: HOSPITAL | Age: 77
End: 2022-08-30

## 2022-08-30 DIAGNOSIS — R07.9 CHEST PAIN, UNSPECIFIED TYPE: Primary | ICD-10-CM

## 2022-08-30 DIAGNOSIS — I11.9 BENIGN HYPERTENSIVE HEART DISEASE WITHOUT HEART FAILURE: ICD-10-CM

## 2022-08-30 DIAGNOSIS — I10 BENIGN HYPERTENSION: ICD-10-CM

## 2022-08-30 LAB
ALBUMIN SERPL-MCNC: 4.5 G/DL (ref 3.5–5.2)
ALBUMIN/GLOB SERPL: 1.3 G/DL
ALP SERPL-CCNC: 74 U/L (ref 39–117)
ALT SERPL W P-5'-P-CCNC: 15 U/L (ref 1–41)
ANION GAP SERPL CALCULATED.3IONS-SCNC: 9 MMOL/L (ref 5–15)
APTT PPP: 32.2 SECONDS (ref 20–40.3)
AST SERPL-CCNC: 16 U/L (ref 1–40)
BASOPHILS # BLD AUTO: 0.1 10*3/MM3 (ref 0–0.2)
BASOPHILS NFR BLD AUTO: 1.2 % (ref 0–1.5)
BILIRUB SERPL-MCNC: 0.5 MG/DL (ref 0–1.2)
BUN SERPL-MCNC: 20 MG/DL (ref 8–23)
BUN/CREAT SERPL: 11.9 (ref 7–25)
CALCIUM SPEC-SCNC: 9.3 MG/DL (ref 8.6–10.5)
CHLORIDE SERPL-SCNC: 103 MMOL/L (ref 98–107)
CO2 SERPL-SCNC: 31 MMOL/L (ref 22–29)
CREAT SERPL-MCNC: 1.68 MG/DL (ref 0.76–1.27)
D-DIMER, QUANTITATIVE (MAD,POW, STR): <270 NG/ML (FEU) (ref 0–470)
DEPRECATED RDW RBC AUTO: 43.8 FL (ref 37–54)
EGFRCR SERPLBLD CKD-EPI 2021: 42.1 ML/MIN/1.73
EOSINOPHIL # BLD AUTO: 0.45 10*3/MM3 (ref 0–0.4)
EOSINOPHIL NFR BLD AUTO: 5.3 % (ref 0.3–6.2)
ERYTHROCYTE [DISTWIDTH] IN BLOOD BY AUTOMATED COUNT: 13.5 % (ref 12.3–15.4)
FLUAV RNA RESP QL NAA+PROBE: NOT DETECTED
FLUBV RNA RESP QL NAA+PROBE: NOT DETECTED
GLOBULIN UR ELPH-MCNC: 3.4 GM/DL
GLUCOSE BLDC GLUCOMTR-MCNC: 142 MG/DL (ref 70–130)
GLUCOSE BLDC GLUCOMTR-MCNC: 158 MG/DL (ref 70–130)
GLUCOSE SERPL-MCNC: 200 MG/DL (ref 65–99)
HBA1C MFR BLD: 6.9 % (ref 4.8–5.6)
HCT VFR BLD AUTO: 42.6 % (ref 37.5–51)
HGB BLD-MCNC: 14.2 G/DL (ref 13–17.7)
HOLD SPECIMEN: NORMAL
IMM GRANULOCYTES # BLD AUTO: 0.05 10*3/MM3 (ref 0–0.05)
IMM GRANULOCYTES NFR BLD AUTO: 0.6 % (ref 0–0.5)
INR PPP: 1.34 (ref 0.8–1.2)
LYMPHOCYTES # BLD AUTO: 2.56 10*3/MM3 (ref 0.7–3.1)
LYMPHOCYTES NFR BLD AUTO: 30.2 % (ref 19.6–45.3)
MAGNESIUM SERPL-MCNC: 1.1 MG/DL (ref 1.6–2.4)
MCH RBC QN AUTO: 29.6 PG (ref 26.6–33)
MCHC RBC AUTO-ENTMCNC: 33.3 G/DL (ref 31.5–35.7)
MCV RBC AUTO: 88.9 FL (ref 79–97)
MONOCYTES # BLD AUTO: 0.44 10*3/MM3 (ref 0.1–0.9)
MONOCYTES NFR BLD AUTO: 5.2 % (ref 5–12)
NEUTROPHILS NFR BLD AUTO: 4.87 10*3/MM3 (ref 1.7–7)
NEUTROPHILS NFR BLD AUTO: 57.5 % (ref 42.7–76)
NRBC BLD AUTO-RTO: 0 /100 WBC (ref 0–0.2)
NT-PROBNP SERPL-MCNC: 154.9 PG/ML (ref 0–1800)
PLATELET # BLD AUTO: 300 10*3/MM3 (ref 140–450)
PMV BLD AUTO: 8.3 FL (ref 6–12)
POTASSIUM SERPL-SCNC: 3.9 MMOL/L (ref 3.5–5.2)
PROT SERPL-MCNC: 7.9 G/DL (ref 6–8.5)
PROTHROMBIN TIME: 16.5 SECONDS (ref 11.1–15.3)
QT INTERVAL: 402 MS
QTC INTERVAL: 454 MS
RBC # BLD AUTO: 4.79 10*6/MM3 (ref 4.14–5.8)
SARS-COV-2 RNA RESP QL NAA+PROBE: NOT DETECTED
SODIUM SERPL-SCNC: 143 MMOL/L (ref 136–145)
TROPONIN T SERPL-MCNC: <0.01 NG/ML (ref 0–0.03)
TROPONIN T SERPL-MCNC: <0.01 NG/ML (ref 0–0.03)
WBC NRBC COR # BLD: 8.47 10*3/MM3 (ref 3.4–10.8)
WHOLE BLOOD HOLD SPECIMEN: NORMAL

## 2022-08-30 PROCEDURE — 36415 COLL VENOUS BLD VENIPUNCTURE: CPT | Performed by: EMERGENCY MEDICINE

## 2022-08-30 PROCEDURE — 63710000001 INSULIN REGULAR HUMAN PER 5 UNITS: Performed by: EMERGENCY MEDICINE

## 2022-08-30 PROCEDURE — 83880 ASSAY OF NATRIURETIC PEPTIDE: CPT

## 2022-08-30 PROCEDURE — 93005 ELECTROCARDIOGRAM TRACING: CPT | Performed by: EMERGENCY MEDICINE

## 2022-08-30 PROCEDURE — 96361 HYDRATE IV INFUSION ADD-ON: CPT

## 2022-08-30 PROCEDURE — 85025 COMPLETE CBC W/AUTO DIFF WBC: CPT

## 2022-08-30 PROCEDURE — 82962 GLUCOSE BLOOD TEST: CPT

## 2022-08-30 PROCEDURE — 87636 SARSCOV2 & INF A&B AMP PRB: CPT | Performed by: EMERGENCY MEDICINE

## 2022-08-30 PROCEDURE — 25010000002 ENOXAPARIN PER 10 MG: Performed by: INTERNAL MEDICINE

## 2022-08-30 PROCEDURE — 96372 THER/PROPH/DIAG INJ SC/IM: CPT

## 2022-08-30 PROCEDURE — 94640 AIRWAY INHALATION TREATMENT: CPT

## 2022-08-30 PROCEDURE — 84484 ASSAY OF TROPONIN QUANT: CPT | Performed by: EMERGENCY MEDICINE

## 2022-08-30 PROCEDURE — C9803 HOPD COVID-19 SPEC COLLECT: HCPCS

## 2022-08-30 PROCEDURE — 80053 COMPREHEN METABOLIC PANEL: CPT

## 2022-08-30 PROCEDURE — 93010 ELECTROCARDIOGRAM REPORT: CPT | Performed by: INTERNAL MEDICINE

## 2022-08-30 PROCEDURE — 99285 EMERGENCY DEPT VISIT HI MDM: CPT

## 2022-08-30 PROCEDURE — 83036 HEMOGLOBIN GLYCOSYLATED A1C: CPT | Performed by: INTERNAL MEDICINE

## 2022-08-30 PROCEDURE — 84484 ASSAY OF TROPONIN QUANT: CPT

## 2022-08-30 PROCEDURE — 83735 ASSAY OF MAGNESIUM: CPT | Performed by: INTERNAL MEDICINE

## 2022-08-30 PROCEDURE — 93005 ELECTROCARDIOGRAM TRACING: CPT

## 2022-08-30 PROCEDURE — 85379 FIBRIN DEGRADATION QUANT: CPT | Performed by: EMERGENCY MEDICINE

## 2022-08-30 PROCEDURE — G0378 HOSPITAL OBSERVATION PER HR: HCPCS

## 2022-08-30 PROCEDURE — 85610 PROTHROMBIN TIME: CPT | Performed by: EMERGENCY MEDICINE

## 2022-08-30 PROCEDURE — 84484 ASSAY OF TROPONIN QUANT: CPT | Performed by: INTERNAL MEDICINE

## 2022-08-30 PROCEDURE — 25010000002 ENOXAPARIN PER 10 MG: Performed by: EMERGENCY MEDICINE

## 2022-08-30 PROCEDURE — 93005 ELECTROCARDIOGRAM TRACING: CPT | Performed by: INTERNAL MEDICINE

## 2022-08-30 PROCEDURE — 85730 THROMBOPLASTIN TIME PARTIAL: CPT | Performed by: EMERGENCY MEDICINE

## 2022-08-30 PROCEDURE — 71046 X-RAY EXAM CHEST 2 VIEWS: CPT

## 2022-08-30 RX ORDER — INSULIN ASPART 100 [IU]/ML
0-9 INJECTION, SOLUTION INTRAVENOUS; SUBCUTANEOUS
Status: DISCONTINUED | OUTPATIENT
Start: 2022-08-30 | End: 2022-09-01 | Stop reason: HOSPADM

## 2022-08-30 RX ORDER — IPRATROPIUM BROMIDE AND ALBUTEROL SULFATE 2.5; .5 MG/3ML; MG/3ML
3 SOLUTION RESPIRATORY (INHALATION)
Status: DISCONTINUED | OUTPATIENT
Start: 2022-08-30 | End: 2022-09-01 | Stop reason: HOSPADM

## 2022-08-30 RX ORDER — ASPIRIN 81 MG/1
81 TABLET, CHEWABLE ORAL DAILY
Status: DISCONTINUED | OUTPATIENT
Start: 2022-08-31 | End: 2022-09-01 | Stop reason: HOSPADM

## 2022-08-30 RX ORDER — FUROSEMIDE 20 MG/1
20 TABLET ORAL DAILY
Status: DISCONTINUED | OUTPATIENT
Start: 2022-08-31 | End: 2022-09-01 | Stop reason: HOSPADM

## 2022-08-30 RX ORDER — ENOXAPARIN SODIUM 100 MG/ML
40 INJECTION SUBCUTANEOUS NIGHTLY
Status: DISCONTINUED | OUTPATIENT
Start: 2022-08-30 | End: 2022-09-01 | Stop reason: HOSPADM

## 2022-08-30 RX ORDER — TAMSULOSIN HYDROCHLORIDE 0.4 MG/1
0.4 CAPSULE ORAL NIGHTLY
Status: DISCONTINUED | OUTPATIENT
Start: 2022-08-30 | End: 2022-09-01 | Stop reason: HOSPADM

## 2022-08-30 RX ORDER — ENOXAPARIN SODIUM 100 MG/ML
1 INJECTION SUBCUTANEOUS ONCE
Status: COMPLETED | OUTPATIENT
Start: 2022-08-30 | End: 2022-08-30

## 2022-08-30 RX ORDER — ONDANSETRON 2 MG/ML
4 INJECTION INTRAMUSCULAR; INTRAVENOUS EVERY 6 HOURS PRN
Status: DISCONTINUED | OUTPATIENT
Start: 2022-08-30 | End: 2022-09-01 | Stop reason: HOSPADM

## 2022-08-30 RX ORDER — MAGNESIUM SULFATE HEPTAHYDRATE 40 MG/ML
2 INJECTION, SOLUTION INTRAVENOUS AS NEEDED
Status: DISCONTINUED | OUTPATIENT
Start: 2022-08-30 | End: 2022-09-01 | Stop reason: HOSPADM

## 2022-08-30 RX ORDER — SODIUM CHLORIDE 9 MG/ML
100 INJECTION, SOLUTION INTRAVENOUS CONTINUOUS
Status: DISCONTINUED | OUTPATIENT
Start: 2022-08-30 | End: 2022-09-01 | Stop reason: HOSPADM

## 2022-08-30 RX ORDER — NALOXONE HCL 0.4 MG/ML
0.4 VIAL (ML) INJECTION
Status: DISCONTINUED | OUTPATIENT
Start: 2022-08-30 | End: 2022-09-01 | Stop reason: HOSPADM

## 2022-08-30 RX ORDER — ALBUTEROL SULFATE 2.5 MG/3ML
2.5 SOLUTION RESPIRATORY (INHALATION) EVERY 4 HOURS PRN
Status: DISCONTINUED | OUTPATIENT
Start: 2022-08-30 | End: 2022-09-01 | Stop reason: HOSPADM

## 2022-08-30 RX ORDER — ACETAMINOPHEN 325 MG/1
650 TABLET ORAL EVERY 6 HOURS PRN
Status: DISCONTINUED | OUTPATIENT
Start: 2022-08-30 | End: 2022-09-01 | Stop reason: HOSPADM

## 2022-08-30 RX ORDER — PANTOPRAZOLE SODIUM 40 MG/1
40 TABLET, DELAYED RELEASE ORAL EVERY MORNING
Status: DISCONTINUED | OUTPATIENT
Start: 2022-08-31 | End: 2022-09-01 | Stop reason: HOSPADM

## 2022-08-30 RX ORDER — PANTOPRAZOLE SODIUM 40 MG/1
40 TABLET, DELAYED RELEASE ORAL EVERY MORNING
Status: DISCONTINUED | OUTPATIENT
Start: 2022-08-31 | End: 2022-08-30

## 2022-08-30 RX ORDER — NEBIVOLOL 5 MG/1
10 TABLET ORAL DAILY
Status: DISCONTINUED | OUTPATIENT
Start: 2022-08-31 | End: 2022-09-01 | Stop reason: HOSPADM

## 2022-08-30 RX ORDER — NICOTINE POLACRILEX 4 MG
15 LOZENGE BUCCAL
Status: DISCONTINUED | OUTPATIENT
Start: 2022-08-30 | End: 2022-09-01 | Stop reason: HOSPADM

## 2022-08-30 RX ORDER — DEXTROSE MONOHYDRATE 25 G/50ML
25 INJECTION, SOLUTION INTRAVENOUS
Status: DISCONTINUED | OUTPATIENT
Start: 2022-08-30 | End: 2022-09-01 | Stop reason: HOSPADM

## 2022-08-30 RX ORDER — MAGNESIUM SULFATE HEPTAHYDRATE 40 MG/ML
4 INJECTION, SOLUTION INTRAVENOUS AS NEEDED
Status: DISCONTINUED | OUTPATIENT
Start: 2022-08-30 | End: 2022-09-01 | Stop reason: HOSPADM

## 2022-08-30 RX ORDER — ATORVASTATIN CALCIUM 20 MG/1
20 TABLET, FILM COATED ORAL NIGHTLY
Status: DISCONTINUED | OUTPATIENT
Start: 2022-08-30 | End: 2022-09-01 | Stop reason: HOSPADM

## 2022-08-30 RX ORDER — SODIUM CHLORIDE 0.9 % (FLUSH) 0.9 %
10 SYRINGE (ML) INJECTION AS NEEDED
Status: DISCONTINUED | OUTPATIENT
Start: 2022-08-30 | End: 2022-09-01 | Stop reason: HOSPADM

## 2022-08-30 RX ORDER — MORPHINE SULFATE 2 MG/ML
1 INJECTION, SOLUTION INTRAMUSCULAR; INTRAVENOUS EVERY 4 HOURS PRN
Status: DISCONTINUED | OUTPATIENT
Start: 2022-08-30 | End: 2022-09-01 | Stop reason: HOSPADM

## 2022-08-30 RX ORDER — NITROGLYCERIN 0.4 MG/1
0.4 TABLET SUBLINGUAL
Status: DISCONTINUED | OUTPATIENT
Start: 2022-08-30 | End: 2022-09-01 | Stop reason: HOSPADM

## 2022-08-30 RX ORDER — SODIUM CHLORIDE 0.9 % (FLUSH) 0.9 %
10 SYRINGE (ML) INJECTION EVERY 12 HOURS SCHEDULED
Status: DISCONTINUED | OUTPATIENT
Start: 2022-08-30 | End: 2022-09-01 | Stop reason: HOSPADM

## 2022-08-30 RX ORDER — ASPIRIN 81 MG/1
324 TABLET, CHEWABLE ORAL ONCE
Status: COMPLETED | OUTPATIENT
Start: 2022-08-30 | End: 2022-08-30

## 2022-08-30 RX ADMIN — ENOXAPARIN SODIUM 40 MG: 40 INJECTION SUBCUTANEOUS at 22:25

## 2022-08-30 RX ADMIN — HUMAN INSULIN 2 UNITS: 100 INJECTION, SOLUTION SUBCUTANEOUS at 16:56

## 2022-08-30 RX ADMIN — TAMSULOSIN HYDROCHLORIDE 0.4 MG: 0.4 CAPSULE ORAL at 22:25

## 2022-08-30 RX ADMIN — ATORVASTATIN CALCIUM 20 MG: 20 TABLET, FILM COATED ORAL at 22:25

## 2022-08-30 RX ADMIN — ENOXAPARIN SODIUM 100 MG: 100 INJECTION SUBCUTANEOUS at 17:12

## 2022-08-30 RX ADMIN — ASPIRIN 324 MG: 81 TABLET, CHEWABLE ORAL at 16:54

## 2022-08-30 RX ADMIN — IPRATROPIUM BROMIDE AND ALBUTEROL SULFATE 3 ML: 2.5; .5 SOLUTION RESPIRATORY (INHALATION) at 23:14

## 2022-08-30 RX ADMIN — NITROGLYCERIN 0.4 MG: 0.4 TABLET, ORALLY DISINTEGRATING SUBLINGUAL at 16:58

## 2022-08-30 RX ADMIN — SODIUM CHLORIDE 100 ML/HR: 9 INJECTION, SOLUTION INTRAVENOUS at 18:10

## 2022-08-30 RX ADMIN — SODIUM CHLORIDE 500 ML: 9 INJECTION, SOLUTION INTRAVENOUS at 16:52

## 2022-08-31 ENCOUNTER — APPOINTMENT (OUTPATIENT)
Dept: CARDIOLOGY | Facility: HOSPITAL | Age: 77
End: 2022-08-31

## 2022-08-31 LAB
ANION GAP SERPL CALCULATED.3IONS-SCNC: 10 MMOL/L (ref 5–15)
BASOPHILS # BLD AUTO: 0.07 10*3/MM3 (ref 0–0.2)
BASOPHILS NFR BLD AUTO: 1 % (ref 0–1.5)
BUN SERPL-MCNC: 17 MG/DL (ref 8–23)
BUN/CREAT SERPL: 12.2 (ref 7–25)
CALCIUM SPEC-SCNC: 8.2 MG/DL (ref 8.6–10.5)
CHLORIDE SERPL-SCNC: 105 MMOL/L (ref 98–107)
CHOLEST SERPL-MCNC: 105 MG/DL (ref 0–200)
CO2 SERPL-SCNC: 28 MMOL/L (ref 22–29)
CREAT SERPL-MCNC: 1.39 MG/DL (ref 0.76–1.27)
DEPRECATED RDW RBC AUTO: 44 FL (ref 37–54)
EGFRCR SERPLBLD CKD-EPI 2021: 52.9 ML/MIN/1.73
EOSINOPHIL # BLD AUTO: 0.39 10*3/MM3 (ref 0–0.4)
EOSINOPHIL NFR BLD AUTO: 5.3 % (ref 0.3–6.2)
ERYTHROCYTE [DISTWIDTH] IN BLOOD BY AUTOMATED COUNT: 13.5 % (ref 12.3–15.4)
GLUCOSE BLDC GLUCOMTR-MCNC: 109 MG/DL (ref 70–130)
GLUCOSE SERPL-MCNC: 106 MG/DL (ref 65–99)
HCT VFR BLD AUTO: 38.1 % (ref 37.5–51)
HDLC SERPL-MCNC: 36 MG/DL (ref 40–60)
HGB BLD-MCNC: 12.8 G/DL (ref 13–17.7)
IMM GRANULOCYTES # BLD AUTO: 0.05 10*3/MM3 (ref 0–0.05)
IMM GRANULOCYTES NFR BLD AUTO: 0.7 % (ref 0–0.5)
LDLC SERPL CALC-MCNC: 41 MG/DL (ref 0–100)
LDLC/HDLC SERPL: 0.97 {RATIO}
LYMPHOCYTES # BLD AUTO: 2.38 10*3/MM3 (ref 0.7–3.1)
LYMPHOCYTES NFR BLD AUTO: 32.4 % (ref 19.6–45.3)
MAGNESIUM SERPL-MCNC: 3.5 MG/DL (ref 1.6–2.4)
MAXIMAL PREDICTED HEART RATE: 145 BPM
MCH RBC QN AUTO: 30 PG (ref 26.6–33)
MCHC RBC AUTO-ENTMCNC: 33.6 G/DL (ref 31.5–35.7)
MCV RBC AUTO: 89.4 FL (ref 79–97)
MONOCYTES # BLD AUTO: 0.53 10*3/MM3 (ref 0.1–0.9)
MONOCYTES NFR BLD AUTO: 7.2 % (ref 5–12)
NEUTROPHILS NFR BLD AUTO: 3.93 10*3/MM3 (ref 1.7–7)
NEUTROPHILS NFR BLD AUTO: 53.4 % (ref 42.7–76)
NRBC BLD AUTO-RTO: 0 /100 WBC (ref 0–0.2)
PLATELET # BLD AUTO: 253 10*3/MM3 (ref 140–450)
PMV BLD AUTO: 8.5 FL (ref 6–12)
POTASSIUM SERPL-SCNC: 3.4 MMOL/L (ref 3.5–5.2)
QT INTERVAL: 406 MS
QTC INTERVAL: 441 MS
RBC # BLD AUTO: 4.26 10*6/MM3 (ref 4.14–5.8)
SODIUM SERPL-SCNC: 143 MMOL/L (ref 136–145)
STRESS TARGET HR: 123 BPM
TRIGL SERPL-MCNC: 170 MG/DL (ref 0–150)
TROPONIN T SERPL-MCNC: <0.01 NG/ML (ref 0–0.03)
TSH SERPL DL<=0.05 MIU/L-ACNC: 2.29 UIU/ML (ref 0.27–4.2)
VLDLC SERPL-MCNC: 28 MG/DL (ref 5–40)
WBC NRBC COR # BLD: 7.35 10*3/MM3 (ref 3.4–10.8)

## 2022-08-31 PROCEDURE — 25010000002 MAGNESIUM SULFATE 2 GM/50ML SOLUTION

## 2022-08-31 PROCEDURE — 80061 LIPID PANEL: CPT | Performed by: INTERNAL MEDICINE

## 2022-08-31 PROCEDURE — 99214 OFFICE O/P EST MOD 30 MIN: CPT | Performed by: NURSE PRACTITIONER

## 2022-08-31 PROCEDURE — 93306 TTE W/DOPPLER COMPLETE: CPT

## 2022-08-31 PROCEDURE — 25010000002 ENOXAPARIN PER 10 MG: Performed by: INTERNAL MEDICINE

## 2022-08-31 PROCEDURE — G0378 HOSPITAL OBSERVATION PER HR: HCPCS

## 2022-08-31 PROCEDURE — 82962 GLUCOSE BLOOD TEST: CPT

## 2022-08-31 PROCEDURE — 80048 BASIC METABOLIC PNL TOTAL CA: CPT | Performed by: INTERNAL MEDICINE

## 2022-08-31 PROCEDURE — 85025 COMPLETE CBC W/AUTO DIFF WBC: CPT | Performed by: INTERNAL MEDICINE

## 2022-08-31 PROCEDURE — 94799 UNLISTED PULMONARY SVC/PX: CPT

## 2022-08-31 PROCEDURE — 84484 ASSAY OF TROPONIN QUANT: CPT | Performed by: INTERNAL MEDICINE

## 2022-08-31 PROCEDURE — 83735 ASSAY OF MAGNESIUM: CPT | Performed by: INTERNAL MEDICINE

## 2022-08-31 PROCEDURE — 84443 ASSAY THYROID STIM HORMONE: CPT | Performed by: INTERNAL MEDICINE

## 2022-08-31 PROCEDURE — 25010000002 PERFLUTREN (DEFINITY) 8.476 MG IN SODIUM CHLORIDE (PF) 0.9 % 10 ML INJECTION: Performed by: INTERNAL MEDICINE

## 2022-08-31 PROCEDURE — 96366 THER/PROPH/DIAG IV INF ADDON: CPT

## 2022-08-31 PROCEDURE — 94664 DEMO&/EVAL PT USE INHALER: CPT

## 2022-08-31 PROCEDURE — 94760 N-INVAS EAR/PLS OXIMETRY 1: CPT

## 2022-08-31 PROCEDURE — 96361 HYDRATE IV INFUSION ADD-ON: CPT

## 2022-08-31 PROCEDURE — 96365 THER/PROPH/DIAG IV INF INIT: CPT

## 2022-08-31 RX ORDER — POTASSIUM CHLORIDE 1.5 G/1.77G
40 POWDER, FOR SOLUTION ORAL ONCE
Status: COMPLETED | OUTPATIENT
Start: 2022-08-31 | End: 2022-08-31

## 2022-08-31 RX ADMIN — TAMSULOSIN HYDROCHLORIDE 0.4 MG: 0.4 CAPSULE ORAL at 21:25

## 2022-08-31 RX ADMIN — ATORVASTATIN CALCIUM 20 MG: 20 TABLET, FILM COATED ORAL at 21:25

## 2022-08-31 RX ADMIN — MAGNESIUM SULFATE HEPTAHYDRATE 2 G: 40 INJECTION, SOLUTION INTRAVENOUS at 00:11

## 2022-08-31 RX ADMIN — MAGNESIUM SULFATE HEPTAHYDRATE 2 G: 40 INJECTION, SOLUTION INTRAVENOUS at 04:39

## 2022-08-31 RX ADMIN — MAGNESIUM SULFATE HEPTAHYDRATE 2 G: 40 INJECTION, SOLUTION INTRAVENOUS at 02:23

## 2022-08-31 RX ADMIN — POTASSIUM CHLORIDE 40 MEQ: 1.5 POWDER, FOR SOLUTION ORAL at 10:14

## 2022-08-31 RX ADMIN — FUROSEMIDE 20 MG: 20 TABLET ORAL at 10:03

## 2022-08-31 RX ADMIN — PANTOPRAZOLE SODIUM 40 MG: 40 TABLET, DELAYED RELEASE ORAL at 06:19

## 2022-08-31 RX ADMIN — ENOXAPARIN SODIUM 40 MG: 40 INJECTION SUBCUTANEOUS at 21:25

## 2022-08-31 RX ADMIN — NEBIVOLOL HYDROCHLORIDE 10 MG: 5 TABLET ORAL at 10:05

## 2022-08-31 RX ADMIN — ASPIRIN 81 MG: 81 TABLET, CHEWABLE ORAL at 10:03

## 2022-08-31 RX ADMIN — PERFLUTREN 4 ML: 6.52 INJECTION, SUSPENSION INTRAVENOUS at 09:52

## 2022-08-31 RX ADMIN — IPRATROPIUM BROMIDE AND ALBUTEROL SULFATE 3 ML: 2.5; .5 SOLUTION RESPIRATORY (INHALATION) at 06:54

## 2022-09-01 ENCOUNTER — READMISSION MANAGEMENT (OUTPATIENT)
Dept: CALL CENTER | Facility: HOSPITAL | Age: 77
End: 2022-09-01

## 2022-09-01 ENCOUNTER — APPOINTMENT (OUTPATIENT)
Dept: NUCLEAR MEDICINE | Facility: HOSPITAL | Age: 77
End: 2022-09-01

## 2022-09-01 ENCOUNTER — APPOINTMENT (OUTPATIENT)
Dept: CARDIOLOGY | Facility: HOSPITAL | Age: 77
End: 2022-09-01

## 2022-09-01 VITALS
DIASTOLIC BLOOD PRESSURE: 63 MMHG | HEART RATE: 61 BPM | OXYGEN SATURATION: 92 % | SYSTOLIC BLOOD PRESSURE: 131 MMHG | TEMPERATURE: 97.2 F | WEIGHT: 207.8 LBS | HEIGHT: 65 IN | RESPIRATION RATE: 18 BRPM | BODY MASS INDEX: 34.62 KG/M2

## 2022-09-01 PROBLEM — R07.9 CHEST PAIN, UNSPECIFIED TYPE: Status: RESOLVED | Noted: 2022-08-30 | Resolved: 2022-09-01

## 2022-09-01 LAB
ANION GAP SERPL CALCULATED.3IONS-SCNC: 8 MMOL/L (ref 5–15)
BASOPHILS # BLD AUTO: 0.07 10*3/MM3 (ref 0–0.2)
BASOPHILS NFR BLD AUTO: 1.2 % (ref 0–1.5)
BH CV REST NUCLEAR ISOTOPE DOSE: 10.7 MCI
BH CV STRESS COMMENTS STAGE 1: NORMAL
BH CV STRESS DOSE REGADENOSON STAGE 1: 0.4
BH CV STRESS DURATION MIN STAGE 1: 0
BH CV STRESS DURATION SEC STAGE 1: 10
BH CV STRESS HR STAGE 1: 65
BH CV STRESS NUCLEAR ISOTOPE DOSE: 37.9 MCI
BH CV STRESS PROTOCOL 1: NORMAL
BH CV STRESS RECOVERY BP: NORMAL MMHG
BH CV STRESS RECOVERY HR: 96 BPM
BH CV STRESS STAGE 1: 1
BUN SERPL-MCNC: 12 MG/DL (ref 8–23)
BUN/CREAT SERPL: 7.9 (ref 7–25)
CALCIUM SPEC-SCNC: 7.7 MG/DL (ref 8.6–10.5)
CHLORIDE SERPL-SCNC: 107 MMOL/L (ref 98–107)
CO2 SERPL-SCNC: 27 MMOL/L (ref 22–29)
CREAT SERPL-MCNC: 1.51 MG/DL (ref 0.76–1.27)
DEPRECATED RDW RBC AUTO: 44.6 FL (ref 37–54)
EGFRCR SERPLBLD CKD-EPI 2021: 47.9 ML/MIN/1.73
EOSINOPHIL # BLD AUTO: 0.41 10*3/MM3 (ref 0–0.4)
EOSINOPHIL NFR BLD AUTO: 7 % (ref 0.3–6.2)
ERYTHROCYTE [DISTWIDTH] IN BLOOD BY AUTOMATED COUNT: 13.7 % (ref 12.3–15.4)
GLUCOSE BLDC GLUCOMTR-MCNC: 192 MG/DL (ref 70–130)
GLUCOSE BLDC GLUCOMTR-MCNC: 89 MG/DL (ref 70–130)
GLUCOSE SERPL-MCNC: 111 MG/DL (ref 65–99)
HCT VFR BLD AUTO: 35.7 % (ref 37.5–51)
HGB BLD-MCNC: 11.8 G/DL (ref 13–17.7)
IMM GRANULOCYTES # BLD AUTO: 0.06 10*3/MM3 (ref 0–0.05)
IMM GRANULOCYTES NFR BLD AUTO: 1 % (ref 0–0.5)
LV EF NUC BP: 59 %
LYMPHOCYTES # BLD AUTO: 1.82 10*3/MM3 (ref 0.7–3.1)
LYMPHOCYTES NFR BLD AUTO: 31.3 % (ref 19.6–45.3)
MAGNESIUM SERPL-MCNC: 2.3 MG/DL (ref 1.6–2.4)
MAXIMAL PREDICTED HEART RATE: 145 BPM
MCH RBC QN AUTO: 29.6 PG (ref 26.6–33)
MCHC RBC AUTO-ENTMCNC: 33.1 G/DL (ref 31.5–35.7)
MCV RBC AUTO: 89.7 FL (ref 79–97)
MONOCYTES # BLD AUTO: 0.43 10*3/MM3 (ref 0.1–0.9)
MONOCYTES NFR BLD AUTO: 7.4 % (ref 5–12)
NEUTROPHILS NFR BLD AUTO: 3.03 10*3/MM3 (ref 1.7–7)
NEUTROPHILS NFR BLD AUTO: 52.1 % (ref 42.7–76)
NRBC BLD AUTO-RTO: 0 /100 WBC (ref 0–0.2)
PERCENT MAX PREDICTED HR: 72.41 %
PLATELET # BLD AUTO: 264 10*3/MM3 (ref 140–450)
PMV BLD AUTO: 8.6 FL (ref 6–12)
POTASSIUM SERPL-SCNC: 3.6 MMOL/L (ref 3.5–5.2)
RBC # BLD AUTO: 3.98 10*6/MM3 (ref 4.14–5.8)
SODIUM SERPL-SCNC: 142 MMOL/L (ref 136–145)
STRESS BASELINE BP: NORMAL MMHG
STRESS BASELINE HR: 62 BPM
STRESS PERCENT HR: 85 %
STRESS POST EXERCISE DUR SEC: 36 SEC
STRESS POST PEAK BP: NORMAL MMHG
STRESS POST PEAK HR: 105 BPM
STRESS TARGET HR: 123 BPM
WBC NRBC COR # BLD: 5.82 10*3/MM3 (ref 3.4–10.8)

## 2022-09-01 PROCEDURE — G0378 HOSPITAL OBSERVATION PER HR: HCPCS

## 2022-09-01 PROCEDURE — 85025 COMPLETE CBC W/AUTO DIFF WBC: CPT | Performed by: INTERNAL MEDICINE

## 2022-09-01 PROCEDURE — 93018 CV STRESS TEST I&R ONLY: CPT | Performed by: INTERNAL MEDICINE

## 2022-09-01 PROCEDURE — 80048 BASIC METABOLIC PNL TOTAL CA: CPT | Performed by: INTERNAL MEDICINE

## 2022-09-01 PROCEDURE — 82962 GLUCOSE BLOOD TEST: CPT

## 2022-09-01 PROCEDURE — 36415 COLL VENOUS BLD VENIPUNCTURE: CPT | Performed by: INTERNAL MEDICINE

## 2022-09-01 PROCEDURE — 93017 CV STRESS TEST TRACING ONLY: CPT

## 2022-09-01 PROCEDURE — 78452 HT MUSCLE IMAGE SPECT MULT: CPT

## 2022-09-01 PROCEDURE — 25010000002 REGADENOSON 0.4 MG/5ML SOLUTION: Performed by: NURSE PRACTITIONER

## 2022-09-01 PROCEDURE — 99214 OFFICE O/P EST MOD 30 MIN: CPT | Performed by: NURSE PRACTITIONER

## 2022-09-01 PROCEDURE — 0 TECHNETIUM SESTAMIBI: Performed by: INTERNAL MEDICINE

## 2022-09-01 PROCEDURE — 93016 CV STRESS TEST SUPVJ ONLY: CPT | Performed by: INTERNAL MEDICINE

## 2022-09-01 PROCEDURE — A9500 TC99M SESTAMIBI: HCPCS | Performed by: INTERNAL MEDICINE

## 2022-09-01 PROCEDURE — 83735 ASSAY OF MAGNESIUM: CPT | Performed by: INTERNAL MEDICINE

## 2022-09-01 PROCEDURE — 78452 HT MUSCLE IMAGE SPECT MULT: CPT | Performed by: INTERNAL MEDICINE

## 2022-09-01 RX ORDER — ISOSORBIDE MONONITRATE 30 MG/1
30 TABLET, EXTENDED RELEASE ORAL
Qty: 30 TABLET | Refills: 0 | Status: SHIPPED | OUTPATIENT
Start: 2022-09-01 | End: 2022-10-01

## 2022-09-01 RX ORDER — SODIUM CHLORIDE 0.9 % (FLUSH) 0.9 %
10 SYRINGE (ML) INJECTION ONCE
Status: COMPLETED | OUTPATIENT
Start: 2022-09-01 | End: 2022-09-01

## 2022-09-01 RX ORDER — ISOSORBIDE MONONITRATE 30 MG/1
30 TABLET, EXTENDED RELEASE ORAL
Status: DISCONTINUED | OUTPATIENT
Start: 2022-09-01 | End: 2022-09-01 | Stop reason: HOSPADM

## 2022-09-01 RX ADMIN — REGADENOSON 0.4 MG: 0.08 INJECTION, SOLUTION INTRAVENOUS at 09:14

## 2022-09-01 RX ADMIN — TECHNETIUM TC 99M SESTAMIBI 1 DOSE: 1 INJECTION INTRAVENOUS at 08:10

## 2022-09-01 RX ADMIN — SODIUM CHLORIDE 100 ML/HR: 9 INJECTION, SOLUTION INTRAVENOUS at 00:17

## 2022-09-01 RX ADMIN — ISOSORBIDE MONONITRATE 30 MG: 30 TABLET, EXTENDED RELEASE ORAL at 16:43

## 2022-09-01 RX ADMIN — Medication 10 ML: at 09:14

## 2022-09-01 RX ADMIN — Medication 10 ML: at 00:18

## 2022-09-01 RX ADMIN — FUROSEMIDE 20 MG: 20 TABLET ORAL at 12:43

## 2022-09-01 RX ADMIN — ASPIRIN 81 MG: 81 TABLET, CHEWABLE ORAL at 12:43

## 2022-09-01 RX ADMIN — TECHNETIUM TC 99M SESTAMIBI 1 DOSE: 1 INJECTION INTRAVENOUS at 09:15

## 2022-09-01 RX ADMIN — NEBIVOLOL HYDROCHLORIDE 10 MG: 5 TABLET ORAL at 12:43

## 2022-09-01 RX ADMIN — PANTOPRAZOLE SODIUM 40 MG: 40 TABLET, DELAYED RELEASE ORAL at 06:14

## 2022-09-01 RX ADMIN — Medication 10 ML: at 12:43

## 2022-09-01 NOTE — PROGRESS NOTES
"[facility] [place] [facility] Progress Note   LOS: 0 days   Patient Care Team:  Jaxson Zepeda MD as PCP - General  Noé Coleman MD as Consulting Physician (Hematology and Oncology)    Chief Complaint:    Chief Complaint   Patient presents with   • Chest Pain        Subjective     Interval History:   Patient Denies: no complaints today  Patient Complaints: no complaints today  History taken from: patient chart     No acute events overnight. No further episodes of chest pain. Denies any complaints. VSS. Nuclear stress test- low risk study.     Objective     Vital Sign Min/Max for last 24 hours  Temp  Min: 97 °F (36.1 °C)  Max: 98.6 °F (37 °C)   BP  Min: 120/58  Max: 151/89   Pulse  Min: 61  Max: 75   Resp  Min: 16  Max: 20   SpO2  Min: 94 %  Max: 99 %   Flow (L/min)  Min: 2  Max: 2   Weight  Min: 93.8 kg (206 lb 12.8 oz)  Max: 94.3 kg (207 lb 12.8 oz)     Flowsheet Rows    Flowsheet Row First Filed Value   Admission Height 165.1 cm (65\") Documented at 08/30/2022 1029   Admission Weight 95.3 kg (210 lb) Documented at 08/30/2022 1029            08/30/22  2032 08/31/22  1627 09/01/22  0607   Weight: 95 kg (209 lb 7 oz) 93.8 kg (206 lb 12.8 oz) 94.3 kg (207 lb 12.8 oz)       Physical Exam:  Physical Exam  Vitals and nursing note reviewed.   Constitutional:       General: He is not in acute distress.     Appearance: He is obese. He is not ill-appearing, toxic-appearing or diaphoretic.   HENT:      Head: Normocephalic.      Right Ear: External ear normal.      Left Ear: External ear normal.   Eyes:      General: Lids are normal.      Pupils: Pupils are equal, round, and reactive to light.   Neck:      Thyroid: No thyromegaly.      Vascular: No carotid bruit or JVD.      Trachea: No tracheal deviation.   Cardiovascular:      Rate and Rhythm: Normal rate and regular rhythm.      Heart sounds: Normal heart sounds. No murmur heard.    No friction rub. No gallop.   Pulmonary:      Effort: Pulmonary effort is normal. No respiratory " distress.      Breath sounds: Normal breath sounds. No stridor. No wheezing or rales.   Chest:      Chest wall: No tenderness.   Abdominal:      General: Bowel sounds are normal. There is no distension.      Palpations: Abdomen is soft.      Tenderness: There is no abdominal tenderness.   Skin:     General: Skin is warm and dry.      Findings: No erythema or rash.   Neurological:      Mental Status: He is alert and oriented to person, place, and time.      Cranial Nerves: No cranial nerve deficit.   Psychiatric:         Behavior: Behavior normal.         Thought Content: Thought content normal.         Judgment: Judgment normal.          Results Review:     Results from last 7 days   Lab Units 09/01/22  0632 08/31/22  0627 08/30/22  1551   SODIUM mmol/L 142 143 143   POTASSIUM mmol/L 3.6 3.4* 3.9   CHLORIDE mmol/L 107 105 103   CO2 mmol/L 27.0 28.0 31.0*   BUN mg/dL 12 17 20   CREATININE mg/dL 1.51* 1.39* 1.68*   CALCIUM mg/dL 7.7* 8.2* 9.3   BILIRUBIN mg/dL  --   --  0.5   ALK PHOS U/L  --   --  74   ALT (SGPT) U/L  --   --  15   AST (SGOT) U/L  --   --  16   GLUCOSE mg/dL 111* 106* 200*       Estimated Creatinine Clearance: 44.6 mL/min (A) (by C-G formula based on SCr of 1.51 mg/dL (H)).    Results from last 7 days   Lab Units 09/01/22  0632 08/31/22  0627 08/30/22  1837   MAGNESIUM mg/dL 2.3 3.5* 1.1*             Results from last 7 days   Lab Units 09/01/22  0632 08/31/22  0627 08/30/22  1551   WBC 10*3/mm3 5.82 7.35 8.47   HEMOGLOBIN g/dL 11.8* 12.8* 14.2   PLATELETS 10*3/mm3 264 253 300       Results from last 7 days   Lab Units 08/30/22  1837   INR  1.34*     Lab Results   Component Value Date    PROBNP 154.9 08/30/2022       I/O last 3 completed shifts:  In: 1145.4 [I.V.:1145.4]  Out: -     Cardiographics:  ECG/EMG Results (last 24 hours)     Procedure Component Value Units Date/Time    SCANNED - TELEMETRY   [[id number]] Resulted: 08/30/22     Updated: 08/31/22 1417    SCANNED EKG [[id number]] Resulted:  08/30/22     Updated: 08/31/22 2100    ECG 12 Lead [[id number]] Collected: 08/30/22 2246     Updated: 08/31/22 2131     QT Interval 406 ms      QTC Interval 441 ms     Narrative:      Test Reason : chest pain  Blood Pressure :   */*   mmHG  Vent. Rate :  71 BPM     Atrial Rate :  71 BPM     P-R Int : 162 ms          QRS Dur : 124 ms      QT Int : 406 ms       P-R-T Axes :  70 106  15 degrees     QTc Int : 441 ms    Normal sinus rhythm  Right bundle branch block  Abnormal ECG  When compared with ECG of 30-AUG-2022 10:30,  T wave inversion now evident in Anterior leads    Referred By:            Confirmed By: STEPHEN GONZALES MD    Adult Transthoracic Echo Complete W/ Cont if Necessary Per Protocol [[id number]] Resulted: 08/31/22 2139     Updated: 08/31/22 2149     Target HR (85%) 123 bpm      Max. Pred. HR (100%) 145 bpm     Narrative:      · Left ventricular wall thickness is consistent with borderline concentric   hypertrophy.  · Left ventricular ejection fraction appears to be 56 - 60%.  · Left ventricular diastolic function was normal.       SCANNED - TELEMETRY   [[id number]] Resulted: 08/30/22     Updated: 08/31/22 2250        Results for orders placed during the hospital encounter of 08/30/22    Adult Transthoracic Echo Complete W/ Cont if Necessary Per Protocol    Interpretation Summary  · Left ventricular wall thickness is consistent with borderline concentric hypertrophy.  · Left ventricular ejection fraction appears to be 56 - 60%.  · Left ventricular diastolic function was normal.      Imaging Results (Most Recent)     Procedure Component Value Units Date/Time    XR Chest 2 View [[id number]] Collected: 08/30/22 1038     Updated: 08/30/22 1054    Narrative:      Chest x-ray PA and lateral       CLINICAL INDICATION: Shortness of breath. Chest pain    COMPARISON: Chest May 10, 2018    FINDINGS: Cardiac silhouette is normal in size. Pulmonary  vascularity is unremarkable.     No focal infiltrate or consolidation.   No pleural effusion.  No  pneumothorax.  Minimal discoid fibrotic changes left lung base.        Impression:      No evidence of active disease.       Electronically signed by:  Ben Natarajan MD  8/30/2022 10:52 AM CDT  Workstation: [id number]          XR Chest 2 View    Result Date: 8/30/2022  No evidence of active disease.  Electronically signed by:  Ben Natarajan MD  8/30/2022 10:52 AM CDT Workstation: [id number]      Medication Review:     Current Facility-Administered Medications:   •  acetaminophen (TYLENOL) tablet 650 mg, 650 mg, Oral, Q6H PRN, Virginia Franklin MD  •  albuterol (PROVENTIL) nebulizer solution 0.083% 2.5 mg/3mL, 2.5 mg, Nebulization, Q4H PRN, Virginia Franklin MD  •  aspirin chewable tablet 81 mg, 81 mg, Oral, Daily, Virginia Franklin MD, 81 mg at 09/01/22 1243  •  atorvastatin (LIPITOR) tablet 20 mg, 20 mg, Oral, Nightly, Virginia Franklin MD, 20 mg at 08/31/22 2125  •  dextrose (D50W) (25 g/50 mL) IV injection 25 g, 25 g, Intravenous, Q15 Min PRN, Virginia Franklin MD  •  dextrose (GLUTOSE) oral gel 15 g, 15 g, Oral, Q15 Min PRN, Virginia Franklin MD  •  Enoxaparin Sodium (LOVENOX) syringe 40 mg, 40 mg, Subcutaneous, Nightly, Virginia Franklin MD, 40 mg at 08/31/22 2125  •  furosemide (LASIX) tablet 20 mg, 20 mg, Oral, Daily, Virginia Franklin MD, 20 mg at 09/01/22 1243  •  glucagon (human recombinant) (GLUCAGEN DIAGNOSTIC) injection 1 mg, 1 mg, Intramuscular, Q15 Min PRN, Virginia Franklin MD  •  Insulin Aspart (novoLOG) injection 0-9 Units, 0-9 Units, Subcutaneous, TID AC, Virginia Franklin MD  •  ipratropium-albuterol (DUO-NEB) nebulizer solution 3 mL, 3 mL, Nebulization, Q8H - RT, Virginia Franklin MD, 3 mL at 08/31/22 0654  •  Magnesium Sulfate 2 gram Bolus, followed by 8 gram infusion (total Mg dose 10 grams)- Mg less than or equal to 1mg/dL, 2 g, Intravenous, PRN **OR** Magnesium Sulfate 2 gram / 50mL Infusion (GIVE X 3 BAGS TO EQUAL 6GM TOTAL DOSE) - Mg 1.1 - 1.5 mg/dl, 2 g, Intravenous, PRN, Stopped  at 08/31/22 0640 **OR** Magnesium Sulfate 4 gram infusion- Mg 1.6-1.9 mg/dL, 4 g, Intravenous, PRN, Devon Oneill MD  •  morphine injection 1 mg, 1 mg, Intravenous, Q4H PRN **AND** naloxone (NARCAN) injection 0.4 mg, 0.4 mg, Intravenous, Q5 Min PRN, Virginia Franklin MD  •  nebivolol (BYSTOLIC) tablet 10 mg, 10 mg, Oral, Daily, Virginia Franklin MD, 10 mg at 09/01/22 1243  •  nitroglycerin (NITROSTAT) SL tablet 0.4 mg, 0.4 mg, Sublingual, Q5 Min PRN, Virginia Franklin MD, 0.4 mg at 08/30/22 1658  •  O2 (OXYGEN), 2 L/min, Inhalation, Nightly, Virginia Franklin MD, 2 L/min at 09/01/22 0017  •  ondansetron (ZOFRAN) injection 4 mg, 4 mg, Intravenous, Q6H PRN, Virginia Franklin MD  •  pantoprazole (PROTONIX) EC tablet 40 mg, 40 mg, Oral, QAM, Virginia Franklin MD, 40 mg at 09/01/22 0614  •  sodium chloride 0.9 % flush 10 mL, 10 mL, Intravenous, PRN, Virginia Franklin MD  •  [COMPLETED] Insert peripheral IV, , , Once **AND** sodium chloride 0.9 % flush 10 mL, 10 mL, Intravenous, PRN, Virginia Franklin MD  •  sodium chloride 0.9 % flush 10 mL, 10 mL, Intravenous, Q12H, Virginia Franklin MD, 10 mL at 09/01/22 1243  •  sodium chloride 0.9 % flush 10 mL, 10 mL, Intravenous, PRN, Virginia Franklin MD  •  sodium chloride 0.9 % infusion, 100 mL/hr, Intravenous, Continuous, Virginia Franklin MD, Last Rate: 100 mL/hr at 09/01/22 0017, 100 mL/hr at 09/01/22 0017  •  tamsulosin (FLOMAX) 24 hr capsule 0.4 mg, 0.4 mg, Oral, Nightly, Virginia Franklin MD, 0.4 mg at 08/31/22 2120    Assessment & Plan       Chest pain, unspecified type    #1. Atypical chest pain: Cardiac enzymes negative. EKG negative for acute findings. ACS has been ruled out.  Patient does have cardiac risk factors.  Previous nuclear stress test in 2019, low risk.  Given elevated creatinine and atypical presenting symptoms nuclear stress test was recommended showing the following:    · No ECG evidence of myocardial ischemia on lexiscan stress.  · Left ventricular ejection  fraction is normal. (Calculated EF = 59%). Normal LV cavity size. Normal LV wall motion noted.  · Myocardial perfusion imaging indicates a small-sized, mildly severe area of ischemia located in the basal inferior, basal inferolateral and basal anterolateral wall.  · Impressions are consistent with a low risk study    TTE showed normal LV systolic function with no hemodynamically significant valvular abnormalities.    -Continue aspirin, atorvastatin, Bystolic, Lasix  -Continue telemetry monitoring  -Start Imdur to antianginal medical therapy. Patient may be discharge with medical management and follow up with cardiology in 1 month.      #2. HTN: chronic, controlled with Bystolic 10mg     #3. HLD: chronic, controlled with Atorvastatin 20mg      #4.  Hypokalemia: replacement per primary.      #5. Kidney stones: recent procedure with Dr. Ariza for kidney stones with cystoscopy with lithotripsy and stent placement.  Patient reports having appointment with him today, stent.  Spoke with Dr. Ariza who will consult during admission.  Appreciate his assistance.    Plan for disposition: d/c home per primary. F/u cardiology 1 month.             This document has been electronically signed by RIVKA Figueroa on September 1, 2022 14:11 CDT     Electronically signed by RIVKA Figueroa, 09/01/22, 2:11 PM CDT.    I personally saw and examined Rolando Novoa after the APRN.  I personally performed a history and physical examination of the patient.  I personally reviewed independent findings and plan of care.  I discussed management with the APRN.  I agree with the APRN's documentation.    The patient underwent nuclear stress testing today which suggested low risk findings.  TTE showed normal LVEF with no regional wall motion abnormalities with no hemodynamically significant valvular abnormalities of visualized valves.  He has been chest pain free since yesterday.   At present, we will plan to optimize medical  therapy for CAD risk factors and chest pain given low risk findings on stress testing and presence of CKD. In this direction, he was started on Imdur therapy in addition to statin, aspirin and bystolic therapy.  Further evaluation for ischemic heart disease should be considered if he has recurrence of symptoms on this therapy.  Recommend outpatient follow up with cardiology in one month past hospital discharge.      Electronically signed by Moses Soto MD, 09/01/22, 7:31 PM CDT.

## 2022-09-01 NOTE — DISCHARGE SUMMARY
[facility] Medicine Services  DISCHARGE SUMMARY       Date of Admission: 8/30/2022  Date of Discharge:  9/1/2022  Primary Care Physician: Jaxson Zepeda MD    Presenting Problem/History of Present Illness:  Chest pain, unspecified type [R07.9]       Final Discharge Diagnoses:  Active Hospital Problems    Diagnosis     Chest pain, unspecified type        Consults:   Consults       Date and Time Order Name Status Description    8/31/2022  2:54 PM Inpatient Urology Consult      8/30/2022  5:14 PM Inpatient Cardiology Consult Completed             Procedures Performed:                 Pertinent Test Results:   Lab Results (most recent)       Procedure Component Value Units Date/Time    Magnesium [[id number]]  (Normal) Collected: 09/01/22 0632    Specimen: Blood Updated: 09/01/22 0713     Magnesium 2.3 mg/dL     Basic Metabolic Panel [[id number]]  (Abnormal) Collected: 09/01/22 0632    Specimen: Blood Updated: 09/01/22 0712     Glucose 111 mg/dL      BUN 12 mg/dL      Creatinine 1.51 mg/dL      Sodium 142 mmol/L      Potassium 3.6 mmol/L      Chloride 107 mmol/L      CO2 27.0 mmol/L      Calcium 7.7 mg/dL      BUN/Creatinine Ratio 7.9     Anion Gap 8.0 mmol/L      eGFR 47.9 mL/min/1.73      Comment: National Kidney Foundation and American Society of Nephrology (ASN) Task Force recommended calculation based on the Chronic Kidney Disease Epidemiology Collaboration (CKD-EPI) equation refit without adjustment for race.       Narrative:      GFR Normal >60  Chronic Kidney Disease <60  Kidney Failure <15      CBC & Differential [[id number]]  (Abnormal) Collected: 09/01/22 0632    Specimen: Blood Updated: 09/01/22 0701    Narrative:      The following orders were created for panel order CBC & Differential.  Procedure                               Abnormality         Status                     ---------                               -----------         ------                      CBC Auto Differential[[id number]]        Abnormal            Final result                 Please view results for these tests on the individual orders.    CBC Auto Differential [[id number]]  (Abnormal) Collected: 09/01/22 0632    Specimen: Blood Updated: 09/01/22 0701     WBC 5.82 10*3/mm3      RBC 3.98 10*6/mm3      Hemoglobin 11.8 g/dL      Hematocrit 35.7 %      MCV 89.7 fL      MCH 29.6 pg      MCHC 33.1 g/dL      RDW 13.7 %      RDW-SD 44.6 fl      MPV 8.6 fL      Platelets 264 10*3/mm3      Neutrophil % 52.1 %      Lymphocyte % 31.3 %      Monocyte % 7.4 %      Eosinophil % 7.0 %      Basophil % 1.2 %      Immature Grans % 1.0 %      Neutrophils, Absolute 3.03 10*3/mm3      Lymphocytes, Absolute 1.82 10*3/mm3      Monocytes, Absolute 0.43 10*3/mm3      Eosinophils, Absolute 0.41 10*3/mm3      Basophils, Absolute 0.07 10*3/mm3      Immature Grans, Absolute 0.06 10*3/mm3      nRBC 0.0 /100 WBC     POC Glucose Once [[id number]]  (Normal) Collected: 09/01/22 0609    Specimen: Blood Updated: 09/01/22 0655     Glucose 89 mg/dL      Comment: RN NotifiedOperator: [id number] [place] [employer] ID: [id number]       POC Glucose Once [[id number]]  (Abnormal) Collected: 08/31/22 1924    Specimen: Blood Updated: 09/01/22 0654     Glucose 192 mg/dL      Comment: RN NotifiedOperator: [id number] [place] [employer] ID: [id number]       Troponin [[id number]]  (Normal) Collected: 08/31/22 1153    Specimen: Blood Updated: 08/31/22 1256     Troponin T <0.010 ng/mL     Narrative:      Troponin T Reference Range:  <= 0.03 ng/mL-   Negative for AMI  >0.03 ng/mL-     Abnormal for myocardial necrosis.  Clinicians would have to utilize clinical acumen, EKG, Troponin and serial changes to determine if it is an Acute Myocardial Infarction or myocardial injury due to an underlying chronic condition.       Results may be falsely decreased if patient taking Biotin.      Troponin [[id number]]  (Normal) Collected: 08/31/22 0627    Specimen:  Blood Updated: 08/31/22 0709     Troponin T <0.010 ng/mL     Narrative:      Troponin T Reference Range:  <= 0.03 ng/mL-   Negative for AMI  >0.03 ng/mL-     Abnormal for myocardial necrosis.  Clinicians would have to utilize clinical acumen, EKG, Troponin and serial changes to determine if it is an Acute Myocardial Infarction or myocardial injury due to an underlying chronic condition.       Results may be falsely decreased if patient taking Biotin.      TSH [[id number]]  (Normal) Collected: 08/31/22 0627    Specimen: Blood Updated: 08/31/22 0709     TSH 2.290 uIU/mL     Magnesium [[id number]]  (Abnormal) Collected: 08/31/22 0627    Specimen: Blood Updated: 08/31/22 0705     Magnesium 3.5 mg/dL     Basic Metabolic Panel [[id number]]  (Abnormal) Collected: 08/31/22 0627    Specimen: Blood Updated: 08/31/22 0705     Glucose 106 mg/dL      BUN 17 mg/dL      Creatinine 1.39 mg/dL      Sodium 143 mmol/L      Potassium 3.4 mmol/L      Chloride 105 mmol/L      CO2 28.0 mmol/L      Calcium 8.2 mg/dL      BUN/Creatinine Ratio 12.2     Anion Gap 10.0 mmol/L      eGFR 52.9 mL/min/1.73      Comment: National Kidney Foundation and American Society of Nephrology (ASN) Task Force recommended calculation based on the Chronic Kidney Disease Epidemiology Collaboration (CKD-EPI) equation refit without adjustment for race.       Narrative:      GFR Normal >60  Chronic Kidney Disease <60  Kidney Failure <15      Lipid Panel [[id number]]  (Abnormal) Collected: 08/31/22 0627    Specimen: Blood Updated: 08/31/22 0705     Total Cholesterol 105 mg/dL      Triglycerides 170 mg/dL      HDL Cholesterol 36 mg/dL      LDL Cholesterol  41 mg/dL      VLDL Cholesterol 28 mg/dL      LDL/HDL Ratio 0.97    Narrative:      Cholesterol Reference Ranges  (U.S. Department of Health and Human Services ATP III Classifications)    Desirable          <200 mg/dL  Borderline High    200-239 mg/dL  High Risk          >240 mg/dL      Triglyceride Reference  Ranges  (U.S. Department of Health and Human Services ATP III Classifications)    Normal           <150 mg/dL  Borderline High  150-199 mg/dL  High             200-499 mg/dL  Very High        >500 mg/dL    HDL Reference Ranges  (U.S. Department of Health and Human Services ATP III Classifications)    Low     <40 mg/dl (major risk factor for CHD)  High    >60 mg/dl ('negative' risk factor for CHD)        LDL Reference Ranges  (U.S. Department of Health and Human Services ATP III Classifications)    Optimal          <100 mg/dL  Near Optimal     100-129 mg/dL  Borderline High  130-159 mg/dL  High             160-189 mg/dL  Very High        >189 mg/dL    CBC & Differential [[id number]]  (Abnormal) Collected: 08/31/22 0627    Specimen: Blood Updated: 08/31/22 0640    Narrative:      The following orders were created for panel order CBC & Differential.  Procedure                               Abnormality         Status                     ---------                               -----------         ------                     CBC Auto Differential[[id number]]        Abnormal            Final result                 Please view results for these tests on the individual orders.    CBC Auto Differential [[id number]]  (Abnormal) Collected: 08/31/22 0627    Specimen: Blood Updated: 08/31/22 0640     WBC 7.35 10*3/mm3      RBC 4.26 10*6/mm3      Hemoglobin 12.8 g/dL      Hematocrit 38.1 %      MCV 89.4 fL      MCH 30.0 pg      MCHC 33.6 g/dL      RDW 13.5 %      RDW-SD 44.0 fl      MPV 8.5 fL      Platelets 253 10*3/mm3      Neutrophil % 53.4 %      Lymphocyte % 32.4 %      Monocyte % 7.2 %      Eosinophil % 5.3 %      Basophil % 1.0 %      Immature Grans % 0.7 %      Neutrophils, Absolute 3.93 10*3/mm3      Lymphocytes, Absolute 2.38 10*3/mm3      Monocytes, Absolute 0.53 10*3/mm3      Eosinophils, Absolute 0.39 10*3/mm3      Basophils, Absolute 0.07 10*3/mm3      Immature Grans, Absolute 0.05 10*3/mm3      nRBC 0.0 /100 WBC      [place] Draw [[id number]] Collected: 08/30/22 1551    Specimen: Blood Updated: 08/30/22 2006    Narrative:      The following orders were created for panel order [place] Draw.  Procedure                               Abnormality         Status                     ---------                               -----------         ------                     Green Top (Gel)[[id number]]                                  Final result               Lavender Top[[id number]]                                     Final result               Gold Top - SST[[id number]]                                   Final result               Light Blue Top[[id number]]                                                                Please view results for these tests on the individual orders.    Extra Tubes [[id number]] Collected: 08/30/22 1835    Specimen: Blood, Venous Line Updated: 08/1947    Narrative:      The following orders were created for panel order Extra Tubes.  Procedure                               Abnormality         Status                     ---------                               -----------         ------                     Gold Top - SST[[id number]]                                   Final result                 Please view results for these tests on the individual orders.    Gold Top - SST [[id number]] Collected: 08/30/22 1835    Specimen: Blood Updated: 08/1947     Extra Tube Hold for add-ons.     Comment: Auto resulted.       aPTT [[id number]]  (Normal) Collected: 08/30/22 1837    Specimen: Blood Updated: 08/30/22 1904     PTT 32.2 seconds     Narrative:      The recommended Heparin therapeutic range is 68-97 seconds.    D-dimer, Quantitative [[id number]]  (Normal) Collected: 08/30/22 1837    Specimen: Blood Updated: 08/30/22 1904     D-Dimer, Quantitative <270 ng/mL (FEU)     Narrative:      Dimer values <500 ng/ml FEU are FDA approved as aid in diagnosis of deep venous thrombosis and pulmonary embolism.  This test should not  be used in an exclusion strategy with pretest probability alone.    A recent guideline regarding diagnosis for pulmonary thromboembolism recommends an adjusted exclusion criterion of age x 10 ng/ml FEU for patients >50 years of age ([employer] Intern Med 2015; 163: 701-711).      Protime-INR [[id number]]  (Abnormal) Collected: 08/30/22 1837    Specimen: Blood Updated: 08/30/22 1903     Protime 16.5 Seconds      INR 1.34    Narrative:      Therapeutic range for most indications is 2.0-3.0 INR,  or 2.5-3.5 for mechanical heart valves.    Hemoglobin A1c [[id number]]  (Abnormal) Collected: 08/30/22 1551    Specimen: Blood Updated: 08/30/22 1806     Hemoglobin A1C 6.90 %     Narrative:      Hemoglobin A1C Ranges:    Increased Risk for Diabetes  5.7% to 6.4%  Diabetes                     >= 6.5%  Diabetic Goal                < 7.0%    COVID-19 and FLU A/B PCR - Swab, Nasopharynx [[id number]]  (Normal) Collected: 08/30/22 1718    Specimen: Swab from Nasopharynx Updated: 08/30/22 1805     COVID19 Not Detected     Influenza A PCR Not Detected     Influenza B PCR Not Detected    Narrative:      Fact sheet for providers: https://www.fda.gov/media/291859/download    Fact sheet for patients: https://www.fda.gov/media/171245/download    Test performed by PCR.    Lavender Top [[id number]] Collected: 08/30/22 1551    Specimen: Blood Updated: 08/30/22 1703     Extra Tube hold for add-on     Comment: Auto resulted       Green Top (Gel) [[id number]] Collected: 08/30/22 1551    Specimen: Blood Updated: 08/30/22 1702     Extra Tube Hold for add-ons.     Comment: Auto resulted.       Gold Top - SST [[id number]] Collected: 08/30/22 1551    Specimen: Blood Updated: 08/30/22 1702     Extra Tube Hold for add-ons.     Comment: Auto resulted.       Comprehensive Metabolic Panel [[id number]]  (Abnormal) Collected: 08/30/22 1551    Specimen: Blood Updated: 08/30/22 1619     Glucose 200 mg/dL      BUN 20 mg/dL      Creatinine 1.68 mg/dL      Sodium 143 mmol/L       Potassium 3.9 mmol/L      Chloride 103 mmol/L      CO2 31.0 mmol/L      Calcium 9.3 mg/dL      Total Protein 7.9 g/dL      Albumin 4.50 g/dL      ALT (SGPT) 15 U/L      AST (SGOT) 16 U/L      Alkaline Phosphatase 74 U/L      Total Bilirubin 0.5 mg/dL      Globulin 3.4 gm/dL      A/G Ratio 1.3 g/dL      BUN/Creatinine Ratio 11.9     Anion Gap 9.0 mmol/L      eGFR 42.1 mL/min/1.73      Comment: National Kidney Foundation and American Society of Nephrology (ASN) Task Force recommended calculation based on the Chronic Kidney Disease Epidemiology Collaboration (CKD-EPI) equation refit without adjustment for race.       Narrative:      GFR Normal >60  Chronic Kidney Disease <60  Kidney Failure <15      BNP [[id number]]  (Normal) Collected: 08/30/22 1551    Specimen: Blood Updated: 08/30/22 1616     proBNP 154.9 pg/mL     Narrative:      Among patients with dyspnea, NT-proBNP is highly sensitive for the detection of acute congestive heart failure. In addition NT-proBNP of <300 pg/ml effectively rules out acute congestive heart failure with 99% negative predictive value.    Results may be falsely decreased if patient taking Biotin.            Imaging Results (Most Recent)       Procedure Component Value Units Date/Time    XR Chest 2 View [[id number]] Collected: 08/30/22 1038     Updated: 08/30/22 1054    Narrative:      Chest x-ray PA and lateral       CLINICAL INDICATION: Shortness of breath. Chest pain    COMPARISON: Chest May 10, 2018    FINDINGS: Cardiac silhouette is normal in size. Pulmonary  vascularity is unremarkable.     No focal infiltrate or consolidation.  No pleural effusion.  No  pneumothorax.  Minimal discoid fibrotic changes left lung base.        Impression:      No evidence of active disease.       Electronically signed by:  Ben Natarajan MD  8/30/2022 10:52 AM CDT  Workstation: [id number]            Chief Complaint on Day of Discharge: chest pain    Hospital Course:  76 y/o male was admitted to the ED with  "complaints of chest pain that has been present for 3-4 weeks.  The pain is intermittent, 2-3/10 in intensity, pressure like in quality, with some radiation to the back.  He notes some associated SOB and lightheadedness, but otherwise denies any complaints.       In the ED the patient had stable vitals.  His imaging showed no acute abnormalities.  A d-dimer is pending.  He was admitted for further treatment and monitoring.      The patient had stable troponin and a normal TTE.  He was seen by cardiology, who felt he would benefit from a stress test which was done on 9/1.  The stress was low risk, but did show some mild areas of ischemia.  He was started on imdur and discharged home.  He is instructed to f/u with cardiology and urology (patient needs to have a stent removed as an outpatient) when scheduled and his PCP in 1 week.        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  Skin: no rashes, bruises or discolorations  Neuro: no focal deficits  Psych: Not agitated        Discharge Disposition:      Discharge Medications:     Discharge Medications        ASK your doctor about these medications        Instructions Start Date   acetaminophen 500 MG tablet  Commonly known as: TYLENOL   500 mg, Oral, Nightly      albuterol sulfate  (90 Base) MCG/ACT inhaler  Commonly known as: PROVENTIL HFA;VENTOLIN HFA;PROAIR HFA   2 puffs, Inhalation, Every 4 Hours PRN      aspirin 81 MG chewable tablet   81 mg, Oral, Daily      atorvastatin 20 MG tablet  Commonly known as: LIPITOR   20 mg, Oral, Nightly      esomeprazole 20 MG " capsule  Commonly known as: nexIUM   20 mg, Oral, Every Morning Before Breakfast      folic acid 1 MG tablet  Commonly known as: FOLVITE   1 mg, Oral, Take As Directed, Monday, Wednesday, and Friday      furosemide 20 MG tablet  Commonly known as: LASIX   20 mg, Oral, Daily      glimepiride 2 MG tablet  Commonly known as: AMARYL   2 mg, Oral, 2 Times Daily      loperamide 2 MG capsule  Commonly known as: IMODIUM   2 mg, Oral, 2 Times Daily PRN      metFORMIN 500 MG tablet  Commonly known as: GLUCOPHAGE   500 mg, Oral, 2 Times Daily With Meals, May take 3 depending on glucose reading      nebivolol 10 MG tablet  Commonly known as: Bystolic   10 mg, Oral, Daily      O2  Commonly known as: OXYGEN   2 L/min, Inhalation, Nightly      omeprazole 20 MG capsule  Commonly known as: priLOSEC   20 mg, Oral, Daily      OneTouch Ultra test strip  Generic drug: glucose blood   No dose, route, or frequency recorded.      tamsulosin 0.4 MG capsule 24 hr capsule  Commonly known as: FLOMAX   1 capsule, Oral, Nightly      umeclidinium-vilanterol 62.5-25 MCG/INH aerosol powder  inhaler  Commonly known as: ANORO ELLIPTA   1 puff, Inhalation, Daily      vitamin B-12 1000 MCG tablet  Commonly known as: CYANOCOBALAMIN   1,000 mcg, Oral, Take As Directed, Monday, Wednesday, and Friday               Discharge Diet: cardiac, diabetic    Activity at Discharge: as tolerated    Discharge Care Plan/Instructions: f/u PCP 1 week, cardiology and urology when scheduled    Follow-up Appointments:   Future Appointments   Date Time Provider Department Center   9/1/2022  3:10 PM MAD NM ADMIN RM 1  MAD NM MAD   9/1/2022  4:15 PM MAD NM 8  MAD NM MAD   9/1/2022  4:50 PM [facility]AD STRESS LAB 1  MAD CARDS MAD   9/1/2022  6:20 PM MAD NM 8  MAD NM MAD   2/22/2023  1:30 PM NURSE [facility] MAD OPI MAD   2/22/2023  2:00 PM Noé Coleman MD [facility] MAD           Time: less than 30 minutes

## 2022-09-01 NOTE — PLAN OF CARE
Possible discharged pending stress test results. Urology to see prior to dc for stent removal. Patient denies any chest pain this shift. Will continue to monitor.   Goal Outcome Evaluation:

## 2022-09-01 NOTE — PLAN OF CARE
Goal Outcome Evaluation:           Progress: improving  Outcome Evaluation: pt has not complained of any chest pain pt has been npo since midnight and is ready for the stress test today

## 2022-09-13 ENCOUNTER — READMISSION MANAGEMENT (OUTPATIENT)
Dept: CALL CENTER | Facility: HOSPITAL | Age: 77
End: 2022-09-13

## 2022-09-13 NOTE — OUTREACH NOTE
Medical Week 2 Survey    Flowsheet Row Responses   [facility] patient discharged from? [facility]   Does the patient have one of the following disease processes/diagnoses(primary or secondary)? Other   Week 2 attempt successful? No   Unsuccessful attempts Attempt 1          KOFFI Lua Registered Nurse

## 2022-10-17 ENCOUNTER — OFFICE VISIT (OUTPATIENT)
Dept: CARDIOLOGY | Facility: CLINIC | Age: 77
End: 2022-10-17

## 2022-10-17 VITALS
HEIGHT: 65 IN | BODY MASS INDEX: 35.16 KG/M2 | OXYGEN SATURATION: 98 % | DIASTOLIC BLOOD PRESSURE: 60 MMHG | HEART RATE: 71 BPM | WEIGHT: 211 LBS | SYSTOLIC BLOOD PRESSURE: 106 MMHG

## 2022-10-17 DIAGNOSIS — E66.2 CLASS 2 OBESITY WITH ALVEOLAR HYPOVENTILATION AND BODY MASS INDEX (BMI) OF 35.0 TO 35.9 IN ADULT, UNSPECIFIED WHETHER SERIOUS COMORBIDITY PRESENT: ICD-10-CM

## 2022-10-17 DIAGNOSIS — E78.2 MIXED HYPERLIPIDEMIA: ICD-10-CM

## 2022-10-17 DIAGNOSIS — I10 PRIMARY HYPERTENSION: ICD-10-CM

## 2022-10-17 DIAGNOSIS — R42 DIZZINESS: ICD-10-CM

## 2022-10-17 DIAGNOSIS — R07.9 CHEST PAIN, UNSPECIFIED TYPE: Primary | ICD-10-CM

## 2022-10-17 PROCEDURE — 99214 OFFICE O/P EST MOD 30 MIN: CPT | Performed by: INTERNAL MEDICINE

## 2022-10-17 NOTE — PROGRESS NOTES
"    [facility] Cardiology  OFFICE NOTE    Cardiovascular Medicine  Moses Soto M.D., [facility]         No referring provider defined for this encounter.    History of Present Illness    Rolando Novoa is a 77 y.o. male who presents for a follow up visit today.   The patient was seen in an inpatient hospitalization in August 2022 for chest discomfort.  His discomfort was described as pressure/tightness in the central chest.  Serum troponins were negative.  ECGs were negative for ST changes specific for ischemia.  Transthoracic echocardiogram (interpreted by Dr. Go) showed normal LV systolic function, normal biatrial sizes, normal RV cavity size/systolic function, no hemodynamically significant valvular abnormalities of visualized valves and a trivial pericardial effusion.  Lexiscan nuclear stress test showed a small sized mildly severe area of ischemia located in the basal inferior, basal inferolateral and basal anterolateral walls.  Overall, study findings were thought to be low risk.  The patient preferred medical management after understanding risks, benefits and alternatives of coronary angiography for definitive evaluation of possible underlying coronary artery disease.  He presents today for follow-up.  He has not had any further episodes of chest discomfort, chest tightness or chest pressure since hospital discharge.  He reports a history of \"black lungs\" and has had dyspnea on exertion for a long time.  He reports that his dyspnea has actually improved lately.  He has not had any PND, orthopnea or leg swelling.  He does report frequent episodes of postural dizziness.  He has not had any presyncope or syncope.    Review of Systems - ROS  Constitution: Negative for weight gain and weight loss.   HENT: Negative for congestion.    Eyes: Negative for blurred vision.   Cardiovascular: As mentioned above  Respiratory: Negative for cough and hemoptysis.    Endocrine: Negative for " polydipsia and polyuria.   Hematologic/Lymphatic: Negative for bleeding problem. Does not bruise/bleed easily.   Skin: Negative for flushing.   Musculoskeletal: Negative for neck pain and stiffness.   Gastrointestinal: Negative for abdominal pain, nausea and vomiting.   Genitourinary: Negative for dysuria and hematuria.   Neurological: Negative for focal weakness and numbness.   Psychiatric/Behavioral: Negative for altered mental status and depression.      All other systems were reviewed and were negative.    Past Medical History:   Diagnosis Date   • Acid reflux    • Black lung disease (HCC)    • Cataract    • Diabetes (HCC)    • Enlarged prostate    • High cholesterol    • Hypertension    • Kidney stone     multiple   • Psoriasis        Family History:  family history includes Cancer in his brother; Heart attack in his father; Stroke in his mother.    Social History: Denies current or past tobacco, alcohol or illicit drug abuse.   reports that he has never smoked. He has never used smokeless tobacco. He reports that he does not drink alcohol and does not use drugs.    Allergies:  Allergies   Allergen Reactions   • Toradol [Ketorolac Tromethamine] Anaphylaxis         Current Outpatient Medications:   •  acetaminophen (TYLENOL) 500 MG tablet, Take 500 mg by mouth Every Night., Disp: , Rfl:   •  albuterol sulfate  (90 Base) MCG/ACT inhaler, Inhale 2 puffs Every 4 (Four) Hours As Needed for Wheezing., Disp: , Rfl:   •  aspirin 81 MG chewable tablet, Chew 81 mg Daily., Disp: , Rfl:   •  atorvastatin (LIPITOR) 20 MG tablet, Take 1 tablet by mouth Every Night., Disp: 30 tablet, Rfl: 11  •  esomeprazole (nexIUM) 20 MG capsule, Take 20 mg by mouth Every Morning Before Breakfast., Disp: , Rfl:   •  folic acid (FOLVITE) 1 MG tablet, Take 1 mg by mouth Take As Directed. Monday, Wednesday, and Friday, Disp: , Rfl:   •  furosemide (LASIX) 20 MG tablet, Take 1 tablet by mouth Daily. (Patient taking differently: Take 1  "tablet by mouth Take As Directed.), Disp: 30 tablet, Rfl: 11  •  glimepiride (AMARYL) 2 MG tablet, Take 2 mg by mouth 2 (Two) Times a Day., Disp: , Rfl:   •  loperamide (IMODIUM) 2 MG capsule, Take 2 mg by mouth 2 (Two) Times a Day As Needed for diarrhea., Disp: , Rfl:   •  metFORMIN (GLUCOPHAGE) 500 MG tablet, Take 500 mg by mouth 2 (Two) Times a Day With Meals. May take 3 depending on glucose reading, Disp: , Rfl:   •  nebivolol (Bystolic) 10 MG tablet, Take 1 tablet by mouth Daily., Disp: 30 tablet, Rfl: 11  •  O2 (OXYGEN), Inhale 2 L/min Every Night., Disp: , Rfl:   •  omeprazole (priLOSEC) 20 MG capsule, Take 20 mg by mouth Daily., Disp: , Rfl:   •  OneTouch Ultra test strip, , Disp: , Rfl:   •  tamsulosin (FLOMAX) 0.4 MG capsule 24 hr capsule, Take 1 capsule by mouth Every Night., Disp: , Rfl:   •  vitamin B-12 (CYANOCOBALAMIN) 1000 MCG tablet, Take 1,000 mcg by mouth Take As Directed. Monday, Wednesday, and Friday, Disp: , Rfl:   •  isosorbide mononitrate (IMDUR) 30 MG 24 hr tablet, Take 1 tablet by mouth Daily for 30 days., Disp: 30 tablet, Rfl: 0  •  umeclidinium-vilanterol (ANORO ELLIPTA) 62.5-25 MCG/INH aerosol powder  inhaler, Inhale 1 puff Daily., Disp: , Rfl:     Physical Exam:  Vitals:    10/17/22 1458   BP: 106/60   BP Location: Left arm   Patient Position: Sitting   Cuff Size: Adult   Pulse: 71   SpO2: 98%   Weight: 95.7 kg (211 lb)   Height: 165.1 cm (65\")   PainSc: 0-No pain     Current Pain Level: none  Pulse Ox: Normal  on room air  General: alert, appears stated age and cooperative     Body Habitus: Obese  HEENT: Head: Normocephalic, no lesions, without obvious abnormality.     Neuro: alert, oriented x3  JVP: Volume/Pulsation: Normal.  Normal waveforms.   Appropriate inspiratory decrease.    Carotid Exam: no bruit normal pulsation bilaterally   Carotid Volume: normal.     Respirations: no increased work of breathing   Chest:  Normal    Pulmonary:No crackles or wheezes   Heart rate: normal  "   Heart Rhythm: regular     Heart Sounds: S1: normal  S2: normal  S3: absent   S4: absent   Abdomen:   Appearance: normal .  Palpation: Soft, non-tender to palpation, bowel sounds positive in all four quadrants  Extremity: no significant pitting edema.     DATA REVIEWED:     EKG. I personally reviewed and interpreted the EKG.  normal sinus rhythm, RBBB, rightward axis on 8/30/2022    ECG/EMG Results (all)     None        ---------------------------------------------------  TTE/HUA:  Results for orders placed during the hospital encounter of 08/30/22    Adult Transthoracic Echo Complete W/ Cont if Necessary Per Protocol    Interpretation Summary  · Left ventricular wall thickness is consistent with borderline concentric hypertrophy.  · Left ventricular ejection fraction appears to be 56 - 60%.  · Left ventricular diastolic function was normal.      -----------------------------------------------------  CXR/Imaging:   Imaging Results (Most Recent)     None          -----------------------------------------------------  CT:   No radiology results for the last 30 days.    ----------------------------------------------------      --------------------------------------------------------------------------------------------------  LABS:     The CVD Risk score (Sara et al., 2008) failed to calculate for the following reasons:    The 2008 CVD risk score is only valid for ages 30 to 74         Lab Results   Component Value Date    GLUCOSE 111 (H) 09/01/2022    BUN 12 09/01/2022    CREATININE 1.51 (H) 09/01/2022    EGFRIFNONA 81 05/10/2018    EGFRIFAFRI 65 10/20/2021    BCR 7.9 09/01/2022    K 3.6 09/01/2022    CO2 27.0 09/01/2022    CALCIUM 7.7 (L) 09/01/2022    ALBUMIN 4.50 08/30/2022    AST 16 08/30/2022    ALT 15 08/30/2022     Lab Results   Component Value Date    WBC 5.82 09/01/2022    HGB 11.8 (L) 09/01/2022    HCT 35.7 (L) 09/01/2022    MCV 89.7 09/01/2022     09/01/2022     Lab Results   Component Value  Date    CHOL 105 08/31/2022    CHLPL 108 05/03/2022    TRIG 170 (H) 08/31/2022    HDL 36 (L) 08/31/2022    LDL 41 08/31/2022     Lab Results   Component Value Date    TSH 2.290 08/31/2022     Lab Results   Component Value Date    TROPONINT <0.010 08/31/2022     Lab Results   Component Value Date    HGBA1C 6.90 (H) 08/30/2022     No results found for: DDIMER  Lab Results   Component Value Date    ALT 15 08/30/2022     Lab Results   Component Value Date    HGBA1C 6.90 (H) 08/30/2022    HGBA1C 7.1 (H) 05/03/2022    HGBA1C 7.6 (H) 10/20/2021     Lab Results   Component Value Date    MICROALBUR 27.1 (H) 05/03/2022    CREATININE 1.51 (H) 09/01/2022     Lab Results   Component Value Date    IRON 117 05/07/2021    TIBC 419 05/07/2021    FERRITIN 74.87 05/07/2021     Lab Results   Component Value Date    INR 1.34 (H) 08/30/2022    PROTIME 16.5 (H) 08/30/2022       [unfilled]    1. Chest pain, unspecified type    The patient was seen in an inpatient hospitalization in August 2022 for chest discomfort.  His discomfort was described as pressure/tightness in the central chest.  Serum troponins were negative.  ECGs were negative for ST changes specific for ischemia.  Transthoracic echocardiogram (interpreted by Dr. Go) showed normal LV systolic function, normal biatrial sizes, normal RV cavity size/systolic function, no hemodynamically significant valvular abnormalities of visualized valves and a trivial pericardial effusion.  Lexiscan nuclear stress test showed a small sized mildly severe area of ischemia located in the basal inferior, basal inferolateral and basal anterolateral walls.  Overall, study findings were thought to be low risk.  The patient preferred medical management after understanding risks, benefits and alternatives of coronary angiography for definitive evaluation of possible underlying coronary artery disease.  The patient has not had any further episodes of chest pressure/tightness/discomfort since  initiation of Imdur therapy during recent hospitalization.  He was offered coronary angiography for definitive evaluation on today's visit; he prefers continuation of medical therapy since he is not having any further chest discomfort at this point.  Continue baby aspirin, Lipitor, Imdur and Bystolic therapy.    2. Primary hypertension  Clinic BP was 106/60 mm Hg today.  Overall, his hypertension control appears to be reasonable.  Dietary sodium restriction was discussed.  Continue Bystolic and Imdur therapy.    3. Mixed hyperlipidemia  Last lipid profile in August 2022 showed LDL cholesterol level of 41 mg/dL and triglyceride level of 170 mg/dL.  Continue Lipitor at current doses.    4. Dizziness  He reports having several episodes of postural dizziness lately.  He has not any presyncope or syncope.  Orthostatic vital signs in the clinic today were negative for orthostasis.  He was instructed to hold Lasix (he takes 20 mg orally daily) for now to see if this would help.    5. Class 2 obesity with alveolar hypoventilation and body mass index (BMI) of 35.0 to 35.9 in adult, unspecified whether serious comorbidity present (HCC)  Dietary modification and regular physical activity were discussed.      Prevention:  Class 2 Severe Obesity (BMI >=35 and <=39.9). Obesity-related health conditions include the following: hypertension, dyslipidemias and GERD. We discussed low calorie, low carb based diet program and portion control.      Rolando Novoa  reports that he has never smoked. He has never used smokeless tobacco..       Return in about 2 months (around 12/17/2022).            Electronically signed by Moses Soto MD on 10/17/22 at 15:19 CDT

## 2022-11-07 ENCOUNTER — TRANSCRIBE ORDERS (OUTPATIENT)
Dept: PODIATRY | Facility: CLINIC | Age: 77
End: 2022-11-07

## 2022-11-07 DIAGNOSIS — L60.8 TOENAIL DEFORMITY: Primary | ICD-10-CM

## 2022-12-05 RX ORDER — CYANOCOBALAMIN (VITAMIN B-12) 1000 MCG
TABLET ORAL
Qty: 36 TABLET | Refills: 0 | Status: SHIPPED | OUTPATIENT
Start: 2022-12-05

## 2022-12-05 NOTE — TELEPHONE ENCOUNTER
Rx Refill Note  Requested Prescriptions     Pending Prescriptions Disp Refills   • Cyanocobalamin (B-12) 1000 MCG tablet [Pharmacy Med Name: B-12 1000MCG TABLET] 36 tablet 3     Si TABLET BY MOUTH ON MONDAY, WEDNESDAY AND FRIDAY      Last office visit with prescribing clinician: 2022   Last telemedicine visit with prescribing clinician: 2023   Next office visit with prescribing clinician: 2023                         Would you like a call back once the refill request has been completed: [] Yes [] No    If the office needs to give you a call back, can they leave a voicemail: [] Yes [] No    Yuly Leavitt  22, 15:49 CST

## 2023-01-06 ENCOUNTER — OFFICE VISIT (OUTPATIENT)
Dept: OTOLARYNGOLOGY | Facility: CLINIC | Age: 78
End: 2023-01-06
Payer: MEDICARE

## 2023-01-06 VITALS — TEMPERATURE: 97.8 F | HEIGHT: 65 IN | BODY MASS INDEX: 35.39 KG/M2 | WEIGHT: 212.4 LBS

## 2023-01-06 DIAGNOSIS — Z86.69 HISTORY OF EAR INFECTION: Primary | ICD-10-CM

## 2023-01-06 DIAGNOSIS — H91.90 SUBJECTIVE HEARING LOSS: ICD-10-CM

## 2023-01-06 PROCEDURE — 99202 OFFICE O/P NEW SF 15 MIN: CPT | Performed by: OTOLARYNGOLOGY

## 2023-01-06 NOTE — PROGRESS NOTES
Chief complaint: Otalgia    Assessment and Plan:  77M with recent reported otitis media on the left - completely resolved, no effusion or abnormalities today    -We did discuss that given his complaint of hearing loss I would like to obtain an audiogram to further assess, the patient is not interested in undergoing audiogram at this time  -Follow-up as needed or if persistent or recurrent ear problems or if interested in a hearing test    History of present illness:    Mr. Kerr is a 77-year-old male presenting today for evaluation of ear pain.  He was seen at urgent care before the new year and diagnosed with a left otitis media.  He describes that he was experiencing some ear pain and after a severe coughing fit related to what he describes as sinusitis he developed copious clear drainage from the left ear that is now resolved.  He denies any acute hearing change but does note bilateral hearing loss that is most bothersome to his wife he denies any tinnitus on either side.  He denies any vertigo.  He states that his coughing and his nasal symptoms have improved.  He has several days left of his antibiotics.  He does have a personal history of recurrent ear infections and drainage in childhood but never underwent any ear surgeries.  No other acute ENT concerns today.    Vital Signs   Vitals:    01/06/23 1414   Temp: 97.8 °F (36.6 °C)     Physical Exam:  General: NAD, awake and alert  Head: normocephalic, atraumatic  Eyes: EOMI, sclerae white, conjunctivae pink, left epiphora present  Ears: pinnae intact without masses or lesions   Right: TM intact without injection or effusion, annulus senilis   Left: TM intact without injection or effusion, there is patchy myringosclerosis posteroinferiorly and superoanteriorly, annulus senilis.  Nose: external straight without deformity  OC/OP: mucosa moist and pink  Neuro: no focal deficits    Results Review:  Urgent care APRN visit from 12/19/2022 reviewed today and  demonstrates complaint of left-sided otalgia this was diagnosed as otitis media and ofloxacin eardrops and amoxicillin was prescribed with ENT follow-up recommended and referred.  At that time patient stated that his left eardrum burst and \"water poured out of it\" a history of frequent ear infections in childhood was noted exam at that time was significant for left-sided redness and cloudiness, curvature of the ear canal made exam difficult, right ear noted to be red.  No mention of otorrhea.    Review of Systems:  Positive ROS items: Appetite change, hearing loss, postnasal drainage, sneezing, chest pain, diarrhea, dizziness, and tremors.  Otherwise, a 14 system ROS is negative except as pertinent positives are mentioned above.    Histories:  Allergies   Allergen Reactions   • Toradol [Ketorolac Tromethamine] Anaphylaxis       Prior to Admission medications    Medication Sig Start Date End Date Taking? Authorizing Provider   acetaminophen (TYLENOL) 500 MG tablet Take 500 mg by mouth Every Night.   Yes Esvin Murray MD   albuterol sulfate  (90 Base) MCG/ACT inhaler Inhale 2 puffs Every 4 (Four) Hours As Needed for Wheezing.   Yes Esvin Murray MD   aspirin 81 MG chewable tablet Chew 81 mg Daily.   Yes Esvin Murray MD   atorvastatin (LIPITOR) 20 MG tablet Take 1 tablet by mouth Every Night. 3/23/20  Yes Cassi Lemus MD   Cyanocobalamin (B-12) 1000 MCG tablet 1 TABLET BY MOUTH ON MONDAY, WEDNESDAY AND FRIDAY 12/5/22  Yes Noé Coleman MD   esomeprazole (nexIUM) 20 MG capsule Take 20 mg by mouth Every Morning Before Breakfast.   Yes Esvin Murray MD   folic acid (FOLVITE) 1 MG tablet Take 1 mg by mouth Take As Directed. Monday, Wednesday, and Friday   Yes Esvin Murray MD   furosemide (LASIX) 20 MG tablet Take 1 tablet by mouth Daily.  Patient taking differently: Take 20 mg by mouth Take As Directed. 3/23/20  Yes Cassi Lemus MD   loperamide (IMODIUM) 2  MG capsule Take 2 mg by mouth 2 (Two) Times a Day As Needed for diarrhea.   Yes Esvin Murray MD   metFORMIN (GLUCOPHAGE) 500 MG tablet Take 500 mg by mouth 2 (Two) Times a Day With Meals. May take 3 depending on glucose reading 6/3/16  Yes Esvin Murray MD   nebivolol (Bystolic) 10 MG tablet Take 1 tablet by mouth Daily. 3/23/20  Yes Cassi Lemus MD   O2 (OXYGEN) Inhale 2 L/min Every Night.   Yes Esvin Murray MD   omeprazole (priLOSEC) 20 MG capsule Take 20 mg by mouth Daily.   Yes Esvin Murray MD   OneTouch Ultra test strip  3/15/21  Yes Esvin Murray MD   tamsulosin (FLOMAX) 0.4 MG capsule 24 hr capsule Take 1 capsule by mouth Every Night.   Yes Esvin Murray MD   glimepiride (AMARYL) 2 MG tablet Take 2 mg by mouth 2 (Two) Times a Day.    Esvin Murray MD   isosorbide mononitrate (IMDUR) 30 MG 24 hr tablet Take 1 tablet by mouth Daily for 30 days. 9/1/22 10/1/22  Virginia Franklin MD   umeclidinium-vilanterol (ANORO ELLIPTA) 62.5-25 MCG/INH aerosol powder  inhaler Inhale 1 puff Daily. 4/14/21 4/15/22  Esvin Murray MD       Past Medical History:   Diagnosis Date   • Acid reflux    • Black lung disease (HCC)    • Cataract    • Diabetes (HCC)    • Enlarged prostate    • High cholesterol    • Hypertension    • Kidney stone     multiple   • Obesity    • Psoriasis        Past Surgical History:   Procedure Laterality Date   • AMPUTATION Left     lower arm and hand   • CATARACT EXTRACTION W/ INTRAOCULAR LENS IMPLANT Left 01/22/2021    Procedure: REMIOVE CATARACT AND IMPLANT  INTRAOCULAR LENS;  Surgeon: Sam Isaacs MD;  Location: [facility] OR;  Service: Ophthalmology;  Laterality: Left;   • CATARACT EXTRACTION W/ INTRAOCULAR LENS IMPLANT Right 01/29/2021    Procedure: REMOVE CATARACT AND IMPLANT INTRAOCULAR LENS;  Surgeon: Sam Isaacs MD;  Location: [facility] OR;  Service: Ophthalmology;  Laterality: Right;   • CHOLECYSTECTOMY OPEN      • COLONOSCOPY N/A 11/09/2018    Procedure: COLONOSCOPY;  Surgeon: Sukhdeep Mae MD;  Location: [facility] ENDOSCOPY;  Service: Gastroenterology   • COLONOSCOPY N/A 03/28/2022    Procedure: COLONOSCOPY;  Surgeon: Sukhdeep Mae MD;  Location: [facility] ENDOSCOPY;  Service: Gastroenterology;  Laterality: N/A;   • CYSTOSCOPY N/A 12/16/2017    Procedure: CYSTOSCOPY WITH CLOT EVACUATION;  Surgeon: Prosper Ariza MD;  Location: [facility] OR;  Service:    • CYSTOSCOPY, URETEROSCOPY, RETROGRADE PYELOGRAM, STENT INSERTION Left 12/15/2017    Procedure: CYSTOSCOPY LEFT URETEROSCOPY RETROGRADE PYELOGRAM HOLMIUM LASER STENT INSERTION;  Surgeon: Prosper Ariza MD;  Location: [facility] OR;  Service:    • CYSTOSCOPY, URETEROSCOPY, RETROGRADE PYELOGRAM, STENT INSERTION Left 08/10/2022    Procedure: CYSTOSCOPY LEFT RETROGRADE URETEROSCOPY LASER LITHOTRIPSY STENT PLACMENT;  Surgeon: Prosper Ariza MD;  Location: [facility];  Service: Urology;  Laterality: Left;   • DENTAL PROCEDURE      multiple extractions   • EXTRACORPOREAL SHOCK WAVE LITHOTRIPSY (ESWL)     • EXTRACORPOREAL SHOCKWAVE LITHOTRIPSY (ESWL), STENT INSERTION/REMOVAL Left 02/03/2017    Procedure: LEFT EXTRACORPOREAL SHOCKWAVE LITHOTRIPSY, POSSIBLE CYSTOSCOPY, POSSIBLE STENT REMOVAL ;  Surgeon: Prosper Ariza MD;  Location: [facility];  Service:    • EXTRACORPOREAL SHOCKWAVE LITHOTRIPSY (ESWL), STENT INSERTION/REMOVAL Left 05/04/2018    Procedure: EXTRACORPOREAL SHOCKWAVE LITHOTRIPSY;  Surgeon: Prosper Ariza MD;  Location: [facility];  Service: Urology   • KIDNEY STONE SURGERY     • SCROTAL SURGERY      trauma   • WISDOM TOOTH EXTRACTION      took bottom ones       Social History     Socioeconomic History   • Marital status:    Tobacco Use   • Smoking status: Never     Passive exposure: Past   • Smokeless tobacco: Never   • Tobacco comments:     For 16 years   Vaping Use   • Vaping Use: Never used   Substance and Sexual Activity   • Alcohol use: Not Currently      Comment: none since 21 y/o   • Drug use: Never   • Sexual activity: Defer       Family History   Problem Relation Age of Onset   • Hypertension Mother    • Stroke Mother    • Heart attack Father    • Psoriasis Father    • Heart attack Sister    • Heart attack Sister    • Breast cancer Sister    • Leukemia Brother    • Diabetes Brother    • Heart attack Brother    • Diabetes Brother    • Congenital heart disease Brother    • Early death Brother    • Cancer Son    • No Known Problems Son              This document has been electronically signed by Malini Zeng MD on January 6, 2023 14:32 CST

## 2023-03-14 ENCOUNTER — APPOINTMENT (OUTPATIENT)
Dept: GENERAL RADIOLOGY | Facility: HOSPITAL | Age: 78
DRG: 193 | End: 2023-03-14
Payer: MEDICARE

## 2023-03-14 ENCOUNTER — HOSPITAL ENCOUNTER (INPATIENT)
Facility: HOSPITAL | Age: 78
LOS: 6 days | Discharge: HOME | DRG: 193 | End: 2023-03-20
Attending: EMERGENCY MEDICINE | Admitting: STUDENT IN AN ORGANIZED HEALTH CARE EDUCATION/TRAINING PROGRAM
Payer: MEDICARE

## 2023-03-14 ENCOUNTER — APPOINTMENT (OUTPATIENT)
Dept: CT IMAGING | Facility: HOSPITAL | Age: 78
DRG: 193 | End: 2023-03-14
Payer: MEDICARE

## 2023-03-14 DIAGNOSIS — Z78.9 IMPAIRED MOBILITY AND ADLS: ICD-10-CM

## 2023-03-14 DIAGNOSIS — J96.02 ACUTE RESPIRATORY FAILURE WITH HYPOXIA AND HYPERCAPNIA: ICD-10-CM

## 2023-03-14 DIAGNOSIS — J96.01 ACUTE RESPIRATORY FAILURE WITH HYPOXIA AND HYPERCAPNIA: ICD-10-CM

## 2023-03-14 DIAGNOSIS — Z74.09 IMPAIRED MOBILITY AND ADLS: ICD-10-CM

## 2023-03-14 DIAGNOSIS — J18.9 PNEUMONIA OF LEFT LOWER LOBE DUE TO INFECTIOUS ORGANISM: Primary | ICD-10-CM

## 2023-03-14 DIAGNOSIS — G93.41 METABOLIC ENCEPHALOPATHY: ICD-10-CM

## 2023-03-14 DIAGNOSIS — Z74.09 IMPAIRED FUNCTIONAL MOBILITY, BALANCE, GAIT, AND ENDURANCE: ICD-10-CM

## 2023-03-14 LAB
ALBUMIN SERPL-MCNC: 4.1 G/DL (ref 3.5–5.2)
ALBUMIN/GLOB SERPL: 1.1 G/DL
ALP SERPL-CCNC: 80 U/L (ref 39–117)
ALT SERPL W P-5'-P-CCNC: 16 U/L (ref 1–41)
ANION GAP SERPL CALCULATED.3IONS-SCNC: 9 MMOL/L (ref 5–15)
APTT PPP: 27.1 SECONDS (ref 20–40.3)
ARTERIAL PATENCY WRIST A: ABNORMAL
AST SERPL-CCNC: 16 U/L (ref 1–40)
ATMOSPHERIC PRESS: 756 MMHG
BACTERIA UR QL AUTO: ABNORMAL /HPF
BASE EXCESS BLDA CALC-SCNC: 5.8 MMOL/L (ref 0–2)
BASOPHILS # BLD AUTO: 0.08 10*3/MM3 (ref 0–0.2)
BASOPHILS NFR BLD AUTO: 1.1 % (ref 0–1.5)
BDY SITE: ABNORMAL
BILIRUB SERPL-MCNC: 0.3 MG/DL (ref 0–1.2)
BILIRUB UR QL STRIP: NEGATIVE
BUN SERPL-MCNC: 15 MG/DL (ref 8–23)
BUN/CREAT SERPL: 8.7 (ref 7–25)
CALCIUM SPEC-SCNC: 8.9 MG/DL (ref 8.6–10.5)
CHLORIDE SERPL-SCNC: 101 MMOL/L (ref 98–107)
CLARITY UR: CLEAR
CO2 SERPL-SCNC: 33 MMOL/L (ref 22–29)
COLOR UR: YELLOW
CREAT SERPL-MCNC: 1.73 MG/DL (ref 0.76–1.27)
D-DIMER, QUANTITATIVE (MAD,POW, STR): 710 NG/ML (FEU) (ref 0–750)
D-LACTATE SERPL-SCNC: 1.2 MMOL/L (ref 0.5–2)
DEPRECATED RDW RBC AUTO: 47.6 FL (ref 37–54)
EGFRCR SERPLBLD CKD-EPI 2021: 40.7 ML/MIN/1.73
EOSINOPHIL # BLD AUTO: 0.6 10*3/MM3 (ref 0–0.4)
EOSINOPHIL NFR BLD AUTO: 8.3 % (ref 0.3–6.2)
ERYTHROCYTE [DISTWIDTH] IN BLOOD BY AUTOMATED COUNT: 13.5 % (ref 12.3–15.4)
FLUAV RNA RESP QL NAA+PROBE: NOT DETECTED
FLUBV RNA RESP QL NAA+PROBE: NOT DETECTED
GAS FLOW AIRWAY: 3 LPM
GEN 5 2HR TROPONIN T REFLEX: 46 NG/L
GLOBULIN UR ELPH-MCNC: 3.6 GM/DL
GLUCOSE BLDC GLUCOMTR-MCNC: 86 MG/DL (ref 70–130)
GLUCOSE SERPL-MCNC: 114 MG/DL (ref 65–99)
GLUCOSE UR STRIP-MCNC: NEGATIVE MG/DL
HCO3 BLDA-SCNC: 35 MMOL/L (ref 20–26)
HCT VFR BLD AUTO: 41.6 % (ref 37.5–51)
HGB BLD-MCNC: 12.1 G/DL (ref 13–17.7)
HGB UR QL STRIP.AUTO: ABNORMAL
HOLD SPECIMEN: NORMAL
HOLD SPECIMEN: NORMAL
HYALINE CASTS UR QL AUTO: ABNORMAL /LPF
IMM GRANULOCYTES # BLD AUTO: 0.07 10*3/MM3 (ref 0–0.05)
IMM GRANULOCYTES NFR BLD AUTO: 1 % (ref 0–0.5)
INR PPP: 0.95 (ref 0.8–1.2)
KETONES UR QL STRIP: NEGATIVE
LEUKOCYTE ESTERASE UR QL STRIP.AUTO: NEGATIVE
LYMPHOCYTES # BLD AUTO: 1.39 10*3/MM3 (ref 0.7–3.1)
LYMPHOCYTES NFR BLD AUTO: 19.2 % (ref 19.6–45.3)
Lab: ABNORMAL
MAGNESIUM SERPL-MCNC: 2.1 MG/DL (ref 1.6–2.4)
MCH RBC QN AUTO: 28.1 PG (ref 26.6–33)
MCHC RBC AUTO-ENTMCNC: 29.1 G/DL (ref 31.5–35.7)
MCV RBC AUTO: 96.7 FL (ref 79–97)
MODALITY: ABNORMAL
MONOCYTES # BLD AUTO: 0.63 10*3/MM3 (ref 0.1–0.9)
MONOCYTES NFR BLD AUTO: 8.7 % (ref 5–12)
NEUTROPHILS NFR BLD AUTO: 4.46 10*3/MM3 (ref 1.7–7)
NEUTROPHILS NFR BLD AUTO: 61.7 % (ref 42.7–76)
NITRITE UR QL STRIP: NEGATIVE
NRBC BLD AUTO-RTO: 0 /100 WBC (ref 0–0.2)
NT-PROBNP SERPL-MCNC: 770.4 PG/ML (ref 0–1800)
PCO2 BLDA: 78.4 MM HG (ref 35–45)
PH BLDA: 7.26 PH UNITS (ref 7.35–7.45)
PH UR STRIP.AUTO: 5.5 [PH] (ref 5–9)
PLATELET # BLD AUTO: 263 10*3/MM3 (ref 140–450)
PMV BLD AUTO: 8.5 FL (ref 6–12)
PO2 BLDA: 79.5 MM HG (ref 83–108)
POTASSIUM SERPL-SCNC: 4.7 MMOL/L (ref 3.5–5.2)
PROT SERPL-MCNC: 7.7 G/DL (ref 6–8.5)
PROT UR QL STRIP: ABNORMAL
PROTHROMBIN TIME: 12.6 SECONDS (ref 11.1–15.3)
RBC # BLD AUTO: 4.3 10*6/MM3 (ref 4.14–5.8)
RBC # UR STRIP: ABNORMAL /HPF
REF LAB TEST METHOD: ABNORMAL
SAO2 % BLDCOA: 96.4 % (ref 94–99)
SARS-COV-2 RNA RESP QL NAA+PROBE: NOT DETECTED
SODIUM SERPL-SCNC: 143 MMOL/L (ref 136–145)
SP GR UR STRIP: 1.01 (ref 1–1.03)
SQUAMOUS #/AREA URNS HPF: ABNORMAL /HPF
TRANS CELLS #/AREA URNS HPF: ABNORMAL /HPF
TROPONIN T DELTA: 1 NG/L
TROPONIN T SERPL HS-MCNC: 45 NG/L
TROPONIN T SERPL HS-MCNC: 51 NG/L
UROBILINOGEN UR QL STRIP: ABNORMAL
VENTILATOR MODE: ABNORMAL
WBC # UR STRIP: ABNORMAL /HPF
WBC NRBC COR # BLD: 7.23 10*3/MM3 (ref 3.4–10.8)
WHOLE BLOOD HOLD COAG: NORMAL
WHOLE BLOOD HOLD SPECIMEN: NORMAL

## 2023-03-14 PROCEDURE — 71045 X-RAY EXAM CHEST 1 VIEW: CPT

## 2023-03-14 PROCEDURE — 25010000002 METHYLPREDNISOLONE PER 125 MG: Performed by: PHYSICIAN ASSISTANT

## 2023-03-14 PROCEDURE — 85610 PROTHROMBIN TIME: CPT | Performed by: EMERGENCY MEDICINE

## 2023-03-14 PROCEDURE — 93005 ELECTROCARDIOGRAM TRACING: CPT

## 2023-03-14 PROCEDURE — 25010000002 ENOXAPARIN PER 10 MG: Performed by: PHYSICIAN ASSISTANT

## 2023-03-14 PROCEDURE — P9612 CATHETERIZE FOR URINE SPEC: HCPCS

## 2023-03-14 PROCEDURE — 80053 COMPREHEN METABOLIC PANEL: CPT

## 2023-03-14 PROCEDURE — 36600 WITHDRAWAL OF ARTERIAL BLOOD: CPT

## 2023-03-14 PROCEDURE — 94799 UNLISTED PULMONARY SVC/PX: CPT

## 2023-03-14 PROCEDURE — 81001 URINALYSIS AUTO W/SCOPE: CPT

## 2023-03-14 PROCEDURE — 82803 BLOOD GASES ANY COMBINATION: CPT

## 2023-03-14 PROCEDURE — 82962 GLUCOSE BLOOD TEST: CPT

## 2023-03-14 PROCEDURE — 85379 FIBRIN DEGRADATION QUANT: CPT | Performed by: EMERGENCY MEDICINE

## 2023-03-14 PROCEDURE — 25010000002 AZITHROMYCIN PER 500 MG: Performed by: EMERGENCY MEDICINE

## 2023-03-14 PROCEDURE — 99285 EMERGENCY DEPT VISIT HI MDM: CPT

## 2023-03-14 PROCEDURE — 36415 COLL VENOUS BLD VENIPUNCTURE: CPT | Performed by: EMERGENCY MEDICINE

## 2023-03-14 PROCEDURE — 83880 ASSAY OF NATRIURETIC PEPTIDE: CPT | Performed by: EMERGENCY MEDICINE

## 2023-03-14 PROCEDURE — 84484 ASSAY OF TROPONIN QUANT: CPT | Performed by: EMERGENCY MEDICINE

## 2023-03-14 PROCEDURE — 84484 ASSAY OF TROPONIN QUANT: CPT

## 2023-03-14 PROCEDURE — 87086 URINE CULTURE/COLONY COUNT: CPT

## 2023-03-14 PROCEDURE — 87040 BLOOD CULTURE FOR BACTERIA: CPT | Performed by: EMERGENCY MEDICINE

## 2023-03-14 PROCEDURE — 87636 SARSCOV2 & INF A&B AMP PRB: CPT | Performed by: EMERGENCY MEDICINE

## 2023-03-14 PROCEDURE — 83735 ASSAY OF MAGNESIUM: CPT

## 2023-03-14 PROCEDURE — 25010000002 CEFTRIAXONE PER 250 MG: Performed by: EMERGENCY MEDICINE

## 2023-03-14 PROCEDURE — 94660 CPAP INITIATION&MGMT: CPT

## 2023-03-14 PROCEDURE — 83605 ASSAY OF LACTIC ACID: CPT | Performed by: EMERGENCY MEDICINE

## 2023-03-14 PROCEDURE — 85730 THROMBOPLASTIN TIME PARTIAL: CPT | Performed by: EMERGENCY MEDICINE

## 2023-03-14 PROCEDURE — 85025 COMPLETE CBC W/AUTO DIFF WBC: CPT

## 2023-03-14 PROCEDURE — 93010 ELECTROCARDIOGRAM REPORT: CPT | Performed by: INTERNAL MEDICINE

## 2023-03-14 PROCEDURE — 70450 CT HEAD/BRAIN W/O DYE: CPT

## 2023-03-14 RX ORDER — ONDANSETRON 2 MG/ML
4 INJECTION INTRAMUSCULAR; INTRAVENOUS EVERY 6 HOURS PRN
Status: DISCONTINUED | OUTPATIENT
Start: 2023-03-14 | End: 2023-03-20 | Stop reason: HOSPADM

## 2023-03-14 RX ORDER — ATORVASTATIN CALCIUM 20 MG/1
20 TABLET, FILM COATED ORAL NIGHTLY
Status: DISCONTINUED | OUTPATIENT
Start: 2023-03-14 | End: 2023-03-20 | Stop reason: HOSPADM

## 2023-03-14 RX ORDER — SODIUM CHLORIDE 9 MG/ML
40 INJECTION, SOLUTION INTRAVENOUS AS NEEDED
Status: DISCONTINUED | OUTPATIENT
Start: 2023-03-14 | End: 2023-03-20 | Stop reason: HOSPADM

## 2023-03-14 RX ORDER — SODIUM CHLORIDE 0.9 % (FLUSH) 0.9 %
10 SYRINGE (ML) INJECTION AS NEEDED
Status: DISCONTINUED | OUTPATIENT
Start: 2023-03-14 | End: 2023-03-20 | Stop reason: HOSPADM

## 2023-03-14 RX ORDER — PANTOPRAZOLE SODIUM 40 MG/1
40 TABLET, DELAYED RELEASE ORAL
Status: DISCONTINUED | OUTPATIENT
Start: 2023-03-15 | End: 2023-03-20 | Stop reason: HOSPADM

## 2023-03-14 RX ORDER — NICOTINE POLACRILEX 4 MG
15 LOZENGE BUCCAL
Status: DISCONTINUED | OUTPATIENT
Start: 2023-03-14 | End: 2023-03-20 | Stop reason: HOSPADM

## 2023-03-14 RX ORDER — DEXTROSE MONOHYDRATE 25 G/50ML
25 INJECTION, SOLUTION INTRAVENOUS
Status: DISCONTINUED | OUTPATIENT
Start: 2023-03-14 | End: 2023-03-20 | Stop reason: HOSPADM

## 2023-03-14 RX ORDER — SODIUM CHLORIDE 9 MG/ML
100 INJECTION, SOLUTION INTRAVENOUS CONTINUOUS
Status: ACTIVE | OUTPATIENT
Start: 2023-03-14 | End: 2023-03-15

## 2023-03-14 RX ORDER — FOLIC ACID 1 MG/1
1 TABLET ORAL
Status: DISCONTINUED | OUTPATIENT
Start: 2023-03-15 | End: 2023-03-20 | Stop reason: HOSPADM

## 2023-03-14 RX ORDER — SODIUM CHLORIDE 9 MG/ML
125 INJECTION, SOLUTION INTRAVENOUS CONTINUOUS
Status: DISCONTINUED | OUTPATIENT
Start: 2023-03-14 | End: 2023-03-14

## 2023-03-14 RX ORDER — ALBUTEROL SULFATE 90 UG/1
2 AEROSOL, METERED RESPIRATORY (INHALATION)
Status: DISCONTINUED | OUTPATIENT
Start: 2023-03-14 | End: 2023-03-14 | Stop reason: SDUPTHER

## 2023-03-14 RX ORDER — METHYLPREDNISOLONE SODIUM SUCCINATE 125 MG/2ML
60 INJECTION, POWDER, LYOPHILIZED, FOR SOLUTION INTRAMUSCULAR; INTRAVENOUS DAILY
Status: DISCONTINUED | OUTPATIENT
Start: 2023-03-14 | End: 2023-03-17

## 2023-03-14 RX ORDER — ENOXAPARIN SODIUM 100 MG/ML
40 INJECTION SUBCUTANEOUS DAILY
Status: DISCONTINUED | OUTPATIENT
Start: 2023-03-14 | End: 2023-03-20 | Stop reason: HOSPADM

## 2023-03-14 RX ORDER — ACETAMINOPHEN 325 MG/1
650 TABLET ORAL EVERY 4 HOURS PRN
Status: DISCONTINUED | OUTPATIENT
Start: 2023-03-14 | End: 2023-03-20 | Stop reason: HOSPADM

## 2023-03-14 RX ORDER — INSULIN ASPART 100 [IU]/ML
0-9 INJECTION, SOLUTION INTRAVENOUS; SUBCUTANEOUS
Status: DISCONTINUED | OUTPATIENT
Start: 2023-03-14 | End: 2023-03-20 | Stop reason: HOSPADM

## 2023-03-14 RX ORDER — ONDANSETRON 4 MG/1
4 TABLET, FILM COATED ORAL EVERY 6 HOURS PRN
Status: DISCONTINUED | OUTPATIENT
Start: 2023-03-14 | End: 2023-03-20 | Stop reason: HOSPADM

## 2023-03-14 RX ORDER — NEBIVOLOL 5 MG/1
10 TABLET ORAL DAILY
Status: DISCONTINUED | OUTPATIENT
Start: 2023-03-15 | End: 2023-03-20 | Stop reason: HOSPADM

## 2023-03-14 RX ORDER — IPRATROPIUM BROMIDE AND ALBUTEROL SULFATE 2.5; .5 MG/3ML; MG/3ML
3 SOLUTION RESPIRATORY (INHALATION)
Status: DISCONTINUED | OUTPATIENT
Start: 2023-03-14 | End: 2023-03-15

## 2023-03-14 RX ORDER — TAMSULOSIN HYDROCHLORIDE 0.4 MG/1
0.4 CAPSULE ORAL NIGHTLY
Status: DISCONTINUED | OUTPATIENT
Start: 2023-03-14 | End: 2023-03-20 | Stop reason: HOSPADM

## 2023-03-14 RX ORDER — ASPIRIN 81 MG/1
81 TABLET, CHEWABLE ORAL DAILY
Status: DISCONTINUED | OUTPATIENT
Start: 2023-03-15 | End: 2023-03-20 | Stop reason: HOSPADM

## 2023-03-14 RX ORDER — SODIUM CHLORIDE 0.9 % (FLUSH) 0.9 %
10 SYRINGE (ML) INJECTION EVERY 12 HOURS SCHEDULED
Status: DISCONTINUED | OUTPATIENT
Start: 2023-03-14 | End: 2023-03-20 | Stop reason: HOSPADM

## 2023-03-14 RX ADMIN — ENOXAPARIN SODIUM 40 MG: 40 INJECTION SUBCUTANEOUS at 21:29

## 2023-03-14 RX ADMIN — SODIUM CHLORIDE 100 ML/HR: 9 INJECTION, SOLUTION INTRAVENOUS at 21:29

## 2023-03-14 RX ADMIN — AZITHROMYCIN MONOHYDRATE 500 MG: 500 INJECTION, POWDER, LYOPHILIZED, FOR SOLUTION INTRAVENOUS at 21:29

## 2023-03-14 RX ADMIN — TAMSULOSIN HYDROCHLORIDE 0.4 MG: 0.4 CAPSULE ORAL at 21:30

## 2023-03-14 RX ADMIN — SODIUM CHLORIDE 125 ML/HR: 9 INJECTION, SOLUTION INTRAVENOUS at 18:08

## 2023-03-14 RX ADMIN — METHYLPREDNISOLONE SODIUM SUCCINATE 60 MG: 125 INJECTION, POWDER, FOR SOLUTION INTRAMUSCULAR; INTRAVENOUS at 21:29

## 2023-03-14 RX ADMIN — ATORVASTATIN CALCIUM 20 MG: 20 TABLET, FILM COATED ORAL at 21:29

## 2023-03-14 NOTE — ED NOTES
"Nursing report ED to floor  Rolando Novoa  77 y.o.  male    HPI:   Chief Complaint   Patient presents with    Shortness of Breath    Tremors    Altered Mental Status       Admitting doctor:   Suad Pritchett MD    Consulting provider(s):  Consults       Date and Time Order Name Status Description    3/14/2023  6:08 PM Hospitalist (on-call MD unless specified)               Admitting diagnosis:   The primary encounter diagnosis was Pneumonia of left lower lobe due to infectious organism. Diagnoses of Metabolic encephalopathy and Acute respiratory failure with hypoxia and hypercapnia (HCC) were also pertinent to this visit.    Code status:   Current Code Status       Date Active Code Status Order ID Comments User Context       Prior            Allergies:   Toradol [ketorolac tromethamine]    Intake and Output  No intake or output data in the 24 hours ending 03/14/23 1841    Weight:       03/14/23  1637   Weight: 98.1 kg (216 lb 3.2 oz)       Most recent vitals:   Vitals:    03/14/23 1637 03/14/23 1803   BP: 149/72 173/83   Pulse: 71 68   Resp: 20 20   Temp: 97.9 °F (36.6 °C)    TempSrc: Oral    SpO2: 95% 98%   Weight: 98.1 kg (216 lb 3.2 oz)    Height: 165.1 cm (65\")      Oxygen Therapy: nasal cannula    Active LDAs/IV Access:   Lines, Drains & Airways       Active LDAs       Name Placement date Placement time Site Days    Peripheral IV 08/30/22 2002 Right Antecubital 08/30/22 2002  Antecubital  195    Peripheral IV 03/14/23 1645 Right Antecubital 03/14/23  1645  Antecubital  less than 1                    Labs (abnormal labs have a star):   Labs Reviewed   COMPREHENSIVE METABOLIC PANEL - Abnormal; Notable for the following components:       Result Value    Glucose 114 (*)     Creatinine 1.73 (*)     CO2 33.0 (*)     eGFR 40.7 (*)     All other components within normal limits    Narrative:     GFR Normal >60  Chronic Kidney Disease <60  Kidney Failure <15    The GFR formula is only valid for adults with " stable renal function between ages 18 and 70.   SINGLE HSTROPONIN T - Abnormal; Notable for the following components:    HS Troponin T 51 (*)     All other components within normal limits    Narrative:     High Sensitive Troponin T Reference Range:  <10.0 ng/L- Negative Female for AMI  <15.0 ng/L- Negative Male for AMI  >=10 - Abnormal Female indicating possible myocardial injury.  >=15 - Abnormal Male indicating possible myocardial injury.   Clinicians would have to utilize clinical acumen, EKG, Troponin, and serial changes to determine if it is an Acute Myocardial Infarction or myocardial injury due to an underlying chronic condition.        CBC WITH AUTO DIFFERENTIAL - Abnormal; Notable for the following components:    Hemoglobin 12.1 (*)     MCHC 29.1 (*)     Lymphocyte % 19.2 (*)     Eosinophil % 8.3 (*)     Immature Grans % 1.0 (*)     Eosinophils, Absolute 0.60 (*)     Immature Grans, Absolute 0.07 (*)     All other components within normal limits   BLOOD GAS, ARTERIAL - Abnormal; Notable for the following components:    pH, Arterial 7.259 (*)     pCO2, Arterial 78.4 (*)     pO2, Arterial 79.5 (*)     HCO3, Arterial 35.0 (*)     Base Excess, Arterial 5.8 (*)     All other components within normal limits   COVID-19 AND FLU A/B, NP SWAB IN TRANSPORT MEDIA 8-12 HR TAT - Normal    Narrative:     Fact sheet for providers: https://www.fda.gov/media/729246/download    Fact sheet for patients: https://www.fda.gov/media/105954/download    Test performed by PCR.   MAGNESIUM - Normal   APTT - Normal    Narrative:     The recommended Heparin therapeutic range is 68-97 seconds.   PROTIME-INR - Normal    Narrative:     Therapeutic range for most indications is 2.0-3.0 INR,  or 2.5-3.5 for mechanical heart valves.   D-DIMER, QUANTITATIVE - Normal    Narrative:     According to the assay 's published package insert, a normal (<500 ng/mL (FEU)) D-dimer result in conjunction with a non-high clinical probability  "assessment, excludes deep vein thrombosis (DVT) and pulmonary embolism (PE) with high sensitivity.    D-dimer values increase with age and this can make VTE exclusion of an older population difficult. To address this, the [employer], based on best available evidence and recent guidelines, recommends that clinicians use age-adjusted D-dimer thresholds in patients greater than 50 years of age with: a) a low probability of PE who do not meet all Pulmonary Embolism Rule Out Criteria, or b) in those with intermediate probability of PE.   The formula for an age-adjusted D-dimer cut-off is \"age*10\".  For example, a 60 year old patient would have an age-adjusted cut-off of 600 ng/mL (FEU) and an 80 year old 800 ng/mL (FEU).     BLOOD CULTURE   BLOOD CULTURE   RAINBOW DRAW    Narrative:     The following orders were created for panel order [place] Draw.  Procedure                               Abnormality         Status                     ---------                               -----------         ------                     Green Top (Gel)[[id number]]                                  Final result               Lavender Top[[id number]]                                     Final result               Gold Top - SST[[id number]]                                   Final result               Light Blue Top[[id number]]                                   Final result                 Please view results for these tests on the individual orders.   URINALYSIS W/ CULTURE IF INDICATED   TROPONIN   BLOOD GAS, ARTERIAL   BNP (IN-HOUSE)   LACTIC ACID, PLASMA   CBC AND DIFFERENTIAL    Narrative:     The following orders were created for panel order CBC & Differential.  Procedure                               Abnormality         Status                     ---------                               -----------         ------                     CBC Auto Differential[[id number]]        Abnormal            Final result             "     Please view results for these tests on the individual orders.   GREEN TOP   LAVENDER TOP   GOLD TOP - SST   LIGHT BLUE TOP       Meds given in ED:   Medications   sodium chloride 0.9 % flush 10 mL (has no administration in time range)   sodium chloride 0.9 % flush 10 mL (has no administration in time range)   sodium chloride 0.9 % infusion (125 mL/hr Intravenous New Bag 3/14/23 1808)   cefTRIAXone (ROCEPHIN) 2 g/100 mL 0.9% NS IVPB (MBP) (2 g Intravenous New Bag 3/14/23 1829)   AZITHROMYCIN 500 MG/250 ML 0.9% NS IVPB (vial-mate) (has no administration in time range)     sodium chloride, 125 mL/hr, Last Rate: 125 mL/hr (03/14/23 1808)         NIH Stroke Scale:       Isolation/Infection(s):  No active isolations   COVID (rule out)     COVID Testing  Collected yes  Resulted negative  Nursing report ED to floor:  Mental status: alert  Ambulatory status: n/a  Precautions: n/a    ED nurse phone extentsiko- [phone]

## 2023-03-14 NOTE — Clinical Note
Level of Care: Telemetry [5]   Diagnosis: Pneumonia of left lower lobe due to infectious organism [[id number]]   Admitting Physician: MARIELENA JAUREGUI [[id number]]   Attending Physician: MARIELENA JAUREGUI [[id number]]   Certification: I Certify That Inpatient Hospital Services Are Medically Necessary For Greater Than 2 Midnights

## 2023-03-14 NOTE — H&P
[facility] Medicine  HISTORY AND PHYSICAL      Date of Admission: 3/14/2023  Primary Care Physician: Jaxson Zepeda MD    Subjective     Chief Complaint: Weakness, Confusion    History of Present Illness  Patient is a 77 year old male (PMHx HTN, HLD, DM, Black Lung disease, GERD) who presented to [facility] ED by EMS from home. Wife accompanies him and provided majority of the history. She reports he has not been feeling well for the last several days. In the last two days, she has noticed he really has been sleeping a lot and has not been eating appropriately for his diabetic status. She really noticed he seemed confused last night and he seems to have developed a tremor, but he states he has always had this. He does use oxygen chronically at home on 2L due to black lung disease, but has had some increased shortness of breath at times. They deny cough, congestion, fever, or known sick contacts.     ED findings pertinent for CXR findings of a left lower lobe pneumonia. CBC unremarkable; CMP shows mild creatinine elevation at 1.73. CT head negative. Hospitalist team consulted for admission with ABG pending. ABG resulted later to reveal respiratory acidosis with hypercapnia and BiPAP was initiated in ED.      Review of Systems   Constitutional: Positive for activity change, appetite change and fatigue. Negative for chills, diaphoresis and fever.   HENT: Negative for congestion, ear pain, postnasal drip, rhinorrhea, sinus pressure, sinus pain, sneezing, sore throat and trouble swallowing.    Eyes: Negative for photophobia, pain and discharge.   Respiratory: Positive for shortness of breath. Negative for cough, chest tightness and wheezing.    Cardiovascular: Negative for chest pain, palpitations and leg swelling.   Gastrointestinal: Negative for abdominal pain, blood in stool, constipation, diarrhea, nausea and vomiting.   Endocrine: Negative for polydipsia, polyphagia  and polyuria.   Genitourinary: Negative for difficulty urinating, dysuria, flank pain, frequency, hematuria and urgency.   Musculoskeletal: Negative for arthralgias, back pain, myalgias and neck pain.   Skin: Negative for color change, rash and wound.   Neurological: Positive for tremors. Negative for dizziness, seizures, syncope, weakness and headaches.   Hematological: Does not bruise/bleed easily.   Psychiatric/Behavioral: Positive for confusion. Negative for behavioral problems, hallucinations, sleep disturbance and suicidal ideas.        Otherwise complete ROS reviewed and negative except as mentioned in the HPI.    Past Medical History:   Past Medical History:   Diagnosis Date   • Acid reflux    • Black lung disease (HCC)    • Cataract    • Diabetes (HCC)    • Enlarged prostate    • High cholesterol    • Hypertension    • Kidney stone     multiple   • Obesity    • Psoriasis      Past Surgical History:  Past Surgical History:   Procedure Laterality Date   • AMPUTATION Left     lower arm and hand   • CATARACT EXTRACTION W/ INTRAOCULAR LENS IMPLANT Left 01/22/2021    Procedure: REMIOVE CATARACT AND IMPLANT  INTRAOCULAR LENS;  Surgeon: Sam Isaacs MD;  Location: [facility];  Service: Ophthalmology;  Laterality: Left;   • CATARACT EXTRACTION W/ INTRAOCULAR LENS IMPLANT Right 01/29/2021    Procedure: REMOVE CATARACT AND IMPLANT INTRAOCULAR LENS;  Surgeon: Sam Isaacs MD;  Location: [facility] OR;  Service: Ophthalmology;  Laterality: Right;   • CHOLECYSTECTOMY OPEN     • COLONOSCOPY N/A 11/09/2018    Procedure: COLONOSCOPY;  Surgeon: Sukhdeep Mae MD;  Location: [facility] ENDOSCOPY;  Service: Gastroenterology   • COLONOSCOPY N/A 03/28/2022    Procedure: COLONOSCOPY;  Surgeon: Sukhdeep Mae MD;  Location: [facility] ENDOSCOPY;  Service: Gastroenterology;  Laterality: N/A;   • CYSTOSCOPY N/A 12/16/2017    Procedure: CYSTOSCOPY WITH CLOT EVACUATION;  Surgeon: Prosper Ariza MD;  Location: [facility] OR;   Service:    • CYSTOSCOPY, URETEROSCOPY, RETROGRADE PYELOGRAM, STENT INSERTION Left 12/15/2017    Procedure: CYSTOSCOPY LEFT URETEROSCOPY RETROGRADE PYELOGRAM HOLMIUM LASER STENT INSERTION;  Surgeon: Prosper Ariza MD;  Location: [facility];  Service:    • CYSTOSCOPY, URETEROSCOPY, RETROGRADE PYELOGRAM, STENT INSERTION Left 08/10/2022    Procedure: CYSTOSCOPY LEFT RETROGRADE URETEROSCOPY LASER LITHOTRIPSY STENT PLACMENT;  Surgeon: Prosper Ariza MD;  Location: [facility];  Service: Urology;  Laterality: Left;   • DENTAL PROCEDURE      multiple extractions   • EXTRACORPOREAL SHOCK WAVE LITHOTRIPSY (ESWL)     • EXTRACORPOREAL SHOCKWAVE LITHOTRIPSY (ESWL), STENT INSERTION/REMOVAL Left 02/03/2017    Procedure: LEFT EXTRACORPOREAL SHOCKWAVE LITHOTRIPSY, POSSIBLE CYSTOSCOPY, POSSIBLE STENT REMOVAL ;  Surgeon: Prosper Ariza MD;  Location: [facility];  Service:    • EXTRACORPOREAL SHOCKWAVE LITHOTRIPSY (ESWL), STENT INSERTION/REMOVAL Left 05/04/2018    Procedure: EXTRACORPOREAL SHOCKWAVE LITHOTRIPSY;  Surgeon: Prosper Ariza MD;  Location: [facility];  Service: Urology   • KIDNEY STONE SURGERY     • SCROTAL SURGERY      trauma   • WISDOM TOOTH EXTRACTION      took bottom ones     Social History:  reports that he has never smoked. He has been exposed to tobacco smoke. He has never used smokeless tobacco. He reports that he does not currently use alcohol. He reports that he does not use drugs.    Family History: family history includes Breast cancer in his sister; Cancer in his son; Congenital heart disease in his brother; Diabetes in his brother and brother; Early death in his brother; Heart attack in his brother, father, sister, and sister; Hypertension in his mother; Leukemia in his brother; No Known Problems in his son; Psoriasis in his father; Stroke in his mother.       Allergies:  Allergies   Allergen Reactions   • Toradol [Ketorolac Tromethamine] Anaphylaxis       Medications:  Prior to Admission medications     Medication Sig Start Date End Date Taking? Authorizing Provider   acetaminophen (TYLENOL) 500 MG tablet Take 500 mg by mouth Every Night.    Esvin Murray MD   albuterol sulfate  (90 Base) MCG/ACT inhaler Inhale 2 puffs Every 4 (Four) Hours As Needed for Wheezing.    Esvin Murray MD   aspirin 81 MG chewable tablet Chew 81 mg Daily.    Esvin Murray MD   atorvastatin (LIPITOR) 20 MG tablet Take 1 tablet by mouth Every Night. 3/23/20   Cassi Lemus MD   Cyanocobalamin (B-12) 1000 MCG tablet 1 TABLET BY MOUTH ON MONDAY, WEDNESDAY AND FRIDAY 12/5/22   Noé Coleman MD   esomeprazole (nexIUM) 20 MG capsule Take 20 mg by mouth Every Morning Before Breakfast.    Esvin Murray MD   folic acid (FOLVITE) 1 MG tablet Take 1 mg by mouth Take As Directed. Monday, Wednesday, and Friday    Esvin Murray MD   furosemide (LASIX) 20 MG tablet Take 1 tablet by mouth Daily.  Patient taking differently: Take 20 mg by mouth Take As Directed. 3/23/20   Cassi Lemus MD   glimepiride (AMARYL) 2 MG tablet Take 2 mg by mouth 2 (Two) Times a Day.    Esvin Murray MD   isosorbide mononitrate (IMDUR) 30 MG 24 hr tablet Take 1 tablet by mouth Daily for 30 days. 9/1/22 10/1/22  Virginia Franklin MD   Lancet Devices (OneTouch Delica Plus Lancing) [facility]  1/26/23   Esvin Murray MD   Lancets (OneTouch Delica Plus [id number]) misc  1/26/23   Esvin Murray MD   loperamide (IMODIUM) 2 MG capsule Take 2 mg by mouth 2 (Two) Times a Day As Needed for diarrhea.    Esvin Murray MD   metFORMIN ER (GLUCOPHAGE-XR) 500 MG 24 hr tablet  2/6/23   Esvin Murray MD   nebivolol (Bystolic) 10 MG tablet Take 1 tablet by mouth Daily. 3/23/20   Cassi Lemus MD   O2 (OXYGEN) Inhale 2 L/min Every Night.    Esvin Murray MD   omeprazole (priLOSEC) 20 MG capsule Take 20 mg by mouth Daily.    Esvin Murray MD   OneTouch Ultra test strip  3/15/21    "ProviderEsvin MD   tamsulosin (FLOMAX) 0.4 MG capsule 24 hr capsule Take 1 capsule by mouth Every Night.    Esvin Murray MD   umeclidinium-vilanterol (ANORO ELLIPTA) 62.5-25 MCG/INH aerosol powder  inhaler Inhale 1 puff Daily. 4/14/21 4/15/22  Esvin Murray MD I have utilized all available immediate resources to obtain, update, and review the patient's current medications.    Objective     Vital Signs: /83   Pulse 68   Temp 97.9 °F (36.6 °C) (Oral)   Resp 20   Ht 165.1 cm (65\")   Wt 98.1 kg (216 lb 3.2 oz)   SpO2 98%   BMI 35.98 kg/m²   Physical Exam  Vitals and nursing note reviewed.   Constitutional:       Appearance: Normal appearance. He is obese.   HENT:      Head: Normocephalic and atraumatic.      Right Ear: External ear normal.      Left Ear: External ear normal.      Nose: Nose normal. No congestion or rhinorrhea.      Mouth/Throat:      Mouth: Mucous membranes are moist.      Pharynx: Oropharynx is clear.   Eyes:      General: No scleral icterus.     Extraocular Movements: Extraocular movements intact.      Conjunctiva/sclera: Conjunctivae normal.   Neck:      Vascular: No carotid bruit.   Cardiovascular:      Rate and Rhythm: Normal rate and regular rhythm.      Pulses: Normal pulses.      Heart sounds: Normal heart sounds. No murmur heard.  Pulmonary:      Effort: Pulmonary effort is normal.      Breath sounds: Examination of the right-lower field reveals decreased breath sounds. Examination of the left-lower field reveals decreased breath sounds. Decreased breath sounds present. No wheezing, rhonchi or rales.   Abdominal:      General: Abdomen is flat. Bowel sounds are normal.      Palpations: Abdomen is soft.      Tenderness: There is no abdominal tenderness. There is no guarding.   Musculoskeletal:         General: No swelling or deformity. Normal range of motion.      Cervical back: Normal range of motion.      Comments: Left lower arm/hand surgically absent "   Skin:     General: Skin is warm and dry.      Capillary Refill: Capillary refill takes less than 2 seconds.      Findings: No rash.   Neurological:      General: No focal deficit present.      Mental Status: He is alert.      GCS: GCS eye subscore is 4. GCS verbal subscore is 5. GCS motor subscore is 6.      Motor: No weakness.   Psychiatric:         Mood and Affect: Mood normal.         Behavior: Behavior normal.         Thought Content: Thought content normal.         Judgment: Judgment normal.              Results Reviewed:  Lab Results (last 24 hours)     Procedure Component Value Units Date/Time    High Sensitivity Troponin T [[id number]] Collected: 03/14/23 1815    Specimen: Blood Updated: 03/14/23 1818    Lactic Acid, Plasma [[id number]] Collected: 03/14/23 1815    Specimen: Blood Updated: 03/14/23 1818    COVID-19 and FLU A/B PCR - Swab, Nasopharynx [[id number]]  (Normal) Collected: 03/14/23 1733    Specimen: Swab from Nasopharynx Updated: 03/14/23 1808     COVID19 Not Detected     Influenza A PCR Not Detected     Influenza B PCR Not Detected    Narrative:      Fact sheet for providers: https://www.fda.gov/media/695934/download    Fact sheet for patients: https://www.fda.gov/media/236466/download    Test performed by PCR.    [place] Draw [[id number]] Collected: 03/14/23 1646    Specimen: Blood Updated: 03/14/23 1800    Narrative:      The following orders were created for panel order [place] Draw.  Procedure                               Abnormality         Status                     ---------                               -----------         ------                     Green Top (Gel)[[id number]]                                  Final result               Lavender Top[[id number]]                                     Final result               Gold Top - SST[[id number]]                                   Final result               Light Blue Top[[id number]]                                   Final result              "    Please view results for these tests on the individual orders.    Gold Top - SST [[id number]] Collected: 03/14/23 1646    Specimen: Blood Updated: 03/14/23 1800     Extra Tube Hold for add-ons.     Comment: Auto resulted.       Light Blue Top [[id number]] Collected: 03/14/23 1646    Specimen: Blood Updated: 03/14/23 1800     Extra Tube Hold for add-ons.     Comment: Auto resulted       Green Top (Gel) [[id number]] Collected: 03/14/23 1646    Specimen: Blood Updated: 03/14/23 1800     Extra Tube Hold for add-ons.     Comment: Auto resulted.       Lavender Top [[id number]] Collected: 03/14/23 1646    Specimen: Blood Updated: 03/14/23 1800     Extra Tube hold for add-on     Comment: Auto resulted       D-dimer, Quantitative [[id number]]  (Normal) Collected: 03/14/23 1646    Specimen: Blood Updated: 03/14/23 1752     D-Dimer, Quantitative 710 ng/mL (FEU)     Narrative:      According to the assay 's published package insert, a normal (<500 ng/mL (FEU)) D-dimer result in conjunction with a non-high clinical probability assessment, excludes deep vein thrombosis (DVT) and pulmonary embolism (PE) with high sensitivity.    D-dimer values increase with age and this can make VTE exclusion of an older population difficult. To address this, the [employer], based on best available evidence and recent guidelines, recommends that clinicians use age-adjusted D-dimer thresholds in patients greater than 50 years of age with: a) a low probability of PE who do not meet all Pulmonary Embolism Rule Out Criteria, or b) in those with intermediate probability of PE.   The formula for an age-adjusted D-dimer cut-off is \"age*10\".  For example, a 60 year old patient would have an age-adjusted cut-off of 600 ng/mL (FEU) and an 80 year old 800 ng/mL (FEU).      aPTT [[id number]]  (Normal) Collected: 03/14/23 1646    Specimen: Blood Updated: 03/14/23 1752     PTT 27.1 seconds     Narrative:      The recommended Heparin " therapeutic range is 68-97 seconds.    Protime-INR [[id number]]  (Normal) Collected: 03/14/23 1646    Specimen: Blood Updated: 03/14/23 1751     Protime 12.6 Seconds      INR 0.95    Narrative:      Therapeutic range for most indications is 2.0-3.0 INR,  or 2.5-3.5 for mechanical heart valves.    Comprehensive Metabolic Panel [[id number]]  (Abnormal) Collected: 03/14/23 1646    Specimen: Blood Updated: 03/14/23 1734     Glucose 114 mg/dL      BUN 15 mg/dL      Creatinine 1.73 mg/dL      Sodium 143 mmol/L      Potassium 4.7 mmol/L      Comment: Slight hemolysis detected by analyzer. Results may be affected.        Chloride 101 mmol/L      CO2 33.0 mmol/L      Calcium 8.9 mg/dL      Total Protein 7.7 g/dL      Albumin 4.1 g/dL      ALT (SGPT) 16 U/L      AST (SGOT) 16 U/L      Alkaline Phosphatase 80 U/L      Total Bilirubin 0.3 mg/dL      Globulin 3.6 gm/dL      A/G Ratio 1.1 g/dL      BUN/Creatinine Ratio 8.7     Anion Gap 9.0 mmol/L      eGFR 40.7 mL/min/1.73     Narrative:      GFR Normal >60  Chronic Kidney Disease <60  Kidney Failure <15    The GFR formula is only valid for adults with stable renal function between ages 18 and 70.    Single High Sensitivity Troponin T [[id number]]  (Abnormal) Collected: 03/14/23 1646    Specimen: Blood Updated: 03/14/23 1723     HS Troponin T 51 ng/L     Narrative:      High Sensitive Troponin T Reference Range:  <10.0 ng/L- Negative Female for AMI  <15.0 ng/L- Negative Male for AMI  >=10 - Abnormal Female indicating possible myocardial injury.  >=15 - Abnormal Male indicating possible myocardial injury.   Clinicians would have to utilize clinical acumen, EKG, Troponin, and serial changes to determine if it is an Acute Myocardial Infarction or myocardial injury due to an underlying chronic condition.         Magnesium [[id number]]  (Normal) Collected: 03/14/23 1646    Specimen: Blood Updated: 03/14/23 1723     Magnesium 2.1 mg/dL     CBC & Differential [[id number]]  (Abnormal)  Collected: 03/14/23 1646    Specimen: Blood Updated: 03/14/23 1655    Narrative:      The following orders were created for panel order CBC & Differential.  Procedure                               Abnormality         Status                     ---------                               -----------         ------                     CBC Auto Differential[[id number]]        Abnormal            Final result                 Please view results for these tests on the individual orders.    CBC Auto Differential [[id number]]  (Abnormal) Collected: 03/14/23 1646    Specimen: Blood Updated: 03/14/23 1655     WBC 7.23 10*3/mm3      RBC 4.30 10*6/mm3      Hemoglobin 12.1 g/dL      Hematocrit 41.6 %      MCV 96.7 fL      MCH 28.1 pg      MCHC 29.1 g/dL      RDW 13.5 %      RDW-SD 47.6 fl      MPV 8.5 fL      Platelets 263 10*3/mm3      Neutrophil % 61.7 %      Lymphocyte % 19.2 %      Monocyte % 8.7 %      Eosinophil % 8.3 %      Basophil % 1.1 %      Immature Grans % 1.0 %      Neutrophils, Absolute 4.46 10*3/mm3      Lymphocytes, Absolute 1.39 10*3/mm3      Monocytes, Absolute 0.63 10*3/mm3      Eosinophils, Absolute 0.60 10*3/mm3      Basophils, Absolute 0.08 10*3/mm3      Immature Grans, Absolute 0.07 10*3/mm3      nRBC 0.0 /100 WBC         Imaging Results (Last 24 Hours)     Procedure Component Value Units Date/Time    CT Head Without Contrast [[id number]] Collected: 03/14/23 1716     Updated: 03/14/23 1733    Narrative:      EXAM: CT HEAD WITHOUT CONTRAST    CLINICAL HISTORY:  77 years Male,Mental status change, unknown  cause    COMPARISON:  None    Axial with coronal and sagittal images were obtained without  intravenous contrast.  This examination was performed according  to our departmental dose optimization program which includes  automated exposure control, adjustment of the MA and kV according  to patient size, and/or use of iterative reconstruction  technique.    FINDINGS:  Mild low attenuation in the bilateral  periventricular/deep  cerebral white matter, most consistent with mild chronic small  vessel ischemic change. No acute intracranial hemorrhage, mass  effect, hydrocephalus, or CT signs of acute infarct.    Mild mucosal thickening along the bilateral maxillary sinuses.  Opacification of several left mastoid air cells.      Impression:      1. Mild chronic small vessel ischemic changes of acute  intracranial process.  2. Opacification of several left mastoid air cells.    Electronically signed by:  Finesse Granados MD  3/14/2023 5:31 PM CDT  Workstation: [id number]    XR Chest 1 View [[id number]] Collected: 03/14/23 1641     Updated: 03/14/23 1704    Narrative:        PORTABLE CHEST    HISTORY: Weakness. Dizziness. Altered mental status.    Portable AP upright film of the chest was obtained at 4:39 PM.  COMPARISON: August 30, 2022    FINDINGS:   EKG leads.  Subsegmental infiltrate left lung base compatible with pneumonia.  Small left pleural effusion.  The heart is not enlarged.  The pulmonary vasculature is not increased.  No pneumothorax.  No acute osseous abnormality.  Degenerative changes each shoulder.      Impression:      CONCLUSION:  Subsegmental infiltrate left lung base compatible with pneumonia.  Small left pleural effusion.    [id number]    Electronically signed by:  Farzad Mcadams MD  3/14/2023 5:02 PM CDT  Workstation: [id number]        I have personally reviewed and interpreted the radiology studies and ECG obtained at time of admission.       Assessment / Plan   Treatment Plan:  1.  Pneumonia of left lower lobe due to infectious organism   -IV Rocephin and Azithromycin initiated; blood cultures pending   -continue oxygen, inhalers/nebs, steroids    2. Respiratory acidosis with hypercapnia   -BiPAP initiated in ED; will trend ABG     3. Metabolic encephalopathy    -secondary to above; continue to monitor for improvement/worsening    4. FLORENCIA   -creatinine today 1.73   -IVF overnight; recheck labs in AM    5.  "DM   -Consistent carbohydrate diet, Acchecks with sliding scale insulin    6. HTN   -continue home meds    7. HLD   -continue statin    8. BPH   -continue Flomax    VTE PPx Lovenox  GERD PPx Protonix PO      Medical Decision Making  Number and Complexity of problems: 8, high complexity    Conditions and Status:        Condition is unchanged.       [facility] Data  External documents reviewed: The patient's recent past medical charts for this facility as well as outside facilities via the \"Care Everywhere\" application of Epic reviewed.     Discussed with: ED provider, attending physician     I have utilized all available immediate resources to obtain, update, or review the patient's current medications (including all prescriptions, over-the-counter products, herbals, cannabis/cannabidiol products, and vitamin/mineral/dietary (nutritional) supplements).     Care Planning  Shared decision making: Patient updated on current status and informed of proposed care plan; is in agreement with plan  Code status and discussions: Full code  I confirmed that the patient's Advance Care Plan is present, code status is documented, or surrogate decision maker is listed in the patient's medical record.       Disposition  Social Determinants of Health that impact treatment or disposition: n/a  I expect the patient to be discharged to home in 2-3 days.     The patient was seen and examined by me on 03/14/23 .        Electronically signed by Maxine Wyman PA-C, 03/14/23, 18:18 CDT.              "

## 2023-03-14 NOTE — ED PROVIDER NOTES
Subjective   History of Present Illness  76yo male pmh significant htn/hyperlipidemia/dm/copd/ckd/bph presents ED with wife reporting patient with 4d hx decreased po intake/generalized weakness/tremulousness and 2d hx confusion.  ROS neg fever/chills/cough/chest pain/abd pain/n/v/d.    History provided by:  Patient and spouse  History limited by:  Mental status change  Illness      Review of Systems   Unable to perform ROS: Mental status change       Past Medical History:   Diagnosis Date   • Acid reflux    • Black lung disease (HCC)    • Cataract    • Diabetes (HCC)    • Enlarged prostate    • High cholesterol    • Hypertension    • Kidney stone     multiple   • Obesity    • Psoriasis        Allergies   Allergen Reactions   • Toradol [Ketorolac Tromethamine] Anaphylaxis       Past Surgical History:   Procedure Laterality Date   • AMPUTATION Left     lower arm and hand   • CATARACT EXTRACTION W/ INTRAOCULAR LENS IMPLANT Left 01/22/2021    Procedure: REMIOVE CATARACT AND IMPLANT  INTRAOCULAR LENS;  Surgeon: Sam Isaacs MD;  Location: [facility];  Service: Ophthalmology;  Laterality: Left;   • CATARACT EXTRACTION W/ INTRAOCULAR LENS IMPLANT Right 01/29/2021    Procedure: REMOVE CATARACT AND IMPLANT INTRAOCULAR LENS;  Surgeon: Sam Isaacs MD;  Location: [facility] OR;  Service: Ophthalmology;  Laterality: Right;   • CHOLECYSTECTOMY OPEN     • COLONOSCOPY N/A 11/09/2018    Procedure: COLONOSCOPY;  Surgeon: Sukhdeep Mae MD;  Location: [facility] ENDOSCOPY;  Service: Gastroenterology   • COLONOSCOPY N/A 03/28/2022    Procedure: COLONOSCOPY;  Surgeon: Sukhdeep Mae MD;  Location: [facility] ENDOSCOPY;  Service: Gastroenterology;  Laterality: N/A;   • CYSTOSCOPY N/A 12/16/2017    Procedure: CYSTOSCOPY WITH CLOT EVACUATION;  Surgeon: Prosper Ariza MD;  Location: [facility] OR;  Service:    • CYSTOSCOPY, URETEROSCOPY, RETROGRADE PYELOGRAM, STENT INSERTION Left 12/15/2017    Procedure: CYSTOSCOPY LEFT  URETEROSCOPY RETROGRADE PYELOGRAM HOLMIUM LASER STENT INSERTION;  Surgeon: Prosper Ariza MD;  Location: [facility];  Service:    • CYSTOSCOPY, URETEROSCOPY, RETROGRADE PYELOGRAM, STENT INSERTION Left 08/10/2022    Procedure: CYSTOSCOPY LEFT RETROGRADE URETEROSCOPY LASER LITHOTRIPSY STENT PLACMENT;  Surgeon: Prosper Ariza MD;  Location: [facility];  Service: Urology;  Laterality: Left;   • DENTAL PROCEDURE      multiple extractions   • EXTRACORPOREAL SHOCK WAVE LITHOTRIPSY (ESWL)     • EXTRACORPOREAL SHOCKWAVE LITHOTRIPSY (ESWL), STENT INSERTION/REMOVAL Left 02/03/2017    Procedure: LEFT EXTRACORPOREAL SHOCKWAVE LITHOTRIPSY, POSSIBLE CYSTOSCOPY, POSSIBLE STENT REMOVAL ;  Surgeon: Prosper Ariza MD;  Location: [facility];  Service:    • EXTRACORPOREAL SHOCKWAVE LITHOTRIPSY (ESWL), STENT INSERTION/REMOVAL Left 05/04/2018    Procedure: EXTRACORPOREAL SHOCKWAVE LITHOTRIPSY;  Surgeon: Prosper Ariza MD;  Location: [facility];  Service: Urology   • KIDNEY STONE SURGERY     • SCROTAL SURGERY      trauma   • WISDOM TOOTH EXTRACTION      took bottom ones       Family History   Problem Relation Age of Onset   • Hypertension Mother    • Stroke Mother    • Heart attack Father    • Psoriasis Father    • Heart attack Sister    • Heart attack Sister    • Breast cancer Sister    • Leukemia Brother    • Diabetes Brother    • Heart attack Brother    • Diabetes Brother    • Congenital heart disease Brother    • Early death Brother    • Cancer Son    • No Known Problems Son        Social History     Socioeconomic History   • Marital status:    Tobacco Use   • Smoking status: Never     Passive exposure: Past   • Smokeless tobacco: Never   • Tobacco comments:     For 16 years   Vaping Use   • Vaping Use: Never used   Substance and Sexual Activity   • Alcohol use: Not Currently     Comment: none since 21 y/o   • Drug use: Never   • Sexual activity: Defer           Objective   Physical Exam  Vitals and nursing note reviewed.    Constitutional:       Appearance: He is obese. He is ill-appearing.   HENT:      Head: Normocephalic and atraumatic.      Right Ear: External ear normal.      Left Ear: External ear normal.      Nose: Nose normal.      Mouth/Throat:      Mouth: Mucous membranes are moist.   Eyes:      Conjunctiva/sclera: Conjunctivae normal.      Pupils: Pupils are equal, round, and reactive to light.   Neck:      Trachea: Trachea and phonation normal.      Meningeal: Brudzinski's sign absent.   Cardiovascular:      Rate and Rhythm: Normal rate and regular rhythm.      Pulses: Normal pulses.      Heart sounds: Normal heart sounds. No murmur heard.    No friction rub. No gallop.   Pulmonary:      Effort: Pulmonary effort is normal. No respiratory distress.      Breath sounds: Normal breath sounds. No wheezing, rhonchi or rales.   Abdominal:      General: Abdomen is protuberant. Bowel sounds are normal. There is no distension.      Palpations: Abdomen is soft.      Tenderness: There is no abdominal tenderness. There is no guarding or rebound.   Musculoskeletal:         General: No swelling or deformity.      Cervical back: Normal range of motion and neck supple. No rigidity.      Right lower leg: No edema.      Left lower leg: No edema.   Lymphadenopathy:      Cervical: No cervical adenopathy.   Skin:     General: Skin is warm and dry.      Coloration: Skin is pale.   Neurological:      General: No focal deficit present.      Mental Status: He is alert.      GCS: GCS eye subscore is 4. GCS verbal subscore is 4. GCS motor subscore is 6.      Sensory: Sensation is intact.      Motor: Motor function is intact.         ECG 12 Lead Dyspnea      Date/Time: 3/14/2023 6:11 PM  Performed by: Burt Chavez MD  Authorized by: Burt Chavez MD   Interpreted by physician  Rhythm: sinus rhythm  Rate: normal  BPM: 78  QRS axis: right  Conduction: incomplete RBBB and LPFB  ST Segments: ST segments normal  T Waves: T waves normal  Other: no  other findings  Clinical impression: abnormal ECG                 ED Course      Labs Reviewed   COMPREHENSIVE METABOLIC PANEL - Abnormal; Notable for the following components:       Result Value    Glucose 114 (*)     Creatinine 1.73 (*)     CO2 33.0 (*)     eGFR 40.7 (*)     All other components within normal limits    Narrative:     GFR Normal >60  Chronic Kidney Disease <60  Kidney Failure <15    The GFR formula is only valid for adults with stable renal function between ages 18 and 70.   SINGLE HSTROPONIN T - Abnormal; Notable for the following components:    HS Troponin T 51 (*)     All other components within normal limits    Narrative:     High Sensitive Troponin T Reference Range:  <10.0 ng/L- Negative Female for AMI  <15.0 ng/L- Negative Male for AMI  >=10 - Abnormal Female indicating possible myocardial injury.  >=15 - Abnormal Male indicating possible myocardial injury.   Clinicians would have to utilize clinical acumen, EKG, Troponin, and serial changes to determine if it is an Acute Myocardial Infarction or myocardial injury due to an underlying chronic condition.        CBC WITH AUTO DIFFERENTIAL - Abnormal; Notable for the following components:    Hemoglobin 12.1 (*)     MCHC 29.1 (*)     Lymphocyte % 19.2 (*)     Eosinophil % 8.3 (*)     Immature Grans % 1.0 (*)     Eosinophils, Absolute 0.60 (*)     Immature Grans, Absolute 0.07 (*)     All other components within normal limits   BLOOD GAS, ARTERIAL - Abnormal; Notable for the following components:    pH, Arterial 7.259 (*)     pCO2, Arterial 78.4 (*)     pO2, Arterial 79.5 (*)     HCO3, Arterial 35.0 (*)     Base Excess, Arterial 5.8 (*)     All other components within normal limits   COVID-19 AND FLU A/B, NP SWAB IN TRANSPORT MEDIA 8-12 HR TAT - Normal    Narrative:     Fact sheet for providers: https://www.fda.gov/media/142623/download    Fact sheet for patients: https://www.fda.gov/media/701443/download    Test performed by PCR.   MAGNESIUM -  "Normal   APTT - Normal    Narrative:     The recommended Heparin therapeutic range is 68-97 seconds.   PROTIME-INR - Normal    Narrative:     Therapeutic range for most indications is 2.0-3.0 INR,  or 2.5-3.5 for mechanical heart valves.   D-DIMER, QUANTITATIVE - Normal    Narrative:     According to the assay 's published package insert, a normal (<500 ng/mL (FEU)) D-dimer result in conjunction with a non-high clinical probability assessment, excludes deep vein thrombosis (DVT) and pulmonary embolism (PE) with high sensitivity.    D-dimer values increase with age and this can make VTE exclusion of an older population difficult. To address this, the [employer], based on best available evidence and recent guidelines, recommends that clinicians use age-adjusted D-dimer thresholds in patients greater than 50 years of age with: a) a low probability of PE who do not meet all Pulmonary Embolism Rule Out Criteria, or b) in those with intermediate probability of PE.   The formula for an age-adjusted D-dimer cut-off is \"age*10\".  For example, a 60 year old patient would have an age-adjusted cut-off of 600 ng/mL (FEU) and an 80 year old 800 ng/mL (FEU).     BLOOD CULTURE   BLOOD CULTURE   RAINBOW DRAW    Narrative:     The following orders were created for panel order [place] Draw.  Procedure                               Abnormality         Status                     ---------                               -----------         ------                     Green Top (Gel)[[id number]]                                  Final result               Lavender Top[[id number]]                                     Final result               Gold Top - SST[[id number]]                                   Final result               Light Blue Top[[id number]]                                   Final result                 Please view results for these tests on the individual orders.   URINALYSIS W/ CULTURE IF INDICATED "   TROPONIN   BLOOD GAS, ARTERIAL   BNP (IN-HOUSE)   LACTIC ACID, PLASMA   CBC AND DIFFERENTIAL    Narrative:     The following orders were created for panel order CBC & Differential.  Procedure                               Abnormality         Status                     ---------                               -----------         ------                     CBC Auto Differential[[id number]]        Abnormal            Final result                 Please view results for these tests on the individual orders.   GREEN TOP   LAVENDER TOP   GOLD TOP - SST   LIGHT BLUE TOP     CT Head Without Contrast    Result Date: 3/14/2023  Narrative: EXAM: CT HEAD WITHOUT CONTRAST CLINICAL HISTORY:  77 years Male,Mental status change, unknown cause COMPARISON:  None Axial with coronal and sagittal images were obtained without intravenous contrast.  This examination was performed according to our departmental dose optimization program which includes automated exposure control, adjustment of the MA and kV according to patient size, and/or use of iterative reconstruction technique. FINDINGS: Mild low attenuation in the bilateral periventricular/deep cerebral white matter, most consistent with mild chronic small vessel ischemic change. No acute intracranial hemorrhage, mass effect, hydrocephalus, or CT signs of acute infarct. Mild mucosal thickening along the bilateral maxillary sinuses. Opacification of several left mastoid air cells.     Impression: 1. Mild chronic small vessel ischemic changes of acute intracranial process. 2. Opacification of several left mastoid air cells. Electronically signed by:  Finesse Granados MD  3/14/2023 5:31 PM CDT Workstation: [id number]    XR Chest 1 View    Result Date: 3/14/2023  Narrative: PORTABLE CHEST HISTORY: Weakness. Dizziness. Altered mental status. Portable AP upright film of the chest was obtained at 4:39 PM. COMPARISON: August 30, 2022 FINDINGS: EKG leads. Subsegmental infiltrate left lung base  compatible with pneumonia. Small left pleural effusion. The heart is not enlarged. The pulmonary vasculature is not increased. No pneumothorax. No acute osseous abnormality. Degenerative changes each shoulder.     Impression: CONCLUSION: Subsegmental infiltrate left lung base compatible with pneumonia. Small left pleural effusion. [id number] Electronically signed by:  Farzad Mcadams MD  3/14/2023 5:02 PM CDT Workstation: [id number]                                         [facility] [place]    Final diagnoses:   Pneumonia of left lower lobe due to infectious organism   Metabolic encephalopathy   Acute respiratory failure with hypoxia and hypercapnia (HCC)       ED Disposition  ED Disposition     ED Disposition   Decision to Admit    Condition   --    Comment   Level of Care: Telemetry [5]   Admitting Physician: MARIELENA JAUREGUI [[id number]]   Attending Physician: MARIELENA JAUREGUI [[id number]]   Patient Class: Inpatient [101]               No follow-up provider specified.       Medication List      No changes were made to your prescriptions during this visit.          Burt Chavez MD  03/14/23 1818       Burt Chavez MD  03/14/23 5026

## 2023-03-15 LAB
ANION GAP SERPL CALCULATED.3IONS-SCNC: 8 MMOL/L (ref 5–15)
ARTERIAL PATENCY WRIST A: ABNORMAL
ARTERIAL PATENCY WRIST A: ABNORMAL
ATMOSPHERIC PRESS: 756 MMHG
ATMOSPHERIC PRESS: 756 MMHG
BASE EXCESS BLDA CALC-SCNC: 4.8 MMOL/L (ref 0–2)
BASE EXCESS BLDA CALC-SCNC: 5 MMOL/L (ref 0–2)
BDY SITE: ABNORMAL
BDY SITE: ABNORMAL
BUN SERPL-MCNC: 16 MG/DL (ref 8–23)
BUN/CREAT SERPL: 10.3 (ref 7–25)
CALCIUM SPEC-SCNC: 8.1 MG/DL (ref 8.6–10.5)
CHLORIDE SERPL-SCNC: 105 MMOL/L (ref 98–107)
CO2 SERPL-SCNC: 28 MMOL/L (ref 22–29)
CREAT SERPL-MCNC: 1.55 MG/DL (ref 0.76–1.27)
EGFRCR SERPLBLD CKD-EPI 2021: 46.4 ML/MIN/1.73
GAS FLOW AIRWAY: 3 LPM
GLUCOSE BLDC GLUCOMTR-MCNC: 158 MG/DL (ref 70–130)
GLUCOSE BLDC GLUCOMTR-MCNC: 191 MG/DL (ref 70–130)
GLUCOSE BLDC GLUCOMTR-MCNC: 250 MG/DL (ref 70–130)
GLUCOSE SERPL-MCNC: 183 MG/DL (ref 65–99)
HCO3 BLDA-SCNC: 32.2 MMOL/L (ref 20–26)
HCO3 BLDA-SCNC: 32.5 MMOL/L (ref 20–26)
Lab: ABNORMAL
Lab: ABNORMAL
MODALITY: ABNORMAL
MODALITY: ABNORMAL
PCO2 BLDA: 60.7 MM HG (ref 35–45)
PCO2 BLDA: 64.1 MM HG (ref 35–45)
PH BLDA: 7.31 PH UNITS (ref 7.35–7.45)
PH BLDA: 7.33 PH UNITS (ref 7.35–7.45)
PO2 BLDA: 60.7 MM HG (ref 83–108)
PO2 BLDA: 64 MM HG (ref 83–108)
POTASSIUM SERPL-SCNC: 3.8 MMOL/L (ref 3.5–5.2)
SAO2 % BLDCOA: 92.7 % (ref 94–99)
SAO2 % BLDCOA: 93.4 % (ref 94–99)
SODIUM SERPL-SCNC: 141 MMOL/L (ref 136–145)
VENTILATOR MODE: ABNORMAL
VENTILATOR MODE: ABNORMAL

## 2023-03-15 PROCEDURE — 36600 WITHDRAWAL OF ARTERIAL BLOOD: CPT

## 2023-03-15 PROCEDURE — 94799 UNLISTED PULMONARY SVC/PX: CPT

## 2023-03-15 PROCEDURE — 94660 CPAP INITIATION&MGMT: CPT

## 2023-03-15 PROCEDURE — 80048 BASIC METABOLIC PNL TOTAL CA: CPT | Performed by: PHYSICIAN ASSISTANT

## 2023-03-15 PROCEDURE — 82803 BLOOD GASES ANY COMBINATION: CPT

## 2023-03-15 PROCEDURE — 63710000001 INSULIN ASPART PER 5 UNITS: Performed by: PHYSICIAN ASSISTANT

## 2023-03-15 PROCEDURE — 97166 OT EVAL MOD COMPLEX 45 MIN: CPT

## 2023-03-15 PROCEDURE — 25010000002 CEFTRIAXONE PER 250 MG: Performed by: PHYSICIAN ASSISTANT

## 2023-03-15 PROCEDURE — 25010000002 AZITHROMYCIN PER 500 MG: Performed by: PHYSICIAN ASSISTANT

## 2023-03-15 PROCEDURE — 99221 1ST HOSP IP/OBS SF/LOW 40: CPT | Performed by: NURSE PRACTITIONER

## 2023-03-15 PROCEDURE — 25010000002 ENOXAPARIN PER 10 MG: Performed by: PHYSICIAN ASSISTANT

## 2023-03-15 PROCEDURE — 25010000002 METHYLPREDNISOLONE PER 125 MG: Performed by: PHYSICIAN ASSISTANT

## 2023-03-15 PROCEDURE — 94761 N-INVAS EAR/PLS OXIMETRY MLT: CPT

## 2023-03-15 PROCEDURE — 82962 GLUCOSE BLOOD TEST: CPT

## 2023-03-15 PROCEDURE — 97162 PT EVAL MOD COMPLEX 30 MIN: CPT

## 2023-03-15 RX ORDER — BUDESONIDE 0.5 MG/2ML
0.5 INHALANT ORAL
Status: DISCONTINUED | OUTPATIENT
Start: 2023-03-15 | End: 2023-03-20 | Stop reason: HOSPADM

## 2023-03-15 RX ORDER — IPRATROPIUM BROMIDE AND ALBUTEROL SULFATE 2.5; .5 MG/3ML; MG/3ML
3 SOLUTION RESPIRATORY (INHALATION) 3 TIMES DAILY
Status: DISCONTINUED | OUTPATIENT
Start: 2023-03-15 | End: 2023-03-20 | Stop reason: HOSPADM

## 2023-03-15 RX ADMIN — ASPIRIN 81 MG: 81 TABLET, CHEWABLE ORAL at 08:29

## 2023-03-15 RX ADMIN — FOLIC ACID 1 MG: 1 TABLET ORAL at 08:29

## 2023-03-15 RX ADMIN — Medication 10 ML: at 08:29

## 2023-03-15 RX ADMIN — CEFTRIAXONE SODIUM 1 G: 1 INJECTION, POWDER, FOR SOLUTION INTRAMUSCULAR; INTRAVENOUS at 20:20

## 2023-03-15 RX ADMIN — INSULIN ASPART 2 UNITS: 100 INJECTION, SOLUTION INTRAVENOUS; SUBCUTANEOUS at 21:34

## 2023-03-15 RX ADMIN — Medication 10 ML: at 21:32

## 2023-03-15 RX ADMIN — IPRATROPIUM BROMIDE AND ALBUTEROL SULFATE 3 ML: 2.5; .5 SOLUTION RESPIRATORY (INHALATION) at 06:55

## 2023-03-15 RX ADMIN — AZITHROMYCIN MONOHYDRATE 500 MG: 500 INJECTION, POWDER, LYOPHILIZED, FOR SOLUTION INTRAVENOUS at 22:18

## 2023-03-15 RX ADMIN — IPRATROPIUM BROMIDE AND ALBUTEROL SULFATE 3 ML: 2.5; .5 SOLUTION RESPIRATORY (INHALATION) at 20:11

## 2023-03-15 RX ADMIN — IPRATROPIUM BROMIDE AND ALBUTEROL SULFATE 3 ML: 2.5; .5 SOLUTION RESPIRATORY (INHALATION) at 14:24

## 2023-03-15 RX ADMIN — ENOXAPARIN SODIUM 40 MG: 40 INJECTION SUBCUTANEOUS at 21:31

## 2023-03-15 RX ADMIN — METHYLPREDNISOLONE SODIUM SUCCINATE 60 MG: 125 INJECTION, POWDER, FOR SOLUTION INTRAMUSCULAR; INTRAVENOUS at 21:31

## 2023-03-15 RX ADMIN — TAMSULOSIN HYDROCHLORIDE 0.4 MG: 0.4 CAPSULE ORAL at 21:31

## 2023-03-15 RX ADMIN — INSULIN ASPART 2 UNITS: 100 INJECTION, SOLUTION INTRAVENOUS; SUBCUTANEOUS at 08:29

## 2023-03-15 RX ADMIN — INSULIN ASPART 6 UNITS: 100 INJECTION, SOLUTION INTRAVENOUS; SUBCUTANEOUS at 18:02

## 2023-03-15 RX ADMIN — BUDESONIDE 0.5 MG: 0.5 SUSPENSION RESPIRATORY (INHALATION) at 20:11

## 2023-03-15 RX ADMIN — PANTOPRAZOLE SODIUM 40 MG: 40 TABLET, DELAYED RELEASE ORAL at 05:52

## 2023-03-15 RX ADMIN — NEBIVOLOL HYDROCHLORIDE 10 MG: 5 TABLET ORAL at 08:29

## 2023-03-15 RX ADMIN — ATORVASTATIN CALCIUM 20 MG: 20 TABLET, FILM COATED ORAL at 21:31

## 2023-03-15 NOTE — PLAN OF CARE
Goal Outcome Evaluation:  Plan of Care Reviewed With: patient           Outcome Evaluation: OT eval completed. Co-eval with PT. Pt sidelying in bed upon arrival. Pt required increased time to become alert after sleeping. Pt was Mod (I) for bed mobility. SBA for sit <> stand t/f, toilet t/f, and mobility in the room. Pt required Max A for LB dressing and reported his wife helps him with this at home. Pt with BUE MMT of 5/5. Pt demonstrates slight imapired safety with ADLs and mobility. I/P OT will follow. Goals established. Anticipate d/c home with assist as needed.

## 2023-03-15 NOTE — PROGRESS NOTES
Inpatient Wound Care Progress Note  [facility]     Patient Name: Rolando Novoa  : 1945  MRN: [id number]  Today's Date: 3/15/2023 Room Number: 409/1      Admit Date: 3/14/2023  Attending: Suad Pritchett MD        Reason For Consult: multiple wounds    77 year old male consult for wound care. Patient has wound to sacral spine that has been there for years. It appears to be psoriatic lesion that patient has scratched at causing it to open. Patient states he does have history of psoriasis and several other psoriatic lesions are noted on lower extremities. Wound on sacral spine measures 8 cm x 5 cm. Wound bed is not open. Area is dry and scratches are scabbed over. He also has a wound on the right popliteal region measuring 0.75 x 1 cm and this appears to be a healing psoriatic lesion as well. Both wounds present on admission.         Visit Dx:    ICD-10-CM ICD-9-CM   1. Pneumonia of left lower lobe due to infectious organism  J18.9 486   2. Metabolic encephalopathy  G93.41 348.31   3. Acute respiratory failure with hypoxia and hypercapnia (HCC)  J96.01 518.81    J96.02      Patient Active Problem List   Diagnosis   • Hyperlipemia   • SOB (shortness of breath)   • Hematuria syndrome   • Psoriasis   • Hypertension   • High cholesterol   • Enlarged prostate   • Diabetes mellitus (HCC)   • Acid reflux   • Calculus of kidney   • Kidney stones   • Chronic respiratory failure with hypoxia (HCC)   • Class 2 obesity due to excess calories without serious comorbidity with body mass index (BMI) of 37.0 to 37.9 in adult   • Black lung (HCC)   • Chronic restrictive lung disease   • Hx of colonic polyps   • Encounter for screening for malignant neoplasm of colon   • Thrombocytosis   • Anemia   • Leucocytosis   • Folate deficiency   • Acute right-sided thoracic back pain   • Chest wall pain   • CKD (chronic kidney disease) stage 2, GFR 60-89 ml/min   • Diabetes type 2, uncontrolled   • Diabetic  neuropathy (HCC)   • Nocturnal hypoxemia   • Supplemental oxygen dependent   • Change in bowel habits   • Diarrhea   • Pneumonia of left lower lobe due to infectious organism       History:   Past Medical History:   Diagnosis Date   • Acid reflux    • Black lung disease (HCC)    • Cataract    • Diabetes (HCC)    • Enlarged prostate    • High cholesterol    • Hypertension    • Kidney stone     multiple   • Obesity    • Psoriasis      Past Surgical History:   Procedure Laterality Date   • AMPUTATION Left     lower arm and hand   • CATARACT EXTRACTION W/ INTRAOCULAR LENS IMPLANT Left 01/22/2021    Procedure: REMIOVE CATARACT AND IMPLANT  INTRAOCULAR LENS;  Surgeon: Sam Isaacs MD;  Location: [facility] OR;  Service: Ophthalmology;  Laterality: Left;   • CATARACT EXTRACTION W/ INTRAOCULAR LENS IMPLANT Right 01/29/2021    Procedure: REMOVE CATARACT AND IMPLANT INTRAOCULAR LENS;  Surgeon: Sam Isaacs MD;  Location: [facility] OR;  Service: Ophthalmology;  Laterality: Right;   • CHOLECYSTECTOMY OPEN     • COLONOSCOPY N/A 11/09/2018    Procedure: COLONOSCOPY;  Surgeon: Sukhdeep Mae MD;  Location: [facility] ENDOSCOPY;  Service: Gastroenterology   • COLONOSCOPY N/A 03/28/2022    Procedure: COLONOSCOPY;  Surgeon: Sukhdeep Mae MD;  Location: [facility] ENDOSCOPY;  Service: Gastroenterology;  Laterality: N/A;   • CYSTOSCOPY N/A 12/16/2017    Procedure: CYSTOSCOPY WITH CLOT EVACUATION;  Surgeon: Prosper Ariza MD;  Location: [facility];  Service:    • CYSTOSCOPY, URETEROSCOPY, RETROGRADE PYELOGRAM, STENT INSERTION Left 12/15/2017    Procedure: CYSTOSCOPY LEFT URETEROSCOPY RETROGRADE PYELOGRAM HOLMIUM LASER STENT INSERTION;  Surgeon: Prosper Ariza MD;  Location: [facility];  Service:    • CYSTOSCOPY, URETEROSCOPY, RETROGRADE PYELOGRAM, STENT INSERTION Left 08/10/2022    Procedure: CYSTOSCOPY LEFT RETROGRADE URETEROSCOPY LASER LITHOTRIPSY STENT PLACMENT;  Surgeon: Prosper Ariza MD;  Location: [facility];   Service: Urology;  Laterality: Left;   • DENTAL PROCEDURE      multiple extractions   • EXTRACORPOREAL SHOCK WAVE LITHOTRIPSY (ESWL)     • EXTRACORPOREAL SHOCKWAVE LITHOTRIPSY (ESWL), STENT INSERTION/REMOVAL Left 02/03/2017    Procedure: LEFT EXTRACORPOREAL SHOCKWAVE LITHOTRIPSY, POSSIBLE CYSTOSCOPY, POSSIBLE STENT REMOVAL ;  Surgeon: Prosper Ariza MD;  Location: [facility];  Service:    • EXTRACORPOREAL SHOCKWAVE LITHOTRIPSY (ESWL), STENT INSERTION/REMOVAL Left 05/04/2018    Procedure: EXTRACORPOREAL SHOCKWAVE LITHOTRIPSY;  Surgeon: Prosper Ariza MD;  Location: [facility];  Service: Urology   • KIDNEY STONE SURGERY     • SCROTAL SURGERY      trauma   • WISDOM TOOTH EXTRACTION      took bottom ones     Social History     Socioeconomic History   • Marital status:    Tobacco Use   • Smoking status: Never     Passive exposure: Past   • Smokeless tobacco: Never   • Tobacco comments:     For 16 years   Vaping Use   • Vaping Use: Never used   Substance and Sexual Activity   • Alcohol use: Not Currently     Comment: none since 19 y/o   • Drug use: Never   • Sexual activity: Defer       Allergies:  Allergies   Allergen Reactions   • Toradol [Ketorolac Tromethamine] Anaphylaxis       Medications:    Current Facility-Administered Medications:   •  acetaminophen (TYLENOL) tablet 650 mg, 650 mg, Oral, Q4H PRN, Garo, Maxine C, PA-C  •  aspirin chewable tablet 81 mg, 81 mg, Oral, Daily, Garo, Maxine C, PA-C, 81 mg at 03/15/23 0829  •  atorvastatin (LIPITOR) tablet 20 mg, 20 mg, Oral, Nightly, Garo, Maxine C, PA-C, 20 mg at 03/14/23 2129  •  AZITHROMYCIN 500 MG/250 ML 0.9% NS IVPB (vial-mate), 500 mg, Intravenous, Q24H, Garo, Maxine C, PA-C  •  budesonide (PULMICORT) nebulizer solution 0.5 mg, 0.5 mg, Nebulization, BID - RT, Cristian Gupta MD  •  cefTRIAXone (ROCEPHIN) 1 g/100 mL 0.9% NS (MBP), 1 g, Intravenous, Q24H, Garo, Maxine C, PA-C  •  dextrose (D50W) (25 g/50 mL) IV injection 25 g, 25 g,  Intravenous, Q15 Min PRN, Maxine Wyman PA-C  •  dextrose (GLUTOSE) oral gel 15 g, 15 g, Oral, Q15 Min PRN, Maxine Wyman PA-C  •  Enoxaparin Sodium (LOVENOX) syringe 40 mg, 40 mg, Subcutaneous, Daily, Maxine Wyman PA-C, 40 mg at 03/14/23 2129  •  folic acid (FOLVITE) tablet 1 mg, 1 mg, Oral, Once per day on Mon Wed Fri, Maxine Wyman PA-C, 1 mg at 03/15/23 0829  •  glucagon (human recombinant) (GLUCAGEN DIAGNOSTIC) injection 1 mg, 1 mg, Intramuscular, Q15 Min PRN, Maxine Wyman PA-C  •  Insulin Aspart (novoLOG) injection 0-9 Units, 0-9 Units, Subcutaneous, 4x Daily AC & at Bedtime, Maxine Wyman PA-C, 2 Units at 03/15/23 0829  •  ipratropium-albuterol (DUO-NEB) nebulizer solution 3 mL, 3 mL, Nebulization, Q8H - RT, Maxine Wyman PA-DARLENE, 3 mL at 03/15/23 0655  •  methylPREDNISolone sodium succinate (SOLU-Medrol) injection 60 mg, 60 mg, Intravenous, Daily, Maxine Wyman PA-DARLENE, 60 mg at 03/14/23 2129  •  nebivolol (BYSTOLIC) tablet 10 mg, 10 mg, Oral, Daily, Maxine Wyman PA-DARLENE, 10 mg at 03/15/23 0829  •  O2 (OXYGEN), 2 L/min, Inhalation, Nightly, Maxine Wyman PA-C  •  ondansetron (ZOFRAN) tablet 4 mg, 4 mg, Oral, Q6H PRN **OR** ondansetron (ZOFRAN) injection 4 mg, 4 mg, Intravenous, Q6H PRN, Maxine Wyman PA-DARLENE  •  pantoprazole (PROTONIX) EC tablet 40 mg, 40 mg, Oral, Q AM, Maxine Wyman PA-C, 40 mg at 03/15/23 0552  •  sodium chloride 0.9 % flush 10 mL, 10 mL, Intravenous, PRN, Emergency, Triage Protocol, MD  •  [COMPLETED] Insert Peripheral IV, , , Once **AND** sodium chloride 0.9 % flush 10 mL, 10 mL, Intravenous, PRN, Burt Chavez MD  •  sodium chloride 0.9 % flush 10 mL, 10 mL, Intravenous, Q12H, Garo, Maxine C, PA-C, 10 mL at 03/15/23 0829  •  sodium chloride 0.9 % flush 10 mL, 10 mL, Intravenous, PRN, Garo, Maxine C, PA-C  •  sodium chloride 0.9 % infusion 40 mL, 40 mL, Intravenous, PRN, Garo, Maxine C, PA-C  •  tamsulosin (FLOMAX) 24 hr  capsule 0.4 mg, 0.4 mg, Oral, Nightly, Garo, Maxine C, PA-C, 0.4 mg at 03/14/23 2130    Results Review:  Lab Results (last 48 hours)     Procedure Component Value Units Date/Time    POC Glucose Once [[id number]]  (Abnormal) Collected: 03/15/23 0733    Specimen: Blood Updated: 03/15/23 0756     Glucose 158 mg/dL      Comment: RN NotifiedOperator: [id number] [facility] HEATHERMeter ID: [id number]       Basic Metabolic Panel [[id number]]  (Abnormal) Collected: 03/15/23 0604    Specimen: Blood Updated: 03/15/23 0701     Glucose 183 mg/dL      BUN 16 mg/dL      Creatinine 1.55 mg/dL      Sodium 141 mmol/L      Potassium 3.8 mmol/L      Chloride 105 mmol/L      CO2 28.0 mmol/L      Calcium 8.1 mg/dL      BUN/Creatinine Ratio 10.3     Anion Gap 8.0 mmol/L      eGFR 46.4 mL/min/1.73     Narrative:      GFR Normal >60  Chronic Kidney Disease <60  Kidney Failure <15    The GFR formula is only valid for adults with stable renal function between ages 18 and 70.    Blood Gas, Arterial - [[id number]]  (Abnormal) Collected: 03/15/23 0435    Specimen: Arterial Blood Updated: 03/15/23 0437     Site Right Radial     Elder's Test N/A     pH, Arterial 7.333 pH units      Comment: 84 Value below reference range        pCO2, Arterial 60.7 mm Hg      Comment: 83 Value above reference range        pO2, Arterial 60.7 mm Hg      Comment: 84 Value below reference range        HCO3, Arterial 32.2 mmol/L      Comment: 83 Value above reference range        Base Excess, Arterial 5.0 mmol/L      Comment: 83 Value above reference range        O2 Saturation, Arterial 92.7 %      Comment: 84 Value below reference range        Barometric Pressure for Blood Gas 756 mmHg      Modality Nasal Cannula     Flow Rate 3.0 lpm      Ventilator Mode NA     Collected by RT     Comment: Meter: [id number]     :  [id number]       Blood Gas, Arterial - [[id number]]  (Abnormal) Collected: 03/15/23 0144    Specimen: Arterial Blood Updated: 03/15/23 0145      Site Arterial Line     Elder's Test N/A     pH, Arterial 7.313 pH units      Comment: 84 Value below reference range        pCO2, Arterial 64.1 mm Hg      Comment: 83 Value above reference range        pO2, Arterial 64.0 mm Hg      Comment: 84 Value below reference range        HCO3, Arterial 32.5 mmol/L      Comment: 83 Value above reference range        Base Excess, Arterial 4.8 mmol/L      Comment: 83 Value above reference range        O2 Saturation, Arterial 93.4 %      Comment: 84 Value below reference range        Barometric Pressure for Blood Gas 756 mmHg      Modality Room Air     Ventilator Mode NA     Collected by RT     Comment: Meter: [id number]     :  [id number]       High Sensitivity Troponin T 2Hr [[id number]]  (Abnormal) Collected: 03/14/23 2100    Specimen: Blood Updated: 03/14/23 2119     HS Troponin T 46 ng/L      Troponin T Delta 1 ng/L     Narrative:      High Sensitive Troponin T Reference Range:  <10.0 ng/L- Negative Female for AMI  <15.0 ng/L- Negative Male for AMI  >=10 - Abnormal Female indicating possible myocardial injury.  >=15 - Abnormal Male indicating possible myocardial injury.   Clinicians would have to utilize clinical acumen, EKG, Troponin, and serial changes to determine if it is an Acute Myocardial Infarction or myocardial injury due to an underlying chronic condition.         POC Glucose Once [[id number]]  (Normal) Collected: 03/14/23 2003    Specimen: Blood Updated: 03/14/23 2018     Glucose 86 mg/dL      Comment: RN NotifiedOperator: [id number] JEAN CONSTANCEMeter ID: [id number]       High Sensitivity Troponin T [[id number]]  (Abnormal) Collected: 03/14/23 1917    Specimen: Blood Updated: 03/14/23 2009     HS Troponin T 45 ng/L     Narrative:      High Sensitive Troponin T Reference Range:  <10.0 ng/L- Negative Female for AMI  <15.0 ng/L- Negative Male for AMI  >=10 - Abnormal Female indicating possible myocardial injury.  >=15 - Abnormal Male indicating  possible myocardial injury.   Clinicians would have to utilize clinical acumen, EKG, Troponin, and serial changes to determine if it is an Acute Myocardial Infarction or myocardial injury due to an underlying chronic condition.         BNP [[id number]]  (Normal) Collected: 03/14/23 1917    Specimen: Blood Updated: 03/14/23 2009     proBNP 770.4 pg/mL     Narrative:      Among patients with dyspnea, NT-proBNP is highly sensitive for the detection of acute congestive heart failure. In addition NT-proBNP of <300 pg/ml effectively rules out acute congestive heart failure with 99% negative predictive value.    Results may be falsely decreased if patient taking Biotin.      Urinalysis, Microscopic Only - Straight Cath [[id number]]  (Abnormal) Collected: 03/14/23 1841    Specimen: Urine from Straight Cath Updated: 03/14/23 1923     RBC, UA 3-5 /HPF      WBC, UA 6-12 /HPF      Bacteria, UA 1+ /HPF      Squamous Epithelial Cells, UA 3-5 /HPF      Transitional Epithelial Cells, UA 3-6 /HPF      Hyaline Casts, UA 7-12 /LPF      Methodology Manual Light Microscopy    Urine Culture - Urine, Straight Cath [[id number]] Collected: 03/14/23 1841    Specimen: Urine from Straight Cath Updated: 03/14/23 1923    Urinalysis With Culture If Indicated - Straight Cath [[id number]]  (Abnormal) Collected: 03/14/23 1841    Specimen: Urine from Straight Cath Updated: 03/14/23 1911     Color, UA Yellow     Appearance, UA Clear     pH, UA 5.5     Specific Gravity, UA 1.014     Glucose, UA Negative     Ketones, UA Negative     Bilirubin, UA Negative     Blood, UA Small (1+)     Protein,  mg/dL (2+)     Leuk Esterase, UA Negative     Nitrite, UA Negative     Urobilinogen, UA 0.2 E.U./dL    Narrative:      In absence of clinical symptoms, the presence of pyuria, bacteria, and/or nitrites on the urinalysis result does not correlate with infection.    Lactic Acid, Plasma [[id number]]  (Normal) Collected: 03/14/23 1815    Specimen: Blood Updated:  03/14/23 1844     Lactate 1.2 mmol/L     Blood Gas, Arterial - [[id number]]  (Abnormal) Collected: 03/14/23 1829    Specimen: Arterial Blood Updated: 03/14/23 1832     Site Right Radial     Elder's Test N/A     pH, Arterial 7.259 pH units      Comment: 84 Value below reference range        pCO2, Arterial 78.4 mm Hg      Comment: 86 Value above critical limit        pO2, Arterial 79.5 mm Hg      Comment: 84 Value below reference range        HCO3, Arterial 35.0 mmol/L      Comment: 83 Value above reference range        Base Excess, Arterial 5.8 mmol/L      Comment: 83 Value above reference range        O2 Saturation, Arterial 96.4 %      Barometric Pressure for Blood Gas 756 mmHg      Modality Nasal Cannula     Flow Rate 3.0 lpm      Ventilator Mode NA     Collected by GITA     Comment: Meter: [id number]     :  [id number]       Blood Culture - Blood, Hand, Right [[id number]] Collected: 03/14/23 1815    Specimen: Blood from Hand, Right Updated: 03/14/23 1820    Blood Culture - Blood, Arm, Left [[id number]] Collected: 03/14/23 1815    Specimen: Blood from Arm, Left Updated: 03/14/23 1820    COVID-19 and FLU A/B PCR - Swab, Nasopharynx [[id number]]  (Normal) Collected: 03/14/23 1733    Specimen: Swab from Nasopharynx Updated: 03/14/23 1808     COVID19 Not Detected     Influenza A PCR Not Detected     Influenza B PCR Not Detected    Narrative:      Fact sheet for providers: https://www.fda.gov/media/299541/download    Fact sheet for patients: https://www.fda.gov/media/347172/download    Test performed by PCR.    [place] Draw [[id number]] Collected: 03/14/23 1646    Specimen: Blood Updated: 03/14/23 1800    Narrative:      The following orders were created for panel order [place] Draw.  Procedure                               Abnormality         Status                     ---------                               -----------         ------                     Green Top (Gel)[[id number]]                                   Final result               Lavender Top[[id number]]                                     Final result               Gold Top - SST[[id number]]                                   Final result               Light Blue Top[[id number]]                                   Final result                 Please view results for these tests on the individual orders.    Gold Top - SST [[id number]] Collected: 03/14/23 1646    Specimen: Blood Updated: 03/14/23 1800     Extra Tube Hold for add-ons.     Comment: Auto resulted.       Light Blue Top [[id number]] Collected: 03/14/23 1646    Specimen: Blood Updated: 03/14/23 1800     Extra Tube Hold for add-ons.     Comment: Auto resulted       Green Top (Gel) [[id number]] Collected: 03/14/23 1646    Specimen: Blood Updated: 03/14/23 1800     Extra Tube Hold for add-ons.     Comment: Auto resulted.       Lavender Top [[id number]] Collected: 03/14/23 1646    Specimen: Blood Updated: 03/14/23 1800     Extra Tube hold for add-on     Comment: Auto resulted       D-dimer, Quantitative [[id number]]  (Normal) Collected: 03/14/23 1646    Specimen: Blood Updated: 03/14/23 1752     D-Dimer, Quantitative 710 ng/mL (FEU)     Narrative:      According to the assay 's published package insert, a normal (<500 ng/mL (FEU)) D-dimer result in conjunction with a non-high clinical probability assessment, excludes deep vein thrombosis (DVT) and pulmonary embolism (PE) with high sensitivity.    D-dimer values increase with age and this can make VTE exclusion of an older population difficult. To address this, the [employer], based on best available evidence and recent guidelines, recommends that clinicians use age-adjusted D-dimer thresholds in patients greater than 50 years of age with: a) a low probability of PE who do not meet all Pulmonary Embolism Rule Out Criteria, or b) in those with intermediate probability of PE.   The formula for an age-adjusted D-dimer cut-off is  "\"age*10\".  For example, a 60 year old patient would have an age-adjusted cut-off of 600 ng/mL (FEU) and an 80 year old 800 ng/mL (FEU).      aPTT [[id number]]  (Normal) Collected: 03/14/23 1646    Specimen: Blood Updated: 03/14/23 1752     PTT 27.1 seconds     Narrative:      The recommended Heparin therapeutic range is 68-97 seconds.    Protime-INR [[id number]]  (Normal) Collected: 03/14/23 1646    Specimen: Blood Updated: 03/14/23 1751     Protime 12.6 Seconds      INR 0.95    Narrative:      Therapeutic range for most indications is 2.0-3.0 INR,  or 2.5-3.5 for mechanical heart valves.    Comprehensive Metabolic Panel [[id number]]  (Abnormal) Collected: 03/14/23 1646    Specimen: Blood Updated: 03/14/23 1734     Glucose 114 mg/dL      BUN 15 mg/dL      Creatinine 1.73 mg/dL      Sodium 143 mmol/L      Potassium 4.7 mmol/L      Comment: Slight hemolysis detected by analyzer. Results may be affected.        Chloride 101 mmol/L      CO2 33.0 mmol/L      Calcium 8.9 mg/dL      Total Protein 7.7 g/dL      Albumin 4.1 g/dL      ALT (SGPT) 16 U/L      AST (SGOT) 16 U/L      Alkaline Phosphatase 80 U/L      Total Bilirubin 0.3 mg/dL      Globulin 3.6 gm/dL      A/G Ratio 1.1 g/dL      BUN/Creatinine Ratio 8.7     Anion Gap 9.0 mmol/L      eGFR 40.7 mL/min/1.73     Narrative:      GFR Normal >60  Chronic Kidney Disease <60  Kidney Failure <15    The GFR formula is only valid for adults with stable renal function between ages 18 and 70.    Single High Sensitivity Troponin T [[id number]]  (Abnormal) Collected: 03/14/23 1646    Specimen: Blood Updated: 03/14/23 1723     HS Troponin T 51 ng/L     Narrative:      High Sensitive Troponin T Reference Range:  <10.0 ng/L- Negative Female for AMI  <15.0 ng/L- Negative Male for AMI  >=10 - Abnormal Female indicating possible myocardial injury.  >=15 - Abnormal Male indicating possible myocardial injury.   Clinicians would have to utilize clinical acumen, EKG, Troponin, and serial " changes to determine if it is an Acute Myocardial Infarction or myocardial injury due to an underlying chronic condition.         Magnesium [[id number]]  (Normal) Collected: 03/14/23 1646    Specimen: Blood Updated: 03/14/23 1723     Magnesium 2.1 mg/dL     CBC & Differential [[id number]]  (Abnormal) Collected: 03/14/23 1646    Specimen: Blood Updated: 03/14/23 1655    Narrative:      The following orders were created for panel order CBC & Differential.  Procedure                               Abnormality         Status                     ---------                               -----------         ------                     CBC Auto Differential[[id number]]        Abnormal            Final result                 Please view results for these tests on the individual orders.    CBC Auto Differential [[id number]]  (Abnormal) Collected: 03/14/23 1646    Specimen: Blood Updated: 03/14/23 1655     WBC 7.23 10*3/mm3      RBC 4.30 10*6/mm3      Hemoglobin 12.1 g/dL      Hematocrit 41.6 %      MCV 96.7 fL      MCH 28.1 pg      MCHC 29.1 g/dL      RDW 13.5 %      RDW-SD 47.6 fl      MPV 8.5 fL      Platelets 263 10*3/mm3      Neutrophil % 61.7 %      Lymphocyte % 19.2 %      Monocyte % 8.7 %      Eosinophil % 8.3 %      Basophil % 1.1 %      Immature Grans % 1.0 %      Neutrophils, Absolute 4.46 10*3/mm3      Lymphocytes, Absolute 1.39 10*3/mm3      Monocytes, Absolute 0.63 10*3/mm3      Eosinophils, Absolute 0.60 10*3/mm3      Basophils, Absolute 0.08 10*3/mm3      Immature Grans, Absolute 0.07 10*3/mm3      nRBC 0.0 /100 WBC         Imaging Results (Last 72 Hours)     Procedure Component Value Units Date/Time    CT Head Without Contrast [[id number]] Collected: 03/14/23 1716     Updated: 03/14/23 1733    Narrative:      EXAM: CT HEAD WITHOUT CONTRAST    CLINICAL HISTORY:  77 years Male,Mental status change, unknown  cause    COMPARISON:  None    Axial with coronal and sagittal images were obtained without  intravenous contrast.   This examination was performed according  to our departmental dose optimization program which includes  automated exposure control, adjustment of the MA and kV according  to patient size, and/or use of iterative reconstruction  technique.    FINDINGS:  Mild low attenuation in the bilateral periventricular/deep  cerebral white matter, most consistent with mild chronic small  vessel ischemic change. No acute intracranial hemorrhage, mass  effect, hydrocephalus, or CT signs of acute infarct.    Mild mucosal thickening along the bilateral maxillary sinuses.  Opacification of several left mastoid air cells.      Impression:      1. Mild chronic small vessel ischemic changes of acute  intracranial process.  2. Opacification of several left mastoid air cells.    Electronically signed by:  Finesse Granados MD  3/14/2023 5:31 PM CDT  Workstation: [id number]    XR Chest 1 View [[id number]] Collected: 03/14/23 1641     Updated: 03/14/23 1704    Narrative:        PORTABLE CHEST    HISTORY: Weakness. Dizziness. Altered mental status.    Portable AP upright film of the chest was obtained at 4:39 PM.  COMPARISON: August 30, 2022    FINDINGS:   EKG leads.  Subsegmental infiltrate left lung base compatible with pneumonia.  Small left pleural effusion.  The heart is not enlarged.  The pulmonary vasculature is not increased.  No pneumothorax.  No acute osseous abnormality.  Degenerative changes each shoulder.      Impression:      CONCLUSION:  Subsegmental infiltrate left lung base compatible with pneumonia.  Small left pleural effusion.    [id number]    Electronically signed by:  Farzad Mcadams MD  3/14/2023 5:02 PM CDT  Workstation: [id number]          Review of Systems:  Review of Systems    Physical Exam  Vitals reviewed. Nursing notes reviewed.  Constitutional:       Appearance: Well-developed.   Skin:     General: Skin is warm and dry.      Coloration: Skin is not pale.      Findings: Positive for wound, psoriasis  Neurological:      Mental  Status: Pt is alert and oriented to person, place, and time.       Physical Assessment:  Wound 03/14/23 1953 sacral spine (Active)   Wound Image   03/14/23 1953   Pressure Injury Stage 2 03/14/23 2000   Dressing Appearance open to air 03/15/23 0830   Closure None 03/14/23 2000   Base dry 03/15/23 0830   Drainage Amount none 03/14/23 2000   Dressing Care open to air 03/14/23 2000       Wound 03/14/23 1954 Right popliteal (Active)   Dressing Appearance open to air 03/15/23 0830   Closure None 03/15/23 0830   Base dry 03/15/23 0830   Drainage Amount none 03/14/23 2000   Dressing Care open to air 03/14/23 2000        Recommendation and Plan      Patient needs offloading of area. Informed patient to use a topical steroid cream and moisturize at home. No other recommendations at this time. Will sign off. Please call with questions.     Jolanta Johnson, RIVKA   3/15/2023   13:20 CDT

## 2023-03-15 NOTE — PLAN OF CARE
Problem: Adult Inpatient Plan of Care  Goal: Plan of Care Review  Flowsheets (Taken 3/15/2023 8752)  Progress: improving  Plan of Care Reviewed With: patient  Outcome Evaluation: 2L/NC, education on extention, calling for assistance to restroom, no acute changes/complaints this shift   Goal Outcome Evaluation:  Plan of Care Reviewed With: patient        Progress: improving  Outcome Evaluation: 2L/NC, education on extention, calling for assistance to restroom, no acute changes/complaints this shift

## 2023-03-15 NOTE — THERAPY EVALUATION
Patient Name: Rolando Novoa  : 1945    MRN: [id number]                              Today's Date: 3/15/2023       Admit Date: 3/14/2023    Visit Dx:     ICD-10-CM ICD-9-CM   1. Pneumonia of left lower lobe due to infectious organism  J18.9 486   2. Metabolic encephalopathy  G93.41 348.31   3. Acute respiratory failure with hypoxia and hypercapnia (HCC)  J96.01 518.81    J96.02    4. Impaired mobility and ADLs  Z74.09 V49.89    Z78.9      Patient Active Problem List   Diagnosis   • Hyperlipemia   • SOB (shortness of breath)   • Hematuria syndrome   • Psoriasis   • Hypertension   • High cholesterol   • Enlarged prostate   • Diabetes mellitus (HCC)   • Acid reflux   • Calculus of kidney   • Kidney stones   • Chronic respiratory failure with hypoxia (HCC)   • Class 2 obesity due to excess calories without serious comorbidity with body mass index (BMI) of 37.0 to 37.9 in adult   • Black lung (HCC)   • Chronic restrictive lung disease   • Hx of colonic polyps   • Encounter for screening for malignant neoplasm of colon   • Thrombocytosis   • Anemia   • Leucocytosis   • Folate deficiency   • Acute right-sided thoracic back pain   • Chest wall pain   • CKD (chronic kidney disease) stage 2, GFR 60-89 ml/min   • Diabetes type 2, uncontrolled   • Diabetic neuropathy (HCC)   • Nocturnal hypoxemia   • Supplemental oxygen dependent   • Change in bowel habits   • Diarrhea   • Pneumonia of left lower lobe due to infectious organism     Past Medical History:   Diagnosis Date   • Acid reflux    • Black lung disease (HCC)    • Cataract    • Diabetes (HCC)    • Enlarged prostate    • High cholesterol    • Hypertension    • Kidney stone     multiple   • Obesity    • Psoriasis      Past Surgical History:   Procedure Laterality Date   • AMPUTATION Left     lower arm and hand   • CATARACT EXTRACTION W/ INTRAOCULAR LENS IMPLANT Left 2021    Procedure: REMIOVE CATARACT AND IMPLANT  INTRAOCULAR LENS;  Surgeon: Sam Isaacs  MD Noé;  Location: [facility] OR;  Service: Ophthalmology;  Laterality: Left;   • CATARACT EXTRACTION W/ INTRAOCULAR LENS IMPLANT Right 01/29/2021    Procedure: REMOVE CATARACT AND IMPLANT INTRAOCULAR LENS;  Surgeon: Sam Isaacs MD;  Location: [facility] OR;  Service: Ophthalmology;  Laterality: Right;   • CHOLECYSTECTOMY OPEN     • COLONOSCOPY N/A 11/09/2018    Procedure: COLONOSCOPY;  Surgeon: Sukhdeep Mae MD;  Location: [facility] ENDOSCOPY;  Service: Gastroenterology   • COLONOSCOPY N/A 03/28/2022    Procedure: COLONOSCOPY;  Surgeon: Sukhdeep Mae MD;  Location: [facility] ENDOSCOPY;  Service: Gastroenterology;  Laterality: N/A;   • CYSTOSCOPY N/A 12/16/2017    Procedure: CYSTOSCOPY WITH CLOT EVACUATION;  Surgeon: Prosper Ariza MD;  Location: [facility];  Service:    • CYSTOSCOPY, URETEROSCOPY, RETROGRADE PYELOGRAM, STENT INSERTION Left 12/15/2017    Procedure: CYSTOSCOPY LEFT URETEROSCOPY RETROGRADE PYELOGRAM HOLMIUM LASER STENT INSERTION;  Surgeon: Prosper Ariza MD;  Location: [facility];  Service:    • CYSTOSCOPY, URETEROSCOPY, RETROGRADE PYELOGRAM, STENT INSERTION Left 08/10/2022    Procedure: CYSTOSCOPY LEFT RETROGRADE URETEROSCOPY LASER LITHOTRIPSY STENT PLACMENT;  Surgeon: Prosper Ariza MD;  Location: [facility];  Service: Urology;  Laterality: Left;   • DENTAL PROCEDURE      multiple extractions   • EXTRACORPOREAL SHOCK WAVE LITHOTRIPSY (ESWL)     • EXTRACORPOREAL SHOCKWAVE LITHOTRIPSY (ESWL), STENT INSERTION/REMOVAL Left 02/03/2017    Procedure: LEFT EXTRACORPOREAL SHOCKWAVE LITHOTRIPSY, POSSIBLE CYSTOSCOPY, POSSIBLE STENT REMOVAL ;  Surgeon: Prosper Ariza MD;  Location: [facility];  Service:    • EXTRACORPOREAL SHOCKWAVE LITHOTRIPSY (ESWL), STENT INSERTION/REMOVAL Left 05/04/2018    Procedure: EXTRACORPOREAL SHOCKWAVE LITHOTRIPSY;  Surgeon: Prosper Ariza MD;  Location: [facility];  Service: Urology   • KIDNEY STONE SURGERY     • SCROTAL SURGERY      trauma   • WISDOM TOOTH EXTRACTION       took bottom ones      General Information    No documentation.                  Mobility/ADL's     Row Name 03/15/23 1348          Bed Mobility    Bed Mobility bed mobility (all) activities  -CM     All Activities, Wheeler (Bed Mobility) modified independence  -CM     Assistive Device (Bed Mobility) bed rails;head of bed elevated  -CM     Row Name 03/15/23 1348          Transfers    Transfers sit-stand transfer;stand-sit transfer;toilet transfer  -CM     Row Name 03/15/23 1348          Sit-Stand Transfer    Sit-Stand Wheeler (Transfers) standby assist  -CM     Row Name 03/15/23 1348          Stand-Sit Transfer    Stand-Sit Wheeler (Transfers) standby assist  -CM     Row Name 03/15/23 1348          Toilet Transfer    Type (Toilet Transfer) stand-sit;sit-stand  -CM     Wheeler Level (Toilet Transfer) standby assist  -CM     Row Name 03/15/23 1348          Functional Mobility    Functional Mobility- Ind. Level standby assist  -CM     Functional Mobility-Distance (Feet) 20  -CM     Row Name 03/15/23 1348          Activities of Daily Living    BADL Assessment/Intervention lower body dressing  -CM     Row Name 03/15/23 1348          Lower Body Dressing Assessment/Training    Wheeler Level (Lower Body Dressing) lower body dressing skills;doff;don;socks;maximum assist (25% patient effort)  -CM     Position (Lower Body Dressing) edge of bed sitting  -CM     Comment, (Lower Body Dressing) Wife assists with socks at home  -CM           User Key  (r) = Recorded By, (t) = Taken By, (c) = Cosigned By    Initials Name Provider Type    Kiki Ku OT Occupational Therapist               Obj/Interventions     Row Name 03/15/23 1348          Sensory Assessment (Somatosensory)    Sensory Assessment (Somatosensory) UE sensation intact  -CM     Row Name 03/15/23 1348          Strength Comprehensive (MMT)    General Manual Muscle Testing (MMT) Assessment lower extremity strength deficits identified  -CM      Comment, General Manual Muscle Testing (MMT) Assessment BUE 5/5 grossly  -CM           User Key  (r) = Recorded By, (t) = Taken By, (c) = Cosigned By    Initials Name Provider Type    Kiki Ku OT Occupational Therapist               Goals/Plan     Row Name 03/15/23 1348          Transfer Goal 1 (OT)    Activity/Assistive Device (Transfer Goal 1, OT) toilet;shower chair  -CM     Barton Level/Cues Needed (Transfer Goal 1, OT) independent  -CM     Time Frame (Transfer Goal 1, OT) long term goal (LTG);by discharge  -CM     Progress/Outcome (Transfer Goal 1, OT) goal not met  -CM     Row Name 03/15/23 1348          Bathing Goal 1 (OT)    Activity/Device (Bathing Goal 1, OT) lower body bathing;shower chair  -CM     Barton Level/Cues Needed (Bathing Goal 1, OT) modified independence  -CM     Time Frame (Bathing Goal 1, OT) long term goal (LTG);by discharge  -CM     Progress/Outcomes (Bathing Goal 1, OT) goal not met  -CM     Row Name 03/15/23 1348          Dressing Goal 1 (OT)    Activity/Device (Dressing Goal 1, OT) lower body dressing  -CM     Barton/Cues Needed (Dressing Goal 1, OT) minimum assist (75% or more patient effort)  -CM     Time Frame (Dressing Goal 1, OT) by discharge;long term goal (LTG)  -CM     Progress/Outcome (Dressing Goal 1, OT) goal not met  -CM     Row Name 03/15/23 1348          Therapy Assessment/Plan (OT)    Planned Therapy Interventions (OT) activity tolerance training;adaptive equipment training;BADL retraining;cognitive/visual perception retraining;manual therapy/joint mobilization;IADL retraining;functional balance retraining;edema control/reduction;neuromuscular control/coordination retraining;occupation/activity based interventions;ROM/therapeutic exercise;patient/caregiver education/training;passive ROM/stretching;strengthening exercise;transfer/mobility retraining  -CM           User Key  (r) = Recorded By, (t) = Taken By, (c) = Cosigned By    Initials Name  Provider Type    CM Kiki Kauffman, NIITSH Occupational Therapist               Clinical Impression     Row Name 03/15/23 1343          Pain Assessment    Pretreatment Pain Rating 0/10 - no pain  -CM     Posttreatment Pain Rating 0/10 - no pain  -CM     Row Name 03/15/23 1349          Plan of Care Review    Plan of Care Reviewed With patient  -CM     Outcome Evaluation OT eval completed. Co-eval with PT. Pt sidelying in bed upon arrival. Pt required increased time to become alert after sleeping. Pt was Mod (I) for bed mobility. SBA for sit <> stand t/f, toilet t/f, and mobility in the room. Pt required Max A for LB dressing and reported his wife helps him with this at home. Pt with BUE MMT of 5/5. Pt demonstrates slight imapired safety with ADLs and mobility. I/P OT will follow. Goals established. Anticipate d/c home with assist as needed.  -CM     Row Name 03/15/23 1345          Therapy Assessment/Plan (OT)    Patient/Family Therapy Goal Statement (OT) return home  -CM     Rehab Potential (OT) good, to achieve stated therapy goals  -CM     Criteria for Skilled Therapeutic Interventions Met (OT) yes  -CM     Therapy Frequency (OT) other (see comments)  5-7 d/wk  -CM     Predicted Duration of Therapy Intervention (OT) untill d/c or all goals met  -     Row Name 03/15/23 1343          Therapy Plan Review/Discharge Plan (OT)    Anticipated Discharge Disposition (OT) home with assist  -     Row Name 03/15/23 134          Vital Signs    Pre Systolic BP Rehab 122  -CM     Pre Treatment Diastolic BP 57  -CM     Post Systolic BP Rehab 145  -CM     Post Treatment Diastolic BP 76  -CM     Pretreatment Heart Rate (beats/min) 77  -CM     Posttreatment Heart Rate (beats/min) 67  -CM     Pre SpO2 (%) 92  -CM     O2 Delivery Pre Treatment nasal cannula  2lpm  -CM     Post SpO2 (%) 92  -CM     O2 Delivery Post Treatment nasal cannula  -CM     Pre Patient Position Side Lying  -CM     Post Patient Position Sitting  -CM     Row Name  03/15/23 1348          Positioning and Restraints    Pre-Treatment Position in bed  -CM     Post Treatment Position bed  -CM     In Bed call light within reach;side lying left;encouraged to call for assist;exit alarm on  -CM           User Key  (r) = Recorded By, (t) = Taken By, (c) = Cosigned By    Initials Name Provider Type    Kiki Ku, NITISH Occupational Therapist               Outcome Measures     Row Name 03/15/23 1348          How much help from another is currently needed...    Putting on and taking off regular lower body clothing? 2  -CM     Bathing (including washing, rinsing, and drying) 3  -CM     Toileting (which includes using toilet bed pan or urinal) 4  -CM     Putting on and taking off regular upper body clothing 4  -CM     Taking care of personal grooming (such as brushing teeth) 4  -CM     Eating meals 4  -CM     AM-PAC 6 Clicks Score (OT) 21  -CM     Row Name 03/15/23 1413 03/15/23 0830       How much help from another person do you currently need...    Turning from your back to your side while in flat bed without using bedrails? 4  -GB 4  -KR    Moving from lying on back to sitting on the side of a flat bed without bedrails? 3  -GB 4  -KR    Moving to and from a bed to a chair (including a wheelchair)? 3  -GB 3  -KR    Standing up from a chair using your arms (e.g., wheelchair, bedside chair)? 3  -GB 3  -KR    Climbing 3-5 steps with a railing? 3  -GB 3  -KR    To walk in hospital room? 3  -GB 3  -KR    AM-PAC 6 Clicks Score (PT) 19  -GB 20  -KR    Row Name 03/15/23 1413 03/15/23 1348       Functional Assessment    Outcome Measure Options Tinetti  -GB AM-PAC 6 Clicks Daily Activity (OT)  -CM          User Key  (r) = Recorded By, (t) = Taken By, (c) = Cosigned By    Initials Name Provider Type    Shireen Robins, PT Physical Therapist    Deneen Arambula, RN Registered Nurse    Kiki Ku, OT Occupational Therapist                Occupational Therapy Education      Title: PT OT SLP Therapies (In Progress)     Topic: Occupational Therapy (In Progress)     Point: ADL training (Done)     Description:   Instruct learner(s) on proper safety adaptation and remediation techniques during self care or transfers.   Instruct in proper use of assistive devices.              Learning Progress Summary           Patient Acceptance, E,TB, VU by CM at 3/15/2023 1551    Comment: OT POC, Role of OT, d/c recommendations, PLB                   Point: Home exercise program (Not Started)     Description:   Instruct learner(s) on appropriate technique for monitoring, assisting and/or progressing therapeutic exercises/activities.              Learner Progress:  Not documented in this visit.          Point: Precautions (Done)     Description:   Instruct learner(s) on prescribed precautions during self-care and functional transfers.              Learning Progress Summary           Patient Acceptance, E,TB, VU by CM at 3/15/2023 1551    Comment: OT POC, Role of OT, d/c recommendations, PLB                   Point: Body mechanics (Done)     Description:   Instruct learner(s) on proper positioning and spine alignment during self-care, functional mobility activities and/or exercises.              Learning Progress Summary           Patient Acceptance, E,TB, VU by CM at 3/15/2023 1551    Comment: OT POC, Role of OT, d/c recommendations, PLB                               User Key     Initials Effective Dates Name Provider Type Discipline    CHARU 11/18/22 -  Kiki Kauffman OT Occupational Therapist OT              OT Recommendation and Plan  Planned Therapy Interventions (OT): activity tolerance training, adaptive equipment training, BADL retraining, cognitive/visual perception retraining, manual therapy/joint mobilization, IADL retraining, functional balance retraining, edema control/reduction, neuromuscular control/coordination retraining, occupation/activity based interventions, ROM/therapeutic exercise,  patient/caregiver education/training, passive ROM/stretching, strengthening exercise, transfer/mobility retraining  Therapy Frequency (OT): other (see comments) (5-7 d/wk)  Plan of Care Review  Plan of Care Reviewed With: patient  Outcome Evaluation: OT eval completed. Co-eval with PT. Pt sidelying in bed upon arrival. Pt required increased time to become alert after sleeping. Pt was Mod (I) for bed mobility. SBA for sit <> stand t/f, toilet t/f, and mobility in the room. Pt required Max A for LB dressing and reported his wife helps him with this at home. Pt with BUE MMT of 5/5. Pt demonstrates slight imapired safety with ADLs and mobility. I/P OT will follow. Goals established. Anticipate d/c home with assist as needed.     Time Calculation:    Time Calculation- OT     Row Name 03/15/23 1553             Time Calculation- OT    OT Start Time 1348  -CM      OT Stop Time 1426  -CM      OT Time Calculation (min) 38 min  -CM      OT Received On 03/15/23  -CM      OT Goal Re-Cert Due Date 03/28/23  -CM         Untimed Charges    OT Eval/Re-eval Minutes 38  -CM         Total Minutes    Untimed Charges Total Minutes 38  -CM       Total Minutes 38  -CM            User Key  (r) = Recorded By, (t) = Taken By, (c) = Cosigned By    Initials Name Provider Type    Kiki Ku OT Occupational Therapist              Therapy Charges for Today     Code Description Service Date Service Provider Modifiers Qty    [id number] HC OT EVAL MOD COMPLEXITY 3 3/15/2023 Kiki Kauffman OT GO 1               Kiki Kauffman OT  3/15/2023

## 2023-03-15 NOTE — THERAPY EVALUATION
Patient Name: Rolando Novoa  : 1945    MRN: [id number]                              Today's Date: 3/15/2023     PT Evaluation  Admit Date: 3/14/2023    Visit Dx:     ICD-10-CM ICD-9-CM   1. Pneumonia of left lower lobe due to infectious organism  J18.9 486   2. Metabolic encephalopathy  G93.41 348.31   3. Acute respiratory failure with hypoxia and hypercapnia (HCC)  J96.01 518.81    J96.02    4. Impaired mobility and ADLs  Z74.09 V49.89    Z78.9    5. Impaired functional mobility, balance, gait, and endurance  Z74.09 V49.89     Patient Active Problem List   Diagnosis   • Hyperlipemia   • SOB (shortness of breath)   • Hematuria syndrome   • Psoriasis   • Hypertension   • High cholesterol   • Enlarged prostate   • Diabetes mellitus (HCC)   • Acid reflux   • Calculus of kidney   • Kidney stones   • Chronic respiratory failure with hypoxia (HCC)   • Class 2 obesity due to excess calories without serious comorbidity with body mass index (BMI) of 37.0 to 37.9 in adult   • Black lung (HCC)   • Chronic restrictive lung disease   • Hx of colonic polyps   • Encounter for screening for malignant neoplasm of colon   • Thrombocytosis   • Anemia   • Leucocytosis   • Folate deficiency   • Acute right-sided thoracic back pain   • Chest wall pain   • CKD (chronic kidney disease) stage 2, GFR 60-89 ml/min   • Diabetes type 2, uncontrolled   • Diabetic neuropathy (HCC)   • Nocturnal hypoxemia   • Supplemental oxygen dependent   • Change in bowel habits   • Diarrhea   • Pneumonia of left lower lobe due to infectious organism     Past Medical History:   Diagnosis Date   • Acid reflux    • Black lung disease (HCC)    • Cataract    • Diabetes (HCC)    • Enlarged prostate    • High cholesterol    • Hypertension    • Kidney stone     multiple   • Obesity    • Psoriasis      Past Surgical History:   Procedure Laterality Date   • AMPUTATION Left     lower arm and hand   • CATARACT EXTRACTION W/ INTRAOCULAR LENS IMPLANT Left  01/22/2021    Procedure: REMIOVE CATARACT AND IMPLANT  INTRAOCULAR LENS;  Surgeon: Sam Isaacs MD;  Location: [facility] OR;  Service: Ophthalmology;  Laterality: Left;   • CATARACT EXTRACTION W/ INTRAOCULAR LENS IMPLANT Right 01/29/2021    Procedure: REMOVE CATARACT AND IMPLANT INTRAOCULAR LENS;  Surgeon: Sam Isaacs MD;  Location: [facility] OR;  Service: Ophthalmology;  Laterality: Right;   • CHOLECYSTECTOMY OPEN     • COLONOSCOPY N/A 11/09/2018    Procedure: COLONOSCOPY;  Surgeon: Sukhdeep Mae MD;  Location: [facility] ENDOSCOPY;  Service: Gastroenterology   • COLONOSCOPY N/A 03/28/2022    Procedure: COLONOSCOPY;  Surgeon: Sukhdeep Mae MD;  Location: [facility] ENDOSCOPY;  Service: Gastroenterology;  Laterality: N/A;   • CYSTOSCOPY N/A 12/16/2017    Procedure: CYSTOSCOPY WITH CLOT EVACUATION;  Surgeon: Prosper Ariza MD;  Location: [facility];  Service:    • CYSTOSCOPY, URETEROSCOPY, RETROGRADE PYELOGRAM, STENT INSERTION Left 12/15/2017    Procedure: CYSTOSCOPY LEFT URETEROSCOPY RETROGRADE PYELOGRAM HOLMIUM LASER STENT INSERTION;  Surgeon: Prosper Ariza MD;  Location: [facility];  Service:    • CYSTOSCOPY, URETEROSCOPY, RETROGRADE PYELOGRAM, STENT INSERTION Left 08/10/2022    Procedure: CYSTOSCOPY LEFT RETROGRADE URETEROSCOPY LASER LITHOTRIPSY STENT PLACMENT;  Surgeon: Prosper Ariza MD;  Location: [facility];  Service: Urology;  Laterality: Left;   • DENTAL PROCEDURE      multiple extractions   • EXTRACORPOREAL SHOCK WAVE LITHOTRIPSY (ESWL)     • EXTRACORPOREAL SHOCKWAVE LITHOTRIPSY (ESWL), STENT INSERTION/REMOVAL Left 02/03/2017    Procedure: LEFT EXTRACORPOREAL SHOCKWAVE LITHOTRIPSY, POSSIBLE CYSTOSCOPY, POSSIBLE STENT REMOVAL ;  Surgeon: Prosper Ariza MD;  Location: [facility];  Service:    • EXTRACORPOREAL SHOCKWAVE LITHOTRIPSY (ESWL), STENT INSERTION/REMOVAL Left 05/04/2018    Procedure: EXTRACORPOREAL SHOCKWAVE LITHOTRIPSY;  Surgeon: Prosper Ariza MD;  Location: [facility];   "Service: Urology   • KIDNEY STONE SURGERY     • SCROTAL SURGERY      trauma   • WISDOM TOOTH EXTRACTION      took bottom ones      General Information     Row Name 03/15/23 1415 03/15/23 135       Physical Therapy Time and Intention    Document Type evaluation  -GB evaluation  -GB    Mode of Treatment co-treatment;physical therapy;occupational therapy  -GB co-treatment;physical therapy;occupational therapy  -GB    Row Name 03/15/23 1415 03/15/23 1350       General Information    Patient Profile Reviewed yes  -GB yes  -GB    Prior Level of Function -- independent:;all household mobility;community mobility;driving;gait  -GB    Existing Precautions/Restrictions fall  -GB fall  -GB    Barriers to Rehab -- cognitive status  -GB    Row Name 03/15/23 135          Living Environment    People in Home spouse  -GB     Row Name 03/15/23 135          Home Main Entrance    Number of Stairs, Main Entrance four  -GB     Stair Railings, Main Entrance railing on left side (ascending)  -GB     Row Name 03/15/23 1350          Stairs Within Home, Primary    Stairs, Within Home, Primary states he thinks he walks w/ AD but says \"i don't know \" when asked what he uses; asked for Solange his wife; indep bathing/dressing/drivinig per pt;  -GB     Number of Stairs, Within Home, Primary none  -GB     Row Name 03/15/23 1415 03/15/23 135       Cognition    Orientation Status (Cognition) oriented to;person;place;verbal cues/prompts needed for orientation;time  ; one stage questions  -GB oriented to;person;place;verbal cues/prompts needed for orientation;time  ; one stage questions  -GB    Row Name 03/15/23 1415 03/15/23 135       Safety Issues, Functional Mobility    Safety Issues Affecting Function (Mobility) -- insight into deficits/self-awareness  -GB    Impairments Affecting Function (Mobility) cognition;endurance/activity tolerance;balance  -GB cognition;endurance/activity tolerance;balance  -GB    Cognitive Impairments, Mobility " Safety/Performance -- insight into deficits/self-awareness  -          User Key  (r) = Recorded By, (t) = Taken By, (c) = Cosigned By    Initials Name Provider Type    Shireen Robins PT Physical Therapist               Mobility     Row Name 03/15/23 1408          Bed Mobility    Bed Mobility bed mobility (all) activities  -     All Activities, Cosmos (Bed Mobility) independent;modified independence  -     Row Name 03/15/23 1408          Bed-Chair Transfer    Bed-Chair Cosmos (Transfers) independent;standby assist  -     Row Name 03/15/23 1408          Sit-Stand Transfer    Sit-Stand [place] (Transfers) independent  -     Row Name 03/15/23 1408          Gait/Stairs (Locomotion)    Cosmos Level (Gait) independent;standby assist  -     Distance in Feet (Gait) 6, 34 ft in room on 02 per NC  -           User Key  (r) = Recorded By, (t) = Taken By, (c) = Cosigned By    Initials Name Provider Type    Shireen Robins PT Physical Therapist               Obj/Interventions     Row Name 03/15/23 1410          Range of Motion Comprehensive    General Range of Motion bilateral lower extremity ROM WFL  -     Row Name 03/15/23 1410          Strength Comprehensive (MMT)    Comment, General Manual Muscle Testing (MMT) Assessment RLE grossly 4-4-/5 hp flx/ext; 4/5 knee ankle flx/ext; LLE grossly 4/5 hip knee ankle flx/ext  -     Row Name 03/15/23 1410          Sensory Assessment (Somatosensory)    Sensory Assessment (Somatosensory) LE sensation intact  -           User Key  (r) = Recorded By, (t) = Taken By, (c) = Cosigned By    Initials Name Provider Type    Shireen Robins PT Physical Therapist               Goals/Plan     Row Name 03/15/23 1432          Bed Mobility Goal 1 (PT)    Activity/Assistive Device (Bed Mobility Goal 1, PT) --  -     Row Name 03/15/23 1432          Transfer Goal 1 (PT)    Activity/Assistive Device (Transfer Goal 1, PT)  bed-to-chair/chair-to-bed;toilet  -GB     Bland Level/Cues Needed (Transfer Goal 1, PT) independent  -GB     Time Frame (Transfer Goal 1, PT) by discharge  -GB     Progress/Outcome (Transfer Goal 1, PT) goal ongoing  -GB     Row Name 03/15/23 1432          Gait Training Goal 1 (PT)    Activity/Assistive Device (Gait Training Goal 1, PT) gait (walking locomotion)  -GB     Bland Level (Gait Training Goal 1, PT) independent  -GB     Distance (Gait Training Goal 1, PT) 100 ft or more per trip w/ VSS  -GB     Progress/Outcome (Gait Training Goal 1, PT) goal not met  -GB     Row Name 03/15/23 1432          Stairs Goal 1 (PT)    Activity/Assistive Device (Stairs Goal 1, PT) ascending stairs;descending stairs  -GB     Bland Level/Cues Needed (Stairs Goal 1, PT) modified independence  -GB     Number of Stairs (Stairs Goal 1, PT) 4 stairs w/ 1 handrail on L  -GB     Time Frame (Stairs Goal 1, PT) by discharge  -GB     Progress/Outcome (Stairs Goal 1, PT) goal ongoing  -GB     Row Name 03/15/23 1432          Therapy Assessment/Plan (PT)    Planned Therapy Interventions (PT) balance training;bed mobility training;gait training;transfer training;stair training;strengthening;neuromuscular re-education;motor coordination training;patient/family education;home exercise program  -GB           User Key  (r) = Recorded By, (t) = Taken By, (c) = Cosigned By    Initials Name Provider Type    Shireen Robins PT Physical Therapist               Clinical Impression     Row Name 03/15/23 1354          Pain    Pretreatment Pain Rating 0/10 - no pain  -GB     Posttreatment Pain Rating 0/10 - no pain  -GB     Row Name 03/15/23 1356          Plan of Care Review    Plan of Care Reviewed With patient  -GB     Outcome Evaluation PT/OT co evals completed..Pt able to move sup<>sit, sit<>stand, gait w/ SBA 1 in room w/out LOB or SOA, walking 6, 34 ft in room on 02 per NC. Pt reported no c/o but ready to return to  bed; He has good rehab potential and rec he goes home w/ assist and possible HH PT pending progress while here.  Good rehab potential.  -GB     Row Name 03/15/23 1354          Therapy Assessment/Plan (PT)    Patient/Family Therapy Goals Statement (PT) home to Solange spouse  -GB     Rehab Potential (PT) good, to achieve stated therapy goals  -GB     Criteria for Skilled Interventions Met (PT) yes  -GB     Therapy Frequency (PT) other (see comments)  5-7 d/w  -GB     Row Name 03/15/23 1354          Vital Signs    Pre Systolic BP Rehab 122  -GB     Pre Treatment Diastolic BP 57  -GB     Post Systolic BP Rehab 145  -GB     Post Treatment Diastolic BP 76  -GB     Pretreatment Heart Rate (beats/min) 77  -GB     Posttreatment Heart Rate (beats/min) 67  -GB     Pre SpO2 (%) 92  -GB     O2 Delivery Pre Treatment nasal cannula  2 LPM  -GB     Post SpO2 (%) 92  encouraged to use breathing ex;  -GB     O2 Delivery Post Treatment nasal cannula  -GB     Pre Patient Position Side Lying  -GB     Intra Patient Position Standing  -GB     Post Patient Position Sitting  sittingup in bed w/ HOB elevated  -GB     Row Name 03/15/23 1354          Positioning and Restraints    Pre-Treatment Position in bed  -GB     Post Treatment Position bed  -GB     In Bed notified nsg;side lying left;fowlers;sitting;patient within staff view  -GB           User Key  (r) = Recorded By, (t) = Taken By, (c) = Cosigned By    Initials Name Provider Type    Shireen Robnis, PT Physical Therapist               Outcome Measures     Row Name 03/15/23 1413 03/15/23 0830       How much help from another person do you currently need...    Turning from your back to your side while in flat bed without using bedrails? 4  -GB 4  -KR    Moving from lying on back to sitting on the side of a flat bed without bedrails? 3  -GB 4  -KR    Moving to and from a bed to a chair (including a wheelchair)? 3  -GB 3  -KR    Standing up from a chair using your arms (e.g.,  "wheelchair, bedside chair)? 3  -GB 3  -KR    Climbing 3-5 steps with a railing? 3  -GB 3  -KR    To walk in hospital room? 3  -GB 3  -KR    AM-PAC 6 Clicks Score (PT) 19  -GB 20  -KR    Row Name 03/15/23 1413          Tinetti Assessment    Tinetti Assessment yes  -GB     Sitting Balance 1  -GB     Arises 2  -GB     Attempts to Rise 2  -GB     Immediate Standing Balance (first 5 sec) 2  -GB     Standing Balance 2  -GB     Sternal Nudge (feet close together) 2  -GB     Eyes Closed (feet close together) 0  -GB     Turning 360 Degrees- Steps 1  -GB     Turning 360 Degrees- Steadiness 1  -GB     Sitting Down 2  -GB     Tinetti Balance Score 15  -GB     Gait Initiation (immediate after told \"go\") 1  -GB     Step Length- Right Swing 1  -GB     Step Length- Left Swing 1  -GB     Foot Clearance- Right Foot 1  -GB     Foot Clearance- Left Foot 1  -GB     Step Symmetry 1  -GB     Step Continuity 1  -GB     Path (excursion) 2  -GB     Trunk 2  -GB     Base of Support 1  -GB     Gait Score 12  -GB     Tinetti Total Score 27  -GB     Tinetti Assessment Comments no AD  -GB     Row Name 03/15/23 1413 03/15/23 1348       Functional Assessment    Outcome Measure Options Tinetti  -GB AM-PAC 6 Clicks Daily Activity (OT)  -CM          User Key  (r) = Recorded By, (t) = Taken By, (c) = Cosigned By    Initials Name Provider Type    Shireen Robins, PT Physical Therapist    Deneen Arambula, RN Registered Nurse    Kiki Ku, OT Occupational Therapist                             Physical Therapy Education     Title: PT OT SLP Therapies (In Progress)     Topic: Physical Therapy (Done)     Point: Mobility training (Done)     Learning Progress Summary           Patient Acceptance, E,TALITA, SHARON,HARI by HERMILO at 3/15/2023 1414    Comment: PT POC; encouraged gait w/ asist; and breathind ext                   Point: Home exercise program (Done)     Learning Progress Summary           Patient Acceptance, E,D, VU,DU by HERMILO at " 3/15/2023 1414    Comment: PT POC; encouraged gait w/ asist; and breathind ext                   Point: Body mechanics (Done)     Learning Progress Summary           Patient Acceptance, E,D, VU,DU by  at 3/15/2023 1414    Comment: PT POC; encouraged gait w/ asist; and breathind ext                   Point: Precautions (Done)     Learning Progress Summary           Patient Acceptance, E,D, VU,DU by  at 3/15/2023 1414    Comment: PT POC; encouraged gait w/ asist; and breathind ext                               User Key     Initials Effective Dates Name Provider Type Discipline     06/16/21 -  Shireen Kimball PT Physical Therapist PT              PT Recommendation and Plan  Planned Therapy Interventions (PT): balance training, bed mobility training, gait training, transfer training, stair training, strengthening, neuromuscular re-education, motor coordination training, patient/family education, home exercise program  Plan of Care Reviewed With: patient  Outcome Evaluation: PT/OT co evals completed..Pt able to move sup<>sit, sit<>stand, gait w/ SBA 1 in room w/out LOB or SOA, walking 6, 34 ft in room on 02 per NC. Pt reported no c/o but ready to return to bed; He has good rehab potential and rec he goes home w/ assist and possible HH PT pending progress while here.  Good rehab potential.     Time Calculation:    PT Charges     Row Name 03/15/23 1350             Time Calculation    Start Time 1348  -GB      Stop Time 1426  -GB      Time Calculation (min) 38 min  -GB      PT Received On 03/15/23  -GB      PT Goal Re-Cert Due Date 03/28/23  -GB         Untimed Charges    PT Eval/Re-eval Minutes 38  -GB         Total Minutes    Untimed Charges Total Minutes 38  -GB       Total Minutes 38  -GB            User Key  (r) = Recorded By, (t) = Taken By, (c) = Cosigned By    Initials Name Provider Type    Shireen Robins PT Physical Therapist              Therapy Charges for Today     Code  Description Service Date Service Provider Modifiers Qty    80732581410 HC PT EVAL MOD COMPLEXITY 3 3/15/2023 Shireen Kimball, PT GP 1    60255841136 HC PT THER SUPP EA 15 MIN 3/15/2023 Shireen Kimball, PT GP 1          PT G-Codes  Outcome Measure Options: Tinetti  AM-PAC 6 Clicks Score (PT): 19  AM-PAC 6 Clicks Score (OT): 21  Tinetti Total Score: 27  PT Discharge Summary  Anticipated Discharge Disposition (PT): home with 24/7 care, home with home health    Shireen Kimball, PT  3/15/2023

## 2023-03-15 NOTE — PROGRESS NOTES
Adult Nutrition  Assessment    Patient Name:  Rolando Novoa  YOB: 1945  MRN: [id number]  Admit Date:  3/14/2023    Assessment Date:  3/15/2023    Comments:  RD visited pt d/t wounds in spine- Pt and Wound RN both states these are r/t psoriasis. Pt denies c/s, reports appetite is ok and #- # w/ BMI 35.9. Pt w/ no wt or other nutritional concerns. RD to follow.    Electronically signed by:  Michelle Miranda RD  03/15/23 14:10 CDT

## 2023-03-15 NOTE — PROGRESS NOTES
[facility] Medicine Services  INPATIENT PROGRESS NOTE      Length of Stay: 1  Date of Admission: 3/14/2023  Primary Care Physician: Jaxson Zepeda MD    Subjective   Chief Complaint: Shortness of breath  HPI: Patient seen and examined, reports feeling that his breathing is better.  Patient's family at bedside note multiple symptoms that been present over the last several weeks including patient's shortness of breath that has been worsening over the past 1 week.  Patient wife notes that he has had difficulties with generalized weakness and balance.  Patient's son also notes that at times his lips appear to be blue and cyanotic.  Patient endorses a history of black lung.  Patient has supplemental oxygen at home that he does not wear on a regular basis.  He does not have any kind of BiPAP machine or CPAP or anything like that.  Per patient and family he has been living a very sedentary lifestyle and at times they have also noted that he has had some blood sugars as low as the 60s which have caused him to feel shaky.  Patient's wife endorses patient having poor p.o. intake at home.    Review of Systems   All pertinent negatives and positives are as above. All other systems have been reviewed and are negative unless otherwise stated.     Objective    As of today 03/15/23  Temp:  [97.8 °F (36.6 °C)-98.7 °F (37.1 °C)] 98.6 °F (37 °C)  Heart Rate:  [48-95] 95  Resp:  [16-20] 19  BP: (147-187)/(69-83) 162/70    Physical Exam  Constitutional:       General: He is not in acute distress.     Appearance: He is not toxic-appearing.   HENT:      Head: Normocephalic and atraumatic.      Right Ear: External ear normal.      Left Ear: External ear normal.      Nose: Nose normal.      Mouth/Throat:      Pharynx: Oropharynx is clear.   Eyes:      Conjunctiva/sclera: Conjunctivae normal.   Cardiovascular:      Rate and Rhythm: Normal rate and regular rhythm.      Heart sounds:  Normal heart sounds.   Pulmonary:      Comments: Diminished breath sounds bilaterally with faint wheeze noted.  Abdominal:      General: Bowel sounds are normal.      Palpations: Abdomen is soft.      Tenderness: There is no abdominal tenderness.   Musculoskeletal:         General: No deformity.   Skin:     General: Skin is warm and dry.      Capillary Refill: Capillary refill takes less than 2 seconds.   Neurological:      General: No focal deficit present.      Mental Status: He is alert and oriented to person, place, and time. Mental status is at baseline.   Psychiatric:         Behavior: Behavior normal.           Results Review:  I have reviewed the labs, radiology results, and diagnostic studies.    Laboratory Data:   Results from last 7 days   Lab Units 03/15/23  0604 03/14/23  1646   SODIUM mmol/L 141 143   POTASSIUM mmol/L 3.8 4.7   CHLORIDE mmol/L 105 101   CO2 mmol/L 28.0 33.0*   BUN mg/dL 16 15   CREATININE mg/dL 1.55* 1.73*   GLUCOSE mg/dL 183* 114*   CALCIUM mg/dL 8.1* 8.9   BILIRUBIN mg/dL  --  0.3   ALK PHOS U/L  --  80   ALT (SGPT) U/L  --  16   AST (SGOT) U/L  --  16   ANION GAP mmol/L 8.0 9.0     Estimated Creatinine Clearance: 44.3 mL/min (A) (by C-G formula based on SCr of 1.55 mg/dL (H)).  Results from last 7 days   Lab Units 03/14/23  1646   MAGNESIUM mg/dL 2.1         Results from last 7 days   Lab Units 03/14/23  1646   WBC 10*3/mm3 7.23   HEMOGLOBIN g/dL 12.1*   HEMATOCRIT % 41.6   PLATELETS 10*3/mm3 263     Results from last 7 days   Lab Units 03/14/23  1646   INR  0.95       Culture Data:   No results found for: BLOODCX  No results found for: URINECX  No results found for: RESPCX  No results found for: WOUNDCX  No results found for: STOOLCX  No components found for: BODYFLD    Radiology Data:   Imaging Results (Last 24 Hours)     Procedure Component Value Units Date/Time    CT Head Without Contrast [[id number]] Collected: 03/14/23 1716     Updated: 03/14/23 1733    Narrative:      EXAM: CT  HEAD WITHOUT CONTRAST    CLINICAL HISTORY:  77 years Male,Mental status change, unknown  cause    COMPARISON:  None    Axial with coronal and sagittal images were obtained without  intravenous contrast.  This examination was performed according  to our departmental dose optimization program which includes  automated exposure control, adjustment of the MA and kV according  to patient size, and/or use of iterative reconstruction  technique.    FINDINGS:  Mild low attenuation in the bilateral periventricular/deep  cerebral white matter, most consistent with mild chronic small  vessel ischemic change. No acute intracranial hemorrhage, mass  effect, hydrocephalus, or CT signs of acute infarct.    Mild mucosal thickening along the bilateral maxillary sinuses.  Opacification of several left mastoid air cells.      Impression:      1. Mild chronic small vessel ischemic changes of acute  intracranial process.  2. Opacification of several left mastoid air cells.    Electronically signed by:  Finesse Granados MD  3/14/2023 5:31 PM CDT  Workstation: [id number]    XR Chest 1 View [[id number]] Collected: 03/14/23 1641     Updated: 03/14/23 1704    Narrative:        PORTABLE CHEST    HISTORY: Weakness. Dizziness. Altered mental status.    Portable AP upright film of the chest was obtained at 4:39 PM.  COMPARISON: August 30, 2022    FINDINGS:   EKG leads.  Subsegmental infiltrate left lung base compatible with pneumonia.  Small left pleural effusion.  The heart is not enlarged.  The pulmonary vasculature is not increased.  No pneumothorax.  No acute osseous abnormality.  Degenerative changes each shoulder.      Impression:      CONCLUSION:  Subsegmental infiltrate left lung base compatible with pneumonia.  Small left pleural effusion.    [id number]    Electronically signed by:  Farzad Mcadams MD  3/14/2023 5:02 PM CDT  Workstation: [id number]          I have reviewed the patient's current medications.     Assessment/Plan     Principal  Problem:    Pneumonia of left lower lobe due to infectious organism  Acute respiratory failure with hypoxia and hypercapnia  Respiratory acidosis  Type 2 diabetes mellitus  Deconditioning  Black lung disease    Plan:  - Continue supplemental oxygen wean as tolerated  - Continue noninvasive ventilation wean as tolerated  - Given patient's hypercapnia initially on admission with improvement with the BiPAP machine currently on AVAPS settings he may benefit from a trilogy machine when he goes home  - Continue bronchodilators, RT to initiate protocol  - Pulmicort nebs have also been ordered  - Pulmonary toilet  - Continue antibiotics with Rocephin and azithromycin  - PT and OT been ordered  - Continue home medications as appropriate  - Given reports of hypoglycemia at home would recommend stopping his home Amaryl at discharge as well  - Discussed with patient and family at bedside that his symptoms are likely related to multiple factors including his hypoglycemia, deconditioning, and lung disease.  Also discussed that he likely needs to wear his oxygen on a regular basis at home as well to help prevent hypoxia.  - Discussed with case management  - DVT prophylaxis with Lovenox  - CODE STATUS: Full    Medical Decision Making  Number and Complexity of problems: 6 high complexity    Conditions and Status:        Condition is improving.     [facility] Data  External documents reviewed: Previous records  Tests considered but not ordered: None     Decision rules/scores evaluated (example [id number], [place], etc): None     Discussed with: Patient, wife, and son at bedside     Treatment Plan  As above    Care Planning  Shared decision making: Patient  Code status and discussions: Full    Disposition  Social Determinants of Health that impact treatment or disposition: None  I expect the patient to be discharged to SNF versus rehab versus home with home health in 3-4 days.       I have utilized all available immediate resources to  obtain, update, or review the patient's current medications (including all prescriptions, over-the-counter products, herbals, cannabis/cannabidiol products, and vitamin/mineral/dietary (nutritional) supplements).   I confirmed that the patient's Advance Care Plan is present, code status is documented, or surrogate decision maker is listed in the patient's medical record.    Discharge Planning: In process    Cristian Gupta MD

## 2023-03-15 NOTE — PAYOR COMM NOTE
"  [facility]  Case Managment Extender   Carrie Guadalupe  (P) [phone]  (F) [phone]          [employer] Provider ID# [id number]  Rolando Novoa (77 y.o. Male)     Date of Birth   1945    Social Security Number       Address   [address] [place] [address]    Home Phone   [phone]    MRN   [id number]       Mandaeism   Orthodoxy    Marital Status                               Admission Date   3/14/23    Admission Type   Emergency    Admitting Provider   Suad Pritchett MD    Attending Provider   Suad Pritchett MD    Department, Room/Bed   [address], 409/1       Discharge Date       Discharge Disposition       Discharge Destination                               Attending Provider: Suad Pritchett MD    Allergies: Toradol [Ketorolac Tromethamine]    Isolation: None   Infection: None   Code Status: CPR    Ht: 165.1 cm (65\")   Wt: 98.1 kg (216 lb 3.2 oz)    Admission Cmt: None   Principal Problem: Pneumonia of left lower lobe due to infectious organism [J18.9]                 Active Insurance as of 3/14/2023     Primary Coverage     Payor Plan Insurance Group Employer/Plan Group    [id number]     Payor Plan Address Payor Plan Phone Number Payor Plan Fax Number Effective Dates    [address] [address] [phone]  3/20/2020 - None Entered    [facility] [id number]       Subscriber Name Subscriber Birth Date Member ID       Rolando Novoa 1945 [id number]                 Emergency Contacts      (Rel.) Home Phone Work Phone Mobile Phone    Solange Novoa (Spouse) [phone] -- [phone]               History & Physical      Maxine Wyman PA-C at 03/14/23 1818              [facility] Medicine  HISTORY AND PHYSICAL      Date of Admission: 3/14/2023  Primary Care Physician: " Jaxson Zepeda MD    Subjective     Chief Complaint: Weakness, Confusion    History of Present Illness  Patient is a 77 year old male (PMHx HTN, HLD, DM, Black Lung disease, GERD) who presented to [facility] ED by EMS from home. Wife accompanies him and provided majority of the history. She reports he has not been feeling well for the last several days. In the last two days, she has noticed he really has been sleeping a lot and has not been eating appropriately for his diabetic status. She really noticed he seemed confused last night and he seems to have developed a tremor, but he states he has always had this. He does use oxygen chronically at home on 2L due to black lung disease, but has had some increased shortness of breath at times. They deny cough, congestion, fever, or known sick contacts.     ED findings pertinent for CXR findings of a left lower lobe pneumonia. CBC unremarkable; CMP shows mild creatinine elevation at 1.73. CT head negative. Hospitalist team consulted for admission with ABG pending. ABG resulted later to reveal respiratory acidosis with hypercapnia and BiPAP was initiated in ED.      Review of Systems   Constitutional: Positive for activity change, appetite change and fatigue. Negative for chills, diaphoresis and fever.   HENT: Negative for congestion, ear pain, postnasal drip, rhinorrhea, sinus pressure, sinus pain, sneezing, sore throat and trouble swallowing.    Eyes: Negative for photophobia, pain and discharge.   Respiratory: Positive for shortness of breath. Negative for cough, chest tightness and wheezing.    Cardiovascular: Negative for chest pain, palpitations and leg swelling.   Gastrointestinal: Negative for abdominal pain, blood in stool, constipation, diarrhea, nausea and vomiting.   Endocrine: Negative for polydipsia, polyphagia and polyuria.   Genitourinary: Negative for difficulty urinating, dysuria, flank pain, frequency, hematuria and urgency.   Musculoskeletal: Negative for  arthralgias, back pain, myalgias and neck pain.   Skin: Negative for color change, rash and wound.   Neurological: Positive for tremors. Negative for dizziness, seizures, syncope, weakness and headaches.   Hematological: Does not bruise/bleed easily.   Psychiatric/Behavioral: Positive for confusion. Negative for behavioral problems, hallucinations, sleep disturbance and suicidal ideas.        Otherwise complete ROS reviewed and negative except as mentioned in the HPI.    Past Medical History:   Past Medical History:   Diagnosis Date   • Acid reflux    • Black lung disease (HCC)    • Cataract    • Diabetes (HCC)    • Enlarged prostate    • High cholesterol    • Hypertension    • Kidney stone     multiple   • Obesity    • Psoriasis      Past Surgical History:  Past Surgical History:   Procedure Laterality Date   • AMPUTATION Left     lower arm and hand   • CATARACT EXTRACTION W/ INTRAOCULAR LENS IMPLANT Left 01/22/2021    Procedure: REMIOVE CATARACT AND IMPLANT  INTRAOCULAR LENS;  Surgeon: Sam Isaacs MD;  Location: [facility];  Service: Ophthalmology;  Laterality: Left;   • CATARACT EXTRACTION W/ INTRAOCULAR LENS IMPLANT Right 01/29/2021    Procedure: REMOVE CATARACT AND IMPLANT INTRAOCULAR LENS;  Surgeon: Sam Isaacs MD;  Location: [facility] OR;  Service: Ophthalmology;  Laterality: Right;   • CHOLECYSTECTOMY OPEN     • COLONOSCOPY N/A 11/09/2018    Procedure: COLONOSCOPY;  Surgeon: Sukhdeep Mae MD;  Location: [facility] ENDOSCOPY;  Service: Gastroenterology   • COLONOSCOPY N/A 03/28/2022    Procedure: COLONOSCOPY;  Surgeon: Sukhdeep Mae MD;  Location: [facility] ENDOSCOPY;  Service: Gastroenterology;  Laterality: N/A;   • CYSTOSCOPY N/A 12/16/2017    Procedure: CYSTOSCOPY WITH CLOT EVACUATION;  Surgeon: Prosper Ariza MD;  Location: [facility] OR;  Service:    • CYSTOSCOPY, URETEROSCOPY, RETROGRADE PYELOGRAM, STENT INSERTION Left 12/15/2017    Procedure: CYSTOSCOPY LEFT URETEROSCOPY RETROGRADE  PYELOGRAM HOLMIUM LASER STENT INSERTION;  Surgeon: Prosper Ariza MD;  Location: [facility];  Service:    • CYSTOSCOPY, URETEROSCOPY, RETROGRADE PYELOGRAM, STENT INSERTION Left 08/10/2022    Procedure: CYSTOSCOPY LEFT RETROGRADE URETEROSCOPY LASER LITHOTRIPSY STENT PLACMENT;  Surgeon: Prosper Ariza MD;  Location: [facility];  Service: Urology;  Laterality: Left;   • DENTAL PROCEDURE      multiple extractions   • EXTRACORPOREAL SHOCK WAVE LITHOTRIPSY (ESWL)     • EXTRACORPOREAL SHOCKWAVE LITHOTRIPSY (ESWL), STENT INSERTION/REMOVAL Left 02/03/2017    Procedure: LEFT EXTRACORPOREAL SHOCKWAVE LITHOTRIPSY, POSSIBLE CYSTOSCOPY, POSSIBLE STENT REMOVAL ;  Surgeon: Prosper Ariza MD;  Location: [facility];  Service:    • EXTRACORPOREAL SHOCKWAVE LITHOTRIPSY (ESWL), STENT INSERTION/REMOVAL Left 05/04/2018    Procedure: EXTRACORPOREAL SHOCKWAVE LITHOTRIPSY;  Surgeon: Prosper Ariza MD;  Location: [facility];  Service: Urology   • KIDNEY STONE SURGERY     • SCROTAL SURGERY      trauma   • WISDOM TOOTH EXTRACTION      took bottom ones     Social History:  reports that he has never smoked. He has been exposed to tobacco smoke. He has never used smokeless tobacco. He reports that he does not currently use alcohol. He reports that he does not use drugs.    Family History: family history includes Breast cancer in his sister; Cancer in his son; Congenital heart disease in his brother; Diabetes in his brother and brother; Early death in his brother; Heart attack in his brother, father, sister, and sister; Hypertension in his mother; Leukemia in his brother; No Known Problems in his son; Psoriasis in his father; Stroke in his mother.       Allergies:  Allergies   Allergen Reactions   • Toradol [Ketorolac Tromethamine] Anaphylaxis       Medications:  Prior to Admission medications    Medication Sig Start Date End Date Taking? Authorizing Provider   acetaminophen (TYLENOL) 500 MG tablet Take 500 mg by mouth Every Night.     Esvin Murray MD   albuterol sulfate  (90 Base) MCG/ACT inhaler Inhale 2 puffs Every 4 (Four) Hours As Needed for Wheezing.    Esvin Murray MD   aspirin 81 MG chewable tablet Chew 81 mg Daily.    Esvin Murray MD   atorvastatin (LIPITOR) 20 MG tablet Take 1 tablet by mouth Every Night. 3/23/20   Cassi Lemus MD   Cyanocobalamin (B-12) 1000 MCG tablet 1 TABLET BY MOUTH ON MONDAY, WEDNESDAY AND FRIDAY 12/5/22   Noé Coleman MD   esomeprazole (nexIUM) 20 MG capsule Take 20 mg by mouth Every Morning Before Breakfast.    Esvin Murray MD   folic acid (FOLVITE) 1 MG tablet Take 1 mg by mouth Take As Directed. Monday, Wednesday, and Friday    Esvin Murray MD   furosemide (LASIX) 20 MG tablet Take 1 tablet by mouth Daily.  Patient taking differently: Take 20 mg by mouth Take As Directed. 3/23/20   Cassi Lemus MD   glimepiride (AMARYL) 2 MG tablet Take 2 mg by mouth 2 (Two) Times a Day.    Esvin Murray MD   isosorbide mononitrate (IMDUR) 30 MG 24 hr tablet Take 1 tablet by mouth Daily for 30 days. 9/1/22 10/1/22  Virginia Franklin MD   Lancet Devices (OneTouch Delica Plus Lancing) [facility] [place]  1/26/23   Esvin Murray MD   Lancets (OneTouch Delica Plus [id number]) misc  1/26/23   Esvin Murray MD   loperamide (IMODIUM) 2 MG capsule Take 2 mg by mouth 2 (Two) Times a Day As Needed for diarrhea.    Esvin Murray MD   metFORMIN ER (GLUCOPHAGE-XR) 500 MG 24 hr tablet  2/6/23   Esvin Murray MD   nebivolol (Bystolic) 10 MG tablet Take 1 tablet by mouth Daily. 3/23/20   Cassi Lemus MD   O2 (OXYGEN) Inhale 2 L/min Every Night.    Esvin Murray MD   omeprazole (priLOSEC) 20 MG capsule Take 20 mg by mouth Daily.    Esvin Murray MD   OneTouch Ultra test strip  3/15/21   Esvin Murray MD   tamsulosin (FLOMAX) 0.4 MG capsule 24 hr capsule Take 1 capsule by mouth Every Night.    Provider, MD Esvin  "  umeclidinium-vilanterol (ANORO ELLIPTA) 62.5-25 MCG/INH aerosol powder  inhaler Inhale 1 puff Daily. 4/14/21 4/15/22  Provider, MD JESUS Mcallister have utilized all available immediate resources to obtain, update, and review the patient's current medications.    Objective     Vital Signs: /83   Pulse 68   Temp 97.9 °F (36.6 °C) (Oral)   Resp 20   Ht 165.1 cm (65\")   Wt 98.1 kg (216 lb 3.2 oz)   SpO2 98%   BMI 35.98 kg/m²   Physical Exam  Vitals and nursing note reviewed.   Constitutional:       Appearance: Normal appearance. He is obese.   HENT:      Head: Normocephalic and atraumatic.      Right Ear: External ear normal.      Left Ear: External ear normal.      Nose: Nose normal. No congestion or rhinorrhea.      Mouth/Throat:      Mouth: Mucous membranes are moist.      Pharynx: Oropharynx is clear.   Eyes:      General: No scleral icterus.     Extraocular Movements: Extraocular movements intact.      Conjunctiva/sclera: Conjunctivae normal.   Neck:      Vascular: No carotid bruit.   Cardiovascular:      Rate and Rhythm: Normal rate and regular rhythm.      Pulses: Normal pulses.      Heart sounds: Normal heart sounds. No murmur heard.  Pulmonary:      Effort: Pulmonary effort is normal.      Breath sounds: Examination of the right-lower field reveals decreased breath sounds. Examination of the left-lower field reveals decreased breath sounds. Decreased breath sounds present. No wheezing, rhonchi or rales.   Abdominal:      General: Abdomen is flat. Bowel sounds are normal.      Palpations: Abdomen is soft.      Tenderness: There is no abdominal tenderness. There is no guarding.   Musculoskeletal:         General: No swelling or deformity. Normal range of motion.      Cervical back: Normal range of motion.      Comments: Left lower arm/hand surgically absent   Skin:     General: Skin is warm and dry.      Capillary Refill: Capillary refill takes less than 2 seconds.      Findings: No rash. "   Neurological:      General: No focal deficit present.      Mental Status: He is alert.      GCS: GCS eye subscore is 4. GCS verbal subscore is 5. GCS motor subscore is 6.      Motor: No weakness.   Psychiatric:         Mood and Affect: Mood normal.         Behavior: Behavior normal.         Thought Content: Thought content normal.         Judgment: Judgment normal.              Results Reviewed:  Lab Results (last 24 hours)     Procedure Component Value Units Date/Time    High Sensitivity Troponin T [[id number]] Collected: 03/14/23 1815    Specimen: Blood Updated: 03/14/23 1818    Lactic Acid, Plasma [[id number]] Collected: 03/14/23 1815    Specimen: Blood Updated: 03/14/23 1818    COVID-19 and FLU A/B PCR - Swab, Nasopharynx [[id number]]  (Normal) Collected: 03/14/23 1733    Specimen: Swab from Nasopharynx Updated: 03/14/23 1808     COVID19 Not Detected     Influenza A PCR Not Detected     Influenza B PCR Not Detected    Narrative:      Fact sheet for providers: https://www.fda.gov/media/645950/download    Fact sheet for patients: https://www.fda.gov/media/785233/download    Test performed by PCR.    Regan Draw [[id number]] Collected: 03/14/23 1646    Specimen: Blood Updated: 03/14/23 1800    Narrative:      The following orders were created for panel order Regan Draw.  Procedure                               Abnormality         Status                     ---------                               -----------         ------                     Green Top (Gel)[[id number]]                                  Final result               Lavender Top[[id number]]                                     Final result               Gold Top - SST[[id number]]                                   Final result               Light Blue Top[[id number]]                                   Final result                 Please view results for these tests on the individual orders.    Gold Top - SST [[id number]] Collected: 03/14/23 1646    Specimen:  "Blood Updated: 03/14/23 1800     Extra Tube Hold for add-ons.     Comment: Auto resulted.       Light Blue Top [[id number]] Collected: 03/14/23 1646    Specimen: Blood Updated: 03/14/23 1800     Extra Tube Hold for add-ons.     Comment: Auto resulted       Green Top (Gel) [[id number]] Collected: 03/14/23 1646    Specimen: Blood Updated: 03/14/23 1800     Extra Tube Hold for add-ons.     Comment: Auto resulted.       Lavender Top [[id number]] Collected: 03/14/23 1646    Specimen: Blood Updated: 03/14/23 1800     Extra Tube hold for add-on     Comment: Auto resulted       D-dimer, Quantitative [[id number]]  (Normal) Collected: 03/14/23 1646    Specimen: Blood Updated: 03/14/23 1752     D-Dimer, Quantitative 710 ng/mL (FEU)     Narrative:      According to the assay 's published package insert, a normal (<500 ng/mL (FEU)) D-dimer result in conjunction with a non-high clinical probability assessment, excludes deep vein thrombosis (DVT) and pulmonary embolism (PE) with high sensitivity.    D-dimer values increase with age and this can make VTE exclusion of an older population difficult. To address this, the American College of Physicians, based on best available evidence and recent guidelines, recommends that clinicians use age-adjusted D-dimer thresholds in patients greater than 50 years of age with: a) a low probability of PE who do not meet all Pulmonary Embolism Rule Out Criteria, or b) in those with intermediate probability of PE.   The formula for an age-adjusted D-dimer cut-off is \"age*10\".  For example, a 60 year old patient would have an age-adjusted cut-off of 600 ng/mL (FEU) and an 80 year old 800 ng/mL (FEU).      aPTT [[id number]]  (Normal) Collected: 03/14/23 1646    Specimen: Blood Updated: 03/14/23 1752     PTT 27.1 seconds     Narrative:      The recommended Heparin therapeutic range is 68-97 seconds.    Protime-INR [[id number]]  (Normal) Collected: 03/14/23 1646    Specimen: Blood Updated: " 03/14/23 1751     Protime 12.6 Seconds      INR 0.95    Narrative:      Therapeutic range for most indications is 2.0-3.0 INR,  or 2.5-3.5 for mechanical heart valves.    Comprehensive Metabolic Panel [[id number]]  (Abnormal) Collected: 03/14/23 1646    Specimen: Blood Updated: 03/14/23 1734     Glucose 114 mg/dL      BUN 15 mg/dL      Creatinine 1.73 mg/dL      Sodium 143 mmol/L      Potassium 4.7 mmol/L      Comment: Slight hemolysis detected by analyzer. Results may be affected.        Chloride 101 mmol/L      CO2 33.0 mmol/L      Calcium 8.9 mg/dL      Total Protein 7.7 g/dL      Albumin 4.1 g/dL      ALT (SGPT) 16 U/L      AST (SGOT) 16 U/L      Alkaline Phosphatase 80 U/L      Total Bilirubin 0.3 mg/dL      Globulin 3.6 gm/dL      A/G Ratio 1.1 g/dL      BUN/Creatinine Ratio 8.7     Anion Gap 9.0 mmol/L      eGFR 40.7 mL/min/1.73     Narrative:      GFR Normal >60  Chronic Kidney Disease <60  Kidney Failure <15    The GFR formula is only valid for adults with stable renal function between ages 18 and 70.    Single High Sensitivity Troponin T [[id number]]  (Abnormal) Collected: 03/14/23 1646    Specimen: Blood Updated: 03/14/23 1723     HS Troponin T 51 ng/L     Narrative:      High Sensitive Troponin T Reference Range:  <10.0 ng/L- Negative Female for AMI  <15.0 ng/L- Negative Male for AMI  >=10 - Abnormal Female indicating possible myocardial injury.  >=15 - Abnormal Male indicating possible myocardial injury.   Clinicians would have to utilize clinical acumen, EKG, Troponin, and serial changes to determine if it is an Acute Myocardial Infarction or myocardial injury due to an underlying chronic condition.         Magnesium [[id number]]  (Normal) Collected: 03/14/23 1646    Specimen: Blood Updated: 03/14/23 1723     Magnesium 2.1 mg/dL     CBC & Differential [[id number]]  (Abnormal) Collected: 03/14/23 1646    Specimen: Blood Updated: 03/14/23 1655    Narrative:      The following orders were created for panel  order CBC & Differential.  Procedure                               Abnormality         Status                     ---------                               -----------         ------                     CBC Auto Differential[[id number]]        Abnormal            Final result                 Please view results for these tests on the individual orders.    CBC Auto Differential [[id number]]  (Abnormal) Collected: 03/14/23 1646    Specimen: Blood Updated: 03/14/23 1655     WBC 7.23 10*3/mm3      RBC 4.30 10*6/mm3      Hemoglobin 12.1 g/dL      Hematocrit 41.6 %      MCV 96.7 fL      MCH 28.1 pg      MCHC 29.1 g/dL      RDW 13.5 %      RDW-SD 47.6 fl      MPV 8.5 fL      Platelets 263 10*3/mm3      Neutrophil % 61.7 %      Lymphocyte % 19.2 %      Monocyte % 8.7 %      Eosinophil % 8.3 %      Basophil % 1.1 %      Immature Grans % 1.0 %      Neutrophils, Absolute 4.46 10*3/mm3      Lymphocytes, Absolute 1.39 10*3/mm3      Monocytes, Absolute 0.63 10*3/mm3      Eosinophils, Absolute 0.60 10*3/mm3      Basophils, Absolute 0.08 10*3/mm3      Immature Grans, Absolute 0.07 10*3/mm3      nRBC 0.0 /100 WBC         Imaging Results (Last 24 Hours)     Procedure Component Value Units Date/Time    CT Head Without Contrast [[id number]] Collected: 03/14/23 1716     Updated: 03/14/23 1733    Narrative:      EXAM: CT HEAD WITHOUT CONTRAST    CLINICAL HISTORY:  77 years Male,Mental status change, unknown  cause    COMPARISON:  None    Axial with coronal and sagittal images were obtained without  intravenous contrast.  This examination was performed according  to our departmental dose optimization program which includes  automated exposure control, adjustment of the MA and kV according  to patient size, and/or use of iterative reconstruction  technique.    FINDINGS:  Mild low attenuation in the bilateral periventricular/deep  cerebral white matter, most consistent with mild chronic small  vessel ischemic change. No acute intracranial  hemorrhage, mass  effect, hydrocephalus, or CT signs of acute infarct.    Mild mucosal thickening along the bilateral maxillary sinuses.  Opacification of several left mastoid air cells.      Impression:      1. Mild chronic small vessel ischemic changes of acute  intracranial process.  2. Opacification of several left mastoid air cells.    Electronically signed by:  Finesse Granados MD  3/14/2023 5:31 PM CDT  Workstation: [id number]    XR Chest 1 View [[id number]] Collected: 03/14/23 1641     Updated: 03/14/23 1704    Narrative:        PORTABLE CHEST    HISTORY: Weakness. Dizziness. Altered mental status.    Portable AP upright film of the chest was obtained at 4:39 PM.  COMPARISON: August 30, 2022    FINDINGS:   EKG leads.  Subsegmental infiltrate left lung base compatible with pneumonia.  Small left pleural effusion.  The heart is not enlarged.  The pulmonary vasculature is not increased.  No pneumothorax.  No acute osseous abnormality.  Degenerative changes each shoulder.      Impression:      CONCLUSION:  Subsegmental infiltrate left lung base compatible with pneumonia.  Small left pleural effusion.    [id number]    Electronically signed by:  Farzad Mcadams MD  3/14/2023 5:02 PM CDT  Workstation: [id number]        I have personally reviewed and interpreted the radiology studies and ECG obtained at time of admission.       Assessment / Plan   Treatment Plan:  1.  Pneumonia of left lower lobe due to infectious organism   -IV Rocephin and Azithromycin initiated; blood cultures pending   -continue oxygen, inhalers/nebs, steroids    2. Respiratory acidosis with hypercapnia   -BiPAP initiated in ED; will trend ABG     3. Metabolic encephalopathy    -secondary to above; continue to monitor for improvement/worsening    4. FLORENCIA   -creatinine today 1.73   -IVF overnight; recheck labs in AM    5. DM   -Consistent carbohydrate diet, Acchecks with sliding scale insulin    6. HTN   -continue home meds    7. HLD   -continue statin    8.  "BPH   -continue Flomax    VTE PPx Lovenox  GERD PPx Protonix PO      Medical Decision Making  Number and Complexity of problems: 8, high complexity    Conditions and Status:        Condition is unchanged.       [facility] Data  External documents reviewed: The patient's recent past medical charts for this facility as well as outside facilities via the \"Care Everywhere\" application of Epic reviewed.     Discussed with: ED provider, attending physician     I have utilized all available immediate resources to obtain, update, or review the patient's current medications (including all prescriptions, over-the-counter products, herbals, cannabis/cannabidiol products, and vitamin/mineral/dietary (nutritional) supplements).     Care Planning  Shared decision making: Patient updated on current status and informed of proposed care plan; is in agreement with plan  Code status and discussions: Full code  I confirmed that the patient's Advance Care Plan is present, code status is documented, or surrogate decision maker is listed in the patient's medical record.       Disposition  Social Determinants of Health that impact treatment or disposition: n/a  I expect the patient to be discharged to home in 2-3 days.     The patient was seen and examined by me on 03/14/23 .        Electronically signed by Maxine Wyman PA-C, 03/14/23, 18:18 CDT.                Electronically signed by Maxine Wyman PA-C at 03/14/23 1900          Emergency Department Notes      Kaelyn Fung, RN at 03/14/23 1659        Unable to provide urine sample at this time.     Electronically signed by Kaelyn Fung, RN at 03/14/23 1700     Jessi Corral, RN at 03/14/23 1733        Pt is a difficult stick, unable to get blood cultures. 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    Electronically signed by Jessi Corral, RN at 03/14/23 1734     Jessi Corral, RN at 03/14/23 1738        Lab called at this time for blood collection    Electronically signed by " Jessi Corral, RN at 03/14/23 1738     Burt Chavez MD at 03/14/23 1809      Procedure Orders    1. ECG 12 Lead Dyspnea [[id number]] ordered by Burt Chavez MD               Subjective   History of Present Illness  78yo male pmh significant htn/hyperlipidemia/dm/copd/ckd/bph presents ED with wife reporting patient with 4d hx decreased po intake/generalized weakness/tremulousness and 2d hx confusion.  ROS neg fever/chills/cough/chest pain/abd pain/n/v/d.    History provided by:  Patient and spouse  History limited by:  Mental status change  Illness      Review of Systems   Unable to perform ROS: Mental status change       Past Medical History:   Diagnosis Date   • Acid reflux    • Black lung disease (HCC)    • Cataract    • Diabetes (HCC)    • Enlarged prostate    • High cholesterol    • Hypertension    • Kidney stone     multiple   • Obesity    • Psoriasis        Allergies   Allergen Reactions   • Toradol [Ketorolac Tromethamine] Anaphylaxis       Past Surgical History:   Procedure Laterality Date   • AMPUTATION Left     lower arm and hand   • CATARACT EXTRACTION W/ INTRAOCULAR LENS IMPLANT Left 01/22/2021    Procedure: REMIOVE CATARACT AND IMPLANT  INTRAOCULAR LENS;  Surgeon: Sam Isaacs MD;  Location: [facility] OR;  Service: Ophthalmology;  Laterality: Left;   • CATARACT EXTRACTION W/ INTRAOCULAR LENS IMPLANT Right 01/29/2021    Procedure: REMOVE CATARACT AND IMPLANT INTRAOCULAR LENS;  Surgeon: Sam Isaacs MD;  Location: [facility] OR;  Service: Ophthalmology;  Laterality: Right;   • CHOLECYSTECTOMY OPEN     • COLONOSCOPY N/A 11/09/2018    Procedure: COLONOSCOPY;  Surgeon: Sukhdeep Mae MD;  Location: [facility] ENDOSCOPY;  Service: Gastroenterology   • COLONOSCOPY N/A 03/28/2022    Procedure: COLONOSCOPY;  Surgeon: Sukhdeep Mae MD;  Location: [facility] ENDOSCOPY;  Service: Gastroenterology;  Laterality: N/A;   • CYSTOSCOPY N/A 12/16/2017    Procedure: CYSTOSCOPY WITH CLOT EVACUATION;   Surgeon: Prosper Ariza MD;  Location: [facility];  Service:    • CYSTOSCOPY, URETEROSCOPY, RETROGRADE PYELOGRAM, STENT INSERTION Left 12/15/2017    Procedure: CYSTOSCOPY LEFT URETEROSCOPY RETROGRADE PYELOGRAM HOLMIUM LASER STENT INSERTION;  Surgeon: Prosper Ariza MD;  Location: [facility];  Service:    • CYSTOSCOPY, URETEROSCOPY, RETROGRADE PYELOGRAM, STENT INSERTION Left 08/10/2022    Procedure: CYSTOSCOPY LEFT RETROGRADE URETEROSCOPY LASER LITHOTRIPSY STENT PLACMENT;  Surgeon: Prosper Ariza MD;  Location: [facility];  Service: Urology;  Laterality: Left;   • DENTAL PROCEDURE      multiple extractions   • EXTRACORPOREAL SHOCK WAVE LITHOTRIPSY (ESWL)     • EXTRACORPOREAL SHOCKWAVE LITHOTRIPSY (ESWL), STENT INSERTION/REMOVAL Left 02/03/2017    Procedure: LEFT EXTRACORPOREAL SHOCKWAVE LITHOTRIPSY, POSSIBLE CYSTOSCOPY, POSSIBLE STENT REMOVAL ;  Surgeon: Prosper Ariza MD;  Location: [facility];  Service:    • EXTRACORPOREAL SHOCKWAVE LITHOTRIPSY (ESWL), STENT INSERTION/REMOVAL Left 05/04/2018    Procedure: EXTRACORPOREAL SHOCKWAVE LITHOTRIPSY;  Surgeon: Prosper Ariza MD;  Location: [facility];  Service: Urology   • KIDNEY STONE SURGERY     • SCROTAL SURGERY      trauma   • WISDOM TOOTH EXTRACTION      took bottom ones       Family History   Problem Relation Age of Onset   • Hypertension Mother    • Stroke Mother    • Heart attack Father    • Psoriasis Father    • Heart attack Sister    • Heart attack Sister    • Breast cancer Sister    • Leukemia Brother    • Diabetes Brother    • Heart attack Brother    • Diabetes Brother    • Congenital heart disease Brother    • Early death Brother    • Cancer Son    • No Known Problems Son        Social History     Socioeconomic History   • Marital status:    Tobacco Use   • Smoking status: Never     Passive exposure: Past   • Smokeless tobacco: Never   • Tobacco comments:     For 16 years   Vaping Use   • Vaping Use: Never used   Substance and Sexual Activity   •  Alcohol use: Not Currently     Comment: none since 21 y/o   • Drug use: Never   • Sexual activity: Defer           Objective   Physical Exam  Vitals and nursing note reviewed.   Constitutional:       Appearance: He is obese. He is ill-appearing.   HENT:      Head: Normocephalic and atraumatic.      Right Ear: External ear normal.      Left Ear: External ear normal.      Nose: Nose normal.      Mouth/Throat:      Mouth: Mucous membranes are moist.   Eyes:      Conjunctiva/sclera: Conjunctivae normal.      Pupils: Pupils are equal, round, and reactive to light.   Neck:      Trachea: Trachea and phonation normal.      Meningeal: Brudzinski's sign absent.   Cardiovascular:      Rate and Rhythm: Normal rate and regular rhythm.      Pulses: Normal pulses.      Heart sounds: Normal heart sounds. No murmur heard.    No friction rub. No gallop.   Pulmonary:      Effort: Pulmonary effort is normal. No respiratory distress.      Breath sounds: Normal breath sounds. No wheezing, rhonchi or rales.   Abdominal:      General: Abdomen is protuberant. Bowel sounds are normal. There is no distension.      Palpations: Abdomen is soft.      Tenderness: There is no abdominal tenderness. There is no guarding or rebound.   Musculoskeletal:         General: No swelling or deformity.      Cervical back: Normal range of motion and neck supple. No rigidity.      Right lower leg: No edema.      Left lower leg: No edema.   Lymphadenopathy:      Cervical: No cervical adenopathy.   Skin:     General: Skin is warm and dry.      Coloration: Skin is pale.   Neurological:      General: No focal deficit present.      Mental Status: He is alert.      GCS: GCS eye subscore is 4. GCS verbal subscore is 4. GCS motor subscore is 6.      Sensory: Sensation is intact.      Motor: Motor function is intact.         ECG 12 Lead Dyspnea      Date/Time: 3/14/2023 6:11 PM  Performed by: Burt Chavez MD  Authorized by: Burt Chavez MD   Interpreted by  physician  Rhythm: sinus rhythm  Rate: normal  BPM: 78  QRS axis: right  Conduction: incomplete RBBB and LPFB  ST Segments: ST segments normal  T Waves: T waves normal  Other: no other findings  Clinical impression: abnormal ECG                ED Course      Labs Reviewed   COMPREHENSIVE METABOLIC PANEL - Abnormal; Notable for the following components:       Result Value    Glucose 114 (*)     Creatinine 1.73 (*)     CO2 33.0 (*)     eGFR 40.7 (*)     All other components within normal limits    Narrative:     GFR Normal >60  Chronic Kidney Disease <60  Kidney Failure <15    The GFR formula is only valid for adults with stable renal function between ages 18 and 70.   SINGLE HSTROPONIN T - Abnormal; Notable for the following components:    HS Troponin T 51 (*)     All other components within normal limits    Narrative:     High Sensitive Troponin T Reference Range:  <10.0 ng/L- Negative Female for AMI  <15.0 ng/L- Negative Male for AMI  >=10 - Abnormal Female indicating possible myocardial injury.  >=15 - Abnormal Male indicating possible myocardial injury.   Clinicians would have to utilize clinical acumen, EKG, Troponin, and serial changes to determine if it is an Acute Myocardial Infarction or myocardial injury due to an underlying chronic condition.        CBC WITH AUTO DIFFERENTIAL - Abnormal; Notable for the following components:    Hemoglobin 12.1 (*)     MCHC 29.1 (*)     Lymphocyte % 19.2 (*)     Eosinophil % 8.3 (*)     Immature Grans % 1.0 (*)     Eosinophils, Absolute 0.60 (*)     Immature Grans, Absolute 0.07 (*)     All other components within normal limits   BLOOD GAS, ARTERIAL - Abnormal; Notable for the following components:    pH, Arterial 7.259 (*)     pCO2, Arterial 78.4 (*)     pO2, Arterial 79.5 (*)     HCO3, Arterial 35.0 (*)     Base Excess, Arterial 5.8 (*)     All other components within normal limits   COVID-19 AND FLU A/B, NP SWAB IN TRANSPORT MEDIA 8-12 HR TAT - Normal    Narrative:      "Fact sheet for providers: https://www.fda.gov/media/557909/download    Fact sheet for patients: https://www.fda.gov/media/598910/download    Test performed by PCR.   MAGNESIUM - Normal   APTT - Normal    Narrative:     The recommended Heparin therapeutic range is 68-97 seconds.   PROTIME-INR - Normal    Narrative:     Therapeutic range for most indications is 2.0-3.0 INR,  or 2.5-3.5 for mechanical heart valves.   D-DIMER, QUANTITATIVE - Normal    Narrative:     According to the assay 's published package insert, a normal (<500 ng/mL (FEU)) D-dimer result in conjunction with a non-high clinical probability assessment, excludes deep vein thrombosis (DVT) and pulmonary embolism (PE) with high sensitivity.    D-dimer values increase with age and this can make VTE exclusion of an older population difficult. To address this, the American [employer], based on best available evidence and recent guidelines, recommends that clinicians use age-adjusted D-dimer thresholds in patients greater than 50 years of age with: a) a low probability of PE who do not meet all Pulmonary Embolism Rule Out Criteria, or b) in those with intermediate probability of PE.   The formula for an age-adjusted D-dimer cut-off is \"age*10\".  For example, a 60 year old patient would have an age-adjusted cut-off of 600 ng/mL (FEU) and an 80 year old 800 ng/mL (FEU).     BLOOD CULTURE   BLOOD CULTURE   RAINBOW DRAW    Narrative:     The following orders were created for panel order [place] Draw.  Procedure                               Abnormality         Status                     ---------                               -----------         ------                     Green Top (Gel)[[id number]]                                  Final result               Lavender Top[[id number]]                                     Final result               Gold Top - SST[[id number]]                                   Final result               Light Blue " Top[[id number]]                                   Final result                 Please view results for these tests on the individual orders.   URINALYSIS W/ CULTURE IF INDICATED   TROPONIN   BLOOD GAS, ARTERIAL   BNP (IN-HOUSE)   LACTIC ACID, PLASMA   CBC AND DIFFERENTIAL    Narrative:     The following orders were created for panel order CBC & Differential.  Procedure                               Abnormality         Status                     ---------                               -----------         ------                     CBC Auto Differential[[id number]]        Abnormal            Final result                 Please view results for these tests on the individual orders.   GREEN TOP   LAVENDER TOP   GOLD TOP - SST   LIGHT BLUE TOP     CT Head Without Contrast    Result Date: 3/14/2023  Narrative: EXAM: CT HEAD WITHOUT CONTRAST CLINICAL HISTORY:  77 years Male,Mental status change, unknown cause COMPARISON:  None Axial with coronal and sagittal images were obtained without intravenous contrast.  This examination was performed according to our departmental dose optimization program which includes automated exposure control, adjustment of the MA and kV according to patient size, and/or use of iterative reconstruction technique. FINDINGS: Mild low attenuation in the bilateral periventricular/deep cerebral white matter, most consistent with mild chronic small vessel ischemic change. No acute intracranial hemorrhage, mass effect, hydrocephalus, or CT signs of acute infarct. Mild mucosal thickening along the bilateral maxillary sinuses. Opacification of several left mastoid air cells.     Impression: 1. Mild chronic small vessel ischemic changes of acute intracranial process. 2. Opacification of several left mastoid air cells. Electronically signed by:  Finesse Granados MD  3/14/2023 5:31 PM CDT Workstation: [id number]    XR Chest 1 View    Result Date: 3/14/2023  Narrative: PORTABLE CHEST HISTORY: Weakness. Dizziness.  Altered mental status. Portable AP upright film of the chest was obtained at 4:39 PM. COMPARISON: August 30, 2022 FINDINGS: EKG leads. Subsegmental infiltrate left lung base compatible with pneumonia. Small left pleural effusion. The heart is not enlarged. The pulmonary vasculature is not increased. No pneumothorax. No acute osseous abnormality. Degenerative changes each shoulder.     Impression: CONCLUSION: Subsegmental infiltrate left lung base compatible with pneumonia. Small left pleural effusion. [id number] Electronically signed by:  Farzad Mcadams MD  3/14/2023 5:02 PM CDT Workstation: [id number]                                         [facility]    Final diagnoses:   Pneumonia of left lower lobe due to infectious organism   Metabolic encephalopathy   Acute respiratory failure with hypoxia and hypercapnia (HCC)       ED Disposition  ED Disposition     ED Disposition   Decision to Admit    Condition   --    Comment   Level of Care: Telemetry [5]   Admitting Physician: SUAD PRITCHETT [[id number]]   Attending Physician: SUAD PRITCHETT [[id number]]   Patient Class: Inpatient [101]               No follow-up provider specified.       Medication List      No changes were made to your prescriptions during this visit.          Burt Chavez MD  03/14/23 1812       Burt Chavez MD  03/14/23 1834      Electronically signed by Burt Chavez MD at 03/14/23 1834     Jessi Corral, RN at 03/14/23 1811        Lab at bedside    Electronically signed by Jessi Corral, RN at 03/14/23 1811     Jessi Corral, RN at 03/14/23 1841          Nursing report ED to floor  Rolando Samuels [facility]  77 y.o.  male    HPI:   Chief Complaint   Patient presents with   • Shortness of Breath   • Tremors   • Altered Mental Status       Admitting doctor:   Suad Pritchett MD    Consulting provider(s):  Consults       Date and Time Order Name Status Description    3/14/2023  6:08 PM Hospitalist (on-call MD unless specified)            "    Admitting diagnosis:   The primary encounter diagnosis was Pneumonia of left lower lobe due to infectious organism. Diagnoses of Metabolic encephalopathy and Acute respiratory failure with hypoxia and hypercapnia (HCC) were also pertinent to this visit.    Code status:   Current Code Status       Date Active Code Status Order ID Comments User Context       Prior            Allergies:   Toradol [ketorolac tromethamine]    Intake and Output  No intake or output data in the 24 hours ending 03/14/23 1841    Weight:       03/14/23  1637   Weight: 98.1 kg (216 lb 3.2 oz)       Most recent vitals:   Vitals:    03/14/23 1637 03/14/23 1803   BP: 149/72 173/83   Pulse: 71 68   Resp: 20 20   Temp: 97.9 °F (36.6 °C)    TempSrc: Oral    SpO2: 95% 98%   Weight: 98.1 kg (216 lb 3.2 oz)    Height: 165.1 cm (65\")      Oxygen Therapy: nasal cannula    Active LDAs/IV Access:   Lines, Drains & Airways       Active LDAs       Name Placement date Placement time Site Days    Peripheral IV 08/30/22 2002 Right Antecubital 08/30/22 2002  Antecubital  195    Peripheral IV 03/14/23 1645 Right Antecubital 03/14/23  1645  Antecubital  less than 1                    Labs (abnormal labs have a star):   Labs Reviewed   COMPREHENSIVE METABOLIC PANEL - Abnormal; Notable for the following components:       Result Value    Glucose 114 (*)     Creatinine 1.73 (*)     CO2 33.0 (*)     eGFR 40.7 (*)     All other components within normal limits    Narrative:     GFR Normal >60  Chronic Kidney Disease <60  Kidney Failure <15    The GFR formula is only valid for adults with stable renal function between ages 18 and 70.   SINGLE HSTROPONIN T - Abnormal; Notable for the following components:    HS Troponin T 51 (*)     All other components within normal limits    Narrative:     High Sensitive Troponin T Reference Range:  <10.0 ng/L- Negative Female for AMI  <15.0 ng/L- Negative Male for AMI  >=10 - Abnormal Female indicating possible myocardial " injury.  >=15 - Abnormal Male indicating possible myocardial injury.   Clinicians would have to utilize clinical acumen, EKG, Troponin, and serial changes to determine if it is an Acute Myocardial Infarction or myocardial injury due to an underlying chronic condition.        CBC WITH AUTO DIFFERENTIAL - Abnormal; Notable for the following components:    Hemoglobin 12.1 (*)     MCHC 29.1 (*)     Lymphocyte % 19.2 (*)     Eosinophil % 8.3 (*)     Immature Grans % 1.0 (*)     Eosinophils, Absolute 0.60 (*)     Immature Grans, Absolute 0.07 (*)     All other components within normal limits   BLOOD GAS, ARTERIAL - Abnormal; Notable for the following components:    pH, Arterial 7.259 (*)     pCO2, Arterial 78.4 (*)     pO2, Arterial 79.5 (*)     HCO3, Arterial 35.0 (*)     Base Excess, Arterial 5.8 (*)     All other components within normal limits   COVID-19 AND FLU A/B, NP SWAB IN TRANSPORT MEDIA 8-12 HR TAT - Normal    Narrative:     Fact sheet for providers: https://www.fda.gov/media/096701/download    Fact sheet for patients: https://www.fda.gov/media/932645/download    Test performed by PCR.   MAGNESIUM - Normal   APTT - Normal    Narrative:     The recommended Heparin therapeutic range is 68-97 seconds.   PROTIME-INR - Normal    Narrative:     Therapeutic range for most indications is 2.0-3.0 INR,  or 2.5-3.5 for mechanical heart valves.   D-DIMER, QUANTITATIVE - Normal    Narrative:     According to the assay 's published package insert, a normal (<500 ng/mL (FEU)) D-dimer result in conjunction with a non-high clinical probability assessment, excludes deep vein thrombosis (DVT) and pulmonary embolism (PE) with high sensitivity.    D-dimer values increase with age and this can make VTE exclusion of an older population difficult. To address this, the American [employer], based on best available evidence and recent guidelines, recommends that clinicians use age-adjusted D-dimer thresholds in  "patients greater than 50 years of age with: a) a low probability of PE who do not meet all Pulmonary Embolism Rule Out Criteria, or b) in those with intermediate probability of PE.   The formula for an age-adjusted D-dimer cut-off is \"age*10\".  For example, a 60 year old patient would have an age-adjusted cut-off of 600 ng/mL (FEU) and an 80 year old 800 ng/mL (FEU).     BLOOD CULTURE   BLOOD CULTURE   RAINBOW DRAW    Narrative:     The following orders were created for panel order [place] Draw.  Procedure                               Abnormality         Status                     ---------                               -----------         ------                     Green Top (Gel)[[id number]]                                  Final result               Lavender Top[[id number]]                                     Final result               Gold Top - SST[[id number]]                                   Final result               Light Blue Top[[id number]]                                   Final result                 Please view results for these tests on the individual orders.   URINALYSIS W/ CULTURE IF INDICATED   TROPONIN   BLOOD GAS, ARTERIAL   BNP (IN-HOUSE)   LACTIC ACID, PLASMA   CBC AND DIFFERENTIAL    Narrative:     The following orders were created for panel order CBC & Differential.  Procedure                               Abnormality         Status                     ---------                               -----------         ------                     CBC Auto Differential[[id number]]        Abnormal            Final result                 Please view results for these tests on the individual orders.   GREEN TOP   LAVENDER TOP   GOLD TOP - SST   LIGHT BLUE TOP       Meds given in ED:   Medications   sodium chloride 0.9 % flush 10 mL (has no administration in time range)   sodium chloride 0.9 % flush 10 mL (has no administration in time range)   sodium chloride 0.9 % infusion (125 mL/hr Intravenous New Bag " 3/14/23 1808)   cefTRIAXone (ROCEPHIN) 2 g/100 mL 0.9% NS IVPB (MBP) (2 g Intravenous New Bag 3/14/23 1829)   AZITHROMYCIN 500 MG/250 ML 0.9% NS IVPB (vial-mate) (has no administration in time range)     sodium chloride, 125 mL/hr, Last Rate: 125 mL/hr (03/14/23 1808)         NIH Stroke Scale:       Isolation/Infection(s):  No active isolations   COVID (rule out)     COVID Testing  Collected yes  Resulted negative  Nursing report ED to floor:  Mental status: alert  Ambulatory status: n/a  Precautions: n/a    ED nurse phone [facility]- [phone]    Electronically signed by Jessi Corral RN at 03/14/23 1842     Jessi Corral RN at 03/14/23 1901        Report given to 4W at this time    Electronically signed by Jessi Corral RN at 03/14/23 1901         Lab Results (last 24 hours)     Procedure Component Value Units Date/Time    POC Glucose Once [[id number]]  (Abnormal) Collected: 03/15/23 0733    Specimen: Blood Updated: 03/15/23 0756     Glucose 158 mg/dL      Comment: RN NotifiedOperator: [id number] [facility] HEATHERMeter ID: [id number]       Basic Metabolic Panel [[id number]]  (Abnormal) Collected: 03/15/23 0604    Specimen: Blood Updated: 03/15/23 0701     Glucose 183 mg/dL      BUN 16 mg/dL      Creatinine 1.55 mg/dL      Sodium 141 mmol/L      Potassium 3.8 mmol/L      Chloride 105 mmol/L      CO2 28.0 mmol/L      Calcium 8.1 mg/dL      BUN/Creatinine Ratio 10.3     Anion Gap 8.0 mmol/L      eGFR 46.4 mL/min/1.73     Narrative:      GFR Normal >60  Chronic Kidney Disease <60  Kidney Failure <15    The GFR formula is only valid for adults with stable renal function between ages 18 and 70.    Blood Gas, Arterial - [[id number]]  (Abnormal) Collected: 03/15/23 0435    Specimen: Arterial Blood Updated: 03/15/23 0437     Site Right Radial     Elder's Test N/A     pH, Arterial 7.333 pH units      Comment: 84 Value below reference range        pCO2, Arterial 60.7 mm Hg      Comment: 83 Value above reference range         pO2, Arterial 60.7 mm Hg      Comment: 84 Value below reference range        HCO3, Arterial 32.2 mmol/L      Comment: 83 Value above reference range        Base Excess, Arterial 5.0 mmol/L      Comment: 83 Value above reference range        O2 Saturation, Arterial 92.7 %      Comment: 84 Value below reference range        Barometric Pressure for Blood Gas 756 mmHg      Modality Nasal Cannula     Flow Rate 3.0 lpm      Ventilator Mode NA     Collected by RT     Comment: Meter: [id number]     :  [id number]       Blood Gas, Arterial - [[id number]]  (Abnormal) Collected: 03/15/23 0144    Specimen: Arterial Blood Updated: 03/15/23 0145     Site Arterial Line     Elder's Test N/A     pH, Arterial 7.313 pH units      Comment: 84 Value below reference range        pCO2, Arterial 64.1 mm Hg      Comment: 83 Value above reference range        pO2, Arterial 64.0 mm Hg      Comment: 84 Value below reference range        HCO3, Arterial 32.5 mmol/L      Comment: 83 Value above reference range        Base Excess, Arterial 4.8 mmol/L      Comment: 83 Value above reference range        O2 Saturation, Arterial 93.4 %      Comment: 84 Value below reference range        Barometric Pressure for Blood Gas 756 mmHg      Modality Room Air     Ventilator Mode NA     Collected by RT     Comment: Meter: [id number]     :  [id number]       High Sensitivity Troponin T 2Hr [[id number]]  (Abnormal) Collected: 03/14/23 2100    Specimen: Blood Updated: 03/14/23 2119     HS Troponin T 46 ng/L      Troponin T Delta 1 ng/L     Narrative:      High Sensitive Troponin T Reference Range:  <10.0 ng/L- Negative Female for AMI  <15.0 ng/L- Negative Male for AMI  >=10 - Abnormal Female indicating possible myocardial injury.  >=15 - Abnormal Male indicating possible myocardial injury.   Clinicians would have to utilize clinical acumen, EKG, Troponin, and serial changes to determine if it is an Acute Myocardial Infarction or myocardial  injury due to an underlying chronic condition.         POC Glucose Once [[id number]]  (Normal) Collected: 03/14/23 2003    Specimen: Blood Updated: 03/14/23 2018     Glucose 86 mg/dL      Comment: RN NotifiedOperator: [id number] JEAN CONSTANCEMeter ID: [id number]       High Sensitivity Troponin T [[id number]]  (Abnormal) Collected: 03/14/23 1917    Specimen: Blood Updated: 03/14/23 2009     HS Troponin T 45 ng/L     Narrative:      High Sensitive Troponin T Reference Range:  <10.0 ng/L- Negative Female for AMI  <15.0 ng/L- Negative Male for AMI  >=10 - Abnormal Female indicating possible myocardial injury.  >=15 - Abnormal Male indicating possible myocardial injury.   Clinicians would have to utilize clinical acumen, EKG, Troponin, and serial changes to determine if it is an Acute Myocardial Infarction or myocardial injury due to an underlying chronic condition.         BNP [[id number]]  (Normal) Collected: 03/14/23 1917    Specimen: Blood Updated: 03/14/23 2009     proBNP 770.4 pg/mL     Narrative:      Among patients with dyspnea, NT-proBNP is highly sensitive for the detection of acute congestive heart failure. In addition NT-proBNP of <300 pg/ml effectively rules out acute congestive heart failure with 99% negative predictive value.    Results may be falsely decreased if patient taking Biotin.      Urinalysis, Microscopic Only - Straight Cath [[id number]]  (Abnormal) Collected: 03/14/23 1841    Specimen: Urine from Straight Cath Updated: 03/14/23 1923     RBC, UA 3-5 /HPF      WBC, UA 6-12 /HPF      Bacteria, UA 1+ /HPF      Squamous Epithelial Cells, UA 3-5 /HPF      Transitional Epithelial Cells, UA 3-6 /HPF      Hyaline Casts, UA 7-12 /LPF      Methodology Manual Light Microscopy    Urine Culture - Urine, Straight Cath [[id number]] Collected: 03/14/23 1841    Specimen: Urine from Straight Cath Updated: 03/14/23 1923    Urinalysis With Culture If Indicated - Straight Cath [[id number]]  (Abnormal) Collected:  03/14/23 1841    Specimen: Urine from Straight Cath Updated: 03/14/23 1911     Color, UA Yellow     Appearance, UA Clear     pH, UA 5.5     Specific Gravity, UA 1.014     Glucose, UA Negative     Ketones, UA Negative     Bilirubin, UA Negative     Blood, UA Small (1+)     Protein,  mg/dL (2+)     Leuk Esterase, UA Negative     Nitrite, UA Negative     Urobilinogen, UA 0.2 E.U./dL    Narrative:      In absence of clinical symptoms, the presence of pyuria, bacteria, and/or nitrites on the urinalysis result does not correlate with infection.    Lactic Acid, Plasma [[id number]]  (Normal) Collected: 03/14/23 1815    Specimen: Blood Updated: 03/14/23 1844     Lactate 1.2 mmol/L     Blood Gas, Arterial - [[id number]]  (Abnormal) Collected: 03/14/23 1829    Specimen: Arterial Blood Updated: 03/14/23 1832     Site Right Radial     Elder's Test N/A     pH, Arterial 7.259 pH units      Comment: 84 Value below reference range        pCO2, Arterial 78.4 mm Hg      Comment: 86 Value above critical limit        pO2, Arterial 79.5 mm Hg      Comment: 84 Value below reference range        HCO3, Arterial 35.0 mmol/L      Comment: 83 Value above reference range        Base Excess, Arterial 5.8 mmol/L      Comment: 83 Value above reference range        O2 Saturation, Arterial 96.4 %      Barometric Pressure for Blood Gas 756 mmHg      Modality Nasal Cannula     Flow Rate 3.0 lpm      Ventilator Mode NA     Collected by GITA     Comment: Meter: [id number]     :  [id number]       Blood Culture - Blood, Hand, Right [[id number]] Collected: 03/14/23 1815    Specimen: Blood from Hand, Right Updated: 03/14/23 1820    Blood Culture - Blood, Arm, Left [[id number]] Collected: 03/14/23 1815    Specimen: Blood from Arm, Left Updated: 03/14/23 1820    COVID-19 and FLU A/B PCR - Swab, Nasopharynx [[id number]]  (Normal) Collected: 03/14/23 1733    Specimen: Swab from Nasopharynx Updated: 03/14/23 1808     COVID19 Not Detected      Influenza A PCR Not Detected     Influenza B PCR Not Detected    Narrative:      Fact sheet for providers: https://www.fda.gov/media/378179/download    Fact sheet for patients: https://www.fda.gov/media/291347/download    Test performed by PCR.    [place] Draw [[id number]] Collected: 03/14/23 1646    Specimen: Blood Updated: 03/14/23 1800    Narrative:      The following orders were created for panel order [place] Draw.  Procedure                               Abnormality         Status                     ---------                               -----------         ------                     Green Top (Gel)[[id number]]                                  Final result               Lavender Top[[id number]]                                     Final result               Gold Top - SST[[id number]]                                   Final result               Light Blue Top[[id number]]                                   Final result                 Please view results for these tests on the individual orders.    Gold Top - SST [[id number]] Collected: 03/14/23 1646    Specimen: Blood Updated: 03/14/23 1800     Extra Tube Hold for add-ons.     Comment: Auto resulted.       Light Blue Top [[id number]] Collected: 03/14/23 1646    Specimen: Blood Updated: 03/14/23 1800     Extra Tube Hold for add-ons.     Comment: Auto resulted       Green Top (Gel) [[id number]] Collected: 03/14/23 1646    Specimen: Blood Updated: 03/14/23 1800     Extra Tube Hold for add-ons.     Comment: Auto resulted.       Lavender Top [[id number]] Collected: 03/14/23 1646    Specimen: Blood Updated: 03/14/23 1800     Extra Tube hold for add-on     Comment: Auto resulted       D-dimer, Quantitative [[id number]]  (Normal) Collected: 03/14/23 1646    Specimen: Blood Updated: 03/14/23 1752     D-Dimer, Quantitative 710 ng/mL (FEU)     Narrative:      According to the assay 's published package insert, a normal (<500 ng/mL (FEU)) D-dimer result in conjunction  "with a non-high clinical probability assessment, excludes deep vein thrombosis (DVT) and pulmonary embolism (PE) with high sensitivity.    D-dimer values increase with age and this can make VTE exclusion of an older population difficult. To address this, the [employer], based on best available evidence and recent guidelines, recommends that clinicians use age-adjusted D-dimer thresholds in patients greater than 50 years of age with: a) a low probability of PE who do not meet all Pulmonary Embolism Rule Out Criteria, or b) in those with intermediate probability of PE.   The formula for an age-adjusted D-dimer cut-off is \"age*10\".  For example, a 60 year old patient would have an age-adjusted cut-off of 600 ng/mL (FEU) and an 80 year old 800 ng/mL (FEU).      aPTT [[id number]]  (Normal) Collected: 03/14/23 1646    Specimen: Blood Updated: 03/14/23 1752     PTT 27.1 seconds     Narrative:      The recommended Heparin therapeutic range is 68-97 seconds.    Protime-INR [[id number]]  (Normal) Collected: 03/14/23 1646    Specimen: Blood Updated: 03/14/23 1751     Protime 12.6 Seconds      INR 0.95    Narrative:      Therapeutic range for most indications is 2.0-3.0 INR,  or 2.5-3.5 for mechanical heart valves.    Comprehensive Metabolic Panel [[id number]]  (Abnormal) Collected: 03/14/23 1646    Specimen: Blood Updated: 03/14/23 1734     Glucose 114 mg/dL      BUN 15 mg/dL      Creatinine 1.73 mg/dL      Sodium 143 mmol/L      Potassium 4.7 mmol/L      Comment: Slight hemolysis detected by analyzer. Results may be affected.        Chloride 101 mmol/L      CO2 33.0 mmol/L      Calcium 8.9 mg/dL      Total Protein 7.7 g/dL      Albumin 4.1 g/dL      ALT (SGPT) 16 U/L      AST (SGOT) 16 U/L      Alkaline Phosphatase 80 U/L      Total Bilirubin 0.3 mg/dL      Globulin 3.6 gm/dL      A/G Ratio 1.1 g/dL      BUN/Creatinine Ratio 8.7     Anion Gap 9.0 mmol/L      eGFR 40.7 mL/min/1.73     Narrative:      GFR " Normal >60  Chronic Kidney Disease <60  Kidney Failure <15    The GFR formula is only valid for adults with stable renal function between ages 18 and 70.    Single High Sensitivity Troponin T [[id number]]  (Abnormal) Collected: 03/14/23 1646    Specimen: Blood Updated: 03/14/23 1723     HS Troponin T 51 ng/L     Narrative:      High Sensitive Troponin T Reference Range:  <10.0 ng/L- Negative Female for AMI  <15.0 ng/L- Negative Male for AMI  >=10 - Abnormal Female indicating possible myocardial injury.  >=15 - Abnormal Male indicating possible myocardial injury.   Clinicians would have to utilize clinical acumen, EKG, Troponin, and serial changes to determine if it is an Acute Myocardial Infarction or myocardial injury due to an underlying chronic condition.         Magnesium [[id number]]  (Normal) Collected: 03/14/23 1646    Specimen: Blood Updated: 03/14/23 1723     Magnesium 2.1 mg/dL     CBC & Differential [[id number]]  (Abnormal) Collected: 03/14/23 1646    Specimen: Blood Updated: 03/14/23 1655    Narrative:      The following orders were created for panel order CBC & Differential.  Procedure                               Abnormality         Status                     ---------                               -----------         ------                     CBC Auto Differential[[id number]]        Abnormal            Final result                 Please view results for these tests on the individual orders.    CBC Auto Differential [[id number]]  (Abnormal) Collected: 03/14/23 1646    Specimen: Blood Updated: 03/14/23 1655     WBC 7.23 10*3/mm3      RBC 4.30 10*6/mm3      Hemoglobin 12.1 g/dL      Hematocrit 41.6 %      MCV 96.7 fL      MCH 28.1 pg      MCHC 29.1 g/dL      RDW 13.5 %      RDW-SD 47.6 fl      MPV 8.5 fL      Platelets 263 10*3/mm3      Neutrophil % 61.7 %      Lymphocyte % 19.2 %      Monocyte % 8.7 %      Eosinophil % 8.3 %      Basophil % 1.1 %      Immature Grans % 1.0 %      Neutrophils, Absolute 4.46  10*3/mm3      Lymphocytes, Absolute 1.39 10*3/mm3      Monocytes, Absolute 0.63 10*3/mm3      Eosinophils, Absolute 0.60 10*3/mm3      Basophils, Absolute 0.08 10*3/mm3      Immature Grans, Absolute 0.07 10*3/mm3      nRBC 0.0 /100 WBC         Imaging Results (Last 24 Hours)     Procedure Component Value Units Date/Time    CT Head Without Contrast [[id number]] Collected: 03/14/23 1716     Updated: 03/14/23 1733    Narrative:      EXAM: CT HEAD WITHOUT CONTRAST    CLINICAL HISTORY:  77 years Male,Mental status change, unknown  cause    COMPARISON:  None    Axial with coronal and sagittal images were obtained without  intravenous contrast.  This examination was performed according  to our departmental dose optimization program which includes  automated exposure control, adjustment of the MA and kV according  to patient size, and/or use of iterative reconstruction  technique.    FINDINGS:  Mild low attenuation in the bilateral periventricular/deep  cerebral white matter, most consistent with mild chronic small  vessel ischemic change. No acute intracranial hemorrhage, mass  effect, hydrocephalus, or CT signs of acute infarct.    Mild mucosal thickening along the bilateral maxillary sinuses.  Opacification of several left mastoid air cells.      Impression:      1. Mild chronic small vessel ischemic changes of acute  intracranial process.  2. Opacification of several left mastoid air cells.    Electronically signed by:  Finesse Granados MD  3/14/2023 5:31 PM CDT  Workstation: [id number]    XR Chest 1 View [[id number]] Collected: 03/14/23 1641     Updated: 03/14/23 1704    Narrative:        PORTABLE CHEST    HISTORY: Weakness. Dizziness. Altered mental status.    Portable AP upright film of the chest was obtained at 4:39 PM.  COMPARISON: August 30, 2022    FINDINGS:   EKG leads.  Subsegmental infiltrate left lung base compatible with pneumonia.  Small left pleural effusion.  The heart is not enlarged.  The pulmonary  vasculature is not increased.  No pneumothorax.  No acute osseous abnormality.  Degenerative changes each shoulder.      Impression:      CONCLUSION:  Subsegmental infiltrate left lung base compatible with pneumonia.  Small left pleural effusion.    [id number]    Electronically signed by:  Farzad Mcadams MD  3/14/2023 5:02 PM CDT  Workstation: [id number]        ECG/EMG Results (last 24 hours)     Procedure Component Value Units Date/Time    ECG 12 Lead ED Triage Standing Order; Weak / Dizzy / AMS [[id number]] Collected: 03/14/23 1645     Updated: 03/14/23 1814     QT Interval 394 ms      QTC Interval 449 ms     Narrative:      Test Reason : ED Triage Standing Order~  Blood Pressure :   */*   mmHG  Vent. Rate :  78 BPM     Atrial Rate :  78 BPM     P-R Int : 142 ms          QRS Dur : 118 ms      QT Int : 394 ms       P-R-T Axes :  22 143  34 degrees     QTc Int : 449 ms    Sinus rhythm with Premature supraventricular complexes  Right bundle branch block  Left posterior fascicular block  ** Bifascicular block **  Abnormal ECG  When compared with ECG of 01-SEP-2022 09:10,  Premature supraventricular complexes are now Present  Minimal criteria for Anterior infarct are no longer Present  Nonspecific T wave abnormality has replaced inverted T waves in Anterior leads    Referred By:            Confirmed By:     ECG 12 Lead Dyspnea [[id number]]  (Abnormal) Resulted: 03/14/23 1834     Updated: 03/14/23 1834          Physician Progress Notes (last 24 hours)  Notes from 03/14/23 1051 through 03/15/23 1051   No notes of this type exist for this encounter.         Medical Student Notes (last 24 hours)  Notes from 03/14/23 1051 through 03/15/23 1051   No notes of this type exist for this encounter.         Consult Notes (last 24 hours)  Notes from 03/14/23 1051 through 03/15/23 1051   No notes of this type exist for this encounter.

## 2023-03-15 NOTE — PROGRESS NOTES
Item 0 Points 1 Point 2 Points 3 Points 4 Points Subtotal   Mental Status Alert, oriented, cooperative Lethargic, follows commands Confused, not following commands Obtunded or Somnolent Comatose 1   Respiratory Pattern Regular RR 8-16 breaths/minute Increased RR 18-25 breaths/minute Dyspnea on exertion, irregular RR 26-30 breaths/minute Shortness of breath,  RR 31-35 breaths/minute Accessory muscle use, severe SOB  RR > 35 breaths/minute 0   Breath Sounds Clear Decreased unilaterally Decreased bilaterally Basilar crackles Wheezing and/or rhonchi 1   Cough Strong, spontaneous, non-productive Strong productive Weak, non-productive Weak, productive or weak with rhonchi Absent or may require suctioning 0   Pulmonary Status Nonsmoker, no previous history, >1 year quit < 1 PPD  <1 year quit > or = 1 PPD Diagnosed pulmonary disease (severe or chronic) Severe or chronic pulmonary disease with exacerbation 3   Surgical Status None General surgery (non-abdominal or non-thoracic) Lower abdominal Thoracic or upper abdominal Thoracic with pulmonary disease 0   Chest X-ray Clear Chronic changes Infiltrates, atelectasis or pleural effusion Infiltrates in > 1 lobe Diffuse infiltrates and atelectasis and/or effusions 2   Activity   Level Ambulatory Ambulatory with assistance Non-ambulatory Paraplegic Quadriplegic 2        Total Score   9   Score    Drug Therapy Frequency 20 or >    Q4 Duoneb with Q2 Albuterol PRN 15-19    Q6 Duoneb with Q4 Albuterol PRN 10-14    QID Duoneb with Q4 Albuterol PRN 5-9    TID Duoneb with Q6 Albuterol PRN 0-4    Q4 PRN Duoneb                  Lung Expansion Therapy (PEP) Bronchopulmonary Hygiene (CPT)   Q4 & PRN - Severe atelectasis, poor oxygenation Q4 - Copious secretions, dyspnea, unable to sleep   QID - High risk for persistent atelectasis, existence of atelectasis QID & Q4 PRN - Moderate secretion production   TID - At risk for developing atelectasis TID - Small amounts of secretions with poor cough    BID - Prevention of atelectasis BID - Unable to breathe deeply and cough spontaneously     RT Comments / Recommendations:  Duoneb TID with Albuterol Q6 PRN. Will reeval in 48 hours

## 2023-03-15 NOTE — PLAN OF CARE
Goal Outcome Evaluation:  Plan of Care Reviewed With: patient           Outcome Evaluation: PT/OT co joshua completed..Pt able to move sup<>sit, sit<>stand, gait w/ SBA 1 in room w/out LOB or SOA, walking 6, 34 ft in room on 02 per NC. Pt reported no c/o but ready to return to bed; He has good rehab potential and rec he goes home w/ assist and possible HH PT pending progress while here.  Good rehab potential.

## 2023-03-15 NOTE — PLAN OF CARE
Goal Outcome Evaluation:  Plan of Care Reviewed With: patient        Progress: no change  Outcome Evaluation: VSS, 3L NC, wound consult ordered for wounds present on admission, no complaints, resting in between care.

## 2023-03-16 PROBLEM — J44.9 COPD (CHRONIC OBSTRUCTIVE PULMONARY DISEASE) (HCC): Status: ACTIVE | Noted: 2023-03-16

## 2023-03-16 PROBLEM — G72.9 MYOPATHIES: Status: ACTIVE | Noted: 2023-03-16

## 2023-03-16 PROBLEM — J96.22 ACUTE AND CHRONIC RESPIRATORY FAILURE WITH HYPERCAPNIA (HCC): Status: ACTIVE | Noted: 2023-03-16

## 2023-03-16 PROBLEM — J96.21 ACUTE AND CHRONIC RESPIRATORY FAILURE WITH HYPOXIA (HCC): Status: ACTIVE | Noted: 2023-03-16

## 2023-03-16 LAB
ANION GAP SERPL CALCULATED.3IONS-SCNC: 10 MMOL/L (ref 5–15)
BACTERIA SPEC AEROBE CULT: NO GROWTH
BUN SERPL-MCNC: 20 MG/DL (ref 8–23)
BUN/CREAT SERPL: 11.3 (ref 7–25)
CALCIUM SPEC-SCNC: 8.2 MG/DL (ref 8.6–10.5)
CHLORIDE SERPL-SCNC: 102 MMOL/L (ref 98–107)
CO2 SERPL-SCNC: 28 MMOL/L (ref 22–29)
CREAT SERPL-MCNC: 1.77 MG/DL (ref 0.76–1.27)
EGFRCR SERPLBLD CKD-EPI 2021: 39.6 ML/MIN/1.73
GLUCOSE BLDC GLUCOMTR-MCNC: 120 MG/DL (ref 70–130)
GLUCOSE BLDC GLUCOMTR-MCNC: 296 MG/DL (ref 70–130)
GLUCOSE SERPL-MCNC: 218 MG/DL (ref 65–99)
POTASSIUM SERPL-SCNC: 3.9 MMOL/L (ref 3.5–5.2)
QT INTERVAL: 394 MS
QTC INTERVAL: 449 MS
SODIUM SERPL-SCNC: 140 MMOL/L (ref 136–145)
WHOLE BLOOD HOLD SPECIMEN: NORMAL

## 2023-03-16 PROCEDURE — 25010000002 ENOXAPARIN PER 10 MG: Performed by: PHYSICIAN ASSISTANT

## 2023-03-16 PROCEDURE — 82962 GLUCOSE BLOOD TEST: CPT

## 2023-03-16 PROCEDURE — 94799 UNLISTED PULMONARY SVC/PX: CPT

## 2023-03-16 PROCEDURE — 94660 CPAP INITIATION&MGMT: CPT

## 2023-03-16 PROCEDURE — 80048 BASIC METABOLIC PNL TOTAL CA: CPT | Performed by: PHYSICIAN ASSISTANT

## 2023-03-16 PROCEDURE — 97110 THERAPEUTIC EXERCISES: CPT

## 2023-03-16 PROCEDURE — 94664 DEMO&/EVAL PT USE INHALER: CPT

## 2023-03-16 PROCEDURE — 97116 GAIT TRAINING THERAPY: CPT

## 2023-03-16 PROCEDURE — 25010000002 CEFTRIAXONE PER 250 MG: Performed by: PHYSICIAN ASSISTANT

## 2023-03-16 PROCEDURE — 94760 N-INVAS EAR/PLS OXIMETRY 1: CPT

## 2023-03-16 PROCEDURE — 63710000001 INSULIN ASPART PER 5 UNITS: Performed by: PHYSICIAN ASSISTANT

## 2023-03-16 PROCEDURE — 25010000002 METHYLPREDNISOLONE PER 125 MG: Performed by: PHYSICIAN ASSISTANT

## 2023-03-16 PROCEDURE — 97535 SELF CARE MNGMENT TRAINING: CPT

## 2023-03-16 PROCEDURE — 63710000001 INSULIN DETEMIR PER 5 UNITS: Performed by: FAMILY MEDICINE

## 2023-03-16 RX ORDER — AZITHROMYCIN 250 MG/1
500 TABLET, FILM COATED ORAL EVERY 24 HOURS
Status: COMPLETED | OUTPATIENT
Start: 2023-03-16 | End: 2023-03-18

## 2023-03-16 RX ADMIN — ENOXAPARIN SODIUM 40 MG: 40 INJECTION SUBCUTANEOUS at 20:08

## 2023-03-16 RX ADMIN — INSULIN ASPART 4 UNITS: 100 INJECTION, SOLUTION INTRAVENOUS; SUBCUTANEOUS at 20:13

## 2023-03-16 RX ADMIN — PANTOPRAZOLE SODIUM 40 MG: 40 TABLET, DELAYED RELEASE ORAL at 08:21

## 2023-03-16 RX ADMIN — BUDESONIDE 0.5 MG: 0.5 SUSPENSION RESPIRATORY (INHALATION) at 19:40

## 2023-03-16 RX ADMIN — ASPIRIN 81 MG: 81 TABLET, CHEWABLE ORAL at 08:21

## 2023-03-16 RX ADMIN — TAMSULOSIN HYDROCHLORIDE 0.4 MG: 0.4 CAPSULE ORAL at 20:08

## 2023-03-16 RX ADMIN — ATORVASTATIN CALCIUM 20 MG: 20 TABLET, FILM COATED ORAL at 20:08

## 2023-03-16 RX ADMIN — INSULIN ASPART 4 UNITS: 100 INJECTION, SOLUTION INTRAVENOUS; SUBCUTANEOUS at 08:22

## 2023-03-16 RX ADMIN — NEBIVOLOL HYDROCHLORIDE 10 MG: 5 TABLET ORAL at 08:21

## 2023-03-16 RX ADMIN — CEFTRIAXONE SODIUM 1 G: 1 INJECTION, POWDER, FOR SOLUTION INTRAMUSCULAR; INTRAVENOUS at 20:15

## 2023-03-16 RX ADMIN — INSULIN DETEMIR 5 UNITS: 100 INJECTION, SOLUTION SUBCUTANEOUS at 09:23

## 2023-03-16 RX ADMIN — IPRATROPIUM BROMIDE AND ALBUTEROL SULFATE 3 ML: 2.5; .5 SOLUTION RESPIRATORY (INHALATION) at 15:16

## 2023-03-16 RX ADMIN — INSULIN ASPART 6 UNITS: 100 INJECTION, SOLUTION INTRAVENOUS; SUBCUTANEOUS at 11:57

## 2023-03-16 RX ADMIN — IPRATROPIUM BROMIDE AND ALBUTEROL SULFATE 3 ML: 2.5; .5 SOLUTION RESPIRATORY (INHALATION) at 19:40

## 2023-03-16 RX ADMIN — Medication 10 ML: at 20:09

## 2023-03-16 RX ADMIN — METHYLPREDNISOLONE SODIUM SUCCINATE 60 MG: 125 INJECTION, POWDER, FOR SOLUTION INTRAMUSCULAR; INTRAVENOUS at 20:08

## 2023-03-16 RX ADMIN — Medication 10 ML: at 08:22

## 2023-03-16 RX ADMIN — AZITHROMYCIN MONOHYDRATE 500 MG: 250 TABLET ORAL at 21:46

## 2023-03-16 NOTE — PROGRESS NOTES
[facility] Medicine Services  INPATIENT PROGRESS NOTE      Length of Stay: 2  Date of Admission: 3/14/2023  Primary Care Physician: Jaxson Zepeda MD    Subjective   Chief Complaint: Shortness of breath  HPI: Feeling much better today and breathing easier. Tolerating BIPAP last night without difficulty and felt it to be very helpful.     Review of Systems   All pertinent negatives and positives are as above. All other systems have been reviewed and are negative unless otherwise stated.     Objective    As of today 03/16/23  Temp:  [98 °F (36.7 °C)-99.6 °F (37.6 °C)] 98 °F (36.7 °C)  Heart Rate:  [61-85] 75  Resp:  [18-25] 19  BP: (124-170)/(54-76) 163/76    Physical Exam  Constitutional:       General: He is not in acute distress.     Appearance: He is not toxic-appearing.   HENT:      Head: Normocephalic and atraumatic.      Right Ear: External ear normal.      Left Ear: External ear normal.      Nose: Nose normal.      Mouth/Throat:      Pharynx: Oropharynx is clear.   Eyes:      Conjunctiva/sclera: Conjunctivae normal.   Cardiovascular:      Rate and Rhythm: Normal rate and regular rhythm.      Heart sounds: Normal heart sounds.   Pulmonary:      Comments: Improved aeration bilaterally, no obvious wheeze noted today  Abdominal:      General: Bowel sounds are normal.      Palpations: Abdomen is soft.      Tenderness: There is no abdominal tenderness.   Musculoskeletal:         General: No deformity.   Skin:     General: Skin is warm and dry.      Capillary Refill: Capillary refill takes less than 2 seconds.   Neurological:      General: No focal deficit present.      Mental Status: He is alert and oriented to person, place, and time. Mental status is at baseline.   Psychiatric:         Behavior: Behavior normal.           Results Review:  I have reviewed the labs, radiology results, and diagnostic studies.    Laboratory Data:   Results from last 7 days   Lab  Units 03/16/23  0601 03/15/23  0604 03/14/23  1646   SODIUM mmol/L 140 141 143   POTASSIUM mmol/L 3.9 3.8 4.7   CHLORIDE mmol/L 102 105 101   CO2 mmol/L 28.0 28.0 33.0*   BUN mg/dL 20 16 15   CREATININE mg/dL 1.77* 1.55* 1.73*   GLUCOSE mg/dL 218* 183* 114*   CALCIUM mg/dL 8.2* 8.1* 8.9   BILIRUBIN mg/dL  --   --  0.3   ALK PHOS U/L  --   --  80   ALT (SGPT) U/L  --   --  16   AST (SGOT) U/L  --   --  16   ANION GAP mmol/L 10.0 8.0 9.0     Estimated Creatinine Clearance: 38.8 mL/min (A) (by C-G formula based on SCr of 1.77 mg/dL (H)).  Results from last 7 days   Lab Units 03/14/23  1646   MAGNESIUM mg/dL 2.1         Results from last 7 days   Lab Units 03/14/23  1646   WBC 10*3/mm3 7.23   HEMOGLOBIN g/dL 12.1*   HEMATOCRIT % 41.6   PLATELETS 10*3/mm3 263     Results from last 7 days   Lab Units 03/14/23  1646   INR  0.95       Culture Data:   Blood Culture   Date Value Ref Range Status   03/14/2023 No growth at 24 hours  Preliminary   03/14/2023 No growth at 24 hours  Preliminary     Urine Culture   Date Value Ref Range Status   03/14/2023 No growth  Final     No results found for: RESPCX  No results found for: WOUNDCX  No results found for: STOOLCX  No components found for: BODYFLD    Radiology Data:   Imaging Results (Last 24 Hours)     ** No results found for the last 24 hours. **          I have reviewed the patient's current medications.     Assessment/Plan     Principal Problem:    Pneumonia of left lower lobe due to infectious organism  Active Problems:    Acute and chronic respiratory failure with hypercapnia (HCC)    Acute and chronic respiratory failure with hypoxia (HCC)    COPD (chronic obstructive pulmonary disease) (HCC)    Myopathies  Acute respiratory failure with hypoxia and hypercapnia  Respiratory acidosis  Type 2 diabetes mellitus  Deconditioning  Black lung disease    Plan:  - Continue supplemental oxygen wean as tolerated  - Continue noninvasive ventilation wean as tolerated  - Given patient's  hypercapnia initially on admission with improvement with the BiPAP machine currently on AVAPS settings he may benefit from a trilogy machine when he goes home  - Continue bronchodilators, RT to initiate protocol  - Pulmicort nebs have also been ordered  - Pulmonary toilet  - Continue antibiotics with Rocephin and azithromycin  - PT and OT been ordered  - Continue home medications as appropriate  - Given reports of hypoglycemia at home would recommend stopping his home Amaryl at discharge as well  - Discussed with case management  - DVT prophylaxis with Lovenox  - CODE STATUS: Full    Patient has acute on chronic hypercapnic and hypoxic respiratory failure along with COPD.  Upon admission to hospital patient PCO2 was 78.4 after being moved to BiPAP on AVAPS mode PCO2 only dropped to 60.7 improving then home BiPAP/BiPAP ST/BiPAP STA is insufficient due to severity of the disease.  Patient requires volume ventilation AE mode with auto titrating EPAP to reduce hypercapnic episodes.  Ordering noninvasive ventilator to reduce hospital readmissions.    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  Patient has been on multiple rounds of antibiotics and steroids for this condition.  Patient has had a productive cough for greater than 6 months.  Ordering a flow vest to help mobilize secretions and reduce hospital readmissions.    Medical Decision Making  Number and Complexity of problems: 6 high complexity    Conditions and Status:        Condition is improving.     [facility] Data  External documents reviewed: Previous records  Tests considered but not ordered: None     Decision rules/scores evaluated (example [id number], [place], etc): None     Discussed with: Patient, wife, and son at bedside     Treatment Plan  As above    Care Planning  Shared decision making: Patient  Code status and discussions: Full    Disposition  Social  Determinants of Health that impact treatment or disposition: None  I expect the patient to be discharged to SNF versus rehab versus home with home health in 3-4 days.       I have utilized all available immediate resources to obtain, update, or review the patient's current medications (including all prescriptions, over-the-counter products, herbals, cannabis/cannabidiol products, and vitamin/mineral/dietary (nutritional) supplements).   I confirmed that the patient's Advance Care Plan is present, code status is documented, or surrogate decision maker is listed in the patient's medical record.    Discharge Planning: In process    Cristian Gupta MD

## 2023-03-16 NOTE — PLAN OF CARE
Goal Outcome Evaluation:  Plan of Care Reviewed With: patient        Progress: improving  Outcome Evaluation: Pt/nsg agreeable to therapy. Pt performed BLE ther ex sitting in chair, and t/f sit-stand and stand-sit SBAx1 with no AD. Pt amb in room without AD with SBAx1-Indep 28x2 in room and 14x2 to toilet and back with assistance from PTA with 02 tubing. Pt returned to chair and left sitting with call light in reach. 02 sats remained WNL during tx. Pt would cont to benefit from therapy.

## 2023-03-16 NOTE — THERAPY TREATMENT NOTE
Acute Care - Physical Therapy Treatment Note  [facility]     Patient Name: Rolando Novoa  : 1945  MRN: [id number]  Today's Date: 3/16/2023      Visit Dx:     ICD-10-CM ICD-9-CM   1. Pneumonia of left lower lobe due to infectious organism  J18.9 486   2. Metabolic encephalopathy  G93.41 348.31   3. Acute respiratory failure with hypoxia and hypercapnia (HCC)  J96.01 518.81    J96.02    4. Impaired mobility and ADLs  Z74.09 V49.89    Z78.9    5. Impaired functional mobility, balance, gait, and endurance  Z74.09 V49.89     Patient Active Problem List   Diagnosis   • Hyperlipemia   • SOB (shortness of breath)   • Hematuria syndrome   • Psoriasis   • Hypertension   • High cholesterol   • Enlarged prostate   • Diabetes mellitus (HCC)   • Acid reflux   • Calculus of kidney   • Kidney stones   • Chronic respiratory failure with hypoxia (HCC)   • Class 2 obesity due to excess calories without serious comorbidity with body mass index (BMI) of 37.0 to 37.9 in adult   • Black lung (HCC)   • Chronic restrictive lung disease   • Hx of colonic polyps   • Encounter for screening for malignant neoplasm of colon   • Thrombocytosis   • Anemia   • Leucocytosis   • Folate deficiency   • Acute right-sided thoracic back pain   • Chest wall pain   • CKD (chronic kidney disease) stage 2, GFR 60-89 ml/min   • Diabetes type 2, uncontrolled   • Diabetic neuropathy (HCC)   • Nocturnal hypoxemia   • Supplemental oxygen dependent   • Change in bowel habits   • Diarrhea   • Pneumonia of left lower lobe due to infectious organism   • Acute and chronic respiratory failure with hypercapnia (HCC)   • Acute and chronic respiratory failure with hypoxia (HCC)   • COPD (chronic obstructive pulmonary disease) (HCC)   • Myopathies     Past Medical History:   Diagnosis Date   • Acid reflux    • Black lung disease (HCC)    • Cataract    • Diabetes (HCC)    • Enlarged prostate    • High cholesterol    • Hypertension    • Kidney stone      Please let us know if you develop any worsening symptoms.   multiple   • Obesity    • Psoriasis      Past Surgical History:   Procedure Laterality Date   • AMPUTATION Left     lower arm and hand   • CATARACT EXTRACTION W/ INTRAOCULAR LENS IMPLANT Left 01/22/2021    Procedure: REMIOVE CATARACT AND IMPLANT  INTRAOCULAR LENS;  Surgeon: Sam Isaacs MD;  Location: [facility] OR;  Service: Ophthalmology;  Laterality: Left;   • CATARACT EXTRACTION W/ INTRAOCULAR LENS IMPLANT Right 01/29/2021    Procedure: REMOVE CATARACT AND IMPLANT INTRAOCULAR LENS;  Surgeon: Sam Isaacs MD;  Location: [facility] OR;  Service: Ophthalmology;  Laterality: Right;   • CHOLECYSTECTOMY OPEN     • COLONOSCOPY N/A 11/09/2018    Procedure: COLONOSCOPY;  Surgeon: Sukhdeep Mae MD;  Location: [facility] ENDOSCOPY;  Service: Gastroenterology   • COLONOSCOPY N/A 03/28/2022    Procedure: COLONOSCOPY;  Surgeon: Sukhdeep Mae MD;  Location: [facility] ENDOSCOPY;  Service: Gastroenterology;  Laterality: N/A;   • CYSTOSCOPY N/A 12/16/2017    Procedure: CYSTOSCOPY WITH CLOT EVACUATION;  Surgeon: Prosper Ariza MD;  Location: [facility];  Service:    • CYSTOSCOPY, URETEROSCOPY, RETROGRADE PYELOGRAM, STENT INSERTION Left 12/15/2017    Procedure: CYSTOSCOPY LEFT URETEROSCOPY RETROGRADE PYELOGRAM HOLMIUM LASER STENT INSERTION;  Surgeon: Prosper Ariza MD;  Location: [facility];  Service:    • CYSTOSCOPY, URETEROSCOPY, RETROGRADE PYELOGRAM, STENT INSERTION Left 08/10/2022    Procedure: CYSTOSCOPY LEFT RETROGRADE URETEROSCOPY LASER LITHOTRIPSY STENT PLACMENT;  Surgeon: Prosper Ariza MD;  Location: [facility];  Service: Urology;  Laterality: Left;   • DENTAL PROCEDURE      multiple extractions   • EXTRACORPOREAL SHOCK WAVE LITHOTRIPSY (ESWL)     • EXTRACORPOREAL SHOCKWAVE LITHOTRIPSY (ESWL), STENT INSERTION/REMOVAL Left 02/03/2017    Procedure: LEFT EXTRACORPOREAL SHOCKWAVE LITHOTRIPSY, POSSIBLE CYSTOSCOPY, POSSIBLE STENT REMOVAL ;  Surgeon: Prosper Ariza MD;  Location: [facility];  Service:    •  EXTRACORPOREAL SHOCKWAVE LITHOTRIPSY (ESWL), STENT INSERTION/REMOVAL Left 05/04/2018    Procedure: EXTRACORPOREAL SHOCKWAVE LITHOTRIPSY;  Surgeon: Prosper Ariza MD;  Location: [facility];  Service: Urology   • KIDNEY STONE SURGERY     • SCROTAL SURGERY      trauma   • WISDOM TOOTH EXTRACTION      took bottom ones     PT Assessment (last 12 hours)     PT Evaluation and Treatment     Row Name 03/16/23 1340          Physical Therapy Time and Intention    Subjective Information no complaints  -TW     Document Type therapy note (daily note)  -TW     Mode of Treatment physical therapy;individual therapy  -TW     Patient Effort good  -TW     Row Name 03/16/23 1340          General Information    Patient Profile Reviewed yes  -TW     Patient Observations agree to therapy;cooperative;alert  -TW     Patient/Family/Caregiver Comments/Observations None  -TW     General Observations of Patient Pt sitting in recliner/chair in room  -TW     Existing Precautions/Restrictions fall  -TW     Row Name 03/16/23 1340          Pain    Pretreatment Pain Rating 0/10 - no pain  -TW     Posttreatment Pain Rating 0/10 - no pain  -TW     Row Name 03/16/23 1340          Cognition    Affect/Mental Status (Cognition) WFL  -TW     Orientation Status (Cognition) oriented x 4  -TW     Follows Commands (Cognition) WFL  -TW     Personal Safety Interventions fall prevention program maintained;gait belt;muscle strengthening facilitated;nonskid shoes/slippers when out of bed  -TW     Row Name 03/16/23 1340          Bed Mobility    Comment, (Bed Mobility) Not tested  -TW     Row Name 03/16/23 1340          Transfers    Transfers sit-stand transfer;stand-sit transfer;toilet transfer  -TW     Row Name 03/16/23 1340          Sit-Stand Transfer    Sit-Stand [place] (Transfers) standby assist  -TW     Row Name 03/16/23 1340          Stand-Sit Transfer    Stand-Sit [place] (Transfers) standby assist  -TW     Row Name 03/16/23 1340          Toilet  Transfer    Type (Toilet Transfer) sit-stand;stand-sit  -TW     [place] Level (Toilet Transfer) standby assist  -TW     Row Name 03/16/23 1340          Gait/Stairs (Locomotion)    Gait/Stairs Locomotion gait/ambulation independence  -TW     [place] Level (Gait) standby assist;independent  Assist given to manage O2 tubing.  -TW     Assistive Device (Gait) --  No AD used  -TW     Distance in Feet (Gait) 28x2 in room, 14x2 to toilet and back to chair.  -TW     Pattern (Gait) step-through  -TW     Row Name 03/16/23 1340          Motor Skills    Therapeutic Exercise hip;knee;ankle  -TW     Row Name 03/16/23 1340          Hip (Therapeutic Exercise)    Hip (Therapeutic Exercise) AROM (active range of motion)  -TW     Hip AROM (Therapeutic Exercise) bilateral;sitting;aBduction;aDduction  -TW     Row Name 03/16/23 1340          Knee (Therapeutic Exercise)    Knee (Therapeutic Exercise) AROM (active range of motion)  -TW     Knee AROM (Therapeutic Exercise) bilateral;sitting;LAQ (long arc quad)  -TW     Row Name 03/16/23 1340          Ankle (Therapeutic Exercise)    Ankle (Therapeutic Exercise) AROM (active range of motion)  -TW     Ankle AROM (Therapeutic Exercise) bilateral;sitting;dorsiflexion;plantarflexion  -TW     Row Name             Wound 03/14/23 1953 sacral spine    Wound - Properties Group Placement Date: 03/14/23  -GZ Placement Time: 1953  -GZ Present on Hospital Admission: Y  -GZ Location: sacral spine  -GZ    Retired Wound - Properties Group Placement Date: 03/14/23  -GZ Placement Time: 1953  -GZ Present on Hospital Admission: Y  -GZ Location: sacral spine  -GZ    Retired Wound - Properties Group Date first assessed: 03/14/23  -GZ Time first assessed: 1953  -GZ Present on Hospital Admission: Y  -GZ Location: sacral spine  -GZ    Row Name             Wound 03/14/23 1954 Right popliteal    Wound - Properties Group Placement Date: 03/14/23  -GZ Placement Time: 1954  -GZ Present on Hospital Admission: Y   -GZ Side: Right  -GZ Location: popliteal  -GZ    Retired Wound - Properties Group Placement Date: 03/14/23  -GZ Placement Time: 1954 -GZ Present on Hospital Admission: Y  -GZ Side: Right  -GZ Location: popliteal  -GZ    Retired Wound - Properties Group Date first assessed: 03/14/23  -GZ Time first assessed: 1954 -GZ Present on Hospital Admission: Y  -GZ Side: Right  -GZ Location: popliteal  -GZ    Row Name 03/16/23 1340          Plan of Care Review    Plan of Care Reviewed With patient  -TW     Progress improving  -TW     Outcome Evaluation Pt/nsg agreeable to therapy. Pt performed BLE ther ex sitting in chair, and t/f sit-stand and stand-sit SBAx1 with no AD. Pt amb in room without AD with SBAx1-Indep 28x2 in room and 14x2 to toilet and back with assistance from PTA with 02 tubing. Pt returned to chair and left sitting with call light in reach. 02 sats remained WNL during tx. Pt would cont to benefit from therapy.  -TW     Row Name 03/16/23 1340          Vital Signs    Pre Systolic BP Rehab 138  -TW     Pre Treatment Diastolic BP 73  -TW     Pretreatment Heart Rate (beats/min) 70  -TW     Intratreatment Heart Rate (beats/min) 82  -TW     Posttreatment Heart Rate (beats/min) 70  -TW     Pre SpO2 (%) 94  -TW     O2 Delivery Pre Treatment nasal cannula  -TW     Intra SpO2 (%) 93  -TW     O2 Delivery Intra Treatment nasal cannula  -TW     Post SpO2 (%) 96  -TW     O2 Delivery Post Treatment nasal cannula  -TW     Pre Patient Position Sitting  -TW     Intra Patient Position Standing  -TW     Post Patient Position Sitting  -TW     Row Name 03/16/23 1340          Positioning and Restraints    Pre-Treatment Position sitting in chair/recliner  -TW     Post Treatment Position chair  -TW     In Chair sitting;call light within reach;encouraged to call for assist;exit alarm on  -TW     Row Name 03/16/23 1340          Therapy Assessment/Plan (PT)    Rehab Potential (PT) good, to achieve stated therapy goals  -TW     Row Name  03/16/23 1340          Transfer Goal 1 (PT)    Activity/Assistive Device (Transfer Goal 1, PT) bed-to-chair/chair-to-bed;toilet  -TW     [place] Level/Cues Needed (Transfer Goal 1, PT) independent  -TW     Time Frame (Transfer Goal 1, PT) by discharge  -TW     Progress/Outcome (Transfer Goal 1, PT) goal ongoing  -TW     Row Name 03/16/23 1340          Gait Training Goal 1 (PT)    Activity/Assistive Device (Gait Training Goal 1, PT) gait (walking locomotion)  -TW     [place] Level (Gait Training Goal 1, PT) independent  -TW     Distance (Gait Training Goal 1, PT) 100 ft or more per trip w/ VSS  -TW     Progress/Outcome (Gait Training Goal 1, PT) goal not met  -TW     Row Name 03/16/23 1340          Stairs Goal 1 (PT)    Activity/Assistive Device (Stairs Goal 1, PT) ascending stairs;descending stairs  -TW     [place] Level/Cues Needed (Stairs Goal 1, PT) modified independence  -TW     Number of Stairs (Stairs Goal 1, PT) 4 stairs w/ 1 handrail on L  -TW     Time Frame (Stairs Goal 1, PT) by discharge  -TW     Progress/Outcome (Stairs Goal 1, PT) goal ongoing  -TW           User Key  (r) = Recorded By, (t) = Taken By, (c) = Cosigned By    Initials Name Provider Type    TW Jason Todd PTA Physical Therapist Assistant    Che Serrato RN Registered Nurse                Physical Therapy Education     Title: PT OT SLP Therapies (Done)     Topic: Physical Therapy (Done)     Point: Mobility training (Done)     Learning Progress Summary           Patient Acceptance, E,D, VU,DU by HERMILO at 3/15/2023 1414    Comment: PT POC; encouraged gait w/ asist; and breathind ext                   Point: Home exercise program (Done)     Learning Progress Summary           Patient Acceptance, E,D, VU,DU by HERMILO at 3/15/2023 1414    Comment: PT POC; encouraged gait w/ asist; and breathind ext                   Point: Body mechanics (Done)     Learning Progress Summary           Patient Acceptance, E,D, VU,DU by  HERMILO at 3/15/2023 1414    Comment: PT POC; encouraged gait w/ asist; and breathind ext                   Point: Precautions (Done)     Learning Progress Summary           Patient Acceptance, E,D, VU,DU by  at 3/15/2023 1414    Comment: PT POC; encouraged gait w/ asist; and breathind ext                               User Key     Initials Effective Dates Name Provider Type Discipline     06/16/21 -  Shireen Kimball, PT Physical Therapist PT              PT Recommendation and Plan  Anticipated Discharge Disposition (PT): home with 24/7 care, home with home health  Plan of Care Reviewed With: patient  Progress: improving  Outcome Evaluation: Pt/nsg agreeable to therapy. Pt performed BLE ther ex sitting in chair, and t/f sit-stand and stand-sit SBAx1 with no AD. Pt amb in room without AD with SBAx1-Indep 28x2 in room and 14x2 to toilet and back with assistance from PTA with 02 tubing. Pt returned to chair and left sitting with call light in reach. 02 sats remained WNL during tx. Pt would cont to benefit from therapy.       Time Calculation:    PT Charges     Row Name 03/16/23 1653             Time Calculation    Start Time 1340  -TW      Stop Time 1407  -TW      Time Calculation (min) 27 min  -TW         Time Calculation- PT    Total Timed Code Minutes- PT 27 minute(s)  -TW            User Key  (r) = Recorded By, (t) = Taken By, (c) = Cosigned By    Initials Name Provider Type    TW Jason Todd PTA Physical Therapist Assistant              Therapy Charges for Today     Code Description Service Date Service Provider Modifiers Qty    90389282459 HC GAIT TRAINING EA 15 MIN 3/16/2023 Jason Todd PTA GP 1    34167789314 HC PT THER PROC EA 15 MIN 3/16/2023 Jason Todd PTA GP 1          PT G-Codes  Outcome Measure Options: Tinetti  AM-PAC 6 Clicks Score (PT): 20  AM-PAC 6 Clicks Score (OT): 21  [place]etti Total Score: 27    Jason Todd PTA  3/16/2023

## 2023-03-16 NOTE — THERAPY TREATMENT NOTE
Patient Name: Rolando Novoa  : 1945    MRN: [id number]                              Today's Date: 3/16/2023       Admit Date: 3/14/2023    Visit Dx:     ICD-10-CM ICD-9-CM   1. Pneumonia of left lower lobe due to infectious organism  J18.9 486   2. Metabolic encephalopathy  G93.41 348.31   3. Acute respiratory failure with hypoxia and hypercapnia (HCC)  J96.01 518.81    J96.02    4. Impaired mobility and ADLs  Z74.09 V49.89    Z78.9    5. Impaired functional mobility, balance, gait, and endurance  Z74.09 V49.89     Patient Active Problem List   Diagnosis   • Hyperlipemia   • SOB (shortness of breath)   • Hematuria syndrome   • Psoriasis   • Hypertension   • High cholesterol   • Enlarged prostate   • Diabetes mellitus (HCC)   • Acid reflux   • Calculus of kidney   • Kidney stones   • Chronic respiratory failure with hypoxia (HCC)   • Class 2 obesity due to excess calories without serious comorbidity with body mass index (BMI) of 37.0 to 37.9 in adult   • Black lung (HCC)   • Chronic restrictive lung disease   • Hx of colonic polyps   • Encounter for screening for malignant neoplasm of colon   • Thrombocytosis   • Anemia   • Leucocytosis   • Folate deficiency   • Acute right-sided thoracic back pain   • Chest wall pain   • CKD (chronic kidney disease) stage 2, GFR 60-89 ml/min   • Diabetes type 2, uncontrolled   • Diabetic neuropathy (HCC)   • Nocturnal hypoxemia   • Supplemental oxygen dependent   • Change in bowel habits   • Diarrhea   • Pneumonia of left lower lobe due to infectious organism   • Acute and chronic respiratory failure with hypercapnia (HCC)   • Acute and chronic respiratory failure with hypoxia (HCC)   • COPD (chronic obstructive pulmonary disease) (HCC)   • Myopathies     Past Medical History:   Diagnosis Date   • Acid reflux    • Black lung disease (HCC)    • Cataract    • Diabetes (HCC)    • Enlarged prostate    • High cholesterol    • Hypertension    • Kidney stone     multiple   •  Obesity    • Psoriasis      Past Surgical History:   Procedure Laterality Date   • AMPUTATION Left     lower arm and hand   • CATARACT EXTRACTION W/ INTRAOCULAR LENS IMPLANT Left 01/22/2021    Procedure: REMIOVE CATARACT AND IMPLANT  INTRAOCULAR LENS;  Surgeon: Sam Isaacs MD;  Location: [facility] OR;  Service: Ophthalmology;  Laterality: Left;   • CATARACT EXTRACTION W/ INTRAOCULAR LENS IMPLANT Right 01/29/2021    Procedure: REMOVE CATARACT AND IMPLANT INTRAOCULAR LENS;  Surgeon: Sam Isaacs MD;  Location: [facility] OR;  Service: Ophthalmology;  Laterality: Right;   • CHOLECYSTECTOMY OPEN     • COLONOSCOPY N/A 11/09/2018    Procedure: COLONOSCOPY;  Surgeon: Sukhdeep Mae MD;  Location: [facility] ENDOSCOPY;  Service: Gastroenterology   • COLONOSCOPY N/A 03/28/2022    Procedure: COLONOSCOPY;  Surgeon: Sukhdeep Mae MD;  Location: [facility] ENDOSCOPY;  Service: Gastroenterology;  Laterality: N/A;   • CYSTOSCOPY N/A 12/16/2017    Procedure: CYSTOSCOPY WITH CLOT EVACUATION;  Surgeon: Prosper Ariza MD;  Location: [facility] OR;  Service:    • CYSTOSCOPY, URETEROSCOPY, RETROGRADE PYELOGRAM, STENT INSERTION Left 12/15/2017    Procedure: CYSTOSCOPY LEFT URETEROSCOPY RETROGRADE PYELOGRAM HOLMIUM LASER STENT INSERTION;  Surgeon: Prosper Ariza MD;  Location: [facility];  Service:    • CYSTOSCOPY, URETEROSCOPY, RETROGRADE PYELOGRAM, STENT INSERTION Left 08/10/2022    Procedure: CYSTOSCOPY LEFT RETROGRADE URETEROSCOPY LASER LITHOTRIPSY STENT PLACMENT;  Surgeon: Prosper Ariza MD;  Location: [facility];  Service: Urology;  Laterality: Left;   • DENTAL PROCEDURE      multiple extractions   • EXTRACORPOREAL SHOCK WAVE LITHOTRIPSY (ESWL)     • EXTRACORPOREAL SHOCKWAVE LITHOTRIPSY (ESWL), STENT INSERTION/REMOVAL Left 02/03/2017    Procedure: LEFT EXTRACORPOREAL SHOCKWAVE LITHOTRIPSY, POSSIBLE CYSTOSCOPY, POSSIBLE STENT REMOVAL ;  Surgeon: Prosper Ariza MD;  Location: [facility];  Service:    • EXTRACORPOREAL  SHOCKWAVE LITHOTRIPSY (ESWL), STENT INSERTION/REMOVAL Left 05/04/2018    Procedure: EXTRACORPOREAL SHOCKWAVE LITHOTRIPSY;  Surgeon: Prosper Ariza MD;  Location: [facility];  Service: Urology   • KIDNEY STONE SURGERY     • SCROTAL SURGERY      trauma   • WISDOM TOOTH EXTRACTION      took bottom ones      General Information     Row Name 03/16/23 1515          OT Time and Intention    Document Type therapy note (daily note)  -LW     Mode of Treatment individual therapy;occupational therapy  -     Row Name 03/16/23 1515          General Information    Patient Profile Reviewed yes  -LW     Existing Precautions/Restrictions fall  -LW     Row Name 03/16/23 1515          Cognition    Orientation Status (Cognition) oriented x 4  -LW     Row Name 03/16/23 1515          Safety Issues, Functional Mobility    Impairments Affecting Function (Mobility) cognition;endurance/activity tolerance;balance  -LW           User Key  (r) = Recorded By, (t) = Taken By, (c) = Cosigned By    Initials Name Provider Type    LW Solange Joseph COTA Occupational Therapist Assistant                 Mobility/ADL's     Row Name 03/16/23 1515          Bed Mobility    Bed Mobility supine-sit  -LW     Supine-Sit [place] (Bed Mobility) independent  -     Assistive Device (Bed Mobility) bed rails  -     Row Name 03/16/23 1515          Transfers    Transfers sit-stand transfer;stand-sit transfer;bed-chair transfer;toilet transfer  -     Row Name 03/16/23 1515          Bed-Chair Transfer    Bed-Chair [place] (Transfers) independent  -     Row Name 03/16/23 1515          Sit-Stand Transfer    Sit-Stand [place] (Transfers) independent  -LW     Row Name 03/16/23 1515          Stand-Sit Transfer    Stand-Sit [place] (Transfers) independent  -     Row Name 03/16/23 1515          Toilet Transfer    Type (Toilet Transfer) sit-stand;stand-sit  -LW     [place] Level (Toilet Transfer) independent  -     Row Name 03/16/23  1515          Activities of Daily Living    BADL Assessment/Intervention bathing;upper body dressing;lower body dressing;grooming;toileting  -LW     Row Name 03/16/23 1515          Lower Body Dressing Assessment/Training    [place] Level (Lower Body Dressing) lower body dressing skills;doff;don;pants/bottoms;contact guard assist;socks;maximum assist (25% patient effort)  -LW     Position (Lower Body Dressing) edge of bed sitting;unsupported standing  -LW     Row Name 03/16/23 1515          Bathing Assessment/Intervention    [place] Level (Bathing) lower body;contact guard assist;upper body;standby assist;distal lower extremities/feet;dependent (less than 25% patient effort)  -LW     Position (Bathing) edge of bed sitting  -LW     Row Name 03/16/23 1515          Upper Body Dressing Assessment/Training    [place] Level (Upper Body Dressing) upper body dressing skills;doff;don;contact guard assist  Hospital gown  -LW     Position (Upper Body Dressing) edge of bed sitting  -LW     Row Name 03/16/23 1515          Grooming Assessment/Training    [place] Level (Grooming) oral care regimen;shave face;wash face, hands;standby assist  -LW     Assistive Devices (Grooming) electric razor  -LW     Position (Grooming) edge of bed sitting  -LW     Comment, (Grooming) Razor  -LW     Row Name 03/16/23 1515          Toileting Assessment/Training    [place] Level (Toileting) adjust/manage clothing;perform perineal hygiene;contact guard assist  -LW     Assistive Devices (Toileting) commode  -LW     Position (Toileting) unsupported sitting  -LW           User Key  (r) = Recorded By, (t) = Taken By, (c) = Cosigned By    Initials Name Provider Type    Solange Gerard COTA Occupational Therapist Assistant               Obj/Interventions    No documentation.                Goals/Plan     Row Name 03/16/23 1020          Transfer Goal 1 (OT)    Activity/Assistive Device (Transfer Goal 1, OT) toilet;shower chair   -LW     [place] Level/Cues Needed (Transfer Goal 1, OT) independent  -LW     Time Frame (Transfer Goal 1, OT) long term goal (LTG);by discharge  -LW     Progress/Outcome (Transfer Goal 1, OT) goal met   -LW     Row Name 03/16/23 1020          Bathing Goal 1 (OT)    Activity/Device (Bathing Goal 1, OT) lower body bathing;shower chair  -LW     [place] Level/Cues Needed (Bathing Goal 1, OT) modified independence  -LW     Time Frame (Bathing Goal 1, OT) long term goal (LTG);by discharge  -LW     Progress/Outcomes (Bathing Goal 1, OT) goal not met  -LW     Row Name 03/16/23 1020          Dressing Goal 1 (OT)    Activity/Device (Dressing Goal 1, OT) lower body dressing  -LW     [place]/Cues Needed (Dressing Goal 1, OT) minimum assist (75% or more patient effort)  -LW     Time Frame (Dressing Goal 1, OT) by discharge;long term goal (LTG)  -LW     Progress/Outcome (Dressing Goal 1, OT) goal not met  -LW           User Key  (r) = Recorded By, (t) = Taken By, (c) = Cosigned By    Initials Name Provider Type    LW Solange Joseph COTA Occupational Therapist Assistant               Clinical Impression     Row Name 03/16/23 1520          Pain Assessment    Pretreatment Pain Rating 0/10 - no pain  -LW     Posttreatment Pain Rating 0/10 - no pain  -LW     Row Name 03/16/23 1520          Plan of Care Review    Plan of Care Reviewed With patient  -LW     Progress improving  -LW     Outcome Evaluation Pt sidelying in bed. Supine to sit Independent. Sit to stand<>stand to sit Independent. T/f to bathroom Independent. Toilet tasks CGA. UB bathing and dressing CGA. LB bathing and dressing CGA. Feet Max A for dressing. Grooming SBA. Pt t/f to [facility] all needs in reach. Pt could benefit from continued OT services.  -     Row Name 03/16/23 1520          Positioning and Restraints    Pre-Treatment Position in bed  -LW     Post Treatment Position chair  -LW     In Chair sitting;call light within reach;encouraged to call  for assist;exit alarm on;notified nsg  -           User Key  (r) = Recorded By, (t) = Taken By, (c) = Cosigned By    Initials Name Provider Type    Solange Gerard COTA Occupational Therapist Assistant               Outcome Measures     Row Name 03/16/23 1523          How much help from another is currently needed...    Putting on and taking off regular lower body clothing? 2  -LW     Bathing (including washing, rinsing, and drying) 3  -LW     Toileting (which includes using toilet bed pan or urinal) 4  -LW     Putting on and taking off regular upper body clothing 4  -LW     Taking care of personal grooming (such as brushing teeth) 4  -LW     Eating meals 4  -LW     AM-PAC 6 Clicks Score (OT) 21  -LW     Row Name 03/16/23 0820          How much help from another person do you currently need...    Turning from your back to your side while in flat bed without using bedrails? 4  -KR     Moving from lying on back to sitting on the side of a flat bed without bedrails? 4  -KR     Moving to and from a bed to a chair (including a wheelchair)? 3  -KR     Standing up from a chair using your arms (e.g., wheelchair, bedside chair)? 3  -KR     Climbing 3-5 steps with a railing? 3  -KR     To walk in hospital room? 3  -KR     AM-PAC 6 Clicks Score (PT) 20  -KR           User Key  (r) = Recorded By, (t) = Taken By, (c) = Cosigned By    Initials Name Provider Type    Solange Gerard COTA Occupational Therapist Assistant    Deneen Arambula, RN Registered Nurse                Occupational Therapy Education     Title: PT OT SLP Therapies (Done)     Topic: Occupational Therapy (Done)     Point: ADL training (Done)     Description:   Instruct learner(s) on proper safety adaptation and remediation techniques during self care or transfers.   Instruct in proper use of assistive devices.              Learning Progress Summary           Patient Acceptance, E,TB, VU by DILAN at 3/16/2023 1523    Acceptance, E,TB, VU by  CM at 3/15/2023 1551    Comment: OT POC, Role of OT, d/c recommendations, PLB                   Point: Home exercise program (Done)     Description:   Instruct learner(s) on appropriate technique for monitoring, assisting and/or progressing therapeutic exercises/activities.              Learning Progress Summary           Patient Acceptance, E,TB, VU by LW at 3/16/2023 1523                   Point: Precautions (Done)     Description:   Instruct learner(s) on prescribed precautions during self-care and functional transfers.              Learning Progress Summary           Patient Acceptance, E,TB, VU by LW at 3/16/2023 1523    Acceptance, E,TB, VU by CM at 3/15/2023 1551    Comment: OT POC, Role of OT, d/c recommendations, PLB                   Point: Body mechanics (Done)     Description:   Instruct learner(s) on proper positioning and spine alignment during self-care, functional mobility activities and/or exercises.              Learning Progress Summary           Patient Acceptance, E,TB, VU by LW at 3/16/2023 1523    Acceptance, E,TB, VU by CM at 3/15/2023 1551    Comment: OT POC, Role of OT, d/c recommendations, PLB                               User Key     Initials Effective Dates Name Provider Type Discipline    LW 06/16/21 -  Solange Joseph COTA Occupational Therapist Assistant OT    CM 11/18/22 -  Kiki Kauffman OT Occupational Therapist OT              OT Recommendation and Plan     Plan of Care Review  Plan of Care Reviewed With: patient  Progress: improving  Outcome Evaluation: Pt sidelying in bed. Supine to sit Independent. Sit to stand<>stand to sit Independent. T/f to bathroom Independent. Toilet tasks CGA. UB bathing and dressing CGA. LB bathing and dressing CGA. Feet Max A for dressing. Grooming SBA. Pt t/f to [facility] all needs in reach. Pt could benefit from continued OT services.     Time Calculation:    Time Calculation- OT     Row Name 03/16/23 1524             Time Calculation- OT    OT  Start Time 1020  -LW      OT Stop Time 1130  -LW      OT Time Calculation (min) 70 min  -LW      Total Timed Code Minutes- OT 70 minute(s)  -LW         Timed Charges    94549 - OT Self Care/Mgmt Minutes 70  -LW         Total Minutes    Timed Charges Total Minutes 70  -LW       Total Minutes 70  -LW            User Key  (r) = Recorded By, (t) = Taken By, (c) = Cosigned By    Initials Name Provider Type    LW Solange Joseph COTA Occupational Therapist Assistant              Therapy Charges for Today     Code Description Service Date Service Provider Modifiers Qty    [id number] HC OT SELF CARE/MGMT/TRAIN EA 15 MIN 3/16/2023 Solange Joseph COTA GO 5               MARÍA Case  3/16/2023

## 2023-03-16 NOTE — PLAN OF CARE
Goal Outcome Evaluation:  Plan of Care Reviewed With: patient        Progress: improving  Outcome Evaluation: Pt alert and oriented, logical in conversation. 2L per NC while awake, bipap when sleeping. Pt rested well for a majority of the night with no complaints. VSS. No distress noted.

## 2023-03-16 NOTE — DISCHARGE PLACEMENT REQUEST
"Rolando Novoa (77 y.o. Male)     Date of Birth   1945    Social Security Number       Address   [address] [place] [address]    Home Phone   [phone]    MRN   [id number]       [facility]    Marital Status                               Admission Date   3/14/23    Admission Type   Emergency    Admitting Provider   Suad Pritchett MD    Attending Provider   Suad Pritchett MD    Department, Room/Bed   [address], 409/1       Discharge Date       Discharge Disposition       Discharge Destination                               Attending Provider: Suad Pritchett MD    Allergies: Toradol [Ketorolac Tromethamine]    Isolation: None   Infection: None   Code Status: CPR    Ht: 165.1 cm (65\")   Wt: 98.1 kg (216 lb 3.2 oz)    Admission Cmt: None   Principal Problem: Pneumonia of left lower lobe due to infectious organism [J18.9]                 Active Insurance as of 3/14/2023     Primary Coverage     Payor Plan Insurance Group Employer/Plan Group    [facility] MEDICARE REPLACEMENT [employer] MEDICARE REPLACEMENT [id number]     Payor Plan Address Payor Plan Phone Number Payor Plan Fax Number Effective Dates    [address] [address] [phone]  3/20/2020 - None Entered    [facility] [id number]       Subscriber Name Subscriber Birth Date Member ID       Rolando Novoa 1945 [id number]                 Emergency Contacts      (Rel.) Home Phone Work Phone Mobile Phone    Solange Novoa (Spouse) [phone] -- [phone]               History & Physical      Maxine Wyman PA-C at 03/14/23 1818     Attestation signed by Suad Pritchett MD at 03/15/23 1437    I have reviewed this documentation and agree.                      [facility] Medicine  HISTORY AND PHYSICAL      Date of Admission: 3/14/2023  Primary Care Physician: Jaxson Zepeda, " MD    Subjective     Chief Complaint: Weakness, Confusion    History of Present Illness  Patient is a 77 year old male (PMHx HTN, HLD, DM, Black Lung disease, GERD) who presented to [facility] by EMS from home. Wife accompanies him and provided majority of the history. She reports he has not been feeling well for the last several days. In the last two days, she has noticed he really has been sleeping a lot and has not been eating appropriately for his diabetic status. She really noticed he seemed confused last night and he seems to have developed a tremor, but he states he has always had this. He does use oxygen chronically at home on 2L due to black lung disease, but has had some increased shortness of breath at times. They deny cough, congestion, fever, or known sick contacts.     ED findings pertinent for CXR findings of a left lower lobe pneumonia. CBC unremarkable; CMP shows mild creatinine elevation at 1.73. CT head negative. Hospitalist team consulted for admission with ABG pending. ABG resulted later to reveal respiratory acidosis with hypercapnia and BiPAP was initiated in ED.      Review of Systems   Constitutional: Positive for activity change, appetite change and fatigue. Negative for chills, diaphoresis and fever.   HENT: Negative for congestion, ear pain, postnasal drip, rhinorrhea, sinus pressure, sinus pain, sneezing, sore throat and trouble swallowing.    Eyes: Negative for photophobia, pain and discharge.   Respiratory: Positive for shortness of breath. Negative for cough, chest tightness and wheezing.    Cardiovascular: Negative for chest pain, palpitations and leg swelling.   Gastrointestinal: Negative for abdominal pain, blood in stool, constipation, diarrhea, nausea and vomiting.   Endocrine: Negative for polydipsia, polyphagia and polyuria.   Genitourinary: Negative for difficulty urinating, dysuria, flank pain, frequency, hematuria and urgency.   Musculoskeletal: Negative for arthralgias, back  pain, myalgias and neck pain.   Skin: Negative for color change, rash and wound.   Neurological: Positive for tremors. Negative for dizziness, seizures, syncope, weakness and headaches.   Hematological: Does not bruise/bleed easily.   Psychiatric/Behavioral: Positive for confusion. Negative for behavioral problems, hallucinations, sleep disturbance and suicidal ideas.        Otherwise complete ROS reviewed and negative except as mentioned in the HPI.    Past Medical History:   Past Medical History:   Diagnosis Date   • Acid reflux    • Black lung disease (HCC)    • Cataract    • Diabetes (HCC)    • Enlarged prostate    • High cholesterol    • Hypertension    • Kidney stone     multiple   • Obesity    • Psoriasis      Past Surgical History:  Past Surgical History:   Procedure Laterality Date   • AMPUTATION Left     lower arm and hand   • CATARACT EXTRACTION W/ INTRAOCULAR LENS IMPLANT Left 01/22/2021    Procedure: REMIOVE CATARACT AND IMPLANT  INTRAOCULAR LENS;  Surgeon: Sam Isaacs MD;  Location: [facility];  Service: Ophthalmology;  Laterality: Left;   • CATARACT EXTRACTION W/ INTRAOCULAR LENS IMPLANT Right 01/29/2021    Procedure: REMOVE CATARACT AND IMPLANT INTRAOCULAR LENS;  Surgeon: aSm Isaacs MD;  Location: [facility] OR;  Service: Ophthalmology;  Laterality: Right;   • CHOLECYSTECTOMY OPEN     • COLONOSCOPY N/A 11/09/2018    Procedure: COLONOSCOPY;  Surgeon: Sukhdeep Mae MD;  Location: [facility] ENDOSCOPY;  Service: Gastroenterology   • COLONOSCOPY N/A 03/28/2022    Procedure: COLONOSCOPY;  Surgeon: Sukhdeep Mae MD;  Location: [facility] ENDOSCOPY;  Service: Gastroenterology;  Laterality: N/A;   • CYSTOSCOPY N/A 12/16/2017    Procedure: CYSTOSCOPY WITH CLOT EVACUATION;  Surgeon: Prosper Ariza MD;  Location: [facility] OR;  Service:    • CYSTOSCOPY, URETEROSCOPY, RETROGRADE PYELOGRAM, STENT INSERTION Left 12/15/2017    Procedure: CYSTOSCOPY LEFT URETEROSCOPY RETROGRADE PYELOGRAM HOLMIUM  LASER STENT INSERTION;  Surgeon: Prosper Ariza MD;  Location: [facility];  Service:    • CYSTOSCOPY, URETEROSCOPY, RETROGRADE PYELOGRAM, STENT INSERTION Left 08/10/2022    Procedure: CYSTOSCOPY LEFT RETROGRADE URETEROSCOPY LASER LITHOTRIPSY STENT PLACMENT;  Surgeon: Prosper Ariza MD;  Location: [facility];  Service: Urology;  Laterality: Left;   • DENTAL PROCEDURE      multiple extractions   • EXTRACORPOREAL SHOCK WAVE LITHOTRIPSY (ESWL)     • EXTRACORPOREAL SHOCKWAVE LITHOTRIPSY (ESWL), STENT INSERTION/REMOVAL Left 02/03/2017    Procedure: LEFT EXTRACORPOREAL SHOCKWAVE LITHOTRIPSY, POSSIBLE CYSTOSCOPY, POSSIBLE STENT REMOVAL ;  Surgeon: Prosper Ariza MD;  Location: [facility];  Service:    • EXTRACORPOREAL SHOCKWAVE LITHOTRIPSY (ESWL), STENT INSERTION/REMOVAL Left 05/04/2018    Procedure: EXTRACORPOREAL SHOCKWAVE LITHOTRIPSY;  Surgeon: Prosper Ariza MD;  Location: [facility];  Service: Urology   • KIDNEY STONE SURGERY     • SCROTAL SURGERY      trauma   • WISDOM TOOTH EXTRACTION      took bottom ones     Social History:  reports that he has never smoked. He has been exposed to tobacco smoke. He has never used smokeless tobacco. He reports that he does not currently use alcohol. He reports that he does not use drugs.    Family History: family history includes Breast cancer in his sister; Cancer in his son; Congenital heart disease in his brother; Diabetes in his brother and brother; Early death in his brother; Heart attack in his brother, father, sister, and sister; Hypertension in his mother; Leukemia in his brother; No Known Problems in his son; Psoriasis in his father; Stroke in his mother.       Allergies:  Allergies   Allergen Reactions   • Toradol [Ketorolac Tromethamine] Anaphylaxis       Medications:  Prior to Admission medications    Medication Sig Start Date End Date Taking? Authorizing Provider   acetaminophen (TYLENOL) 500 MG tablet Take 500 mg by mouth Every Night.    Provider, MD Esvin    albuterol sulfate  (90 Base) MCG/ACT inhaler Inhale 2 puffs Every 4 (Four) Hours As Needed for Wheezing.    Esvin Murray MD   aspirin 81 MG chewable tablet Chew 81 mg Daily.    Esvin Murray MD   atorvastatin (LIPITOR) 20 MG tablet Take 1 tablet by mouth Every Night. 3/23/20   Cassi Lemus MD   Cyanocobalamin (B-12) 1000 MCG tablet 1 TABLET BY MOUTH ON MONDAY, WEDNESDAY AND FRIDAY 12/5/22   Noé Coleman MD   esomeprazole (nexIUM) 20 MG capsule Take 20 mg by mouth Every Morning Before Breakfast.    Esvin Murray MD   folic acid (FOLVITE) 1 MG tablet Take 1 mg by mouth Take As Directed. Monday, Wednesday, and Friday    Esvin Murray MD   furosemide (LASIX) 20 MG tablet Take 1 tablet by mouth Daily.  Patient taking differently: Take 20 mg by mouth Take As Directed. 3/23/20   Cassi Lemus MD   glimepiride (AMARYL) 2 MG tablet Take 2 mg by mouth 2 (Two) Times a Day.    Esvin Murray MD   isosorbide mononitrate (IMDUR) 30 MG 24 hr tablet Take 1 tablet by mouth Daily for 30 days. 9/1/22 10/1/22  Virginia Franklin MD   Lancet Devices (OneTouch Delica Plus Lancing) [facility]  1/26/23   Esvin Murray MD   Lancets (OneTouch Delica Plus [id number]) misc  1/26/23   Esvin Murray MD   loperamide (IMODIUM) 2 MG capsule Take 2 mg by mouth 2 (Two) Times a Day As Needed for diarrhea.    Esvin Murray MD   metFORMIN ER (GLUCOPHAGE-XR) 500 MG 24 hr tablet  2/6/23   Esvin Murray MD   nebivolol (Bystolic) 10 MG tablet Take 1 tablet by mouth Daily. 3/23/20   Cassi Lemus MD   O2 (OXYGEN) Inhale 2 L/min Every Night.    Esvin Murray MD   omeprazole (priLOSEC) 20 MG capsule Take 20 mg by mouth Daily.    Esvin Murray MD   OneTouch Ultra test strip  3/15/21   Esvin Murray MD   tamsulosin (FLOMAX) 0.4 MG capsule 24 hr capsule Take 1 capsule by mouth Every Night.    Provider, MD Esvin   umeclidinium-vilanterol  "(ANORO ELLIPTA) 62.5-25 MCG/INH aerosol powder  inhaler Inhale 1 puff Daily. 4/14/21 4/15/22  Provider, MD JESUS Mcallister have utilized all available immediate resources to obtain, update, and review the patient's current medications.    Objective     Vital Signs: /83   Pulse 68   Temp 97.9 °F (36.6 °C) (Oral)   Resp 20   Ht 165.1 cm (65\")   Wt 98.1 kg (216 lb 3.2 oz)   SpO2 98%   BMI 35.98 kg/m²   Physical Exam  Vitals and nursing note reviewed.   Constitutional:       Appearance: Normal appearance. He is obese.   HENT:      Head: Normocephalic and atraumatic.      Right Ear: External ear normal.      Left Ear: External ear normal.      Nose: Nose normal. No congestion or rhinorrhea.      Mouth/Throat:      Mouth: Mucous membranes are moist.      Pharynx: Oropharynx is clear.   Eyes:      General: No scleral icterus.     Extraocular Movements: Extraocular movements intact.      Conjunctiva/sclera: Conjunctivae normal.   Neck:      Vascular: No carotid bruit.   Cardiovascular:      Rate and Rhythm: Normal rate and regular rhythm.      Pulses: Normal pulses.      Heart sounds: Normal heart sounds. No murmur heard.  Pulmonary:      Effort: Pulmonary effort is normal.      Breath sounds: Examination of the right-lower field reveals decreased breath sounds. Examination of the left-lower field reveals decreased breath sounds. Decreased breath sounds present. No wheezing, rhonchi or rales.   Abdominal:      General: Abdomen is flat. Bowel sounds are normal.      Palpations: Abdomen is soft.      Tenderness: There is no abdominal tenderness. There is no guarding.   Musculoskeletal:         General: No swelling or deformity. Normal range of motion.      Cervical back: Normal range of motion.      Comments: Left lower arm/hand surgically absent   Skin:     General: Skin is warm and dry.      Capillary Refill: Capillary refill takes less than 2 seconds.      Findings: No rash.   Neurological:      General: No " focal deficit present.      Mental Status: He is alert.      GCS: GCS eye subscore is 4. GCS verbal subscore is 5. GCS motor subscore is 6.      Motor: No weakness.   Psychiatric:         Mood and Affect: Mood normal.         Behavior: Behavior normal.         Thought Content: Thought content normal.         Judgment: Judgment normal.              Results Reviewed:  Lab Results (last 24 hours)     Procedure Component Value Units Date/Time    High Sensitivity Troponin T [[id number]] Collected: 03/14/23 1815    Specimen: Blood Updated: 03/14/23 1818    Lactic Acid, Plasma [[id number]] Collected: 03/14/23 1815    Specimen: Blood Updated: 03/14/23 1818    COVID-19 and FLU A/B PCR - Swab, Nasopharynx [[id number]]  (Normal) Collected: 03/14/23 1733    Specimen: Swab from Nasopharynx Updated: 03/14/23 1808     COVID19 Not Detected     Influenza A PCR Not Detected     Influenza B PCR Not Detected    Narrative:      Fact sheet for providers: https://www.fda.gov/media/489228/download    Fact sheet for patients: https://www.fda.gov/media/308072/download    Test performed by PCR.    Hanna Draw [[id number]] Collected: 03/14/23 1646    Specimen: Blood Updated: 03/14/23 1800    Narrative:      The following orders were created for panel order Hanna Draw.  Procedure                               Abnormality         Status                     ---------                               -----------         ------                     Green Top (Gel)[[id number]]                                  Final result               Lavender Top[[id number]]                                     Final result               Gold Top - SST[[id number]]                                   Final result               Light Blue Top[[id number]]                                   Final result                 Please view results for these tests on the individual orders.    Gold Top - SST [[id number]] Collected: 03/14/23 1646    Specimen: Blood Updated: 03/14/23 1800      "Extra Tube Hold for add-ons.     Comment: Auto resulted.       Light Blue Top [[id number]] Collected: 03/14/23 1646    Specimen: Blood Updated: 03/14/23 1800     Extra Tube Hold for add-ons.     Comment: Auto resulted       Green Top (Gel) [[id number]] Collected: 03/14/23 1646    Specimen: Blood Updated: 03/14/23 1800     Extra Tube Hold for add-ons.     Comment: Auto resulted.       Lavender Top [[id number]] Collected: 03/14/23 1646    Specimen: Blood Updated: 03/14/23 1800     Extra Tube hold for add-on     Comment: Auto resulted       D-dimer, Quantitative [[id number]]  (Normal) Collected: 03/14/23 1646    Specimen: Blood Updated: 03/14/23 1752     D-Dimer, Quantitative 710 ng/mL (FEU)     Narrative:      According to the assay 's published package insert, a normal (<500 ng/mL (FEU)) D-dimer result in conjunction with a non-high clinical probability assessment, excludes deep vein thrombosis (DVT) and pulmonary embolism (PE) with high sensitivity.    D-dimer values increase with age and this can make VTE exclusion of an older population difficult. To address this, the American College of Physicians, based on best available evidence and recent guidelines, recommends that clinicians use age-adjusted D-dimer thresholds in patients greater than 50 years of age with: a) a low probability of PE who do not meet all Pulmonary Embolism Rule Out Criteria, or b) in those with intermediate probability of PE.   The formula for an age-adjusted D-dimer cut-off is \"age*10\".  For example, a 60 year old patient would have an age-adjusted cut-off of 600 ng/mL (FEU) and an 80 year old 800 ng/mL (FEU).      aPTT [[id number]]  (Normal) Collected: 03/14/23 1646    Specimen: Blood Updated: 03/14/23 1752     PTT 27.1 seconds     Narrative:      The recommended Heparin therapeutic range is 68-97 seconds.    Protime-INR [[id number]]  (Normal) Collected: 03/14/23 1646    Specimen: Blood Updated: 03/14/23 1751     Protime 12.6 Seconds  "     INR 0.95    Narrative:      Therapeutic range for most indications is 2.0-3.0 INR,  or 2.5-3.5 for mechanical heart valves.    Comprehensive Metabolic Panel [[id number]]  (Abnormal) Collected: 03/14/23 1646    Specimen: Blood Updated: 03/14/23 1734     Glucose 114 mg/dL      BUN 15 mg/dL      Creatinine 1.73 mg/dL      Sodium 143 mmol/L      Potassium 4.7 mmol/L      Comment: Slight hemolysis detected by analyzer. Results may be affected.        Chloride 101 mmol/L      CO2 33.0 mmol/L      Calcium 8.9 mg/dL      Total Protein 7.7 g/dL      Albumin 4.1 g/dL      ALT (SGPT) 16 U/L      AST (SGOT) 16 U/L      Alkaline Phosphatase 80 U/L      Total Bilirubin 0.3 mg/dL      Globulin 3.6 gm/dL      A/G Ratio 1.1 g/dL      BUN/Creatinine Ratio 8.7     Anion Gap 9.0 mmol/L      eGFR 40.7 mL/min/1.73     Narrative:      GFR Normal >60  Chronic Kidney Disease <60  Kidney Failure <15    The GFR formula is only valid for adults with stable renal function between ages 18 and 70.    Single High Sensitivity Troponin T [[id number]]  (Abnormal) Collected: 03/14/23 1646    Specimen: Blood Updated: 03/14/23 1723     HS Troponin T 51 ng/L     Narrative:      High Sensitive Troponin T Reference Range:  <10.0 ng/L- Negative Female for AMI  <15.0 ng/L- Negative Male for AMI  >=10 - Abnormal Female indicating possible myocardial injury.  >=15 - Abnormal Male indicating possible myocardial injury.   Clinicians would have to utilize clinical acumen, EKG, Troponin, and serial changes to determine if it is an Acute Myocardial Infarction or myocardial injury due to an underlying chronic condition.         Magnesium [[id number]]  (Normal) Collected: 03/14/23 1646    Specimen: Blood Updated: 03/14/23 1723     Magnesium 2.1 mg/dL     CBC & Differential [[id number]]  (Abnormal) Collected: 03/14/23 1646    Specimen: Blood Updated: 03/14/23 1655    Narrative:      The following orders were created for panel order CBC & Differential.  Procedure                                Abnormality         Status                     ---------                               -----------         ------                     CBC Auto Differential[[id number]]        Abnormal            Final result                 Please view results for these tests on the individual orders.    CBC Auto Differential [[id number]]  (Abnormal) Collected: 03/14/23 1646    Specimen: Blood Updated: 03/14/23 1655     WBC 7.23 10*3/mm3      RBC 4.30 10*6/mm3      Hemoglobin 12.1 g/dL      Hematocrit 41.6 %      MCV 96.7 fL      MCH 28.1 pg      MCHC 29.1 g/dL      RDW 13.5 %      RDW-SD 47.6 fl      MPV 8.5 fL      Platelets 263 10*3/mm3      Neutrophil % 61.7 %      Lymphocyte % 19.2 %      Monocyte % 8.7 %      Eosinophil % 8.3 %      Basophil % 1.1 %      Immature Grans % 1.0 %      Neutrophils, Absolute 4.46 10*3/mm3      Lymphocytes, Absolute 1.39 10*3/mm3      Monocytes, Absolute 0.63 10*3/mm3      Eosinophils, Absolute 0.60 10*3/mm3      Basophils, Absolute 0.08 10*3/mm3      Immature Grans, Absolute 0.07 10*3/mm3      nRBC 0.0 /100 WBC         Imaging Results (Last 24 Hours)     Procedure Component Value Units Date/Time    CT Head Without Contrast [[id number]] Collected: 03/14/23 1716     Updated: 03/14/23 1733    Narrative:      EXAM: CT HEAD WITHOUT CONTRAST    CLINICAL HISTORY:  77 years Male,Mental status change, unknown  cause    COMPARISON:  None    Axial with coronal and sagittal images were obtained without  intravenous contrast.  This examination was performed according  to our departmental dose optimization program which includes  automated exposure control, adjustment of the MA and kV according  to patient size, and/or use of iterative reconstruction  technique.    FINDINGS:  Mild low attenuation in the bilateral periventricular/deep  cerebral white matter, most consistent with mild chronic small  vessel ischemic change. No acute intracranial hemorrhage, mass  effect, hydrocephalus, or CT  signs of acute infarct.    Mild mucosal thickening along the bilateral maxillary sinuses.  Opacification of several left mastoid air cells.      Impression:      1. Mild chronic small vessel ischemic changes of acute  intracranial process.  2. Opacification of several left mastoid air cells.    Electronically signed by:  Finesse Granados MD  3/14/2023 5:31 PM CDT  Workstation: [id number]    XR Chest 1 View [[id number]] Collected: 03/14/23 1641     Updated: 03/14/23 1704    Narrative:        PORTABLE CHEST    HISTORY: Weakness. Dizziness. Altered mental status.    Portable AP upright film of the chest was obtained at 4:39 PM.  COMPARISON: August 30, 2022    FINDINGS:   EKG leads.  Subsegmental infiltrate left lung base compatible with pneumonia.  Small left pleural effusion.  The heart is not enlarged.  The pulmonary vasculature is not increased.  No pneumothorax.  No acute osseous abnormality.  Degenerative changes each shoulder.      Impression:      CONCLUSION:  Subsegmental infiltrate left lung base compatible with pneumonia.  Small left pleural effusion.    [id number]    Electronically signed by:  Farzad Mcadams MD  3/14/2023 5:02 PM CDT  Workstation: [id number]        I have personally reviewed and interpreted the radiology studies and ECG obtained at time of admission.       Assessment / Plan   Treatment Plan:  1.  Pneumonia of left lower lobe due to infectious organism   -IV Rocephin and Azithromycin initiated; blood cultures pending   -continue oxygen, inhalers/nebs, steroids    2. Respiratory acidosis with hypercapnia   -BiPAP initiated in ED; will trend ABG     3. Metabolic encephalopathy    -secondary to above; continue to monitor for improvement/worsening    4. FLORENCIA   -creatinine today 1.73   -IVF overnight; recheck labs in AM    5. DM   -Consistent carbohydrate diet, Acchecks with sliding scale insulin    6. HTN   -continue home meds    7. HLD   -continue statin    8. BPH   -continue Flomax    VTE PPx Lovenox  GERD  "PPx Protonix PO      Medical Decision Making  Number and Complexity of problems: 8, high complexity    Conditions and Status:        Condition is unchanged.       [facility] Data  External documents reviewed: The patient's recent past medical charts for this facility as well as outside facilities via the \"Care Everywhere\" application of Epic reviewed.     Discussed with: ED provider, attending physician     I have utilized all available immediate resources to obtain, update, or review the patient's current medications (including all prescriptions, over-the-counter products, herbals, cannabis/cannabidiol products, and vitamin/mineral/dietary (nutritional) supplements).     Care Planning  Shared decision making: Patient updated on current status and informed of proposed care plan; is in agreement with plan  Code status and discussions: Full code  I confirmed that the patient's Advance Care Plan is present, code status is documented, or surrogate decision maker is listed in the patient's medical record.       Disposition  Social Determinants of Health that impact treatment or disposition: n/a  I expect the patient to be discharged to home in 2-3 days.     The patient was seen and examined by me on 23 .        Electronically signed by Maxine Wyman PA-C, 23, 18:18 CDT.                     Physician Progress Notes (last 24 hours)      Jolanta Johnson APRN at 03/15/23 1320          Inpatient Wound Care Progress Note  [facility]     Patient Name: Rolando Novoa  : 1945  MRN: [id number]  Today's Date: 3/15/2023 Room Number: 409/1      Admit Date: 3/14/2023  Attending: Suad Pritchett MD        Reason For Consult: multiple wounds    77 year old male consult for wound care. Patient has wound to sacral spine that has been there for years. It appears to be psoriatic lesion that patient has scratched at causing it to open. Patient states he does have history of psoriasis and " several other psoriatic lesions are noted on lower extremities. Wound on sacral spine measures 8 cm x 5 cm. Wound bed is not open. Area is dry and scratches are scabbed over. He also has a wound on the right popliteal region measuring 0.75 x 1 cm and this appears to be a healing psoriatic lesion as well. Both wounds present on admission.         Visit Dx:    ICD-10-CM ICD-9-CM   1. Pneumonia of left lower lobe due to infectious organism  J18.9 486   2. Metabolic encephalopathy  G93.41 348.31   3. Acute respiratory failure with hypoxia and hypercapnia (HCC)  J96.01 518.81    J96.02      Patient Active Problem List   Diagnosis   • Hyperlipemia   • SOB (shortness of breath)   • Hematuria syndrome   • Psoriasis   • Hypertension   • High cholesterol   • Enlarged prostate   • Diabetes mellitus (HCC)   • Acid reflux   • Calculus of kidney   • Kidney stones   • Chronic respiratory failure with hypoxia (HCC)   • Class 2 obesity due to excess calories without serious comorbidity with body mass index (BMI) of 37.0 to 37.9 in adult   • Black lung (HCC)   • Chronic restrictive lung disease   • Hx of colonic polyps   • Encounter for screening for malignant neoplasm of colon   • Thrombocytosis   • Anemia   • Leucocytosis   • Folate deficiency   • Acute right-sided thoracic back pain   • Chest wall pain   • CKD (chronic kidney disease) stage 2, GFR 60-89 ml/min   • Diabetes type 2, uncontrolled   • Diabetic neuropathy (HCC)   • Nocturnal hypoxemia   • Supplemental oxygen dependent   • Change in bowel habits   • Diarrhea   • Pneumonia of left lower lobe due to infectious organism       History:   Past Medical History:   Diagnosis Date   • Acid reflux    • Black lung disease (HCC)    • Cataract    • Diabetes (HCC)    • Enlarged prostate    • High cholesterol    • Hypertension    • Kidney stone     multiple   • Obesity    • Psoriasis      Past Surgical History:   Procedure Laterality Date   • AMPUTATION Left     lower arm and hand   •  CATARACT EXTRACTION W/ INTRAOCULAR LENS IMPLANT Left 01/22/2021    Procedure: REMIOVE CATARACT AND IMPLANT  INTRAOCULAR LENS;  Surgeon: Sam Isaacs MD;  Location: [facility] OR;  Service: Ophthalmology;  Laterality: Left;   • CATARACT EXTRACTION W/ INTRAOCULAR LENS IMPLANT Right 01/29/2021    Procedure: REMOVE CATARACT AND IMPLANT INTRAOCULAR LENS;  Surgeon: Sam Isaacs MD;  Location: [facility] OR;  Service: Ophthalmology;  Laterality: Right;   • CHOLECYSTECTOMY OPEN     • COLONOSCOPY N/A 11/09/2018    Procedure: COLONOSCOPY;  Surgeon: Sukhdeep Mae MD;  Location: [facility] ENDOSCOPY;  Service: Gastroenterology   • COLONOSCOPY N/A 03/28/2022    Procedure: COLONOSCOPY;  Surgeon: Sukhdeep Mae MD;  Location: [facility] ENDOSCOPY;  Service: Gastroenterology;  Laterality: N/A;   • CYSTOSCOPY N/A 12/16/2017    Procedure: CYSTOSCOPY WITH CLOT EVACUATION;  Surgeon: Prosper Ariza MD;  Location: [facility] OR;  Service:    • CYSTOSCOPY, URETEROSCOPY, RETROGRADE PYELOGRAM, STENT INSERTION Left 12/15/2017    Procedure: CYSTOSCOPY LEFT URETEROSCOPY RETROGRADE PYELOGRAM HOLMIUM LASER STENT INSERTION;  Surgeon: Prosper Ariza MD;  Location: [facility];  Service:    • CYSTOSCOPY, URETEROSCOPY, RETROGRADE PYELOGRAM, STENT INSERTION Left 08/10/2022    Procedure: CYSTOSCOPY LEFT RETROGRADE URETEROSCOPY LASER LITHOTRIPSY STENT PLACMENT;  Surgeon: Prosper Ariza MD;  Location: [facility];  Service: Urology;  Laterality: Left;   • DENTAL PROCEDURE      multiple extractions   • EXTRACORPOREAL SHOCK WAVE LITHOTRIPSY (ESWL)     • EXTRACORPOREAL SHOCKWAVE LITHOTRIPSY (ESWL), STENT INSERTION/REMOVAL Left 02/03/2017    Procedure: LEFT EXTRACORPOREAL SHOCKWAVE LITHOTRIPSY, POSSIBLE CYSTOSCOPY, POSSIBLE STENT REMOVAL ;  Surgeon: Prosper Ariza MD;  Location: [facility];  Service:    • EXTRACORPOREAL SHOCKWAVE LITHOTRIPSY (ESWL), STENT INSERTION/REMOVAL Left 05/04/2018    Procedure: EXTRACORPOREAL SHOCKWAVE LITHOTRIPSY;   Surgeon: Prosper Ariza MD;  Location: [facility];  Service: Urology   • KIDNEY STONE SURGERY     • SCROTAL SURGERY      trauma   • WISDOM TOOTH EXTRACTION      took bottom ones     Social History     Socioeconomic History   • Marital status:    Tobacco Use   • Smoking status: Never     Passive exposure: Past   • Smokeless tobacco: Never   • Tobacco comments:     For 16 years   Vaping Use   • Vaping Use: Never used   Substance and Sexual Activity   • Alcohol use: Not Currently     Comment: none since 21 y/o   • Drug use: Never   • Sexual activity: Defer       Allergies:  Allergies   Allergen Reactions   • Toradol [Ketorolac Tromethamine] Anaphylaxis       Medications:    Current Facility-Administered Medications:   •  acetaminophen (TYLENOL) tablet 650 mg, 650 mg, Oral, Q4H PRN, Maxine Wyman C, PA-C  •  aspirin chewable tablet 81 mg, 81 mg, Oral, Daily, Maxine Wyman, PA-C, 81 mg at 03/15/23 0829  •  atorvastatin (LIPITOR) tablet 20 mg, 20 mg, Oral, Nightly, Maxine Wyman, PA-C, 20 mg at 03/14/23 2129  •  AZITHROMYCIN 500 MG/250 ML 0.9% NS IVPB (vial-mate), 500 mg, Intravenous, Q24H, Maxine Wyman, PA-C  •  budesonide (PULMICORT) nebulizer solution 0.5 mg, 0.5 mg, Nebulization, BID - RT, Cristian Gupta MD  •  cefTRIAXone (ROCEPHIN) 1 g/100 mL 0.9% NS (MBP), 1 g, Intravenous, Q24H, Maxine Wyman, PA-C  •  dextrose (D50W) (25 g/50 mL) IV injection 25 g, 25 g, Intravenous, Q15 Min PRN, Maxine Wyman, PA-C  •  dextrose (GLUTOSE) oral gel 15 g, 15 g, Oral, Q15 Min PRN, Maxine Wyman, PA-C  •  Enoxaparin Sodium (LOVENOX) syringe 40 mg, 40 mg, Subcutaneous, Daily, Maxine Wyman, PA-C, 40 mg at 03/14/23 2129  •  folic acid (FOLVITE) tablet 1 mg, 1 mg, Oral, Once per day on Mon Wed Fri, Maxine Wyman PA-C, 1 mg at 03/15/23 0829  •  glucagon (human recombinant) (GLUCAGEN DIAGNOSTIC) injection 1 mg, 1 mg, Intramuscular, Q15 Min PRN, Maxine Wyman PA-C  •  Insulin Aspart  (novoLOG) injection 0-9 Units, 0-9 Units, Subcutaneous, 4x Daily AC & at Bedtime, Maxine Wyman C, PA-C, 2 Units at 03/15/23 0829  •  ipratropium-albuterol (DUO-NEB) nebulizer solution 3 mL, 3 mL, Nebulization, Q8H - RT, Garo, Maxine C, PA-C, 3 mL at 03/15/23 0655  •  methylPREDNISolone sodium succinate (SOLU-Medrol) injection 60 mg, 60 mg, Intravenous, Daily, Maxine Wyman C, PA-C, 60 mg at 03/14/23 2129  •  nebivolol (BYSTOLIC) tablet 10 mg, 10 mg, Oral, Daily, Maxine Wyman, PA-C, 10 mg at 03/15/23 0829  •  O2 (OXYGEN), 2 L/min, Inhalation, Nightly, Maxine yWman C, PA-C  •  ondansetron (ZOFRAN) tablet 4 mg, 4 mg, Oral, Q6H PRN **OR** ondansetron (ZOFRAN) injection 4 mg, 4 mg, Intravenous, Q6H PRN, Maxine Wyman C, PA-C  •  pantoprazole (PROTONIX) EC tablet 40 mg, 40 mg, Oral, Q AM, Maxine Wyman, PA-C, 40 mg at 03/15/23 0552  •  sodium chloride 0.9 % flush 10 mL, 10 mL, Intravenous, PRN, Emergency, Triage Protocol, MD  •  [COMPLETED] Insert Peripheral IV, , , Once **AND** sodium chloride 0.9 % flush 10 mL, 10 mL, Intravenous, PRN, Burt Chavez MD  •  sodium chloride 0.9 % flush 10 mL, 10 mL, Intravenous, Q12H, Maxine Wyman, PA-C, 10 mL at 03/15/23 0829  •  sodium chloride 0.9 % flush 10 mL, 10 mL, Intravenous, PRN, Maxine Wyman C, PA-C  •  sodium chloride 0.9 % infusion 40 mL, 40 mL, Intravenous, PRN, Garo Maxine C, PA-C  •  tamsulosin (FLOMAX) 24 hr capsule 0.4 mg, 0.4 mg, Oral, Nightly, Garo, Maxine C, PA-C, 0.4 mg at 03/14/23 2130    Results Review:  Lab Results (last 48 hours)     Procedure Component Value Units Date/Time    POC Glucose Once [[id number]]  (Abnormal) Collected: 03/15/23 0733    Specimen: Blood Updated: 03/15/23 0756     Glucose 158 mg/dL      Comment: RN NotifiedOperator: [id number] [facility] HEATHERMeter ID: [id number]       Basic Metabolic Panel [[id number]]  (Abnormal) Collected: 03/15/23 0604    Specimen: Blood Updated: 03/15/23 0701     Glucose 183  mg/dL      BUN 16 mg/dL      Creatinine 1.55 mg/dL      Sodium 141 mmol/L      Potassium 3.8 mmol/L      Chloride 105 mmol/L      CO2 28.0 mmol/L      Calcium 8.1 mg/dL      BUN/Creatinine Ratio 10.3     Anion Gap 8.0 mmol/L      eGFR 46.4 mL/min/1.73     Narrative:      GFR Normal >60  Chronic Kidney Disease <60  Kidney Failure <15    The GFR formula is only valid for adults with stable renal function between ages 18 and 70.    Blood Gas, Arterial - [[id number]]  (Abnormal) Collected: 03/15/23 0435    Specimen: Arterial Blood Updated: 03/15/23 0437     Site Right Radial     Elder's Test N/A     pH, Arterial 7.333 pH units      Comment: 84 Value below reference range        pCO2, Arterial 60.7 mm Hg      Comment: 83 Value above reference range        pO2, Arterial 60.7 mm Hg      Comment: 84 Value below reference range        HCO3, Arterial 32.2 mmol/L      Comment: 83 Value above reference range        Base Excess, Arterial 5.0 mmol/L      Comment: 83 Value above reference range        O2 Saturation, Arterial 92.7 %      Comment: 84 Value below reference range        Barometric Pressure for Blood Gas 756 mmHg      Modality Nasal Cannula     Flow Rate 3.0 lpm      Ventilator Mode NA     Collected by RT     Comment: Meter: [id number]     :  [id number]       Blood Gas, Arterial - [[id number]]  (Abnormal) Collected: 03/15/23 0144    Specimen: Arterial Blood Updated: 03/15/23 0145     Site Arterial Line     Elder's Test N/A     pH, Arterial 7.313 pH units      Comment: 84 Value below reference range        pCO2, Arterial 64.1 mm Hg      Comment: 83 Value above reference range        pO2, Arterial 64.0 mm Hg      Comment: 84 Value below reference range        HCO3, Arterial 32.5 mmol/L      Comment: 83 Value above reference range        Base Excess, Arterial 4.8 mmol/L      Comment: 83 Value above reference range        O2 Saturation, Arterial 93.4 %      Comment: 84 Value below reference range         Barometric Pressure for Blood Gas 756 mmHg      Modality Room Air     Ventilator Mode NA     Collected by RT     Comment: Meter: [id number]     :  [id number]       High Sensitivity Troponin T 2Hr [[id number]]  (Abnormal) Collected: 03/14/23 2100    Specimen: Blood Updated: 03/14/23 2119     HS Troponin T 46 ng/L      Troponin T Delta 1 ng/L     Narrative:      High Sensitive Troponin T Reference Range:  <10.0 ng/L- Negative Female for AMI  <15.0 ng/L- Negative Male for AMI  >=10 - Abnormal Female indicating possible myocardial injury.  >=15 - Abnormal Male indicating possible myocardial injury.   Clinicians would have to utilize clinical acumen, EKG, Troponin, and serial changes to determine if it is an Acute Myocardial Infarction or myocardial injury due to an underlying chronic condition.         POC Glucose Once [[id number]]  (Normal) Collected: 03/14/23 2003    Specimen: Blood Updated: 03/14/23 2018     Glucose 86 mg/dL      Comment: RN NotifiedOperator: [id number] JEAN CONSTANCEMeter ID: [id number]       High Sensitivity Troponin T [[id number]]  (Abnormal) Collected: 03/14/23 1917    Specimen: Blood Updated: 03/14/23 2009     HS Troponin T 45 ng/L     Narrative:      High Sensitive Troponin T Reference Range:  <10.0 ng/L- Negative Female for AMI  <15.0 ng/L- Negative Male for AMI  >=10 - Abnormal Female indicating possible myocardial injury.  >=15 - Abnormal Male indicating possible myocardial injury.   Clinicians would have to utilize clinical acumen, EKG, Troponin, and serial changes to determine if it is an Acute Myocardial Infarction or myocardial injury due to an underlying chronic condition.         BNP [[id number]]  (Normal) Collected: 03/14/23 1917    Specimen: Blood Updated: 03/14/23 2009     proBNP 770.4 pg/mL     Narrative:      Among patients with dyspnea, NT-proBNP is highly sensitive for the detection of acute congestive heart failure. In addition NT-proBNP of <300 pg/ml effectively  rules out acute congestive heart failure with 99% negative predictive value.    Results may be falsely decreased if patient taking Biotin.      Urinalysis, Microscopic Only - Straight Cath [[id number]]  (Abnormal) Collected: 03/14/23 1841    Specimen: Urine from Straight Cath Updated: 03/14/23 1923     RBC, UA 3-5 /HPF      WBC, UA 6-12 /HPF      Bacteria, UA 1+ /HPF      Squamous Epithelial Cells, UA 3-5 /HPF      Transitional Epithelial Cells, UA 3-6 /HPF      Hyaline Casts, UA 7-12 /LPF      Methodology Manual Light Microscopy    Urine Culture - Urine, Straight Cath [[id number]] Collected: 03/14/23 1841    Specimen: Urine from Straight Cath Updated: 03/14/23 1923    Urinalysis With Culture If Indicated - Straight Cath [[id number]]  (Abnormal) Collected: 03/14/23 1841    Specimen: Urine from Straight Cath Updated: 03/14/23 1911     Color, UA Yellow     Appearance, UA Clear     pH, UA 5.5     Specific Gravity, UA 1.014     Glucose, UA Negative     Ketones, UA Negative     Bilirubin, UA Negative     Blood, UA Small (1+)     Protein,  mg/dL (2+)     Leuk Esterase, UA Negative     Nitrite, UA Negative     Urobilinogen, UA 0.2 E.U./dL    Narrative:      In absence of clinical symptoms, the presence of pyuria, bacteria, and/or nitrites on the urinalysis result does not correlate with infection.    Lactic Acid, Plasma [[id number]]  (Normal) Collected: 03/14/23 1815    Specimen: Blood Updated: 03/14/23 1844     Lactate 1.2 mmol/L     Blood Gas, Arterial - [[id number]]  (Abnormal) Collected: 03/14/23 1829    Specimen: Arterial Blood Updated: 03/14/23 1832     Site Right Radial     Elder's Test N/A     pH, Arterial 7.259 pH units      Comment: 84 Value below reference range        pCO2, Arterial 78.4 mm Hg      Comment: 86 Value above critical limit        pO2, Arterial 79.5 mm Hg      Comment: 84 Value below reference range        HCO3, Arterial 35.0 mmol/L      Comment: 83 Value above reference range        Base Excess,  Arterial 5.8 mmol/L      Comment: 83 Value above reference range        O2 Saturation, Arterial 96.4 %      Barometric Pressure for Blood Gas 756 mmHg      Modality Nasal Cannula     Flow Rate 3.0 lpm      Ventilator Mode NA     Collected by GITA     Comment: Meter: [id number]     :  [id number]       Blood Culture - Blood, Hand, Right [[id number]] Collected: 03/14/23 1815    Specimen: Blood from Hand, Right Updated: 03/14/23 1820    Blood Culture - Blood, Arm, Left [[id number]] Collected: 03/14/23 1815    Specimen: Blood from Arm, Left Updated: 03/14/23 1820    COVID-19 and FLU A/B PCR - Swab, Nasopharynx [[id number]]  (Normal) Collected: 03/14/23 1733    Specimen: Swab from Nasopharynx Updated: 03/14/23 1808     COVID19 Not Detected     Influenza A PCR Not Detected     Influenza B PCR Not Detected    Narrative:      Fact sheet for providers: https://www.fda.gov/media/493195/download    Fact sheet for patients: https://www.fda.gov/media/849533/download    Test performed by PCR.    [place] Draw [[id number]] Collected: 03/14/23 1646    Specimen: Blood Updated: 03/14/23 1800    Narrative:      The following orders were created for panel order [place] Draw.  Procedure                               Abnormality         Status                     ---------                               -----------         ------                     Green Top (Gel)[[id number]]                                  Final result               Lavender Top[[id number]]                                     Final result               Gold Top - SST[[id number]]                                   Final result               Light Blue Top[[id number]]                                   Final result                 Please view results for these tests on the individual orders.    Gold Top - SST [[id number]] Collected: 03/14/23 1646    Specimen: Blood Updated: 03/14/23 1800     Extra Tube Hold for add-ons.     Comment: Auto resulted.       Light Blue Top  "[[id number]] Collected: 03/14/23 1646    Specimen: Blood Updated: 03/14/23 1800     Extra Tube Hold for add-ons.     Comment: Auto resulted       Green Top (Gel) [[id number]] Collected: 03/14/23 1646    Specimen: Blood Updated: 03/14/23 1800     Extra Tube Hold for add-ons.     Comment: Auto resulted.       Lavender Top [[id number]] Collected: 03/14/23 1646    Specimen: Blood Updated: 03/14/23 1800     Extra Tube hold for add-on     Comment: Auto resulted       D-dimer, Quantitative [[id number]]  (Normal) Collected: 03/14/23 1646    Specimen: Blood Updated: 03/14/23 1752     D-Dimer, Quantitative 710 ng/mL (FEU)     Narrative:      According to the assay 's published package insert, a normal (<500 ng/mL (FEU)) D-dimer result in conjunction with a non-high clinical probability assessment, excludes deep vein thrombosis (DVT) and pulmonary embolism (PE) with high sensitivity.    D-dimer values increase with age and this can make VTE exclusion of an older population difficult. To address this, the [employer], based on best available evidence and recent guidelines, recommends that clinicians use age-adjusted D-dimer thresholds in patients greater than 50 years of age with: a) a low probability of PE who do not meet all Pulmonary Embolism Rule Out Criteria, or b) in those with intermediate probability of PE.   The formula for an age-adjusted D-dimer cut-off is \"age*10\".  For example, a 60 year old patient would have an age-adjusted cut-off of 600 ng/mL (FEU) and an 80 year old 800 ng/mL (FEU).      aPTT [[id number]]  (Normal) Collected: 03/14/23 1646    Specimen: Blood Updated: 03/14/23 1752     PTT 27.1 seconds     Narrative:      The recommended Heparin therapeutic range is 68-97 seconds.    Protime-INR [[id number]]  (Normal) Collected: 03/14/23 1646    Specimen: Blood Updated: 03/14/23 1751     Protime 12.6 Seconds      INR 0.95    Narrative:      Therapeutic range for most indications is " 2.0-3.0 INR,  or 2.5-3.5 for mechanical heart valves.    Comprehensive Metabolic Panel [[id number]]  (Abnormal) Collected: 03/14/23 1646    Specimen: Blood Updated: 03/14/23 1734     Glucose 114 mg/dL      BUN 15 mg/dL      Creatinine 1.73 mg/dL      Sodium 143 mmol/L      Potassium 4.7 mmol/L      Comment: Slight hemolysis detected by analyzer. Results may be affected.        Chloride 101 mmol/L      CO2 33.0 mmol/L      Calcium 8.9 mg/dL      Total Protein 7.7 g/dL      Albumin 4.1 g/dL      ALT (SGPT) 16 U/L      AST (SGOT) 16 U/L      Alkaline Phosphatase 80 U/L      Total Bilirubin 0.3 mg/dL      Globulin 3.6 gm/dL      A/G Ratio 1.1 g/dL      BUN/Creatinine Ratio 8.7     Anion Gap 9.0 mmol/L      eGFR 40.7 mL/min/1.73     Narrative:      GFR Normal >60  Chronic Kidney Disease <60  Kidney Failure <15    The GFR formula is only valid for adults with stable renal function between ages 18 and 70.    Single High Sensitivity Troponin T [[id number]]  (Abnormal) Collected: 03/14/23 1646    Specimen: Blood Updated: 03/14/23 1723     HS Troponin T 51 ng/L     Narrative:      High Sensitive Troponin T Reference Range:  <10.0 ng/L- Negative Female for AMI  <15.0 ng/L- Negative Male for AMI  >=10 - Abnormal Female indicating possible myocardial injury.  >=15 - Abnormal Male indicating possible myocardial injury.   Clinicians would have to utilize clinical acumen, EKG, Troponin, and serial changes to determine if it is an Acute Myocardial Infarction or myocardial injury due to an underlying chronic condition.         Magnesium [[id number]]  (Normal) Collected: 03/14/23 1646    Specimen: Blood Updated: 03/14/23 1723     Magnesium 2.1 mg/dL     CBC & Differential [[id number]]  (Abnormal) Collected: 03/14/23 1646    Specimen: Blood Updated: 03/14/23 1655    Narrative:      The following orders were created for panel order CBC & Differential.  Procedure                               Abnormality         Status                      ---------                               -----------         ------                     CBC Auto Differential[[id number]]        Abnormal            Final result                 Please view results for these tests on the individual orders.    CBC Auto Differential [[id number]]  (Abnormal) Collected: 03/14/23 1646    Specimen: Blood Updated: 03/14/23 1655     WBC 7.23 10*3/mm3      RBC 4.30 10*6/mm3      Hemoglobin 12.1 g/dL      Hematocrit 41.6 %      MCV 96.7 fL      MCH 28.1 pg      MCHC 29.1 g/dL      RDW 13.5 %      RDW-SD 47.6 fl      MPV 8.5 fL      Platelets 263 10*3/mm3      Neutrophil % 61.7 %      Lymphocyte % 19.2 %      Monocyte % 8.7 %      Eosinophil % 8.3 %      Basophil % 1.1 %      Immature Grans % 1.0 %      Neutrophils, Absolute 4.46 10*3/mm3      Lymphocytes, Absolute 1.39 10*3/mm3      Monocytes, Absolute 0.63 10*3/mm3      Eosinophils, Absolute 0.60 10*3/mm3      Basophils, Absolute 0.08 10*3/mm3      Immature Grans, Absolute 0.07 10*3/mm3      nRBC 0.0 /100 WBC         Imaging Results (Last 72 Hours)     Procedure Component Value Units Date/Time    CT Head Without Contrast [[id number]] Collected: 03/14/23 1716     Updated: 03/14/23 1733    Narrative:      EXAM: CT HEAD WITHOUT CONTRAST    CLINICAL HISTORY:  77 years Male,Mental status change, unknown  cause    COMPARISON:  None    Axial with coronal and sagittal images were obtained without  intravenous contrast.  This examination was performed according  to our departmental dose optimization program which includes  automated exposure control, adjustment of the MA and kV according  to patient size, and/or use of iterative reconstruction  technique.    FINDINGS:  Mild low attenuation in the bilateral periventricular/deep  cerebral white matter, most consistent with mild chronic small  vessel ischemic change. No acute intracranial hemorrhage, mass  effect, hydrocephalus, or CT signs of acute infarct.    Mild mucosal thickening along the bilateral  maxillary sinuses.  Opacification of several left mastoid air cells.      Impression:      1. Mild chronic small vessel ischemic changes of acute  intracranial process.  2. Opacification of several left mastoid air cells.    Electronically signed by:  Finesse Granados MD  3/14/2023 5:31 PM CDT  Workstation: [id number]    XR Chest 1 View [[id number]] Collected: 03/14/23 1641     Updated: 03/14/23 1704    Narrative:        PORTABLE CHEST    HISTORY: Weakness. Dizziness. Altered mental status.    Portable AP upright film of the chest was obtained at 4:39 PM.  COMPARISON: August 30, 2022    FINDINGS:   EKG leads.  Subsegmental infiltrate left lung base compatible with pneumonia.  Small left pleural effusion.  The heart is not enlarged.  The pulmonary vasculature is not increased.  No pneumothorax.  No acute osseous abnormality.  Degenerative changes each shoulder.      Impression:      CONCLUSION:  Subsegmental infiltrate left lung base compatible with pneumonia.  Small left pleural effusion.    [id number]    Electronically signed by:  Farzad Mcadams MD  3/14/2023 5:02 PM CDT  Workstation: [id number]          Review of Systems:  Review of Systems    Physical Exam  Vitals reviewed. Nursing notes reviewed.  Constitutional:       Appearance: Well-developed.   Skin:     General: Skin is warm and dry.      Coloration: Skin is not pale.      Findings: Positive for wound, psoriasis  Neurological:      Mental Status: Pt is alert and oriented to person, place, and time.       Physical Assessment:  Wound 03/14/23 1953 sacral spine (Active)   Wound Image   03/14/23 1953   Pressure Injury Stage 2 03/14/23 2000   Dressing Appearance open to air 03/15/23 0830   Closure None 03/14/23 2000   Base dry 03/15/23 0830   Drainage Amount none 03/14/23 2000   Dressing Care open to air 03/14/23 2000       Wound 03/14/23 1954 Right popliteal (Active)   Dressing Appearance open to air 03/15/23 0830   Closure None 03/15/23 0830   Base dry 03/15/23 0830    Drainage Amount none 03/14/23 2000   Dressing Care open to air 03/14/23 2000        Recommendation and Plan      Patient needs offloading of area. Informed patient to use a topical steroid cream and moisturize at home. No other recommendations at this time. Will sign off. Please call with questions.     RIVKA Blackburn   3/15/2023   13:20 CDT      Electronically signed by Jolanta Johnson APRN at 03/15/23 1324     Cristian Gupta MD at 03/15/23 1254               [facility] Medicine Services  INPATIENT PROGRESS NOTE      Length of Stay: 1  Date of Admission: 3/14/2023  Primary Care Physician: Jaxson Zepeda MD    Subjective   Chief Complaint: Shortness of breath  HPI: Patient seen and examined, reports feeling that his breathing is better.  Patient's family at bedside note multiple symptoms that been present over the last several weeks including patient's shortness of breath that has been worsening over the past 1 week.  Patient wife notes that he has had difficulties with generalized weakness and balance.  Patient's son also notes that at times his lips appear to be blue and cyanotic.  Patient endorses a history of black lung.  Patient has supplemental oxygen at home that he does not wear on a regular basis.  He does not have any kind of BiPAP machine or CPAP or anything like that.  Per patient and family he has been living a very sedentary lifestyle and at times they have also noted that he has had some blood sugars as low as the 60s which have caused him to feel shaky.  Patient's wife endorses patient having poor p.o. intake at home.    Review of Systems   All pertinent negatives and positives are as above. All other systems have been reviewed and are negative unless otherwise stated.     Objective    As of today 03/15/23  Temp:  [97.8 °F (36.6 °C)-98.7 °F (37.1 °C)] 98.6 °F (37 °C)  Heart Rate:  [48-95] 95  Resp:  [16-20] 19  BP:  (147-187)/(69-83) 162/70    Physical Exam  Constitutional:       General: He is not in acute distress.     Appearance: He is not toxic-appearing.   HENT:      Head: Normocephalic and atraumatic.      Right Ear: External ear normal.      Left Ear: External ear normal.      Nose: Nose normal.      Mouth/Throat:      Pharynx: Oropharynx is clear.   Eyes:      Conjunctiva/sclera: Conjunctivae normal.   Cardiovascular:      Rate and Rhythm: Normal rate and regular rhythm.      Heart sounds: Normal heart sounds.   Pulmonary:      Comments: Diminished breath sounds bilaterally with faint wheeze noted.  Abdominal:      General: Bowel sounds are normal.      Palpations: Abdomen is soft.      Tenderness: There is no abdominal tenderness.   Musculoskeletal:         General: No deformity.   Skin:     General: Skin is warm and dry.      Capillary Refill: Capillary refill takes less than 2 seconds.   Neurological:      General: No focal deficit present.      Mental Status: He is alert and oriented to person, place, and time. Mental status is at baseline.   Psychiatric:         Behavior: Behavior normal.           Results Review:  I have reviewed the labs, radiology results, and diagnostic studies.    Laboratory Data:   Results from last 7 days   Lab Units 03/15/23  0604 03/14/23  1646   SODIUM mmol/L 141 143   POTASSIUM mmol/L 3.8 4.7   CHLORIDE mmol/L 105 101   CO2 mmol/L 28.0 33.0*   BUN mg/dL 16 15   CREATININE mg/dL 1.55* 1.73*   GLUCOSE mg/dL 183* 114*   CALCIUM mg/dL 8.1* 8.9   BILIRUBIN mg/dL  --  0.3   ALK PHOS U/L  --  80   ALT (SGPT) U/L  --  16   AST (SGOT) U/L  --  16   ANION GAP mmol/L 8.0 9.0     Estimated Creatinine Clearance: 44.3 mL/min (A) (by C-G formula based on SCr of 1.55 mg/dL (H)).  Results from last 7 days   Lab Units 03/14/23  1646   MAGNESIUM mg/dL 2.1         Results from last 7 days   Lab Units 03/14/23  1646   WBC 10*3/mm3 7.23   HEMOGLOBIN g/dL 12.1*   HEMATOCRIT % 41.6   PLATELETS 10*3/mm3 263      Results from last 7 days   Lab Units 03/14/23  1646   INR  0.95       Culture Data:   No results found for: BLOODCX  No results found for: URINECX  No results found for: RESPCX  No results found for: WOUNDCX  No results found for: STOOLCX  No components found for: BODYFLD    Radiology Data:   Imaging Results (Last 24 Hours)     Procedure Component Value Units Date/Time    CT Head Without Contrast [[id number]] Collected: 03/14/23 1716     Updated: 03/14/23 1733    Narrative:      EXAM: CT HEAD WITHOUT CONTRAST    CLINICAL HISTORY:  77 years Male,Mental status change, unknown  cause    COMPARISON:  None    Axial with coronal and sagittal images were obtained without  intravenous contrast.  This examination was performed according  to our departmental dose optimization program which includes  automated exposure control, adjustment of the MA and kV according  to patient size, and/or use of iterative reconstruction  technique.    FINDINGS:  Mild low attenuation in the bilateral periventricular/deep  cerebral white matter, most consistent with mild chronic small  vessel ischemic change. No acute intracranial hemorrhage, mass  effect, hydrocephalus, or CT signs of acute infarct.    Mild mucosal thickening along the bilateral maxillary sinuses.  Opacification of several left mastoid air cells.      Impression:      1. Mild chronic small vessel ischemic changes of acute  intracranial process.  2. Opacification of several left mastoid air cells.    Electronically signed by:  Finesse Granados MD  3/14/2023 5:31 PM CDT  Workstation: [id number]    XR Chest 1 View [[id number]] Collected: 03/14/23 1641     Updated: 03/14/23 1704    Narrative:        PORTABLE CHEST    HISTORY: Weakness. Dizziness. Altered mental status.    Portable AP upright film of the chest was obtained at 4:39 PM.  COMPARISON: August 30, 2022    FINDINGS:   EKG leads.  Subsegmental infiltrate left lung base compatible with pneumonia.  Small left pleural  effusion.  The heart is not enlarged.  The pulmonary vasculature is not increased.  No pneumothorax.  No acute osseous abnormality.  Degenerative changes each shoulder.      Impression:      CONCLUSION:  Subsegmental infiltrate left lung base compatible with pneumonia.  Small left pleural effusion.    [id number]    Electronically signed by:  Farzad Mcadams MD  3/14/2023 5:02 PM CDT  Workstation: [id number]          I have reviewed the patient's current medications.     Assessment/Plan     Principal Problem:    Pneumonia of left lower lobe due to infectious organism  Acute respiratory failure with hypoxia and hypercapnia  Respiratory acidosis  Type 2 diabetes mellitus  Deconditioning  Black lung disease    Plan:  - Continue supplemental oxygen wean as tolerated  - Continue noninvasive ventilation wean as tolerated  - Given patient's hypercapnia initially on admission with improvement with the BiPAP machine currently on AVAPS settings he may benefit from a trilogy machine when he goes home  - Continue bronchodilators, RT to initiate protocol  - Pulmicort nebs have also been ordered  - Pulmonary toilet  - Continue antibiotics with Rocephin and azithromycin  - PT and OT been ordered  - Continue home medications as appropriate  - Given reports of hypoglycemia at home would recommend stopping his home Amaryl at discharge as well  - Discussed with patient and family at bedside that his symptoms are likely related to multiple factors including his hypoglycemia, deconditioning, and lung disease.  Also discussed that he likely needs to wear his oxygen on a regular basis at home as well to help prevent hypoxia.  - Discussed with case management  - DVT prophylaxis with Lovenox  - CODE STATUS: Full    Medical Decision Making  Number and Complexity of problems: 6 high complexity    Conditions and Status:        Condition is improving.     MDM Data  External documents reviewed: Previous records  Tests considered but not ordered: None      Decision rules/scores evaluated (example [id number], Wells, etc): None     Discussed with: Patient, wife, and son at bedside     Treatment Plan  As above    Care Planning  Shared decision making: Patient  Code status and discussions: Full    Disposition  Social Determinants of Health that impact treatment or disposition: None  I expect the patient to be discharged to SNF versus rehab versus home with home health in 3-4 days.       I have utilized all available immediate resources to obtain, update, or review the patient's current medications (including all prescriptions, over-the-counter products, herbals, cannabis/cannabidiol products, and vitamin/mineral/dietary (nutritional) supplements).   I confirmed that the patient's Advance Care Plan is present, code status is documented, or surrogate decision maker is listed in the patient's medical record.    Discharge Planning: In process    Cristian Gupta MD      Electronically signed by Cristian Gupta MD at 03/15/23 7145

## 2023-03-16 NOTE — PLAN OF CARE
Problem: Adult Inpatient Plan of Care  Goal: Plan of Care Review  Flowsheets (Taken 3/16/2023 4591)  Progress: improving  Plan of Care Reviewed With: patient  Outcome Evaluation: vss, improving this shift, up to bathroom multiple times, diet tolerated, 2L/NC   Goal Outcome Evaluation:  Plan of Care Reviewed With: patient        Progress: improving  Outcome Evaluation: vss, improving this shift, up to bathroom multiple times, diet tolerated, 2L/NC

## 2023-03-16 NOTE — PLAN OF CARE
Problem: Adult Inpatient Plan of Care  Goal: Plan of Care Review  Outcome: Ongoing, Progressing  Flowsheets  Taken 3/16/2023 1523  Progress: improving  Plan of Care Reviewed With: patient  Taken 3/16/2023 1520  Progress: improving  Plan of Care Reviewed With: patient  Outcome Evaluation: Pt sidelying in bed. Supine to sit Independent. Sit to stand<>stand to sit Independent. T/f to bathroom Independent. Toilet tasks CGA. UB bathing and dressing CGA. LB bathing and dressing CGA. Feet Max A for dressing. Grooming SBA. Pt t/f to bschair all needs in reach. Pt could benefit from continued OT services.   Goal Outcome Evaluation:  Plan of Care Reviewed With: patient        Progress: improving  Outcome Evaluation: Pt sidelying in bed. Supine to sit Independent. Sit to stand<>stand to sit Independent. T/f to bathroom Independent. Toilet tasks CGA. UB bathing and dressing CGA. LB bathing and dressing CGA. Feet Max A for dressing. Grooming SBA. Pt t/f to bschair all needs in reach. Pt could benefit from continued OT services.

## 2023-03-17 LAB
ALBUMIN SERPL-MCNC: 3.5 G/DL (ref 3.5–5.2)
ALBUMIN/GLOB SERPL: 1.3 G/DL
ALP SERPL-CCNC: 58 U/L (ref 39–117)
ALT SERPL W P-5'-P-CCNC: 12 U/L (ref 1–41)
ANION GAP SERPL CALCULATED.3IONS-SCNC: 11 MMOL/L (ref 5–15)
AST SERPL-CCNC: 10 U/L (ref 1–40)
BACTERIA UR QL AUTO: NORMAL /HPF
BASOPHILS # BLD AUTO: 0.01 10*3/MM3 (ref 0–0.2)
BASOPHILS NFR BLD AUTO: 0.2 % (ref 0–1.5)
BILIRUB SERPL-MCNC: 0.2 MG/DL (ref 0–1.2)
BILIRUB UR QL STRIP: NEGATIVE
BUN SERPL-MCNC: 28 MG/DL (ref 8–23)
BUN/CREAT SERPL: 14.6 (ref 7–25)
CALCIUM SPEC-SCNC: 8.3 MG/DL (ref 8.6–10.5)
CHLORIDE SERPL-SCNC: 102 MMOL/L (ref 98–107)
CLARITY UR: CLEAR
CO2 SERPL-SCNC: 27 MMOL/L (ref 22–29)
COLOR UR: YELLOW
CREAT SERPL-MCNC: 1.92 MG/DL (ref 0.76–1.27)
DEPRECATED RDW RBC AUTO: 44.6 FL (ref 37–54)
EGFRCR SERPLBLD CKD-EPI 2021: 35.9 ML/MIN/1.73
EOSINOPHIL # BLD AUTO: 0 10*3/MM3 (ref 0–0.4)
EOSINOPHIL NFR BLD AUTO: 0 % (ref 0.3–6.2)
ERYTHROCYTE [DISTWIDTH] IN BLOOD BY AUTOMATED COUNT: 13.3 % (ref 12.3–15.4)
GLOBULIN UR ELPH-MCNC: 2.7 GM/DL
GLUCOSE BLDC GLUCOMTR-MCNC: 215 MG/DL (ref 70–130)
GLUCOSE BLDC GLUCOMTR-MCNC: 233 MG/DL (ref 70–130)
GLUCOSE BLDC GLUCOMTR-MCNC: 277 MG/DL (ref 70–130)
GLUCOSE BLDC GLUCOMTR-MCNC: 300 MG/DL (ref 70–130)
GLUCOSE BLDC GLUCOMTR-MCNC: 327 MG/DL (ref 70–130)
GLUCOSE SERPL-MCNC: 243 MG/DL (ref 65–99)
GLUCOSE UR STRIP-MCNC: ABNORMAL MG/DL
HCT VFR BLD AUTO: 33.2 % (ref 37.5–51)
HGB BLD-MCNC: 10.3 G/DL (ref 13–17.7)
HGB UR QL STRIP.AUTO: NEGATIVE
HYALINE CASTS UR QL AUTO: NORMAL /LPF
IMM GRANULOCYTES # BLD AUTO: 0.06 10*3/MM3 (ref 0–0.05)
IMM GRANULOCYTES NFR BLD AUTO: 1 % (ref 0–0.5)
KETONES UR QL STRIP: ABNORMAL
LEUKOCYTE ESTERASE UR QL STRIP.AUTO: NEGATIVE
LYMPHOCYTES # BLD AUTO: 0.62 10*3/MM3 (ref 0.7–3.1)
LYMPHOCYTES NFR BLD AUTO: 10.7 % (ref 19.6–45.3)
MCH RBC QN AUTO: 28.2 PG (ref 26.6–33)
MCHC RBC AUTO-ENTMCNC: 31 G/DL (ref 31.5–35.7)
MCV RBC AUTO: 91 FL (ref 79–97)
MONOCYTES # BLD AUTO: 0.05 10*3/MM3 (ref 0.1–0.9)
MONOCYTES NFR BLD AUTO: 0.9 % (ref 5–12)
NEUTROPHILS NFR BLD AUTO: 5.08 10*3/MM3 (ref 1.7–7)
NEUTROPHILS NFR BLD AUTO: 87.2 % (ref 42.7–76)
NITRITE UR QL STRIP: NEGATIVE
NRBC BLD AUTO-RTO: 0 /100 WBC (ref 0–0.2)
PH UR STRIP.AUTO: 5.5 [PH] (ref 5–9)
PLATELET # BLD AUTO: 250 10*3/MM3 (ref 140–450)
PMV BLD AUTO: 9.1 FL (ref 6–12)
POTASSIUM SERPL-SCNC: 3.9 MMOL/L (ref 3.5–5.2)
PROT SERPL-MCNC: 6.2 G/DL (ref 6–8.5)
PROT UR QL STRIP: ABNORMAL
RBC # BLD AUTO: 3.65 10*6/MM3 (ref 4.14–5.8)
RBC # UR STRIP: NORMAL /HPF
REF LAB TEST METHOD: NORMAL
SODIUM SERPL-SCNC: 140 MMOL/L (ref 136–145)
SODIUM UR-SCNC: 79 MMOL/L
SP GR UR STRIP: 1.02 (ref 1–1.03)
SQUAMOUS #/AREA URNS HPF: NORMAL /HPF
UROBILINOGEN UR QL STRIP: ABNORMAL
WBC # UR STRIP: NORMAL /HPF
WBC NRBC COR # BLD: 5.82 10*3/MM3 (ref 3.4–10.8)

## 2023-03-17 PROCEDURE — 94799 UNLISTED PULMONARY SVC/PX: CPT

## 2023-03-17 PROCEDURE — 82962 GLUCOSE BLOOD TEST: CPT

## 2023-03-17 PROCEDURE — 25010000002 CEFTRIAXONE PER 250 MG: Performed by: PHYSICIAN ASSISTANT

## 2023-03-17 PROCEDURE — 94761 N-INVAS EAR/PLS OXIMETRY MLT: CPT

## 2023-03-17 PROCEDURE — 97530 THERAPEUTIC ACTIVITIES: CPT

## 2023-03-17 PROCEDURE — 25010000002 ENOXAPARIN PER 10 MG: Performed by: PHYSICIAN ASSISTANT

## 2023-03-17 PROCEDURE — 87205 SMEAR GRAM STAIN: CPT | Performed by: FAMILY MEDICINE

## 2023-03-17 PROCEDURE — 81001 URINALYSIS AUTO W/SCOPE: CPT | Performed by: FAMILY MEDICINE

## 2023-03-17 PROCEDURE — 84300 ASSAY OF URINE SODIUM: CPT | Performed by: FAMILY MEDICINE

## 2023-03-17 PROCEDURE — 80053 COMPREHEN METABOLIC PANEL: CPT | Performed by: FAMILY MEDICINE

## 2023-03-17 PROCEDURE — 97535 SELF CARE MNGMENT TRAINING: CPT

## 2023-03-17 PROCEDURE — 97116 GAIT TRAINING THERAPY: CPT

## 2023-03-17 PROCEDURE — 94664 DEMO&/EVAL PT USE INHALER: CPT

## 2023-03-17 PROCEDURE — 85025 COMPLETE CBC W/AUTO DIFF WBC: CPT | Performed by: FAMILY MEDICINE

## 2023-03-17 PROCEDURE — 97110 THERAPEUTIC EXERCISES: CPT

## 2023-03-17 PROCEDURE — 63710000001 PREDNISONE PER 1 MG: Performed by: FAMILY MEDICINE

## 2023-03-17 PROCEDURE — 94760 N-INVAS EAR/PLS OXIMETRY 1: CPT

## 2023-03-17 PROCEDURE — 63710000001 INSULIN ASPART PER 5 UNITS: Performed by: PHYSICIAN ASSISTANT

## 2023-03-17 PROCEDURE — 63710000001 INSULIN DETEMIR PER 5 UNITS: Performed by: FAMILY MEDICINE

## 2023-03-17 PROCEDURE — 82570 ASSAY OF URINE CREATININE: CPT | Performed by: FAMILY MEDICINE

## 2023-03-17 RX ADMIN — BUDESONIDE 0.5 MG: 0.5 SUSPENSION RESPIRATORY (INHALATION) at 08:07

## 2023-03-17 RX ADMIN — IPRATROPIUM BROMIDE AND ALBUTEROL SULFATE 3 ML: 2.5; .5 SOLUTION RESPIRATORY (INHALATION) at 19:59

## 2023-03-17 RX ADMIN — ENOXAPARIN SODIUM 40 MG: 40 INJECTION SUBCUTANEOUS at 21:07

## 2023-03-17 RX ADMIN — INSULIN DETEMIR 5 UNITS: 100 INJECTION, SOLUTION SUBCUTANEOUS at 08:35

## 2023-03-17 RX ADMIN — IPRATROPIUM BROMIDE AND ALBUTEROL SULFATE 3 ML: 2.5; .5 SOLUTION RESPIRATORY (INHALATION) at 14:04

## 2023-03-17 RX ADMIN — ATORVASTATIN CALCIUM 20 MG: 20 TABLET, FILM COATED ORAL at 21:06

## 2023-03-17 RX ADMIN — AZITHROMYCIN MONOHYDRATE 500 MG: 250 TABLET ORAL at 21:05

## 2023-03-17 RX ADMIN — IPRATROPIUM BROMIDE AND ALBUTEROL SULFATE 3 ML: 2.5; .5 SOLUTION RESPIRATORY (INHALATION) at 08:07

## 2023-03-17 RX ADMIN — INSULIN ASPART 6 UNITS: 100 INJECTION, SOLUTION INTRAVENOUS; SUBCUTANEOUS at 17:26

## 2023-03-17 RX ADMIN — BUDESONIDE 0.5 MG: 0.5 SUSPENSION RESPIRATORY (INHALATION) at 19:59

## 2023-03-17 RX ADMIN — FOLIC ACID 1 MG: 1 TABLET ORAL at 08:32

## 2023-03-17 RX ADMIN — Medication 10 ML: at 21:08

## 2023-03-17 RX ADMIN — INSULIN ASPART 7 UNITS: 100 INJECTION, SOLUTION INTRAVENOUS; SUBCUTANEOUS at 12:50

## 2023-03-17 RX ADMIN — INSULIN ASPART 7 UNITS: 100 INJECTION, SOLUTION INTRAVENOUS; SUBCUTANEOUS at 21:07

## 2023-03-17 RX ADMIN — PANTOPRAZOLE SODIUM 40 MG: 40 TABLET, DELAYED RELEASE ORAL at 08:35

## 2023-03-17 RX ADMIN — NEBIVOLOL HYDROCHLORIDE 10 MG: 5 TABLET ORAL at 08:32

## 2023-03-17 RX ADMIN — TAMSULOSIN HYDROCHLORIDE 0.4 MG: 0.4 CAPSULE ORAL at 21:06

## 2023-03-17 RX ADMIN — INSULIN ASPART 4 UNITS: 100 INJECTION, SOLUTION INTRAVENOUS; SUBCUTANEOUS at 08:36

## 2023-03-17 RX ADMIN — PREDNISONE 50 MG: 20 TABLET ORAL at 08:32

## 2023-03-17 RX ADMIN — Medication 10 ML: at 08:36

## 2023-03-17 RX ADMIN — CEFTRIAXONE SODIUM 1 G: 1 INJECTION, POWDER, FOR SOLUTION INTRAMUSCULAR; INTRAVENOUS at 17:26

## 2023-03-17 RX ADMIN — ASPIRIN 81 MG: 81 TABLET, CHEWABLE ORAL at 08:32

## 2023-03-17 NOTE — PLAN OF CARE
Goal Outcome Evaluation:  Plan of Care Reviewed With: patient        Progress: improving  Outcome Evaluation: Nsg and pt agreeable to tx. Pt able to t/f sup to sit ind with bed down and no bed rail. Pt with slightly low O2 initially but able to get sats to 93% with PLB cues. Pt stood with SBA and no AD. Pt amb with O2 applied at 3L per NC for 124ft x 2 with no AD and CGA for safety. Pt performed B LE seated ther ex with good effort and tech. Pt returned to room and sup and left with all needs met. Pt would cont to benefit from therapy upon DC.

## 2023-03-17 NOTE — PLAN OF CARE
Problem: Adult Inpatient Plan of Care  Goal: Plan of Care Review  Outcome: Ongoing, Progressing  Flowsheets  Taken 3/17/2023 1047  Progress: improving  Plan of Care Reviewed With: patient  Taken 3/17/2023 1044  Progress: improving  Plan of Care Reviewed With: patient  Outcome Evaluation: Patient sidelying. Grooming with setup. Educated patient on Home Safety. All needs in reach. Patient could continue to benefit from OT services.   Goal Outcome Evaluation:  Plan of Care Reviewed With: patient        Progress: improving  Outcome Evaluation: Patient sidelying. Grooming with setup. Educated patient on Home Safety. All needs in reach. Patient could continue to benefit from OT services.

## 2023-03-17 NOTE — THERAPY TREATMENT NOTE
Acute Care - Physical Therapy Treatment Note  [facility]     Patient Name: Rolando Novoa  : 1945  MRN: [id number]  Today's Date: 3/17/2023      Visit Dx:     ICD-10-CM ICD-9-CM   1. Pneumonia of left lower lobe due to infectious organism  J18.9 486   2. Metabolic encephalopathy  G93.41 348.31   3. Acute respiratory failure with hypoxia and hypercapnia (HCC)  J96.01 518.81    J96.02    4. Impaired mobility and ADLs  Z74.09 V49.89    Z78.9    5. Impaired functional mobility, balance, gait, and endurance  Z74.09 V49.89     Patient Active Problem List   Diagnosis   • Hyperlipemia   • SOB (shortness of breath)   • Hematuria syndrome   • Psoriasis   • Hypertension   • High cholesterol   • Enlarged prostate   • Diabetes mellitus (HCC)   • Acid reflux   • Calculus of kidney   • Kidney stones   • Chronic respiratory failure with hypoxia (HCC)   • Class 2 obesity due to excess calories without serious comorbidity with body mass index (BMI) of 37.0 to 37.9 in adult   • Black lung (HCC)   • Chronic restrictive lung disease   • Hx of colonic polyps   • Encounter for screening for malignant neoplasm of colon   • Thrombocytosis   • Anemia   • Leucocytosis   • Folate deficiency   • Acute right-sided thoracic back pain   • Chest wall pain   • CKD (chronic kidney disease) stage 2, GFR 60-89 ml/min   • Diabetes type 2, uncontrolled   • Diabetic neuropathy (HCC)   • Nocturnal hypoxemia   • Supplemental oxygen dependent   • Change in bowel habits   • Diarrhea   • Pneumonia of left lower lobe due to infectious organism   • Acute and chronic respiratory failure with hypercapnia (HCC)   • Acute and chronic respiratory failure with hypoxia (HCC)   • COPD (chronic obstructive pulmonary disease) (HCC)   • Myopathies     Past Medical History:   Diagnosis Date   • Acid reflux    • Black lung disease (HCC)    • Cataract    • Diabetes (HCC)    • Enlarged prostate    • High cholesterol    • Hypertension    • Kidney stone      multiple   • Obesity    • Psoriasis      Past Surgical History:   Procedure Laterality Date   • AMPUTATION Left     lower arm and hand   • CATARACT EXTRACTION W/ INTRAOCULAR LENS IMPLANT Left 01/22/2021    Procedure: REMIOVE CATARACT AND IMPLANT  INTRAOCULAR LENS;  Surgeon: Sam Isaacs MD;  Location: [facility] OR;  Service: Ophthalmology;  Laterality: Left;   • CATARACT EXTRACTION W/ INTRAOCULAR LENS IMPLANT Right 01/29/2021    Procedure: REMOVE CATARACT AND IMPLANT INTRAOCULAR LENS;  Surgeon: Sam Isaacs MD;  Location: [facility] OR;  Service: Ophthalmology;  Laterality: Right;   • CHOLECYSTECTOMY OPEN     • COLONOSCOPY N/A 11/09/2018    Procedure: COLONOSCOPY;  Surgeon: Sukhdeep Mae MD;  Location: [facility] ENDOSCOPY;  Service: Gastroenterology   • COLONOSCOPY N/A 03/28/2022    Procedure: COLONOSCOPY;  Surgeon: Sukhdeep Mae MD;  Location: [facility] ENDOSCOPY;  Service: Gastroenterology;  Laterality: N/A;   • CYSTOSCOPY N/A 12/16/2017    Procedure: CYSTOSCOPY WITH CLOT EVACUATION;  Surgeon: Prosper Ariza MD;  Location: [facility];  Service:    • CYSTOSCOPY, URETEROSCOPY, RETROGRADE PYELOGRAM, STENT INSERTION Left 12/15/2017    Procedure: CYSTOSCOPY LEFT URETEROSCOPY RETROGRADE PYELOGRAM HOLMIUM LASER STENT INSERTION;  Surgeon: Prosper Ariza MD;  Location: [facility];  Service:    • CYSTOSCOPY, URETEROSCOPY, RETROGRADE PYELOGRAM, STENT INSERTION Left 08/10/2022    Procedure: CYSTOSCOPY LEFT RETROGRADE URETEROSCOPY LASER LITHOTRIPSY STENT PLACMENT;  Surgeon: Prosper Ariza MD;  Location: [facility];  Service: Urology;  Laterality: Left;   • DENTAL PROCEDURE      multiple extractions   • EXTRACORPOREAL SHOCK WAVE LITHOTRIPSY (ESWL)     • EXTRACORPOREAL SHOCKWAVE LITHOTRIPSY (ESWL), STENT INSERTION/REMOVAL Left 02/03/2017    Procedure: LEFT EXTRACORPOREAL SHOCKWAVE LITHOTRIPSY, POSSIBLE CYSTOSCOPY, POSSIBLE STENT REMOVAL ;  Surgeon: Prosper Ariza MD;  Location: [facility];  Service:    •  EXTRACORPOREAL SHOCKWAVE LITHOTRIPSY (ESWL), STENT INSERTION/REMOVAL Left 05/04/2018    Procedure: EXTRACORPOREAL SHOCKWAVE LITHOTRIPSY;  Surgeon: Prosper Ariza MD;  Location: [facility];  Service: Urology   • KIDNEY STONE SURGERY     • SCROTAL SURGERY      trauma   • WISDOM TOOTH EXTRACTION      took bottom ones     PT Assessment (last 12 hours)     PT Evaluation and Treatment     Row Name 03/17/23 0926          Physical Therapy Time and Intention    Subjective Information complains of;fatigue  -TW     Document Type therapy note (daily note)  -TW     Mode of Treatment physical therapy;individual therapy  -TW     Patient Effort good  -TW     Comment Nsg okayed tx.  -TW     Row Name 03/17/23 0926          General Information    Patient Profile Reviewed yes  -TW     Patient Observations alert;cooperative;agree to therapy  -TW     Patient/Family/Caregiver Comments/Observations none  -TW     General Observations of Patient Pt sup in bed.  -TW     Existing Precautions/Restrictions fall  -TW     Row Name 03/17/23 0926          Pain    Pretreatment Pain Rating 0/10 - no pain  -TW     Posttreatment Pain Rating 0/10 - no pain  -TW     Row Name 03/17/23 0926          Cognition    Affect/Mental Status (Cognition) WFL  -TW     Orientation Status (Cognition) oriented x 4  -TW     Follows Commands (Cognition) WFL  -TW     Personal Safety Interventions fall prevention program maintained;gait belt;nonskid shoes/slippers when out of bed;muscle strengthening facilitated  -TW     Row Name 03/17/23 0926          Bed Mobility    Supine-Sit [place] (Bed Mobility) independent  -TW     Sit-Supine [place] (Bed Mobility) independent  -TW     Row Name 03/17/23 0926          Transfers    Transfers sit-stand transfer;stand-sit transfer;toilet transfer  -TW     Row Name 03/17/23 0926          Sit-Stand Transfer    Sit-Stand [place] (Transfers) standby assist  -TW     Row Name 03/17/23 0926          Stand-Sit Transfer     Stand-Sit [place] (Transfers) standby assist  -TW     Row Name 03/17/23 0926          Gait/Stairs (Locomotion)    Gait/Stairs Locomotion gait/ambulation independence  -TW     [place] Level (Gait) standby assist;independent  -TW     Assistive Device (Gait) other (see comments)  No AD used  -TW     Distance in Feet (Gait) 124ft x2  -TW     Pattern (Gait) step-through  -TW     Row Name 03/17/23 0926          Hip (Therapeutic Exercise)    Hip AROM (Therapeutic Exercise) bilateral;sitting  -TW     Row Name 03/17/23 0926          Knee (Therapeutic Exercise)    Knee AROM (Therapeutic Exercise) bilateral;sitting  -TW     Row Name 03/17/23 0926          Ankle (Therapeutic Exercise)    Ankle AROM (Therapeutic Exercise) bilateral;sitting  -TW     Row Name             Wound 03/14/23 1953 sacral spine    Wound - Properties Group Placement Date: 03/14/23 -GZ Placement Time: 1953 -GZ Present on Hospital Admission: Y  -GZ Location: sacral spine  -GZ    Retired Wound - Properties Group Placement Date: 03/14/23 -GZ Placement Time: 1953 -GZ Present on Hospital Admission: Y  -GZ Location: sacral spine  -GZ    Retired Wound - Properties Group Date first assessed: 03/14/23 -GZ Time first assessed: 1953 -GZ Present on Hospital Admission: Y  -GZ Location: sacral spine  -GZ    Row Name             Wound 03/14/23 1954 Right popliteal    Wound - Properties Group Placement Date: 03/14/23 -GZ Placement Time: 1954 -GZ Present on Hospital Admission: Y  -GZ Side: Right  -GZ Location: popliteal  -GZ    Retired Wound - Properties Group Placement Date: 03/14/23  -GZ Placement Time: 1954 -GZ Present on Hospital Admission: Y  -GZ Side: Right  -GZ Location: popliteal  -GZ    Retired Wound - Properties Group Date first assessed: 03/14/23  -GZ Time first assessed: 1954 -GZ Present on Hospital Admission: Y  -GZ Side: Right  -GZ Location: popliteal  -GZ    Row Name 03/17/23 0926          Plan of Care Review    Plan of Care Reviewed With  patient  -TW     Progress improving  -TW     Outcome Evaluation Nsg and pt agreeable to tx. Pt able to t/f sup to sit ind with bed down and no bed rail. Pt with slightly low O2 initially but able to get sats to 93% with PLB cues. Pt stood with SBA and no AD. Pt amb with O2 applied at 3L per NC for 124ft x 2 with no AD and CGA for safety. Pt performed B LE seated ther ex with good effort and tech. Pt returned to room and sup and left with all needs met. Pt would cont to benefit from therapy upon DC.  -TW     Row Name 03/17/23 0926          Vital Signs    Pretreatment Heart Rate (beats/min) 104  -TW     Intratreatment Heart Rate (beats/min) 113  -TW     Posttreatment Heart Rate (beats/min) 107  -TW     Pre SpO2 (%) 86  -TW     O2 Delivery Pre Treatment nasal cannula  -TW     Intra SpO2 (%) 96  -TW     O2 Delivery Intra Treatment nasal cannula  -TW     Post SpO2 (%) 92  -TW     O2 Delivery Post Treatment nasal cannula  -TW     Pre Patient Position Supine  -TW     Intra Patient Position Standing  -TW     Post Patient Position Supine  -TW     Row Name 03/17/23 0926          Positioning and Restraints    Pre-Treatment Position in bed  -TW     Post Treatment Position bed  -TW     In Bed supine;call light within reach;encouraged to call for assist;with nsg  -TW     Row Name 03/17/23 0926          Therapy Assessment/Plan (PT)    Rehab Potential (PT) good, to achieve stated therapy goals  -TW     Row Name 03/17/23 0926          Transfer Goal 1 (PT)    Activity/Assistive Device (Transfer Goal 1, PT) bed-to-chair/chair-to-bed;toilet  -TW     [place] Level/Cues Needed (Transfer Goal 1, PT) independent  -TW     Time Frame (Transfer Goal 1, PT) by discharge  -TW     Progress/Outcome (Transfer Goal 1, PT) goal ongoing  -TW     Row Name 03/17/23 0926          Gait Training Goal 1 (PT)    Activity/Assistive Device (Gait Training Goal 1, PT) gait (walking locomotion)  -TW     [place] Level (Gait Training Goal 1, PT)  independent  -TW     Distance (Gait Training Goal 1, PT) 100 ft or more per trip w/ VSS  -TW     Progress/Outcome (Gait Training Goal 1, PT) goal not met  -TW     Row Name 03/17/23 0926          Stairs Goal 1 (PT)    Activity/Assistive Device (Stairs Goal 1, PT) ascending stairs;descending stairs  -TW     [place] Level/Cues Needed (Stairs Goal 1, PT) modified independence  -TW     Number of Stairs (Stairs Goal 1, PT) 4 stairs w/ 1 handrail on L  -TW     Time Frame (Stairs Goal 1, PT) by discharge  -TW     Progress/Outcome (Stairs Goal 1, PT) goal ongoing  -TW           User Key  (r) = Recorded By, (t) = Taken By, (c) = Cosigned By    Initials Name Provider Type    TW Jason Todd PTA Physical Therapist Assistant    Che Serrato RN Registered Nurse                Physical Therapy Education     Title: PT OT SLP Therapies (Done)     Topic: Physical Therapy (Done)     Point: Mobility training (Done)     Learning Progress Summary           Patient Acceptance, E,TB,H, VU by  at 3/17/2023 1047    Comment: Educated patient on Home safety    Acceptance, E,D, VU,DU by  at 3/15/2023 1414    Comment: PT POC; encouraged gait w/ asist; and breathind ext                   Point: Home exercise program (Done)     Learning Progress Summary           Patient Acceptance, E,TB,H, VU by LW at 3/17/2023 1047    Comment: Educated patient on Home safety    Acceptance, E,D, VU,DU by HERMILO at 3/15/2023 1414    Comment: PT POC; encouraged gait w/ asist; and breathind ext                   Point: Body mechanics (Done)     Learning Progress Summary           Patient Acceptance, E,TB,H, VU by LW at 3/17/2023 1047    Comment: Educated patient on Home safety    Acceptance, E,D, VU,DU by HERMILO at 3/15/2023 1414    Comment: PT POC; encouraged gait w/ asist; and breathind ext                   Point: Precautions (Done)     Learning Progress Summary           Patient Acceptance, E,TB,H, VU by LW at 3/17/2023 1047    Comment:  Educated patient on Home safety    Acceptance, E,D, VU,DU by  at 3/15/2023 1414    Comment: PT POC; encouraged gait w/ asist; and breathind ext                               User Key     Initials Effective Dates Name Provider Type Discipline    GB 06/16/21 -  Shireen Kimball, PT Physical Therapist PT    LW 06/16/21 -  Solange Joseph COTA Occupational Therapist Assistant OT              PT Recommendation and Plan  Anticipated Discharge Disposition (PT): home with 24/7 care, home with home health  Plan of Care Reviewed With: patient  Progress: improving  Outcome Evaluation: Nsg and pt agreeable to tx. Pt able to t/f sup to sit ind with bed down and no bed rail. Pt with slightly low O2 initially but able to get sats to 93% with PLB cues. Pt stood with SBA and no AD. Pt amb with O2 applied at 3L per NC for 124ft x 2 with no AD and CGA for safety. Pt performed B LE seated ther ex with good effort and tech. Pt returned to room and sup and left with all needs met. Pt would cont to benefit from therapy upon DC.       Time Calculation:    PT Charges     Row Name 03/17/23 1154             Time Calculation    Start Time 0926  -TW      Stop Time 1005  -TW      Time Calculation (min) 39 min  -TW         Time Calculation- PT    Total Timed Code Minutes- PT 39 minute(s)  -TW            User Key  (r) = Recorded By, (t) = Taken By, (c) = Cosigned By    Initials Name Provider Type    TW Jason Todd PTA Physical Therapist Assistant              Therapy Charges for Today     Code Description Service Date Service Provider Modifiers Qty    89612546404 HC GAIT TRAINING EA 15 MIN 3/16/2023 Jason Todd, PTA GP 1    69910568638 HC PT THER PROC EA 15 MIN 3/16/2023 Jason Todd, PTA GP 1    70638550054 HC GAIT TRAINING EA 15 MIN 3/17/2023 Jason Todd, PTA GP 1    71081470535 HC PT THER PROC EA 15 MIN 3/17/2023 Jason Todd, PTA GP 1    77999265069 HC PT THERAPEUTIC ACT EA 15 MIN 3/17/2023  Jason Todd, PTA GP 1          PT G-Codes  Outcome Measure Options: Tinetti  AM-PAC 6 Clicks Score (PT): 21  AM-PAC 6 Clicks Score (OT): 21  Tinetti Total Score: 27    Jason Todd PTA  3/17/2023

## 2023-03-17 NOTE — PLAN OF CARE
Goal Outcome Evaluation:  Plan of Care Reviewed With: patient        Progress: improving  Outcome Evaluation: VSS, no events overnight. Bipap on while sleeping, has rested for a majority of the night.

## 2023-03-17 NOTE — PLAN OF CARE
Goal Outcome Evaluation:  Plan of Care Reviewed With: patient        Progress: no change  Outcome Evaluation: VSS; up in chair at this time; no new complaints; patient resting between care; oxygen maintained.

## 2023-03-17 NOTE — THERAPY TREATMENT NOTE
Patient Name: Rolando Novoa  : 1945    MRN: [id number]                              Today's Date: 3/17/2023       Admit Date: 3/14/2023    Visit Dx:     ICD-10-CM ICD-9-CM   1. Pneumonia of left lower lobe due to infectious organism  J18.9 486   2. Metabolic encephalopathy  G93.41 348.31   3. Acute respiratory failure with hypoxia and hypercapnia (HCC)  J96.01 518.81    J96.02    4. Impaired mobility and ADLs  Z74.09 V49.89    Z78.9    5. Impaired functional mobility, balance, gait, and endurance  Z74.09 V49.89     Patient Active Problem List   Diagnosis   • Hyperlipemia   • SOB (shortness of breath)   • Hematuria syndrome   • Psoriasis   • Hypertension   • High cholesterol   • Enlarged prostate   • Diabetes mellitus (HCC)   • Acid reflux   • Calculus of kidney   • Kidney stones   • Chronic respiratory failure with hypoxia (HCC)   • Class 2 obesity due to excess calories without serious comorbidity with body mass index (BMI) of 37.0 to 37.9 in adult   • Black lung (HCC)   • Chronic restrictive lung disease   • Hx of colonic polyps   • Encounter for screening for malignant neoplasm of colon   • Thrombocytosis   • Anemia   • Leucocytosis   • Folate deficiency   • Acute right-sided thoracic back pain   • Chest wall pain   • CKD (chronic kidney disease) stage 2, GFR 60-89 ml/min   • Diabetes type 2, uncontrolled   • Diabetic neuropathy (HCC)   • Nocturnal hypoxemia   • Supplemental oxygen dependent   • Change in bowel habits   • Diarrhea   • Pneumonia of left lower lobe due to infectious organism   • Acute and chronic respiratory failure with hypercapnia (HCC)   • Acute and chronic respiratory failure with hypoxia (HCC)   • COPD (chronic obstructive pulmonary disease) (HCC)   • Myopathies     Past Medical History:   Diagnosis Date   • Acid reflux    • Black lung disease (HCC)    • Cataract    • Diabetes (HCC)    • Enlarged prostate    • High cholesterol    • Hypertension    • Kidney stone     multiple   •  Obesity    • Psoriasis      Past Surgical History:   Procedure Laterality Date   • AMPUTATION Left     lower arm and hand   • CATARACT EXTRACTION W/ INTRAOCULAR LENS IMPLANT Left 01/22/2021    Procedure: REMIOVE CATARACT AND IMPLANT  INTRAOCULAR LENS;  Surgeon: Sam Isaacs MD;  Location: [facility] OR;  Service: Ophthalmology;  Laterality: Left;   • CATARACT EXTRACTION W/ INTRAOCULAR LENS IMPLANT Right 01/29/2021    Procedure: REMOVE CATARACT AND IMPLANT INTRAOCULAR LENS;  Surgeon: Sam Isaacs MD;  Location: [facility] OR;  Service: Ophthalmology;  Laterality: Right;   • CHOLECYSTECTOMY OPEN     • COLONOSCOPY N/A 11/09/2018    Procedure: COLONOSCOPY;  Surgeon: Sukhdeep Mae MD;  Location: [facility] ENDOSCOPY;  Service: Gastroenterology   • COLONOSCOPY N/A 03/28/2022    Procedure: COLONOSCOPY;  Surgeon: Sukhdeep Mae MD;  Location: [facility] ENDOSCOPY;  Service: Gastroenterology;  Laterality: N/A;   • CYSTOSCOPY N/A 12/16/2017    Procedure: CYSTOSCOPY WITH CLOT EVACUATION;  Surgeon: Prosper Ariza MD;  Location: [facility] OR;  Service:    • CYSTOSCOPY, URETEROSCOPY, RETROGRADE PYELOGRAM, STENT INSERTION Left 12/15/2017    Procedure: CYSTOSCOPY LEFT URETEROSCOPY RETROGRADE PYELOGRAM HOLMIUM LASER STENT INSERTION;  Surgeon: Prosper Ariza MD;  Location: [facility];  Service:    • CYSTOSCOPY, URETEROSCOPY, RETROGRADE PYELOGRAM, STENT INSERTION Left 08/10/2022    Procedure: CYSTOSCOPY LEFT RETROGRADE URETEROSCOPY LASER LITHOTRIPSY STENT PLACMENT;  Surgeon: Prosper Ariza MD;  Location: [facility];  Service: Urology;  Laterality: Left;   • DENTAL PROCEDURE      multiple extractions   • EXTRACORPOREAL SHOCK WAVE LITHOTRIPSY (ESWL)     • EXTRACORPOREAL SHOCKWAVE LITHOTRIPSY (ESWL), STENT INSERTION/REMOVAL Left 02/03/2017    Procedure: LEFT EXTRACORPOREAL SHOCKWAVE LITHOTRIPSY, POSSIBLE CYSTOSCOPY, POSSIBLE STENT REMOVAL ;  Surgeon: Prosper Ariza MD;  Location: [facility];  Service:    • EXTRACORPOREAL  SHOCKWAVE LITHOTRIPSY (ESWL), STENT INSERTION/REMOVAL Left 05/04/2018    Procedure: EXTRACORPOREAL SHOCKWAVE LITHOTRIPSY;  Surgeon: Prosper Ariza MD;  Location: [facility];  Service: Urology   • KIDNEY STONE SURGERY     • SCROTAL SURGERY      trauma   • WISDOM TOOTH EXTRACTION      took bottom ones      General Information     Row Name 03/17/23 1042          OT Time and Intention    Document Type therapy note (daily note)  -LW     Mode of Treatment individual therapy;occupational therapy  -LW     Row Name 03/17/23 1042          General Information    Patient Profile Reviewed yes  -LW     Existing Precautions/Restrictions fall  -LW     Row Name 03/17/23 1042          Cognition    Orientation Status (Cognition) oriented x 4  -LW     Row Name 03/17/23 1042          Safety Issues, Functional Mobility    Impairments Affecting Function (Mobility) balance;endurance/activity tolerance  -LW           User Key  (r) = Recorded By, (t) = Taken By, (c) = Cosigned By    Initials Name Provider Type    Solange Gerard COTA Occupational Therapist Assistant                 Mobility/ADL's     Row Name 03/17/23 1043          Bed Mobility    Bed Mobility supine-sit;sit-supine  -LW     Supine-Sit [place] (Bed Mobility) independent  -LW     Sit-Supine [place] (Bed Mobility) independent  -LW     Row Name 03/17/23 1043          Activities of Daily Living    BADL Assessment/Intervention grooming  -LW     Row Name 03/17/23 1043          Grooming Assessment/Training    [place] Level (Grooming) oral care regimen;wash face, hands;set up  -LW           User Key  (r) = Recorded By, (t) = Taken By, (c) = Cosigned By    Initials Name Provider Type    Solange Gerard COTA Occupational Therapist Assistant               Obj/Interventions    No documentation.                Goals/Plan     Row Name 03/17/23 0855          Transfer Goal 1 (OT)    Activity/Assistive Device (Transfer Goal 1, OT) toilet;shower chair  -LW      [place] Level/Cues Needed (Transfer Goal 1, OT) independent  -LW     Time Frame (Transfer Goal 1, OT) long term goal (LTG);by discharge  -LW     Progress/Outcome (Transfer Goal 1, OT) goal met  -LW     Row Name 03/17/23 0855          Bathing Goal 1 (OT)    Activity/Device (Bathing Goal 1, OT) lower body bathing;shower chair  -LW     [place] Level/Cues Needed (Bathing Goal 1, OT) modified independence  -LW     Time Frame (Bathing Goal 1, OT) long term goal (LTG);by discharge  -LW     Progress/Outcomes (Bathing Goal 1, OT) goal not met  -LW     Row Name 03/17/23 0855          Dressing Goal 1 (OT)    Activity/Device (Dressing Goal 1, OT) lower body dressing  -LW     [place]/Cues Needed (Dressing Goal 1, OT) minimum assist (75% or more patient effort)  -LW     Time Frame (Dressing Goal 1, OT) by discharge;long term goal (LTG)  -LW     Progress/Outcome (Dressing Goal 1, OT) goal not met  -LW           User Key  (r) = Recorded By, (t) = Taken By, (c) = Cosigned By    Initials Name Provider Type    Solange Gerard COTA Occupational Therapist Assistant               Clinical Impression     Row Name 03/17/23 1044          Pain Assessment    Pretreatment Pain Rating 0/10 - no pain  -LW     Posttreatment Pain Rating 0/10 - no pain  -     Row Name 03/17/23 1044          Plan of Care Review    Plan of Care Reviewed With patient  -LW     Progress improving  -LW     Outcome Evaluation Patient sidelying. Grooming with setup. Educated patient on Home Safety. All needs in reach. Patient could continue to benefit from OT services.  -     Row Name 03/17/23 1044          Positioning and Restraints    Pre-Treatment Position in bed  -LW     Post Treatment Position bed  -LW     In Bed side lying right;call light within reach;encouraged to call for assist;exit alarm on  -LW           User Key  (r) = Recorded By, (t) = Taken By, (c) = Cosigned By    Initials Name Provider Type    Solange Gerard COTA Occupational  Therapist Assistant               Outcome Measures     Row Name 03/17/23 1046          How much help from another is currently needed...    Putting on and taking off regular lower body clothing? 2  -LW     Bathing (including washing, rinsing, and drying) 3  -LW     Toileting (which includes using toilet bed pan or urinal) 4  -LW     Putting on and taking off regular upper body clothing 4  -LW     Taking care of personal grooming (such as brushing teeth) 4  -LW     Eating meals 4  -LW     AM-PAC 6 Clicks Score (OT) 21  -LW     Row Name 03/17/23 0852          How much help from another person do you currently need...    Turning from your back to your side while in flat bed without using bedrails? 4  -HE     Moving from lying on back to sitting on the side of a flat bed without bedrails? 4  -HE     Moving to and from a bed to a chair (including a wheelchair)? 4  -HE     Standing up from a chair using your arms (e.g., wheelchair, bedside chair)? 4  -HE     Climbing 3-5 steps with a railing? 2  -HE     To walk in hospital room? 3  -HE     AM-PAC 6 Clicks Score (PT) 21  -HE           User Key  (r) = Recorded By, (t) = Taken By, (c) = Cosigned By    Initials Name Provider Type    LW Solange Joseph COTA Occupational Therapist Assistant    Claudette Andrea, RN Registered Nurse                Occupational Therapy Education     Title: PT OT SLP Therapies (Done)     Topic: Occupational Therapy (Done)     Point: ADL training (Done)     Description:   Instruct learner(s) on proper safety adaptation and remediation techniques during self care or transfers.   Instruct in proper use of assistive devices.              Learning Progress Summary           Patient Acceptance, E,TB,H, VU by LW at 3/17/2023 1047    Comment: Educated patient on Home safety    Acceptance, E,TB, VU by LW at 3/16/2023 1523    Acceptance, E,TB, VU by  at 3/15/2023 1551    Comment: OT POC, Role of OT, d/c recommendations, PLB                   Point:  Home exercise program (Done)     Description:   Instruct learner(s) on appropriate technique for monitoring, assisting and/or progressing therapeutic exercises/activities.              Learning Progress Summary           Patient Acceptance, E,TB,H, VU by  at 3/17/2023 1047    Comment: Educated patient on Home safety    Acceptance, E,TB, VU by LW at 3/16/2023 1523                   Point: Precautions (Done)     Description:   Instruct learner(s) on prescribed precautions during self-care and functional transfers.              Learning Progress Summary           Patient Acceptance, E,TB,H, VU by LW at 3/17/2023 1047    Comment: Educated patient on Home safety    Acceptance, E,TB, VU by LW at 3/16/2023 1523    Acceptance, E,TB, VU by  at 3/15/2023 1551    Comment: OT POC, Role of OT, d/c recommendations, PLB                   Point: Body mechanics (Done)     Description:   Instruct learner(s) on proper positioning and spine alignment during self-care, functional mobility activities and/or exercises.              Learning Progress Summary           Patient Acceptance, E,TB,H, VU by LW at 3/17/2023 1047    Comment: Educated patient on Home safety    Acceptance, E,TB, VU by LW at 3/16/2023 1523    Acceptance, E,TB, VU by  at 3/15/2023 1551    Comment: OT POC, Role of OT, d/c recommendations, PLB                               User Key     Initials Effective Dates Name Provider Type Discipline     06/16/21 -  Solange Joseph COTA Occupational Therapist Assistant OT     11/18/22 -  Kiki Kauffman OT Occupational Therapist OT              OT Recommendation and Plan     Plan of Care Review  Plan of Care Reviewed With: patient  Progress: improving  Outcome Evaluation: Patient sidelying. Grooming with setup. Educated patient on Home Safety. All needs in reach. Patient could continue to benefit from OT services.     Time Calculation:    Time Calculation- OT     Row Name 03/17/23 1047             Time Calculation- OT     OT Start Time 0855  -LW      OT Stop Time 0920  -LW      OT Time Calculation (min) 25 min  -LW      Total Timed Code Minutes- OT 25 minute(s)  -LW      OT Received On 03/17/23  -LW         Timed Charges    01647 - OT Self Care/Mgmt Minutes 25  -LW         Total Minutes    Timed Charges Total Minutes 25  -LW       Total Minutes 25  -LW            User Key  (r) = Recorded By, (t) = Taken By, (c) = Cosigned By    Initials Name Provider Type    LW Solange Joseph COTA Occupational Therapist Assistant              Therapy Charges for Today     Code Description Service Date Service Provider Modifiers Qty    88092468486 HC OT SELF CARE/MGMT/TRAIN EA 15 MIN 3/16/2023 Solange Joseph COTA GO 5    88523328690 HC OT SELF CARE/MGMT/TRAIN EA 15 MIN 3/17/2023 Solange Joseph COTA GO 2               MARÍA Case  3/17/2023

## 2023-03-17 NOTE — PROGRESS NOTES
[facility]  INPATIENT PROGRESS NOTE      Length of Stay: 3  Date of Admission: 3/14/2023  Primary Care Physician: Jaxson Zepeda MD    Subjective   Chief Complaint: Shortness of breath  HPI: Patient seen and examined, he notes that he had some difficulties with sleep last night but otherwise is overall feeling better and breathing better.    Review of Systems   All pertinent negatives and positives are as above. All other systems have been reviewed and are negative unless otherwise stated.     Objective    As of today 03/17/23  Temp:  [98 °F (36.7 °C)-98.9 °F (37.2 °C)] 98 °F (36.7 °C)  Heart Rate:  [] 88  Resp:  [18-20] 18  BP: (132-176)/(60-79) 153/74    Physical Exam  Constitutional:       General: He is not in acute distress.     Appearance: He is not toxic-appearing.   HENT:      Head: Normocephalic and atraumatic.      Right Ear: External ear normal.      Left Ear: External ear normal.      Nose: Nose normal.      Mouth/Throat:      Pharynx: Oropharynx is clear.   Eyes:      Conjunctiva/sclera: Conjunctivae normal.   Cardiovascular:      Rate and Rhythm: Normal rate and regular rhythm.      Heart sounds: Normal heart sounds.   Pulmonary:      Comments: Improved aeration bilaterally, no obvious wheeze noted today  Abdominal:      General: Bowel sounds are normal.      Palpations: Abdomen is soft.      Tenderness: There is no abdominal tenderness.   Musculoskeletal:         General: No deformity.   Skin:     General: Skin is warm and dry.      Capillary Refill: Capillary refill takes less than 2 seconds.   Neurological:      General: No focal deficit present.      Mental Status: He is alert and oriented to person, place, and time. Mental status is at baseline.   Psychiatric:         Behavior: Behavior normal.           Results Review:  I have reviewed the labs, radiology results, and diagnostic studies.    Laboratory Data:   Results  from last 7 days   Lab Units 03/17/23  0545 03/16/23  0601 03/15/23  0604 03/14/23  1646   SODIUM mmol/L 140 140 141 143   POTASSIUM mmol/L 3.9 3.9 3.8 4.7   CHLORIDE mmol/L 102 102 105 101   CO2 mmol/L 27.0 28.0 28.0 33.0*   BUN mg/dL 28* 20 16 15   CREATININE mg/dL 1.92* 1.77* 1.55* 1.73*   GLUCOSE mg/dL 243* 218* 183* 114*   CALCIUM mg/dL 8.3* 8.2* 8.1* 8.9   BILIRUBIN mg/dL 0.2  --   --  0.3   ALK PHOS U/L 58  --   --  80   ALT (SGPT) U/L 12  --   --  16   AST (SGOT) U/L 10  --   --  16   ANION GAP mmol/L 11.0 10.0 8.0 9.0     Estimated Creatinine Clearance: 36.1 mL/min (A) (by C-G formula based on SCr of 1.92 mg/dL (H)).  Results from last 7 days   Lab Units 03/14/23  1646   MAGNESIUM mg/dL 2.1         Results from last 7 days   Lab Units 03/17/23  0545 03/14/23  1646   WBC 10*3/mm3 5.82 7.23   HEMOGLOBIN g/dL 10.3* 12.1*   HEMATOCRIT % 33.2* 41.6   PLATELETS 10*3/mm3 250 263     Results from last 7 days   Lab Units 03/14/23  1646   INR  0.95       Culture Data:   Blood Culture   Date Value Ref Range Status   03/14/2023 No growth at 2 days  Preliminary   03/14/2023 No growth at 2 days  Preliminary     Urine Culture   Date Value Ref Range Status   03/14/2023 No growth  Final     No results found for: RESPCX  No results found for: WOUNDCX  No results found for: STOOLCX  No components found for: BODYFLD    Radiology Data:   Imaging Results (Last 24 Hours)     ** No results found for the last 24 hours. **          I have reviewed the patient's current medications.     Assessment/Plan     Principal Problem:    Pneumonia of left lower lobe due to infectious organism  Active Problems:    Acute and chronic respiratory failure with hypercapnia (HCC)    Acute and chronic respiratory failure with hypoxia (HCC)    COPD (chronic obstructive pulmonary disease) (HCC)    Myopathies  Acute respiratory failure with hypoxia and hypercapnia  Respiratory acidosis  Type 2 diabetes mellitus  Deconditioning  Black lung  disease    Plan:  - Continue supplemental oxygen wean as tolerated  - Continue noninvasive ventilation wean as tolerated  - Given patient's hypercapnia initially on admission with improvement with the BiPAP machine currently on AVAPS settings he may benefit from a trilogy machine when he goes home  - Continue bronchodilators, RT to initiate protocol  - IV steroids transition to p.o. steroids today  - Pulmicort nebs have also been ordered  - Pulmonary toilet  - Continue antibiotics with Rocephin and azithromycin  - PT and OT been ordered  - Continue home medications as appropriate  - Given reports of hypoglycemia at home would recommend stopping his home Amaryl at discharge as well  - Discussed with case management  - DVT prophylaxis with Lovenox  - CODE STATUS: Full    Patient has acute on chronic hypercapnic and hypoxic respiratory failure along with COPD.  Upon admission to hospital patient PCO2 was 78.4 after being moved to BiPAP on AVAPS mode PCO2 only dropped to 60.7 improving then home BiPAP/BiPAP ST/BiPAP STA is insufficient due to severity of the disease.  Patient requires volume ventilation AE mode with auto titrating EPAP to reduce hypercapnic episodes.  Ordering noninvasive ventilator to reduce hospital readmissions.    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  Patient has been on multiple rounds of antibiotics and steroids for this condition.  Patient has had a productive cough for greater than 6 months.  Ordering a flow vest to help mobilize secretions and reduce hospital readmissions.    Medical Decision Making  Number and Complexity of problems: 6 high complexity    Conditions and Status:        Condition is improving.     MDM Data  External documents reviewed: Previous records  Tests considered but not ordered: None     Decision rules/scores evaluated (example [id number], Wells, etc): None      Discussed with: Patient, wife, and son at bedside     Treatment Plan  As above    Care Planning  Shared decision making: Patient  Code status and discussions: Full    Disposition  Social Determinants of Health that impact treatment or disposition: None  I expect the patient to be discharged to SNF versus rehab versus home with home health in 3-4 days.       I have utilized all available immediate resources to obtain, update, or review the patient's current medications (including all prescriptions, over-the-counter products, herbals, cannabis/cannabidiol products, and vitamin/mineral/dietary (nutritional) supplements).   I confirmed that the patient's Advance Care Plan is present, code status is documented, or surrogate decision maker is listed in the patient's medical record.    Discharge Planning: In process    Cristian Gupta MD

## 2023-03-18 LAB
ALBUMIN SERPL-MCNC: 3.7 G/DL (ref 3.5–5.2)
ALBUMIN/GLOB SERPL: 1.3 G/DL
ALP SERPL-CCNC: 59 U/L (ref 39–117)
ALT SERPL W P-5'-P-CCNC: 23 U/L (ref 1–41)
ANION GAP SERPL CALCULATED.3IONS-SCNC: 11 MMOL/L (ref 5–15)
AST SERPL-CCNC: 19 U/L (ref 1–40)
BASOPHILS # BLD AUTO: 0.02 10*3/MM3 (ref 0–0.2)
BASOPHILS NFR BLD AUTO: 0.3 % (ref 0–1.5)
BILIRUB SERPL-MCNC: 0.2 MG/DL (ref 0–1.2)
BUN SERPL-MCNC: 29 MG/DL (ref 8–23)
BUN/CREAT SERPL: 16.9 (ref 7–25)
CALCIUM SPEC-SCNC: 8.4 MG/DL (ref 8.6–10.5)
CHLORIDE SERPL-SCNC: 101 MMOL/L (ref 98–107)
CO2 SERPL-SCNC: 27 MMOL/L (ref 22–29)
CREAT SERPL-MCNC: 1.72 MG/DL (ref 0.76–1.27)
CREAT UR-MCNC: 70.7 MG/DL
DEPRECATED RDW RBC AUTO: 44.4 FL (ref 37–54)
EGFRCR SERPLBLD CKD-EPI 2021: 40.9 ML/MIN/1.73
EOSINOPHIL # BLD AUTO: 0.01 10*3/MM3 (ref 0–0.4)
EOSINOPHIL NFR BLD AUTO: 0.1 % (ref 0.3–6.2)
ERYTHROCYTE [DISTWIDTH] IN BLOOD BY AUTOMATED COUNT: 13.4 % (ref 12.3–15.4)
GLOBULIN UR ELPH-MCNC: 2.8 GM/DL
GLUCOSE BLDC GLUCOMTR-MCNC: 169 MG/DL (ref 70–130)
GLUCOSE BLDC GLUCOMTR-MCNC: 195 MG/DL (ref 70–130)
GLUCOSE BLDC GLUCOMTR-MCNC: 257 MG/DL (ref 70–130)
GLUCOSE BLDC GLUCOMTR-MCNC: 290 MG/DL (ref 70–130)
GLUCOSE SERPL-MCNC: 223 MG/DL (ref 65–99)
HANSEL STAIN: NEGATIVE
HBA1C MFR BLD: 6.2 % (ref 4.8–5.6)
HCT VFR BLD AUTO: 34.8 % (ref 37.5–51)
HGB BLD-MCNC: 10.9 G/DL (ref 13–17.7)
IMM GRANULOCYTES # BLD AUTO: 0.07 10*3/MM3 (ref 0–0.05)
IMM GRANULOCYTES NFR BLD AUTO: 1 % (ref 0–0.5)
LYMPHOCYTES # BLD AUTO: 1.67 10*3/MM3 (ref 0.7–3.1)
LYMPHOCYTES NFR BLD AUTO: 23.5 % (ref 19.6–45.3)
MCH RBC QN AUTO: 28.4 PG (ref 26.6–33)
MCHC RBC AUTO-ENTMCNC: 31.3 G/DL (ref 31.5–35.7)
MCV RBC AUTO: 90.6 FL (ref 79–97)
MONOCYTES # BLD AUTO: 0.53 10*3/MM3 (ref 0.1–0.9)
MONOCYTES NFR BLD AUTO: 7.4 % (ref 5–12)
NEUTROPHILS NFR BLD AUTO: 4.82 10*3/MM3 (ref 1.7–7)
NEUTROPHILS NFR BLD AUTO: 67.7 % (ref 42.7–76)
NRBC BLD AUTO-RTO: 0 /100 WBC (ref 0–0.2)
PLATELET # BLD AUTO: 245 10*3/MM3 (ref 140–450)
PMV BLD AUTO: 8.9 FL (ref 6–12)
POTASSIUM SERPL-SCNC: 3.5 MMOL/L (ref 3.5–5.2)
PROT SERPL-MCNC: 6.5 G/DL (ref 6–8.5)
RBC # BLD AUTO: 3.84 10*6/MM3 (ref 4.14–5.8)
SODIUM SERPL-SCNC: 139 MMOL/L (ref 136–145)
WBC NRBC COR # BLD: 7.12 10*3/MM3 (ref 3.4–10.8)

## 2023-03-18 PROCEDURE — 80053 COMPREHEN METABOLIC PANEL: CPT | Performed by: FAMILY MEDICINE

## 2023-03-18 PROCEDURE — 83036 HEMOGLOBIN GLYCOSYLATED A1C: CPT | Performed by: FAMILY MEDICINE

## 2023-03-18 PROCEDURE — 94799 UNLISTED PULMONARY SVC/PX: CPT

## 2023-03-18 PROCEDURE — 63710000001 PREDNISONE PER 1 MG: Performed by: FAMILY MEDICINE

## 2023-03-18 PROCEDURE — 63710000001 PREDNISONE PER 5 MG: Performed by: FAMILY MEDICINE

## 2023-03-18 PROCEDURE — 85025 COMPLETE CBC W/AUTO DIFF WBC: CPT | Performed by: FAMILY MEDICINE

## 2023-03-18 PROCEDURE — 97116 GAIT TRAINING THERAPY: CPT

## 2023-03-18 PROCEDURE — 94660 CPAP INITIATION&MGMT: CPT

## 2023-03-18 PROCEDURE — 94760 N-INVAS EAR/PLS OXIMETRY 1: CPT

## 2023-03-18 PROCEDURE — 25010000002 CEFTRIAXONE PER 250 MG: Performed by: PHYSICIAN ASSISTANT

## 2023-03-18 PROCEDURE — 63710000001 INSULIN ASPART PER 5 UNITS: Performed by: PHYSICIAN ASSISTANT

## 2023-03-18 PROCEDURE — 82962 GLUCOSE BLOOD TEST: CPT

## 2023-03-18 PROCEDURE — 97110 THERAPEUTIC EXERCISES: CPT

## 2023-03-18 PROCEDURE — 94664 DEMO&/EVAL PT USE INHALER: CPT

## 2023-03-18 PROCEDURE — 97535 SELF CARE MNGMENT TRAINING: CPT

## 2023-03-18 PROCEDURE — 63710000001 INSULIN DETEMIR PER 5 UNITS: Performed by: FAMILY MEDICINE

## 2023-03-18 PROCEDURE — 25010000002 ENOXAPARIN PER 10 MG: Performed by: PHYSICIAN ASSISTANT

## 2023-03-18 RX ADMIN — ASPIRIN 81 MG: 81 TABLET, CHEWABLE ORAL at 08:23

## 2023-03-18 RX ADMIN — NEBIVOLOL HYDROCHLORIDE 10 MG: 5 TABLET ORAL at 08:24

## 2023-03-18 RX ADMIN — ENOXAPARIN SODIUM 40 MG: 40 INJECTION SUBCUTANEOUS at 21:19

## 2023-03-18 RX ADMIN — Medication 10 ML: at 08:25

## 2023-03-18 RX ADMIN — TAMSULOSIN HYDROCHLORIDE 0.4 MG: 0.4 CAPSULE ORAL at 22:03

## 2023-03-18 RX ADMIN — PREDNISONE 50 MG: 20 TABLET ORAL at 08:23

## 2023-03-18 RX ADMIN — INSULIN ASPART 2 UNITS: 100 INJECTION, SOLUTION INTRAVENOUS; SUBCUTANEOUS at 08:24

## 2023-03-18 RX ADMIN — CEFTRIAXONE SODIUM 1 G: 1 INJECTION, POWDER, FOR SOLUTION INTRAMUSCULAR; INTRAVENOUS at 17:25

## 2023-03-18 RX ADMIN — INSULIN ASPART 2 UNITS: 100 INJECTION, SOLUTION INTRAVENOUS; SUBCUTANEOUS at 17:25

## 2023-03-18 RX ADMIN — IPRATROPIUM BROMIDE AND ALBUTEROL SULFATE 3 ML: 2.5; .5 SOLUTION RESPIRATORY (INHALATION) at 19:16

## 2023-03-18 RX ADMIN — PANTOPRAZOLE SODIUM 40 MG: 40 TABLET, DELAYED RELEASE ORAL at 06:11

## 2023-03-18 RX ADMIN — INSULIN DETEMIR 5 UNITS: 100 INJECTION, SOLUTION SUBCUTANEOUS at 08:24

## 2023-03-18 RX ADMIN — IPRATROPIUM BROMIDE AND ALBUTEROL SULFATE 3 ML: 2.5; .5 SOLUTION RESPIRATORY (INHALATION) at 07:55

## 2023-03-18 RX ADMIN — BUDESONIDE 0.5 MG: 0.5 SUSPENSION RESPIRATORY (INHALATION) at 19:16

## 2023-03-18 RX ADMIN — INSULIN ASPART 6 UNITS: 100 INJECTION, SOLUTION INTRAVENOUS; SUBCUTANEOUS at 21:19

## 2023-03-18 RX ADMIN — INSULIN ASPART 6 UNITS: 100 INJECTION, SOLUTION INTRAVENOUS; SUBCUTANEOUS at 11:32

## 2023-03-18 RX ADMIN — BUDESONIDE 0.5 MG: 0.5 SUSPENSION RESPIRATORY (INHALATION) at 07:55

## 2023-03-18 RX ADMIN — AZITHROMYCIN MONOHYDRATE 500 MG: 250 TABLET ORAL at 21:19

## 2023-03-18 RX ADMIN — ATORVASTATIN CALCIUM 20 MG: 20 TABLET, FILM COATED ORAL at 21:19

## 2023-03-18 NOTE — PLAN OF CARE
Problem: Adult Inpatient Plan of Care  Goal: Plan of Care Review  Outcome: Ongoing, Progressing  Flowsheets (Taken 3/18/2023 1627)  Progress: improving  Outcome Evaluation: Pt has worked well today on therapy goals. maintains O2 Sat on O2. VSS. Rec'd SS insulin in addition to basal dose. No PRN pain med required  Goal: Patient-Specific Goal (Individualized)  Outcome: Ongoing, Progressing  Goal: Absence of Hospital-Acquired Illness or Injury  Outcome: Ongoing, Progressing  Intervention: Identify and Manage Fall Risk  Recent Flowsheet Documentation  Taken 3/18/2023 1400 by Phyllis Fajardo RN  Safety Promotion/Fall Prevention: safety round/check completed  Taken 3/18/2023 1200 by Phyllis Fajardo RN  Safety Promotion/Fall Prevention: safety round/check completed  Taken 3/18/2023 0921 by Phyllis Fajardo RN  Safety Promotion/Fall Prevention:   safety round/check completed   activity supervised   nonskid shoes/slippers when out of bed  Taken 3/18/2023 0800 by Phyllis Fajardo RN  Safety Promotion/Fall Prevention:   safety round/check completed   nonskid shoes/slippers when out of bed   assistive device/personal items within reach   activity supervised  Intervention: Prevent Skin Injury  Recent Flowsheet Documentation  Taken 3/18/2023 1400 by Phyllis Fajardo RN  Body Position:   position changed independently   weight shifting  Taken 3/18/2023 1200 by Phyllis Fajardo RN  Body Position: position changed independently  Taken 3/18/2023 0800 by Phyllis Fajardo RN  Body Position: position changed independently  Intervention: Prevent and Manage VTE (Venous Thromboembolism) Risk  Recent Flowsheet Documentation  Taken 3/18/2023 1400 by Phyllis Fajardo RN  Activity Management:   activity encouraged   up in chair  Taken 3/18/2023 1200 by Phyllis Fajardo RN  Activity Management: activity adjusted per tolerance  Taken 3/18/2023 0921 by Phyllis Fajardo RN  Activity Management: activity adjusted per tolerance  Taken 3/18/2023  0800 by Scotty, Phyllis, RN  Activity Management: activity adjusted per tolerance  Intervention: Prevent Infection  Recent Flowsheet Documentation  Taken 3/18/2023 0921 by Phyllis Fajardo RN  Infection Prevention:   personal protective equipment utilized   hand hygiene promoted  Taken 3/18/2023 0800 by Phyllis Fajardo RN  Infection Prevention:   hand hygiene promoted   personal protective equipment utilized  Goal: Optimal Comfort and Wellbeing  Outcome: Ongoing, Progressing  Goal: Readiness for Transition of Care  Outcome: Ongoing, Progressing     Problem: Fall Injury Risk  Goal: Absence of Fall and Fall-Related Injury  Outcome: Ongoing, Progressing  Intervention: Identify and Manage Contributors  Recent Flowsheet Documentation  Taken 3/18/2023 0800 by Phyllis Fajardo RN  Medication Review/Management: medications reviewed  Self-Care Promotion: independence encouraged  Intervention: Promote Injury-Free Environment  Recent Flowsheet Documentation  Taken 3/18/2023 1400 by Phyllis Fajardo RN  Safety Promotion/Fall Prevention: safety round/check completed  Taken 3/18/2023 1200 by Phyllis Fajardo RN  Safety Promotion/Fall Prevention: safety round/check completed  Taken 3/18/2023 0921 by Phyllis Fajardo RN  Safety Promotion/Fall Prevention:   safety round/check completed   activity supervised   nonskid shoes/slippers when out of bed  Taken 3/18/2023 0800 by Phyllis Fajardo RN  Safety Promotion/Fall Prevention:   safety round/check completed   nonskid shoes/slippers when out of bed   assistive device/personal items within reach   activity supervised     Problem: Skin Injury Risk Increased  Goal: Skin Health and Integrity  Outcome: Ongoing, Progressing  Intervention: Optimize Skin Protection  Recent Flowsheet Documentation  Taken 3/18/2023 0800 by Phyllis Fajardo RN  Head of Bed (HOB) Positioning: HOB elevated     Problem: Fluid Imbalance (Pneumonia)  Goal: Fluid Balance  Outcome: Ongoing, Progressing  Intervention:  Monitor and Manage Fluid Balance  Recent Flowsheet Documentation  Taken 3/18/2023 0800 by Phyllis Fajardo, RN  Fluid/Electrolyte Management: oral rehydration therapy initiated     Problem: Infection (Pneumonia)  Goal: Resolution of Infection Signs and Symptoms  Outcome: Ongoing, Progressing     Problem: Respiratory Compromise (Pneumonia)  Goal: Effective Oxygenation and Ventilation  Outcome: Ongoing, Progressing  Intervention: Optimize Oxygenation and Ventilation  Recent Flowsheet Documentation  Taken 3/18/2023 0800 by Phyllis Fajardo, RN  Head of Bed (HOB) Positioning: HOB elevated   Goal Outcome Evaluation:           Progress: improving  Outcome Evaluation: Pt has worked well today on therapy goals. maintains O2 Sat on O2. VSS. Rec'd SS insulin in addition to basal dose. No PRN pain med required

## 2023-03-18 NOTE — PLAN OF CARE
Goal Outcome Evaluation:  Plan of Care Reviewed With: patient     Problem: Adult Inpatient Plan of Care  Goal: Plan of Care Review  Outcome: Ongoing, Progressing  Flowsheets (Taken 3/18/2023 4811)  Progress: improving  Plan of Care Reviewed With: patient  Outcome Evaluation: pt responded well to PT w/ increased gait to 240 ft SBA with RW. pt requires CGA for stair training. pt participated in LE ther ex with good effort. no new goals met at this time. pt would continue to benefit from PT services.

## 2023-03-18 NOTE — PROGRESS NOTES
Item 0 Points 1 Point 2 Points 3 Points 4 Points Subtotal   Mental Status Alert, oriented, cooperative Lethargic, follows commands Confused, not following commands Obtunded or Somnolent Comatose 0   Respiratory Pattern Regular RR 8-16 breaths/minute Increased RR 18-25 breaths/minute Dyspnea on exertion, irregular RR 26-30 breaths/minute Shortness of breath,  RR 31-35 breaths/minute Accessory muscle use, severe SOB  RR > 35 breaths/minute 0   Breath Sounds Clear Decreased unilaterally Decreased bilaterally Basilar crackles Wheezing and/or rhonchi 1   Cough Strong, spontaneous, non-productive Strong productive Weak, non-productive Weak, productive or weak with rhonchi Absent or may require suctioning 0   Pulmonary Status Nonsmoker, no previous history, >1 year quit < 1 PPD  <1 year quit > or = 1 PPD Diagnosed pulmonary disease (severe or chronic) Severe or chronic pulmonary disease with exacerbation 3   Surgical Status None General surgery (non-abdominal or non-thoracic) Lower abdominal Thoracic or upper abdominal Thoracic with pulmonary disease 0   Chest X-ray Clear Chronic changes Infiltrates, atelectasis or pleural effusion Infiltrates in > 1 lobe Diffuse infiltrates and atelectasis and/or effusions 2   Activity   Level Ambulatory Ambulatory with assistance Non-ambulatory Paraplegic Quadriplegic 0        Total Score   6   Score    Drug Therapy Frequency 20 or >    Q4 Duoneb with Q2 Albuterol PRN 15-19    Q6 Duoneb with Q4 Albuterol PRN 10-14    QID Duoneb with Q4 Albuterol PRN 5-9    TID Duoneb with Q6 Albuterol PRN 0-4    Q4 PRN Duoneb                  Lung Expansion Therapy (PEP) Bronchopulmonary Hygiene (CPT)   Q4 & PRN - Severe atelectasis, poor oxygenation Q4 - Copious secretions, dyspnea, unable to sleep   QID - High risk for persistent atelectasis, existence of atelectasis QID & Q4 PRN - Moderate secretion production   TID - At risk for developing atelectasis TID - Small amounts of secretions with poor cough    BID - Prevention of atelectasis BID - Unable to breathe deeply and cough spontaneously     RT Comments / Recommendations:      No changes made at this time

## 2023-03-18 NOTE — PLAN OF CARE
Goal Outcome Evaluation:  Plan of Care Reviewed With: patient        Progress: improving  Outcome Evaluation: erlin villagran'triny OT pt agreeableot sponge bath w/ max A for ub and arriaga for lb. amb in room w/ supervision no AD, toilet transfer supervision/I, pt resting in bed needs in reach  cont poc

## 2023-03-18 NOTE — PLAN OF CARE
Goal Outcome Evaluation:  Plan of Care Reviewed With: patient        Progress: no change  Outcome Evaluation: VSS. Wearing bipap while sleeping. Resting well.

## 2023-03-18 NOTE — PROGRESS NOTES
[facility] Medicine Services  INPATIENT PROGRESS NOTE      Length of Stay: 4  Date of Admission: 3/14/2023  Primary Care Physician: Jaxson Zepeda MD    Subjective   Chief Complaint: Shortness of breath  HPI: Doing well, breathing better. On home O2.  No concerns. Would like to go home soon.    Review of Systems   All pertinent negatives and positives are as above. All other systems have been reviewed and are negative unless otherwise stated.     Objective    As of today 03/18/23  Temp:  [97.2 °F (36.2 °C)-98.4 °F (36.9 °C)] 97.5 °F (36.4 °C)  Heart Rate:  [] 92  Resp:  [16-20] 18  BP: (140-161)/(64-75) 140/69    Physical Exam  Constitutional:       General: He is not in acute distress.     Appearance: He is not toxic-appearing.   HENT:      Head: Normocephalic and atraumatic.      Right Ear: External ear normal.      Left Ear: External ear normal.      Nose: Nose normal.      Mouth/Throat:      Pharynx: Oropharynx is clear.   Eyes:      Conjunctiva/sclera: Conjunctivae normal.   Cardiovascular:      Rate and Rhythm: Normal rate and regular rhythm.      Heart sounds: Normal heart sounds.   Pulmonary:      Comments: Improved aeration bilaterally, no obvious wheeze noted today  Abdominal:      General: Bowel sounds are normal.      Palpations: Abdomen is soft.      Tenderness: There is no abdominal tenderness.   Musculoskeletal:         General: No deformity.   Skin:     General: Skin is warm and dry.      Capillary Refill: Capillary refill takes less than 2 seconds.   Neurological:      General: No focal deficit present.      Mental Status: He is alert and oriented to person, place, and time. Mental status is at baseline.   Psychiatric:         Behavior: Behavior normal.           Results Review:  I have reviewed the labs, radiology results, and diagnostic studies.    Laboratory Data:   Results from last 7 days   Lab Units 03/18/23  0818 03/17/23  0572  03/16/23  0601 03/15/23  0604 03/14/23  1646   SODIUM mmol/L 139 140 140   < > 143   POTASSIUM mmol/L 3.5 3.9 3.9   < > 4.7   CHLORIDE mmol/L 101 102 102   < > 101   CO2 mmol/L 27.0 27.0 28.0   < > 33.0*   BUN mg/dL 29* 28* 20   < > 15   CREATININE mg/dL 1.72* 1.92* 1.77*   < > 1.73*   GLUCOSE mg/dL 223* 243* 218*   < > 114*   CALCIUM mg/dL 8.4* 8.3* 8.2*   < > 8.9   BILIRUBIN mg/dL 0.2 0.2  --   --  0.3   ALK PHOS U/L 59 58  --   --  80   ALT (SGPT) U/L 23 12  --   --  16   AST (SGOT) U/L 19 10  --   --  16   ANION GAP mmol/L 11.0 11.0 10.0   < > 9.0    < > = values in this interval not displayed.     Estimated Creatinine Clearance: 41 mL/min (A) (by C-G formula based on SCr of 1.72 mg/dL (H)).  Results from last 7 days   Lab Units 03/14/23  1646   MAGNESIUM mg/dL 2.1         Results from last 7 days   Lab Units 03/18/23  0818 03/17/23  0545 03/14/23  1646   WBC 10*3/mm3 7.12 5.82 7.23   HEMOGLOBIN g/dL 10.9* 10.3* 12.1*   HEMATOCRIT % 34.8* 33.2* 41.6   PLATELETS 10*3/mm3 245 250 263     Results from last 7 days   Lab Units 03/14/23  1646   INR  0.95       Culture Data:   No results found for: BLOODCX  No results found for: URINECX  No results found for: RESPCX  No results found for: WOUNDCX  No results found for: STOOLCX  No components found for: BODYFLD    Radiology Data:   Imaging Results (Last 24 Hours)     ** No results found for the last 24 hours. **          I have reviewed the patient's current medications.     Assessment/Plan     Principal Problem:    Pneumonia of left lower lobe due to infectious organism  Active Problems:    Acute and chronic respiratory failure with hypercapnia (HCC)    Acute and chronic respiratory failure with hypoxia (HCC)    COPD (chronic obstructive pulmonary disease) (HCC)    Myopathies  Acute respiratory failure with hypoxia and hypercapnia  Respiratory acidosis  Type 2 diabetes mellitus  Deconditioning  Black lung disease    Plan:  - Continue supplemental oxygen wean as  tolerated  - Continue noninvasive ventilation wean as tolerated  - Given patient's hypercapnia initially on admission with improvement with the BiPAP machine currently on AVAPS settings he may benefit from a trilogy machine when he goes home  - Discussed with patient that we would likely not have trilogy machine until next week and this can be delivered to his home instead of the hospital. He is okay with this option.   - Continue bronchodilators, RT to initiate protocol  - IV steroids transition to p.o. steroids today  - Pulmicort nebs have also been ordered  - Pulmonary toilet  - Continue antibiotics with Rocephin and azithromycin  - PT and OT been ordered  - Continue home medications as appropriate  - Given reports of hypoglycemia at home would recommend stopping his home Amaryl at discharge as well  - Will observe one more day and plan for likely d/c tomorrow with home health.   - DVT prophylaxis with Lovenox  - CODE STATUS: Full    Patient has acute on chronic hypercapnic and hypoxic respiratory failure along with COPD.  Upon admission to hospital patient PCO2 was 78.4 after being moved to BiPAP on AVAPS mode PCO2 only dropped to 60.7 improving then home BiPAP/BiPAP ST/BiPAP STA is insufficient due to severity of the disease.  Patient requires volume ventilation AE mode with auto titrating EPAP to reduce hypercapnic episodes.  Ordering noninvasive ventilator to reduce hospital readmissions.    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  Patient has been on multiple rounds of antibiotics and steroids for this condition.  Patient has had a productive cough for greater than 6 months.  Ordering a flow vest to help mobilize secretions and reduce hospital readmissions.    Medical Decision Making  Number and Complexity of problems: 6 high complexity    Conditions and Status:        Condition is improving.     MDM  Data  External documents reviewed: Previous records  Tests considered but not ordered: None     Decision rules/scores evaluated (example [id number], Wells, etc): None     Discussed with: Patient, wife, and son at bedside     Treatment Plan  As above    Care Planning  Shared decision making: Patient  Code status and discussions: Full    Disposition  Social Determinants of Health that impact treatment or disposition: None  I expect the patient to be discharged to SNF versus rehab versus home with home health in 3-4 days.       I have utilized all available immediate resources to obtain, update, or review the patient's current medications (including all prescriptions, over-the-counter products, herbals, cannabis/cannabidiol products, and vitamin/mineral/dietary (nutritional) supplements).   I confirmed that the patient's Advance Care Plan is present, code status is documented, or surrogate decision maker is listed in the patient's medical record.    Discharge Planning: In process    Cristian Gupta MD

## 2023-03-18 NOTE — THERAPY TREATMENT NOTE
Acute Care - Physical Therapy Treatment Note  [facility]     Patient Name: Rolando Novoa  : 1945  MRN: [id number]  Today's Date: 3/18/2023      Visit Dx:     ICD-10-CM ICD-9-CM   1. Pneumonia of left lower lobe due to infectious organism  J18.9 486   2. Metabolic encephalopathy  G93.41 348.31   3. Acute respiratory failure with hypoxia and hypercapnia (HCC)  J96.01 518.81    J96.02    4. Impaired mobility and ADLs  Z74.09 V49.89    Z78.9    5. Impaired functional mobility, balance, gait, and endurance  Z74.09 V49.89     Patient Active Problem List   Diagnosis   • Hyperlipemia   • SOB (shortness of breath)   • Hematuria syndrome   • Psoriasis   • Hypertension   • High cholesterol   • Enlarged prostate   • Diabetes mellitus (HCC)   • Acid reflux   • Calculus of kidney   • Kidney stones   • Chronic respiratory failure with hypoxia (HCC)   • Class 2 obesity due to excess calories without serious comorbidity with body mass index (BMI) of 37.0 to 37.9 in adult   • Black lung (HCC)   • Chronic restrictive lung disease   • Hx of colonic polyps   • Encounter for screening for malignant neoplasm of colon   • Thrombocytosis   • Anemia   • Leucocytosis   • Folate deficiency   • Acute right-sided thoracic back pain   • Chest wall pain   • CKD (chronic kidney disease) stage 2, GFR 60-89 ml/min   • Diabetes type 2, uncontrolled   • Diabetic neuropathy (HCC)   • Nocturnal hypoxemia   • Supplemental oxygen dependent   • Change in bowel habits   • Diarrhea   • Pneumonia of left lower lobe due to infectious organism   • Acute and chronic respiratory failure with hypercapnia (HCC)   • Acute and chronic respiratory failure with hypoxia (HCC)   • COPD (chronic obstructive pulmonary disease) (HCC)   • Myopathies     Past Medical History:   Diagnosis Date   • Acid reflux    • Black lung disease (HCC)    • Cataract    • Diabetes (HCC)    • Enlarged prostate    • High cholesterol    • Hypertension    • Kidney stone      multiple   • Obesity    • Psoriasis      Past Surgical History:   Procedure Laterality Date   • AMPUTATION Left     lower arm and hand   • CATARACT EXTRACTION W/ INTRAOCULAR LENS IMPLANT Left 01/22/2021    Procedure: REMIOVE CATARACT AND IMPLANT  INTRAOCULAR LENS;  Surgeon: Sam Isaacs MD;  Location: [facility] OR;  Service: Ophthalmology;  Laterality: Left;   • CATARACT EXTRACTION W/ INTRAOCULAR LENS IMPLANT Right 01/29/2021    Procedure: REMOVE CATARACT AND IMPLANT INTRAOCULAR LENS;  Surgeon: Sam Isaacs MD;  Location: [facility] OR;  Service: Ophthalmology;  Laterality: Right;   • CHOLECYSTECTOMY OPEN     • COLONOSCOPY N/A 11/09/2018    Procedure: COLONOSCOPY;  Surgeon: Sukhdeep Mae MD;  Location: [facility] ENDOSCOPY;  Service: Gastroenterology   • COLONOSCOPY N/A 03/28/2022    Procedure: COLONOSCOPY;  Surgeon: Sukhdeep Mae MD;  Location: [facility] ENDOSCOPY;  Service: Gastroenterology;  Laterality: N/A;   • CYSTOSCOPY N/A 12/16/2017    Procedure: CYSTOSCOPY WITH CLOT EVACUATION;  Surgeon: Prosper Ariza MD;  Location: [facility];  Service:    • CYSTOSCOPY, URETEROSCOPY, RETROGRADE PYELOGRAM, STENT INSERTION Left 12/15/2017    Procedure: CYSTOSCOPY LEFT URETEROSCOPY RETROGRADE PYELOGRAM HOLMIUM LASER STENT INSERTION;  Surgeon: Prosper Ariza MD;  Location: [facility];  Service:    • CYSTOSCOPY, URETEROSCOPY, RETROGRADE PYELOGRAM, STENT INSERTION Left 08/10/2022    Procedure: CYSTOSCOPY LEFT RETROGRADE URETEROSCOPY LASER LITHOTRIPSY STENT PLACMENT;  Surgeon: Prosper Ariza MD;  Location: [facility];  Service: Urology;  Laterality: Left;   • DENTAL PROCEDURE      multiple extractions   • EXTRACORPOREAL SHOCK WAVE LITHOTRIPSY (ESWL)     • EXTRACORPOREAL SHOCKWAVE LITHOTRIPSY (ESWL), STENT INSERTION/REMOVAL Left 02/03/2017    Procedure: LEFT EXTRACORPOREAL SHOCKWAVE LITHOTRIPSY, POSSIBLE CYSTOSCOPY, POSSIBLE STENT REMOVAL ;  Surgeon: Prosper Ariza MD;  Location: [facility];  Service:    •  EXTRACORPOREAL SHOCKWAVE LITHOTRIPSY (ESWL), STENT INSERTION/REMOVAL Left 05/04/2018    Procedure: EXTRACORPOREAL SHOCKWAVE LITHOTRIPSY;  Surgeon: Prosper Ariza MD;  Location: [facility];  Service: Urology   • KIDNEY STONE SURGERY     • SCROTAL SURGERY      trauma   • WISDOM TOOTH EXTRACTION      took bottom ones     PT Assessment (last 12 hours)     PT Evaluation and Treatment     Row Name 03/18/23 1454          Physical Therapy Time and Intention    Document Type therapy note (daily note)  -     Mode of Treatment physical therapy;individual therapy  -     Patient Effort good  -CRYSTAL     Comment --  -CRYSTAL     Row Name 03/18/23 1454          General Information    Patient Profile Reviewed yes  -     Existing Precautions/Restrictions fall  -     Row Name 03/18/23 1454          Pain    Pretreatment Pain Rating 0/10 - no pain  -     Posttreatment Pain Rating 0/10 - no pain  -     Row Name 03/18/23 1454          Cognition    Affect/Mental Status (Cognition) WFL  -     Orientation Status (Cognition) oriented x 4  -     Follows Commands (Cognition) WFL  -     Personal Safety Interventions fall prevention program maintained;gait belt;muscle strengthening facilitated;nonskid shoes/slippers when out of bed;supervised activity  -     Row Name 03/18/23 1454          Bed Mobility    Bed Mobility supine-sit;sit-supine  -     Supine-Sit Bowman (Bed Mobility) independent  -     Sit-Supine Bowman (Bed Mobility) independent  -     Assistive Device (Bed Mobility) bed rails;head of bed elevated  -     Row Name 03/18/23 1454          Transfers    Transfers sit-stand transfer;stand-sit transfer  -     Row Name 03/18/23 1454          Sit-Stand Transfer    Sit-Stand Bowman (Transfers) standby assist  -     Assistive Device (Sit-Stand Transfers) walker, front-wheeled  -     Row Name 03/18/23 1454          Stand-Sit Transfer    Stand-Sit Bowman (Transfers) standby assist  -      Assistive Device (Stand-Sit Transfers) walker, front-wheeled  -CRYSTAL     Row Name 03/18/23 1454          Gait/Stairs (Locomotion)    Gait/Stairs Locomotion gait/ambulation independence  -CRYSTAL     Davie Level (Gait) standby assist  -CRYSTAL     Assistive Device (Gait) other (see comments)  No AD used  -CRYSTAL     Distance in Feet (Gait) 240  -CRYSTAL     Pattern (Gait) step-through  -CRYSTAL     Deviations/Abnormal Patterns (Gait) dung decreased;festinating/shuffling;gait speed decreased  -CRYSTAL     Negotiation (Stairs) stairs independence;stairs assistive device;handrail location;number of steps;ascending technique;descending technique  -CRYSTAL     Davie Level (Stairs) contact guard  -CRYSTAL     Assistive Device (Stairs) walker, front-wheeled  -CRYSTAL     Handrail Location (Stairs) right side (ascending);right side (descending)  -CRYSTAL     Number of Steps (Stairs) 2 x2  -CRYSTAL     Ascending Technique (Stairs) step-to-step  -CRYSTAL     Descending Technique (Stairs) step-to-step  -CRYSTAL     Row Name 03/18/23 1454          Motor Skills    Therapeutic Exercise --  HS, hip Ab/Ad, SLR- 15x1 christopher AROM  -CRYSTAL     Row Name             Wound 03/14/23 1953 sacral spine    Wound - Properties Group Placement Date: 03/14/23  -GZ Placement Time: 1953 -GZ Present on Hospital Admission: Y  -GZ Location: sacral spine  -GZ    Retired Wound - Properties Group Placement Date: 03/14/23  -GZ Placement Time: 1953 -GZ Present on Hospital Admission: Y  -GZ Location: sacral spine  -GZ    Retired Wound - Properties Group Date first assessed: 03/14/23  -GZ Time first assessed: 1953 -GZ Present on Hospital Admission: Y  -GZ Location: sacral spine  -GZ    Row Name             Wound 03/14/23 1954 Right popliteal    Wound - Properties Group Placement Date: 03/14/23  -GZ Placement Time: 1954 -GZ Present on Hospital Admission: Y  -GZ Side: Right  -GZ Location: popliteal  -GZ    Retired Wound - Properties Group Placement Date: 03/14/23  -GZ Placement Time: 1954 -GZ Present on  Hospital Admission: Y  -GZ Side: Right  -GZ Location: popliteal  -GZ    Retired Wound - Properties Group Date first assessed: 03/14/23  -GZ Time first assessed: 1954 -GZ Present on Hospital Admission: Y  -GZ Side: Right  -GZ Location: popliteal  -GZ    Row Name 03/18/23 1454          Vital Signs    Pre Systolic BP Rehab 152  -CRYSTAL     Pre Treatment Diastolic BP 83  -CRYSTAL     Post Systolic BP Rehab 153  -CRYSTAL     Post Treatment Diastolic BP 78  -CRYSTAL     Pretreatment Heart Rate (beats/min) 74  -CRYSTAL     Intratreatment Heart Rate (beats/min) 87  -CRYSTAL     Posttreatment Heart Rate (beats/min) 79  -CRYSTAL     Pre SpO2 (%) 95  -CRYSTAL     O2 Delivery Pre Treatment nasal cannula  -CRYSTAL     Intra SpO2 (%) 95  -CRYSTAL     O2 Delivery Intra Treatment nasal cannula  -CRYSTAL     Post SpO2 (%) 94  -CRYSTAL     O2 Delivery Post Treatment nasal cannula  -CRYSTAL     Pre Patient Position Supine  -CRYSTAL     Post Patient Position Supine  -     Row Name 03/18/23 1454          Positioning and Restraints    Pre-Treatment Position in bed  -CRYSTAL     Post Treatment Position bed  -CRYSTAL     In Bed fowlers;call light within reach;encouraged to call for assist;exit alarm on  all needs met  -     Row Name 03/18/23 1454          Therapy Assessment/Plan (PT)    Rehab Potential (PT) good, to achieve stated therapy goals  -     Row Name 03/18/23 1454          Transfer Goal 1 (PT)    Activity/Assistive Device (Transfer Goal 1, PT) bed-to-chair/chair-to-bed;toilet  -     [place] Level/Cues Needed (Transfer Goal 1, PT) independent  -CRYSTAL     Time Frame (Transfer Goal 1, PT) by discharge  -CRYSTAL     Progress/Outcome (Transfer Goal 1, PT) goal ongoing  -     Row Name 03/18/23 1454          Gait Training Goal 1 (PT)    Activity/Assistive Device (Gait Training Goal 1, PT) gait (walking locomotion)  -CRYSTAL     [place] Level (Gait Training Goal 1, PT) independent  -CRYSTAL     Distance (Gait Training Goal 1, PT) 100 ft or more per trip w/ VSS  -CRYSTAL     Progress/Outcome (Gait Training Goal 1, PT)  goal not met  -     Row Name 03/18/23 1454          Stairs Goal 1 (PT)    Activity/Assistive Device (Stairs Goal 1, PT) ascending stairs;descending stairs  -     [place] Level/Cues Needed (Stairs Goal 1, PT) modified independence  -     Number of Stairs (Stairs Goal 1, PT) 4 stairs w/ 1 handrail on L  -CRYSTAL     Time Frame (Stairs Goal 1, PT) by discharge  -CRYSTAL     Progress/Outcome (Stairs Goal 1, PT) goal ongoing  -CRYSTAL           User Key  (r) = Recorded By, (t) = Taken By, (c) = Cosigned By    Initials Name Provider Type    CRYSTAL Dinesh Lora PTA Physical Therapist Assistant    Che Serrato RN Registered Nurse                Physical Therapy Education     Title: PT OT SLP Therapies (In Progress)     Topic: Physical Therapy (In Progress)     Point: Mobility training (In Progress)     Learning Progress Summary           Patient Acceptance, E, NR by CRYSTAL at 3/18/2023 1546    Acceptance, E,TB,H, VU by  at 3/17/2023 1047    Comment: Educated patient on Home safety    Acceptance, E,D, VU,DU by HERMILO at 3/15/2023 1414    Comment: PT POC; encouraged gait w/ asist; and breathind ext                   Point: Home exercise program (Done)     Learning Progress Summary           Patient Acceptance, E,TB,H, VU by LW at 3/17/2023 1047    Comment: Educated patient on Home safety    Acceptance, E,D, VU,DU by GB at 3/15/2023 1414    Comment: PT POC; encouraged gait w/ asist; and breathind ext                   Point: Body mechanics (Done)     Learning Progress Summary           Patient Acceptance, E,TB,H, VU by LW at 3/17/2023 1047    Comment: Educated patient on Home safety    Acceptance, E,D, VU,DU by GB at 3/15/2023 1414    Comment: PT POC; encouraged gait w/ asist; and breathind ext                   Point: Precautions (Done)     Learning Progress Summary           Patient Acceptance, E,TB,H, VU by LW at 3/17/2023 1047    Comment: Educated patient on Home safety    Acceptance, E,D, VU,DU by HERMILO at 3/15/2023 1414     Comment: PT POC; encouraged gait w/ asist; and breathind ext                               User Key     Initials Effective Dates Name Provider Type Discipline    GB 06/16/21 -  Shireen Kimball, PT Physical Therapist PT    CRYSTAL 06/16/21 -  Dinesh Lora PTA Physical Therapist Assistant PT    LW 06/16/21 -  Solange Joseph COTA Occupational Therapist Assistant OT              PT Recommendation and Plan  Anticipated Discharge Disposition (PT): home with /7 care, home with home health  Plan of Care Reviewed With: patient  Progress: improving  Outcome Evaluation: pt responded well to PT w/ increased gait to 240 ft SBA with RW. pt requires CGA for stair training. pt participated in LE ther ex with good effort. no new goals met at this time. pt would continue to benefit from PT services.   Outcome Measures     Row Name 03/18/23 1440             How much help from another person do you currently need...    Turning from your back to your side while in flat bed without using bedrails? 4  -CRYSTAL      Moving from lying on back to sitting on the side of a flat bed without bedrails? 4  -CRYSTAL      Moving to and from a bed to a chair (including a wheelchair)? 3  -CYRSTAL      Standing up from a chair using your arms (e.g., wheelchair, bedside chair)? 3  -CRYSTAL      Climbing 3-5 steps with a railing? 3  -CRYSTAL      To walk in hospital room? 3  -CRYSTAL      AM-PAC 6 Clicks Score (PT) 20  -         Functional Assessment    Outcome Measure Options AM-PAC 6 Clicks Basic Mobility (PT)  -            User Key  (r) = Recorded By, (t) = Taken By, (c) = Cosigned By    Initials Name Provider Type    CRYSTAL Dinesh Lora PTA Physical Therapist Assistant                 Time Calculation:    PT Charges     Row Name 03/18/23 1549             Time Calculation    Start Time 1454  -CRYSTAL      Stop Time 1522  -      Time Calculation (min) 28 min  -         Time Calculation- PT    Total Timed Code Minutes- PT 28 minute(s)  -CRYSTAL         Timed Charges     88517 - PT Therapeutic Exercise Minutes 8  -CRYSTAL      68486 - Gait Training Minutes  15  -CRYSTAL      83507 - PT Therapeutic Activity Minutes 5  -CRYSTAL         Total Minutes    Timed Charges Total Minutes 28  -CRYSTAL       Total Minutes 28  -CRYSTAL            User Key  (r) = Recorded By, (t) = Taken By, (c) = Cosigned By    Initials Name Provider Type    CRYSTAL Dinesh Lora PTA Physical Therapist Assistant              Therapy Charges for Today     Code Description Service Date Service Provider Modifiers Qty    38678649672 HC PT THER PROC EA 15 MIN 3/18/2023 Dinesh Lora PTA GP 1    22723905787 HC GAIT TRAINING EA 15 MIN 3/18/2023 Dinesh Lora PTA GP 1          PT G-Codes  Outcome Measure Options: AM-PAC 6 Clicks Basic Mobility (PT)  AM-PAC 6 Clicks Score (PT): 20  AM-PAC 6 Clicks Score (OT): 21  Tinetti Total Score: 27    Dinesh Lora PTA  3/18/2023

## 2023-03-18 NOTE — THERAPY TREATMENT NOTE
Patient Name: Rolando Novoa  : 1945    MRN: [id number]                              Today's Date: 3/18/2023       Admit Date: 3/14/2023    Visit Dx:     ICD-10-CM ICD-9-CM   1. Pneumonia of left lower lobe due to infectious organism  J18.9 486   2. Metabolic encephalopathy  G93.41 348.31   3. Acute respiratory failure with hypoxia and hypercapnia (HCC)  J96.01 518.81    J96.02    4. Impaired mobility and ADLs  Z74.09 V49.89    Z78.9    5. Impaired functional mobility, balance, gait, and endurance  Z74.09 V49.89     Patient Active Problem List   Diagnosis   • Hyperlipemia   • SOB (shortness of breath)   • Hematuria syndrome   • Psoriasis   • Hypertension   • High cholesterol   • Enlarged prostate   • Diabetes mellitus (HCC)   • Acid reflux   • Calculus of kidney   • Kidney stones   • Chronic respiratory failure with hypoxia (HCC)   • Class 2 obesity due to excess calories without serious comorbidity with body mass index (BMI) of 37.0 to 37.9 in adult   • Black lung (HCC)   • Chronic restrictive lung disease   • Hx of colonic polyps   • Encounter for screening for malignant neoplasm of colon   • Thrombocytosis   • Anemia   • Leucocytosis   • Folate deficiency   • Acute right-sided thoracic back pain   • Chest wall pain   • CKD (chronic kidney disease) stage 2, GFR 60-89 ml/min   • Diabetes type 2, uncontrolled   • Diabetic neuropathy (HCC)   • Nocturnal hypoxemia   • Supplemental oxygen dependent   • Change in bowel habits   • Diarrhea   • Pneumonia of left lower lobe due to infectious organism   • Acute and chronic respiratory failure with hypercapnia (HCC)   • Acute and chronic respiratory failure with hypoxia (HCC)   • COPD (chronic obstructive pulmonary disease) (HCC)   • Myopathies     Past Medical History:   Diagnosis Date   • Acid reflux    • Black lung disease (HCC)    • Cataract    • Diabetes (HCC)    • Enlarged prostate    • High cholesterol    • Hypertension    • Kidney stone     multiple   •  Obesity    • Psoriasis      Past Surgical History:   Procedure Laterality Date   • AMPUTATION Left     lower arm and hand   • CATARACT EXTRACTION W/ INTRAOCULAR LENS IMPLANT Left 01/22/2021    Procedure: REMIOVE CATARACT AND IMPLANT  INTRAOCULAR LENS;  Surgeon: Sam Isaacs MD;  Location: [facility] OR;  Service: Ophthalmology;  Laterality: Left;   • CATARACT EXTRACTION W/ INTRAOCULAR LENS IMPLANT Right 01/29/2021    Procedure: REMOVE CATARACT AND IMPLANT INTRAOCULAR LENS;  Surgeon: Sam Isaacs MD;  Location: [facility] OR;  Service: Ophthalmology;  Laterality: Right;   • CHOLECYSTECTOMY OPEN     • COLONOSCOPY N/A 11/09/2018    Procedure: COLONOSCOPY;  Surgeon: Sukhdeep Mae MD;  Location: [facility] ENDOSCOPY;  Service: Gastroenterology   • COLONOSCOPY N/A 03/28/2022    Procedure: COLONOSCOPY;  Surgeon: Sukhdeep Mae MD;  Location: [facility] ENDOSCOPY;  Service: Gastroenterology;  Laterality: N/A;   • CYSTOSCOPY N/A 12/16/2017    Procedure: CYSTOSCOPY WITH CLOT EVACUATION;  Surgeon: Prosper Ariza MD;  Location: [facility] OR;  Service:    • CYSTOSCOPY, URETEROSCOPY, RETROGRADE PYELOGRAM, STENT INSERTION Left 12/15/2017    Procedure: CYSTOSCOPY LEFT URETEROSCOPY RETROGRADE PYELOGRAM HOLMIUM LASER STENT INSERTION;  Surgeon: Prosper Ariza MD;  Location: [facility];  Service:    • CYSTOSCOPY, URETEROSCOPY, RETROGRADE PYELOGRAM, STENT INSERTION Left 08/10/2022    Procedure: CYSTOSCOPY LEFT RETROGRADE URETEROSCOPY LASER LITHOTRIPSY STENT PLACMENT;  Surgeon: Prsoper Ariza MD;  Location: [facility];  Service: Urology;  Laterality: Left;   • DENTAL PROCEDURE      multiple extractions   • EXTRACORPOREAL SHOCK WAVE LITHOTRIPSY (ESWL)     • EXTRACORPOREAL SHOCKWAVE LITHOTRIPSY (ESWL), STENT INSERTION/REMOVAL Left 02/03/2017    Procedure: LEFT EXTRACORPOREAL SHOCKWAVE LITHOTRIPSY, POSSIBLE CYSTOSCOPY, POSSIBLE STENT REMOVAL ;  Surgeon: Prosper Ariza MD;  Location: [facility];  Service:    • EXTRACORPOREAL  SHOCKWAVE LITHOTRIPSY (ESWL), STENT INSERTION/REMOVAL Left 05/04/2018    Procedure: EXTRACORPOREAL SHOCKWAVE LITHOTRIPSY;  Surgeon: Prosper Ariza MD;  Location: [facility];  Service: Urology   • KIDNEY STONE SURGERY     • SCROTAL SURGERY      trauma   • WISDOM TOOTH EXTRACTION      took bottom ones      General Information     Row Name 03/18/23 1400          OT Time and Intention    Document Type therapy note (daily note)  -     Mode of Treatment individual therapy;occupational therapy  -     Row Name 03/18/23 1400          General Information    Patient Profile Reviewed yes  -     Existing Precautions/Restrictions fall  -RC     Row Name 03/18/23 1400          Cognition    Orientation Status (Cognition) oriented x 4  -RC     Row Name 03/18/23 1400          Safety Issues, Functional Mobility    Impairments Affecting Function (Mobility) balance;endurance/activity tolerance  -           User Key  (r) = Recorded By, (t) = Taken By, (c) = Cosigned By    Initials Name Provider Type    RC Enedelia Cabrera COTA Occupational Therapist Assistant                 Mobility/ADL's    No documentation.                Obj/Interventions    No documentation.                Goals/Plan     Row Name 03/18/23 1400          Transfer Goal 1 (OT)    Activity/Assistive Device (Transfer Goal 1, OT) toilet;shower chair  -     [place] Level/Cues Needed (Transfer Goal 1, OT) independent  -RC     Time Frame (Transfer Goal 1, OT) long term goal (LTG);by discharge  -     Progress/Outcome (Transfer Goal 1, OT) goal met  -     Row Name 03/18/23 1400          Bathing Goal 1 (OT)    Activity/Device (Bathing Goal 1, OT) lower body bathing;shower chair  -     [place] Level/Cues Needed (Bathing Goal 1, OT) modified independence  -RC     Time Frame (Bathing Goal 1, OT) long term goal (LTG);by discharge  -     Progress/Outcomes (Bathing Goal 1, OT) goal not met  -     Row Name 03/18/23 1400          Dressing Goal 1 (OT)     Activity/Device (Dressing Goal 1, OT) lower body dressing  -RC     [place]/Cues Needed (Dressing Goal 1, OT) minimum assist (75% or more patient effort)  -RC     Time Frame (Dressing Goal 1, OT) by discharge;long term goal (LTG)  -RC     Progress/Outcome (Dressing Goal 1, OT) goal not met  -RC           User Key  (r) = Recorded By, (t) = Taken By, (c) = Cosigned By    Initials Name Provider Type    Enedelia Cadet COTA Occupational Therapist Assistant               Clinical Impression     Row Name 03/18/23 1400          Pain Assessment    Pretreatment Pain Rating 0/10 - no pain  -RC     Posttreatment Pain Rating 0/10 - no pain  -RC     Row Name 03/18/23 1400          Therapy Assessment/Plan (OT)    Rehab Potential (OT) good, to achieve stated therapy goals  -RC     Criteria for Skilled Therapeutic Interventions Met (OT) yes  -RC     Therapy Frequency (OT) other (see comments)  5-7 d/wk  -RC     Row Name 03/18/23 1400          Therapy Plan Review/Discharge Plan (OT)    Anticipated Discharge Disposition (OT) home with assist  -RC           User Key  (r) = Recorded By, (t) = Taken By, (c) = Cosigned By    Initials Name Provider Type    Enedelia Cadet COTA Occupational Therapist Assistant               Outcome Measures     Row Name 03/18/23 1400          How much help from another is currently needed...    Putting on and taking off regular lower body clothing? 2  -RC     Bathing (including washing, rinsing, and drying) 3  -RC     Toileting (which includes using toilet bed pan or urinal) 4  -RC     Putting on and taking off regular upper body clothing 4  -RC     Taking care of personal grooming (such as brushing teeth) 4  -RC     Eating meals 4  -RC     AM-PAC 6 Clicks Score (OT) 21  -RC     Row Name 03/18/23 0800          How much help from another person do you currently need...    Turning from your back to your side while in flat bed without using bedrails? 4  -CRYSTAL     Moving from lying on back to  sitting on the side of a flat bed without bedrails? 4  -CRYSTAL     Moving to and from a bed to a chair (including a wheelchair)? 3  -CRYSTAL     Standing up from a chair using your arms (e.g., wheelchair, bedside chair)? 3  -CRYSTAL     Climbing 3-5 steps with a railing? 3  -CRYSTAL     To walk in hospital room? 3  -CRYSTAL     AM-PAC 6 Clicks Score (PT) 20  -CRYSTAL     Row Name 03/18/23 1400          Functional Assessment    Outcome Measure Options AM-PAC 6 Clicks Daily Activity (OT)  -           User Key  (r) = Recorded By, (t) = Taken By, (c) = Cosigned By    Initials Name Provider Type    RC Enedelia Cabrera COTA Occupational Therapist Assistant    Phyllis Romeo RN Registered Nurse                Occupational Therapy Education     Title: PT OT SLP Therapies (Done)     Topic: Occupational Therapy (Done)     Point: ADL training (Done)     Description:   Instruct learner(s) on proper safety adaptation and remediation techniques during self care or transfers.   Instruct in proper use of assistive devices.              Learning Progress Summary           Patient Acceptance, E, VU,NR by RC at 3/18/2023 1511    Acceptance, E,TB,H, VU by LW at 3/17/2023 1047    Comment: Educated patient on Home safety    Acceptance, E,TB, VU by LW at 3/16/2023 1523    Acceptance, E,TB, VU by  at 3/15/2023 1551    Comment: OT POC, Role of OT, d/c recommendations, PLB                   Point: Home exercise program (Done)     Description:   Instruct learner(s) on appropriate technique for monitoring, assisting and/or progressing therapeutic exercises/activities.              Learning Progress Summary           Patient Acceptance, E,TB,H, VU by LW at 3/17/2023 1047    Comment: Educated patient on Home safety    Acceptance, E,TB, VU by LW at 3/16/2023 1523                   Point: Precautions (Done)     Description:   Instruct learner(s) on prescribed precautions during self-care and functional transfers.              Learning Progress Summary            Patient Acceptance, E, VU,NR by  at 3/18/2023 1511    Acceptance, E,TB,H, VU by LW at 3/17/2023 1047    Comment: Educated patient on Home safety    Acceptance, E,TB, VU by LW at 3/16/2023 1523    Acceptance, E,TB, VU by  at 3/15/2023 1551    Comment: OT POC, Role of OT, d/c recommendations, PLB                   Point: Body mechanics (Done)     Description:   Instruct learner(s) on proper positioning and spine alignment during self-care, functional mobility activities and/or exercises.              Learning Progress Summary           Patient Acceptance, E, VU,NR by  at 3/18/2023 1511    Acceptance, E,TB,H, VU by LW at 3/17/2023 1047    Comment: Educated patient on Home safety    Acceptance, E,TB, VU by LW at 3/16/2023 1523    Acceptance, E,TB, VU by  at 3/15/2023 1551    Comment: OT POC, Role of OT, d/c recommendations, PLB                               User Key     Initials Effective Dates Name Provider Type Discipline     06/16/21 -  Enedelia Cabrera COTA Occupational Therapist Assistant OT    LW 06/16/21 -  Solange Joseph COTA Occupational Therapist Assistant OT     11/18/22 -  Kiki Kauffman OT Occupational Therapist OT              OT Recommendation and Plan  Therapy Frequency (OT): other (see comments) (5-7 d/wk)  Plan of Care Review  Plan of Care Reviewed With: patient  Progress: improving  Outcome Evaluation: nurisng ok'd OT pt agreeableot sponge bath w/ max A for ub and arriaga for lb. amb in room w/ supervision no AD, toilet transfer supervision/I, pt resting in bed needs in reach  cont poc     Time Calculation:    Time Calculation- OT     Row Name 03/18/23 1511             Time Calculation- OT    OT Start Time 1400  -RC      OT Stop Time 1440  -RC      OT Time Calculation (min) 40 min  -RC      Total Timed Code Minutes- OT 40 minute(s)  -      OT Received On 03/18/23  -         Timed Charges    09657 - OT Self Care/Mgmt Minutes 40  -RC         Total Minutes    Timed Charges Total Minutes  40  -RC       Total Minutes 40  -RC            User Key  (r) = Recorded By, (t) = Taken By, (c) = Cosigned By    Initials Name Provider Type    RC Enedelia Cabrera COTA Occupational Therapist Assistant              Therapy Charges for Today     Code Description Service Date Service Provider Modifiers Qty    [id number]  OT SELF CARE/MGMT/TRAIN EA 15 MIN 3/18/2023 Enedelia Cabrera COTA GO 3               MARÍA Goodwin  3/18/2023

## 2023-03-19 LAB
ALBUMIN SERPL-MCNC: 3.4 G/DL (ref 3.5–5.2)
ALBUMIN/GLOB SERPL: 1.4 G/DL
ALP SERPL-CCNC: 54 U/L (ref 39–117)
ALT SERPL W P-5'-P-CCNC: 33 U/L (ref 1–41)
ANION GAP SERPL CALCULATED.3IONS-SCNC: 10 MMOL/L (ref 5–15)
AST SERPL-CCNC: 18 U/L (ref 1–40)
BACTERIA SPEC AEROBE CULT: NORMAL
BACTERIA SPEC AEROBE CULT: NORMAL
BASOPHILS # BLD AUTO: 0.02 10*3/MM3 (ref 0–0.2)
BASOPHILS NFR BLD AUTO: 0.3 % (ref 0–1.5)
BILIRUB SERPL-MCNC: 0.2 MG/DL (ref 0–1.2)
BUN SERPL-MCNC: 41 MG/DL (ref 8–23)
BUN/CREAT SERPL: 20.8 (ref 7–25)
CALCIUM SPEC-SCNC: 8.3 MG/DL (ref 8.6–10.5)
CHLORIDE SERPL-SCNC: 102 MMOL/L (ref 98–107)
CO2 SERPL-SCNC: 28 MMOL/L (ref 22–29)
CREAT SERPL-MCNC: 1.97 MG/DL (ref 0.76–1.27)
DEPRECATED RDW RBC AUTO: 43.5 FL (ref 37–54)
EGFRCR SERPLBLD CKD-EPI 2021: 34.8 ML/MIN/1.73
EOSINOPHIL # BLD AUTO: 0.01 10*3/MM3 (ref 0–0.4)
EOSINOPHIL NFR BLD AUTO: 0.1 % (ref 0.3–6.2)
ERYTHROCYTE [DISTWIDTH] IN BLOOD BY AUTOMATED COUNT: 13.2 % (ref 12.3–15.4)
GLOBULIN UR ELPH-MCNC: 2.5 GM/DL
GLUCOSE BLDC GLUCOMTR-MCNC: 175 MG/DL (ref 70–130)
GLUCOSE BLDC GLUCOMTR-MCNC: 198 MG/DL (ref 70–130)
GLUCOSE BLDC GLUCOMTR-MCNC: 232 MG/DL (ref 70–130)
GLUCOSE BLDC GLUCOMTR-MCNC: 331 MG/DL (ref 70–130)
GLUCOSE SERPL-MCNC: 189 MG/DL (ref 65–99)
HCT VFR BLD AUTO: 33 % (ref 37.5–51)
HGB BLD-MCNC: 10.4 G/DL (ref 13–17.7)
IMM GRANULOCYTES # BLD AUTO: 0.11 10*3/MM3 (ref 0–0.05)
IMM GRANULOCYTES NFR BLD AUTO: 1.6 % (ref 0–0.5)
LYMPHOCYTES # BLD AUTO: 1.24 10*3/MM3 (ref 0.7–3.1)
LYMPHOCYTES NFR BLD AUTO: 18.4 % (ref 19.6–45.3)
MCH RBC QN AUTO: 28.1 PG (ref 26.6–33)
MCHC RBC AUTO-ENTMCNC: 31.5 G/DL (ref 31.5–35.7)
MCV RBC AUTO: 89.2 FL (ref 79–97)
MONOCYTES # BLD AUTO: 0.46 10*3/MM3 (ref 0.1–0.9)
MONOCYTES NFR BLD AUTO: 6.8 % (ref 5–12)
NEUTROPHILS NFR BLD AUTO: 4.89 10*3/MM3 (ref 1.7–7)
NEUTROPHILS NFR BLD AUTO: 72.8 % (ref 42.7–76)
NRBC BLD AUTO-RTO: 0 /100 WBC (ref 0–0.2)
PLATELET # BLD AUTO: 235 10*3/MM3 (ref 140–450)
PMV BLD AUTO: 9.1 FL (ref 6–12)
POTASSIUM SERPL-SCNC: 4.2 MMOL/L (ref 3.5–5.2)
PROT SERPL-MCNC: 5.9 G/DL (ref 6–8.5)
RBC # BLD AUTO: 3.7 10*6/MM3 (ref 4.14–5.8)
SODIUM SERPL-SCNC: 140 MMOL/L (ref 136–145)
WBC NRBC COR # BLD: 6.73 10*3/MM3 (ref 3.4–10.8)

## 2023-03-19 PROCEDURE — 82962 GLUCOSE BLOOD TEST: CPT

## 2023-03-19 PROCEDURE — 94799 UNLISTED PULMONARY SVC/PX: CPT

## 2023-03-19 PROCEDURE — 94640 AIRWAY INHALATION TREATMENT: CPT

## 2023-03-19 PROCEDURE — 63710000001 PREDNISONE PER 1 MG: Performed by: FAMILY MEDICINE

## 2023-03-19 PROCEDURE — 94664 DEMO&/EVAL PT USE INHALER: CPT

## 2023-03-19 PROCEDURE — 80053 COMPREHEN METABOLIC PANEL: CPT | Performed by: FAMILY MEDICINE

## 2023-03-19 PROCEDURE — 94660 CPAP INITIATION&MGMT: CPT

## 2023-03-19 PROCEDURE — 94760 N-INVAS EAR/PLS OXIMETRY 1: CPT

## 2023-03-19 PROCEDURE — 63710000001 INSULIN ASPART PER 5 UNITS: Performed by: PHYSICIAN ASSISTANT

## 2023-03-19 PROCEDURE — 85025 COMPLETE CBC W/AUTO DIFF WBC: CPT | Performed by: FAMILY MEDICINE

## 2023-03-19 PROCEDURE — 63710000001 INSULIN DETEMIR PER 5 UNITS: Performed by: FAMILY MEDICINE

## 2023-03-19 PROCEDURE — 25010000002 ENOXAPARIN PER 10 MG: Performed by: PHYSICIAN ASSISTANT

## 2023-03-19 RX ADMIN — TAMSULOSIN HYDROCHLORIDE 0.4 MG: 0.4 CAPSULE ORAL at 20:16

## 2023-03-19 RX ADMIN — ASPIRIN 81 MG: 81 TABLET, CHEWABLE ORAL at 08:06

## 2023-03-19 RX ADMIN — Medication 10 ML: at 08:25

## 2023-03-19 RX ADMIN — NEBIVOLOL HYDROCHLORIDE 10 MG: 5 TABLET ORAL at 08:06

## 2023-03-19 RX ADMIN — INSULIN DETEMIR 5 UNITS: 100 INJECTION, SOLUTION SUBCUTANEOUS at 08:07

## 2023-03-19 RX ADMIN — INSULIN ASPART 7 UNITS: 100 INJECTION, SOLUTION INTRAVENOUS; SUBCUTANEOUS at 20:18

## 2023-03-19 RX ADMIN — PANTOPRAZOLE SODIUM 40 MG: 40 TABLET, DELAYED RELEASE ORAL at 06:00

## 2023-03-19 RX ADMIN — INSULIN ASPART 4 UNITS: 100 INJECTION, SOLUTION INTRAVENOUS; SUBCUTANEOUS at 17:23

## 2023-03-19 RX ADMIN — ATORVASTATIN CALCIUM 20 MG: 20 TABLET, FILM COATED ORAL at 20:16

## 2023-03-19 RX ADMIN — PREDNISONE 50 MG: 20 TABLET ORAL at 08:06

## 2023-03-19 RX ADMIN — Medication 10 ML: at 20:16

## 2023-03-19 RX ADMIN — BUDESONIDE 0.5 MG: 0.5 SUSPENSION RESPIRATORY (INHALATION) at 21:07

## 2023-03-19 RX ADMIN — INSULIN ASPART 2 UNITS: 100 INJECTION, SOLUTION INTRAVENOUS; SUBCUTANEOUS at 08:07

## 2023-03-19 RX ADMIN — INSULIN ASPART 2 UNITS: 100 INJECTION, SOLUTION INTRAVENOUS; SUBCUTANEOUS at 11:49

## 2023-03-19 RX ADMIN — IPRATROPIUM BROMIDE AND ALBUTEROL SULFATE 3 ML: 2.5; .5 SOLUTION RESPIRATORY (INHALATION) at 13:21

## 2023-03-19 RX ADMIN — IPRATROPIUM BROMIDE AND ALBUTEROL SULFATE 3 ML: 2.5; .5 SOLUTION RESPIRATORY (INHALATION) at 21:07

## 2023-03-19 RX ADMIN — ENOXAPARIN SODIUM 40 MG: 40 INJECTION SUBCUTANEOUS at 20:15

## 2023-03-19 NOTE — PLAN OF CARE
Goal Outcome Evaluation:  Plan of Care Reviewed With: patient        Progress: no change  Outcome Evaluation: VSS. Pt resting well between care. No changes.

## 2023-03-19 NOTE — PLAN OF CARE
Problem: Adult Inpatient Plan of Care  Goal: Plan of Care Review  Outcome: Ongoing, Progressing  Flowsheets (Taken 3/19/2023 1621)  Outcome Evaluation: Pt up with standby assist to BR. Denies any changes, none noted. VSS. Meds per orders.  Goal: Patient-Specific Goal (Individualized)  Outcome: Ongoing, Progressing  Goal: Absence of Hospital-Acquired Illness or Injury  Outcome: Ongoing, Progressing  Intervention: Identify and Manage Fall Risk  Recent Flowsheet Documentation  Taken 3/19/2023 0736 by Phyllis Fajardo RN  Safety Promotion/Fall Prevention:   activity supervised   safety round/check completed  Intervention: Prevent Skin Injury  Recent Flowsheet Documentation  Taken 3/19/2023 0736 by Phyllis Fajardo RN  Body Position:   position changed independently   weight shifting   dangle, side of bed  Intervention: Prevent and Manage VTE (Venous Thromboembolism) Risk  Recent Flowsheet Documentation  Taken 3/19/2023 0736 by Phyllis Fajardo RN  Activity Management:   activity adjusted per tolerance   sitting, edge of bed  Intervention: Prevent Infection  Recent Flowsheet Documentation  Taken 3/19/2023 0736 by Phyllis Fajardo RN  Infection Prevention:   single patient room provided   personal protective equipment utilized   hand hygiene promoted   equipment surfaces disinfected  Goal: Optimal Comfort and Wellbeing  Outcome: Ongoing, Progressing  Goal: Readiness for Transition of Care  Outcome: Ongoing, Progressing     Problem: Fall Injury Risk  Goal: Absence of Fall and Fall-Related Injury  Outcome: Ongoing, Progressing  Intervention: Identify and Manage Contributors  Recent Flowsheet Documentation  Taken 3/19/2023 0736 by Phyllis Fajardo RN  Medication Review/Management: medications reviewed  Intervention: Promote Injury-Free Environment  Recent Flowsheet Documentation  Taken 3/19/2023 0736 by Phyllis Fajardo RN  Safety Promotion/Fall Prevention:   activity supervised   safety round/check completed     Problem:  Skin Injury Risk Increased  Goal: Skin Health and Integrity  Outcome: Ongoing, Progressing  Intervention: Optimize Skin Protection  Recent Flowsheet Documentation  Taken 3/19/2023 0736 by Phyllis Fajardo RN  Head of Bed ([facility]) Positioning: HOB elevated     Problem: Fluid Imbalance (Pneumonia)  Goal: Fluid Balance  Outcome: Ongoing, Progressing     Problem: Infection (Pneumonia)  Goal: Resolution of Infection Signs and Symptoms  Outcome: Ongoing, Progressing     Problem: Respiratory Compromise (Pneumonia)  Goal: Effective Oxygenation and Ventilation  Outcome: Ongoing, Progressing  Intervention: Promote Airway Secretion Clearance  Recent Flowsheet Documentation  Taken 3/19/2023 0736 by Phyllis Fajardo RN  Cough And Deep Breathing: done independently per patient  Intervention: Optimize Oxygenation and Ventilation  Recent Flowsheet Documentation  Taken 3/19/2023 0736 by Phyllis Fajardo RN  Head of Bed (HOB) Positioning: HOB elevated   Goal Outcome Evaluation:           Progress: improving  Outcome Evaluation: Pt up with standby assist to BR. Denies any changes, none noted. VSS. Meds per orders.

## 2023-03-19 NOTE — PROGRESS NOTES
[facility] Medicine Services  INPATIENT PROGRESS NOTE      Length of Stay: 5  Date of Admission: 3/14/2023  Primary Care Physician: Jaxson Zepeda MD    Subjective   Chief Complaint: Shortness of breath  HPI: Doing well and gradually feeling better.  Denies any new concerns.    Review of Systems   All pertinent negatives and positives are as above. All other systems have been reviewed and are negative unless otherwise stated.     Objective    As of today 03/19/23  Temp:  [97.6 °F (36.4 °C)-98.7 °F (37.1 °C)] 98 °F (36.7 °C)  Heart Rate:  [60-87] 77  Resp:  [18-27] 18  BP: (142-168)/(67-82) 153/74    Physical Exam  Constitutional:       General: He is not in acute distress.     Appearance: He is not toxic-appearing.   HENT:      Head: Normocephalic and atraumatic.      Right Ear: External ear normal.      Left Ear: External ear normal.      Nose: Nose normal.      Mouth/Throat:      Pharynx: Oropharynx is clear.   Eyes:      Conjunctiva/sclera: Conjunctivae normal.   Cardiovascular:      Rate and Rhythm: Normal rate and regular rhythm.      Heart sounds: Normal heart sounds.   Pulmonary:      Comments: Improved aeration bilaterally, no obvious wheeze noted today  Abdominal:      General: Bowel sounds are normal.      Palpations: Abdomen is soft.      Tenderness: There is no abdominal tenderness.   Musculoskeletal:         General: No deformity.   Skin:     General: Skin is warm and dry.      Capillary Refill: Capillary refill takes less than 2 seconds.   Neurological:      General: No focal deficit present.      Mental Status: He is alert and oriented to person, place, and time. Mental status is at baseline.   Psychiatric:         Behavior: Behavior normal.           Results Review:  I have reviewed the labs, radiology results, and diagnostic studies.    Laboratory Data:   Results from last 7 days   Lab Units 03/19/23  0553 03/18/23  0818 03/17/23  0545   SODIUM  mmol/L 140 139 140   POTASSIUM mmol/L 4.2 3.5 3.9   CHLORIDE mmol/L 102 101 102   CO2 mmol/L 28.0 27.0 27.0   BUN mg/dL 41* 29* 28*   CREATININE mg/dL 1.97* 1.72* 1.92*   GLUCOSE mg/dL 189* 223* 243*   CALCIUM mg/dL 8.3* 8.4* 8.3*   BILIRUBIN mg/dL 0.2 0.2 0.2   ALK PHOS U/L 54 59 58   ALT (SGPT) U/L 33 23 12   AST (SGOT) U/L 18 19 10   ANION GAP mmol/L 10.0 11.0 11.0     Estimated Creatinine Clearance: 35.6 mL/min (A) (by C-G formula based on SCr of 1.97 mg/dL (H)).  Results from last 7 days   Lab Units 03/14/23  1646   MAGNESIUM mg/dL 2.1         Results from last 7 days   Lab Units 03/19/23  0553 03/18/23  0818 03/17/23  0545 03/14/23  1646   WBC 10*3/mm3 6.73 7.12 5.82 7.23   HEMOGLOBIN g/dL 10.4* 10.9* 10.3* 12.1*   HEMATOCRIT % 33.0* 34.8* 33.2* 41.6   PLATELETS 10*3/mm3 235 245 250 263     Results from last 7 days   Lab Units 03/14/23  1646   INR  0.95       Culture Data:   No results found for: BLOODCX  No results found for: URINECX  No results found for: RESPCX  No results found for: WOUNDCX  No results found for: STOOLCX  No components found for: BODYFLD    Radiology Data:   Imaging Results (Last 24 Hours)     ** No results found for the last 24 hours. **          I have reviewed the patient's current medications.     Assessment/Plan     Principal Problem:    Pneumonia of left lower lobe due to infectious organism  Active Problems:    Acute and chronic respiratory failure with hypercapnia (HCC)    Acute and chronic respiratory failure with hypoxia (HCC)    COPD (chronic obstructive pulmonary disease) (HCC)    Myopathies  Acute respiratory failure with hypoxia and hypercapnia  Respiratory acidosis  Type 2 diabetes mellitus  Deconditioning  Black lung disease    Plan:  - Continue supplemental oxygen wean as tolerated  - Continue noninvasive ventilation wean as tolerated  - Given patient's hypercapnia initially on admission with improvement with the BiPAP machine currently on AVAPS settings he may benefit  from a trilogy machine when he goes home  - Discussed with patient that we would likely not have trilogy machine until next week and this can be delivered to his home instead of the hospital. He is okay with this option.   - Continue bronchodilators, RT to initiate protocol  - Continue p.o. steroids.  - Pulmicort nebs have also been ordered  - Pulmonary toilet  - Completed antibiotics with Rocephin and azithromycin  - PT and OT been ordered  - Continue home medications as appropriate  - Given reports of hypoglycemia at home would recommend stopping his home Amaryl at discharge as well  - Plan for likely discharge tomorrow.   - DVT prophylaxis with Lovenox  - CODE STATUS: Full    Patient has acute on chronic hypercapnic and hypoxic respiratory failure along with COPD.  Upon admission to hospital patient PCO2 was 78.4 after being moved to BiPAP on AVAPS mode PCO2 only dropped to 60.7 improving then home BiPAP/BiPAP ST/BiPAP STA is insufficient due to severity of the disease.  Patient requires volume ventilation AE mode with auto titrating EPAP to reduce hypercapnic episodes.  Ordering noninvasive ventilator to reduce hospital readmissions.    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  Patient has been on multiple rounds of antibiotics and steroids for this condition.  Patient has had a productive cough for greater than 6 months.  Ordering a flow vest to help mobilize secretions and reduce hospital readmissions.    Medical Decision Making  Number and Complexity of problems: 6 high complexity    Conditions and Status:        Condition is improving.     [facility] Data  External documents reviewed: Previous records  Tests considered but not ordered: None     Decision rules/scores evaluated (example [id number], [place], etc): None     Discussed with: Patient, wife, and son at bedside     Treatment Plan  As above    Care  Planning  Shared decision making: Patient  Code status and discussions: Full    Disposition  Social Determinants of Health that impact treatment or disposition: None  I expect the patient to be discharged to SNF versus rehab versus home with home health in 3-4 days.       I have utilized all available immediate resources to obtain, update, or review the patient's current medications (including all prescriptions, over-the-counter products, herbals, cannabis/cannabidiol products, and vitamin/mineral/dietary (nutritional) supplements).   I confirmed that the patient's Advance Care Plan is present, code status is documented, or surrogate decision maker is listed in the patient's medical record.    Discharge Planning: In process    Cristian Gupta MD

## 2023-03-20 ENCOUNTER — READMISSION MANAGEMENT (OUTPATIENT)
Dept: CALL CENTER | Facility: HOSPITAL | Age: 78
End: 2023-03-20
Payer: MEDICARE

## 2023-03-20 VITALS
RESPIRATION RATE: 18 BRPM | OXYGEN SATURATION: 97 % | DIASTOLIC BLOOD PRESSURE: 66 MMHG | TEMPERATURE: 98.2 F | SYSTOLIC BLOOD PRESSURE: 131 MMHG | HEIGHT: 65 IN | BODY MASS INDEX: 37.07 KG/M2 | WEIGHT: 222.5 LBS | HEART RATE: 90 BPM

## 2023-03-20 LAB
ALBUMIN SERPL-MCNC: 3.4 G/DL (ref 3.5–5.2)
ALBUMIN/GLOB SERPL: 1.4 G/DL
ALP SERPL-CCNC: 54 U/L (ref 39–117)
ALT SERPL W P-5'-P-CCNC: 45 U/L (ref 1–41)
ANION GAP SERPL CALCULATED.3IONS-SCNC: 10 MMOL/L (ref 5–15)
AST SERPL-CCNC: 22 U/L (ref 1–40)
BASOPHILS # BLD AUTO: 0.02 10*3/MM3 (ref 0–0.2)
BASOPHILS NFR BLD AUTO: 0.3 % (ref 0–1.5)
BILIRUB SERPL-MCNC: 0.3 MG/DL (ref 0–1.2)
BUN SERPL-MCNC: 37 MG/DL (ref 8–23)
BUN/CREAT SERPL: 21.3 (ref 7–25)
CALCIUM SPEC-SCNC: 8.5 MG/DL (ref 8.6–10.5)
CHLORIDE SERPL-SCNC: 100 MMOL/L (ref 98–107)
CO2 SERPL-SCNC: 28 MMOL/L (ref 22–29)
CREAT SERPL-MCNC: 1.74 MG/DL (ref 0.76–1.27)
DEPRECATED RDW RBC AUTO: 43.2 FL (ref 37–54)
EGFRCR SERPLBLD CKD-EPI 2021: 40.4 ML/MIN/1.73
EOSINOPHIL # BLD AUTO: 0.03 10*3/MM3 (ref 0–0.4)
EOSINOPHIL NFR BLD AUTO: 0.4 % (ref 0.3–6.2)
ERYTHROCYTE [DISTWIDTH] IN BLOOD BY AUTOMATED COUNT: 13.2 % (ref 12.3–15.4)
GLOBULIN UR ELPH-MCNC: 2.5 GM/DL
GLUCOSE BLDC GLUCOMTR-MCNC: 169 MG/DL (ref 70–130)
GLUCOSE BLDC GLUCOMTR-MCNC: 273 MG/DL (ref 70–130)
GLUCOSE BLDC GLUCOMTR-MCNC: 401 MG/DL (ref 70–130)
GLUCOSE SERPL-MCNC: 154 MG/DL (ref 65–99)
HCT VFR BLD AUTO: 31.8 % (ref 37.5–51)
HGB BLD-MCNC: 10.5 G/DL (ref 13–17.7)
IMM GRANULOCYTES # BLD AUTO: 0.13 10*3/MM3 (ref 0–0.05)
IMM GRANULOCYTES NFR BLD AUTO: 1.9 % (ref 0–0.5)
LYMPHOCYTES # BLD AUTO: 1.6 10*3/MM3 (ref 0.7–3.1)
LYMPHOCYTES NFR BLD AUTO: 23.6 % (ref 19.6–45.3)
MCH RBC QN AUTO: 29.2 PG (ref 26.6–33)
MCHC RBC AUTO-ENTMCNC: 33 G/DL (ref 31.5–35.7)
MCV RBC AUTO: 88.3 FL (ref 79–97)
MONOCYTES # BLD AUTO: 0.51 10*3/MM3 (ref 0.1–0.9)
MONOCYTES NFR BLD AUTO: 7.5 % (ref 5–12)
NEUTROPHILS NFR BLD AUTO: 4.5 10*3/MM3 (ref 1.7–7)
NEUTROPHILS NFR BLD AUTO: 66.3 % (ref 42.7–76)
NRBC BLD AUTO-RTO: 0 /100 WBC (ref 0–0.2)
PLATELET # BLD AUTO: 235 10*3/MM3 (ref 140–450)
PMV BLD AUTO: 9.3 FL (ref 6–12)
POTASSIUM SERPL-SCNC: 3.8 MMOL/L (ref 3.5–5.2)
PROT SERPL-MCNC: 5.9 G/DL (ref 6–8.5)
RBC # BLD AUTO: 3.6 10*6/MM3 (ref 4.14–5.8)
SODIUM SERPL-SCNC: 138 MMOL/L (ref 136–145)
WBC NRBC COR # BLD: 6.79 10*3/MM3 (ref 3.4–10.8)

## 2023-03-20 PROCEDURE — 82962 GLUCOSE BLOOD TEST: CPT

## 2023-03-20 PROCEDURE — 94799 UNLISTED PULMONARY SVC/PX: CPT

## 2023-03-20 PROCEDURE — 94660 CPAP INITIATION&MGMT: CPT

## 2023-03-20 PROCEDURE — 63710000001 INSULIN ASPART PER 5 UNITS: Performed by: PHYSICIAN ASSISTANT

## 2023-03-20 PROCEDURE — 94760 N-INVAS EAR/PLS OXIMETRY 1: CPT

## 2023-03-20 PROCEDURE — 85025 COMPLETE CBC W/AUTO DIFF WBC: CPT | Performed by: FAMILY MEDICINE

## 2023-03-20 PROCEDURE — 94664 DEMO&/EVAL PT USE INHALER: CPT

## 2023-03-20 PROCEDURE — 63710000001 PREDNISONE PER 1 MG: Performed by: FAMILY MEDICINE

## 2023-03-20 PROCEDURE — 97110 THERAPEUTIC EXERCISES: CPT

## 2023-03-20 PROCEDURE — 63710000001 INSULIN DETEMIR PER 5 UNITS: Performed by: FAMILY MEDICINE

## 2023-03-20 PROCEDURE — 80053 COMPREHEN METABOLIC PANEL: CPT | Performed by: FAMILY MEDICINE

## 2023-03-20 RX ORDER — PREDNISONE 50 MG/1
50 TABLET ORAL
Qty: 1 TABLET | Refills: 0 | Status: SHIPPED | OUTPATIENT
Start: 2023-03-21 | End: 2023-03-22

## 2023-03-20 RX ADMIN — Medication 10 ML: at 08:19

## 2023-03-20 RX ADMIN — FOLIC ACID 1 MG: 1 TABLET ORAL at 08:19

## 2023-03-20 RX ADMIN — PANTOPRAZOLE SODIUM 40 MG: 40 TABLET, DELAYED RELEASE ORAL at 06:15

## 2023-03-20 RX ADMIN — INSULIN ASPART 6 UNITS: 100 INJECTION, SOLUTION INTRAVENOUS; SUBCUTANEOUS at 11:24

## 2023-03-20 RX ADMIN — IPRATROPIUM BROMIDE AND ALBUTEROL SULFATE 3 ML: 2.5; .5 SOLUTION RESPIRATORY (INHALATION) at 07:01

## 2023-03-20 RX ADMIN — PREDNISONE 50 MG: 20 TABLET ORAL at 08:18

## 2023-03-20 RX ADMIN — BUDESONIDE 0.5 MG: 0.5 SUSPENSION RESPIRATORY (INHALATION) at 07:01

## 2023-03-20 RX ADMIN — ASPIRIN 81 MG: 81 TABLET, CHEWABLE ORAL at 08:19

## 2023-03-20 RX ADMIN — INSULIN DETEMIR 5 UNITS: 100 INJECTION, SOLUTION SUBCUTANEOUS at 08:19

## 2023-03-20 RX ADMIN — NEBIVOLOL HYDROCHLORIDE 10 MG: 5 TABLET ORAL at 08:18

## 2023-03-20 RX ADMIN — INSULIN ASPART 2 UNITS: 100 INJECTION, SOLUTION INTRAVENOUS; SUBCUTANEOUS at 08:20

## 2023-03-20 NOTE — PLAN OF CARE
Goal Outcome Evaluation:  Plan of Care Reviewed With: patient        Progress: improving  Outcome Evaluation: VSS. No acute changes this shift. Did have one elevated B/P reading later this shift. Patient repositioned and B/P repeated manually. B/P wnl at that time. Wore bipap later this shift.

## 2023-03-20 NOTE — THERAPY TREATMENT NOTE
Patient Name: Rolando Novoa  : 1945    MRN: [id number]                              Today's Date: 3/20/2023       Admit Date: 3/14/2023    Visit Dx:     ICD-10-CM ICD-9-CM   1. Pneumonia of left lower lobe due to infectious organism  J18.9 486   2. Metabolic encephalopathy  G93.41 348.31   3. Acute respiratory failure with hypoxia and hypercapnia (HCC)  J96.01 518.81    J96.02    4. Impaired mobility and ADLs  Z74.09 V49.89    Z78.9    5. Impaired functional mobility, balance, gait, and endurance  Z74.09 V49.89     Patient Active Problem List   Diagnosis   • Hyperlipemia   • SOB (shortness of breath)   • Hematuria syndrome   • Psoriasis   • Hypertension   • High cholesterol   • Enlarged prostate   • Diabetes mellitus (HCC)   • Acid reflux   • Calculus of kidney   • Kidney stones   • Chronic respiratory failure with hypoxia (HCC)   • Class 2 obesity due to excess calories without serious comorbidity with body mass index (BMI) of 37.0 to 37.9 in adult   • Black lung (HCC)   • Chronic restrictive lung disease   • Hx of colonic polyps   • Encounter for screening for malignant neoplasm of colon   • Thrombocytosis   • Anemia   • Leucocytosis   • Folate deficiency   • Acute right-sided thoracic back pain   • Chest wall pain   • CKD (chronic kidney disease) stage 2, GFR 60-89 ml/min   • Diabetes type 2, uncontrolled   • Diabetic neuropathy (HCC)   • Nocturnal hypoxemia   • Supplemental oxygen dependent   • Change in bowel habits   • Diarrhea   • Pneumonia of left lower lobe due to infectious organism   • Acute and chronic respiratory failure with hypercapnia (HCC)   • Acute and chronic respiratory failure with hypoxia (HCC)   • COPD (chronic obstructive pulmonary disease) (HCC)   • Myopathies     Past Medical History:   Diagnosis Date   • Acid reflux    • Black lung disease (HCC)    • Cataract    • Diabetes (HCC)    • Enlarged prostate    • High cholesterol    • Hypertension    • Kidney stone     multiple   •  Obesity    • Psoriasis      Past Surgical History:   Procedure Laterality Date   • AMPUTATION Left     lower arm and hand   • CATARACT EXTRACTION W/ INTRAOCULAR LENS IMPLANT Left 01/22/2021    Procedure: REMIOVE CATARACT AND IMPLANT  INTRAOCULAR LENS;  Surgeon: Sam Isaacs MD;  Location: [facility] OR;  Service: Ophthalmology;  Laterality: Left;   • CATARACT EXTRACTION W/ INTRAOCULAR LENS IMPLANT Right 01/29/2021    Procedure: REMOVE CATARACT AND IMPLANT INTRAOCULAR LENS;  Surgeon: Sam Isaacs MD;  Location: [facility] OR;  Service: Ophthalmology;  Laterality: Right;   • CHOLECYSTECTOMY OPEN     • COLONOSCOPY N/A 11/09/2018    Procedure: COLONOSCOPY;  Surgeon: Sukhdeep Mae MD;  Location: [facility] ENDOSCOPY;  Service: Gastroenterology   • COLONOSCOPY N/A 03/28/2022    Procedure: COLONOSCOPY;  Surgeon: Sukhdeep Mae MD;  Location: [facility] ENDOSCOPY;  Service: Gastroenterology;  Laterality: N/A;   • CYSTOSCOPY N/A 12/16/2017    Procedure: CYSTOSCOPY WITH CLOT EVACUATION;  Surgeon: Prosper Ariza MD;  Location: [facility] OR;  Service:    • CYSTOSCOPY, URETEROSCOPY, RETROGRADE PYELOGRAM, STENT INSERTION Left 12/15/2017    Procedure: CYSTOSCOPY LEFT URETEROSCOPY RETROGRADE PYELOGRAM HOLMIUM LASER STENT INSERTION;  Surgeon: Prosper Ariza MD;  Location: [facility];  Service:    • CYSTOSCOPY, URETEROSCOPY, RETROGRADE PYELOGRAM, STENT INSERTION Left 08/10/2022    Procedure: CYSTOSCOPY LEFT RETROGRADE URETEROSCOPY LASER LITHOTRIPSY STENT PLACMENT;  Surgeon: Prosper Ariza MD;  Location: [facility];  Service: Urology;  Laterality: Left;   • DENTAL PROCEDURE      multiple extractions   • EXTRACORPOREAL SHOCK WAVE LITHOTRIPSY (ESWL)     • EXTRACORPOREAL SHOCKWAVE LITHOTRIPSY (ESWL), STENT INSERTION/REMOVAL Left 02/03/2017    Procedure: LEFT EXTRACORPOREAL SHOCKWAVE LITHOTRIPSY, POSSIBLE CYSTOSCOPY, POSSIBLE STENT REMOVAL ;  Surgeon: Prosper Ariza MD;  Location: [facility];  Service:    • EXTRACORPOREAL  SHOCKWAVE LITHOTRIPSY (ESWL), STENT INSERTION/REMOVAL Left 05/04/2018    Procedure: EXTRACORPOREAL SHOCKWAVE LITHOTRIPSY;  Surgeon: Prosper Ariza MD;  Location: [facility];  Service: Urology   • KIDNEY STONE SURGERY     • SCROTAL SURGERY      trauma   • WISDOM TOOTH EXTRACTION      took bottom ones      General Information     Row Name 03/20/23 0752          OT Time and Intention    Document Type therapy note (daily note)  -KD     Mode of Treatment individual therapy;occupational therapy  -KD     Row Name 03/20/23 0752          General Information    Patient Profile Reviewed yes  -KD     Existing Precautions/Restrictions fall  -KD     Row Name 03/20/23 0752          Cognition    Orientation Status (Cognition) oriented x 4  -KD     Row Name 03/20/23 0752          Safety Issues, Functional Mobility    Impairments Affecting Function (Mobility) balance;endurance/activity tolerance  -KD           User Key  (r) = Recorded By, (t) = Taken By, (c) = Cosigned By    Initials Name Provider Type     Xiomara Francois COTA Occupational Therapist Assistant                 Mobility/ADL's     Row Name 03/20/23 0752          Bed Mobility    Supine-Sit [place] (Bed Mobility) independent  -KD     Sit-Supine [place] (Bed Mobility) independent  -KD     Assistive Device (Bed Mobility) bed rails;head of bed elevated  -KD     Row Name 03/20/23 0752          Stand-Sit Transfer    Stand-Sit [place] (Transfers) standby assist  -KD     Row Name 03/20/23 0752          Functional Mobility    Functional Mobility- Ind. Level standby assist  -KD     Functional Mobility-Distance (Feet) 660  -KD     Row Name 03/20/23 0752          Activities of Daily Living    BADL Assessment/Intervention toileting  -KD     Row Name 03/20/23 0752          Lower Body Dressing Assessment/Training    [place] Level (Lower Body Dressing) pants/bottoms;lower body dressing skills;standby assist  -KD     Position (Lower Body Dressing) unsupported  standing  -KD           User Key  (r) = Recorded By, (t) = Taken By, (c) = Cosigned By    Initials Name Provider Type    Xiomara Red COTA Occupational Therapist Assistant               Obj/Interventions    No documentation.                Goals/Plan     Row Name 03/20/23 0752          Transfer Goal 1 (OT)    Activity/Assistive Device (Transfer Goal 1, OT) toilet;shower chair  -KD     [place] Level/Cues Needed (Transfer Goal 1, OT) independent  -KD     Time Frame (Transfer Goal 1, OT) long term goal (LTG);by discharge  -KD     Progress/Outcome (Transfer Goal 1, OT) goal met  -KD     Row Name 03/20/23 0752          Bathing Goal 1 (OT)    Activity/Device (Bathing Goal 1, OT) lower body bathing;shower chair  -KD     [place] Level/Cues Needed (Bathing Goal 1, OT) modified independence  -KD     Time Frame (Bathing Goal 1, OT) long term goal (LTG);by discharge  -KD     Progress/Outcomes (Bathing Goal 1, OT) goal not met  -KD     Row Name 03/20/23 0752          Dressing Goal 1 (OT)    Activity/Device (Dressing Goal 1, OT) lower body dressing  -KD     [place]/Cues Needed (Dressing Goal 1, OT) minimum assist (75% or more patient effort)  -KD     Time Frame (Dressing Goal 1, OT) by discharge;long term goal (LTG)  -KD     Progress/Outcome (Dressing Goal 1, OT) goal not met  -KD           User Key  (r) = Recorded By, (t) = Taken By, (c) = Cosigned By    Initials Name Provider Type    Xiomara Red COTA Occupational Therapist Assistant               Clinical Impression     Row Name 03/20/23 0752          Pain Assessment    Pretreatment Pain Rating 0/10 - no pain  -KD     Posttreatment Pain Rating 0/10 - no pain  -KD     Row Name 03/20/23 0752          Plan of Care Review    Plan of Care Reviewed With patient  -KD     Progress improving  -KD     Row Name 03/20/23 0752          Therapy Assessment/Plan (OT)    Therapy Frequency (OT) other (see comments)  5-7 d/wk  -KD     Row Name 03/20/23 0752           Therapy Plan Review/Discharge Plan (OT)    Anticipated Discharge Disposition (OT) home with assist  -KD     Row Name 03/20/23 0752          Vital Signs    Pre Systolic BP Rehab 150  -KD     Pre Treatment Diastolic BP 76  -KD     Pretreatment Heart Rate (beats/min) 67  -KD     Pre SpO2 (%) 97  -KD     O2 Delivery Pre Treatment nasal cannula  -KD     Pre Patient Position Supine  -KD     Intra Patient Position Standing  -KD     Post Patient Position Sitting  -KD     Row Name 03/20/23 0752          Positioning and Restraints    Pre-Treatment Position in bed  -KD     Post Treatment Position bed  -KD     In Bed fowlers;call light within reach;encouraged to call for assist;exit alarm on  -KD           User Key  (r) = Recorded By, (t) = Taken By, (c) = Cosigned By    Initials Name Provider Type    Xiomara Red COTA Occupational Therapist Assistant               Outcome Measures     Row Name 03/20/23 0752          How much help from another is currently needed...    Putting on and taking off regular lower body clothing? 2  -KD     Bathing (including washing, rinsing, and drying) 3  -KD     Toileting (which includes using toilet bed pan or urinal) 4  -KD     Putting on and taking off regular upper body clothing 4  -KD     Taking care of personal grooming (such as brushing teeth) 4  -KD     Eating meals 4  -KD     AM-PAC 6 Clicks Score (OT) 21  -KD     Row Name 03/20/23 0819          How much help from another person do you currently need...    Turning from your back to your side while in flat bed without using bedrails? 4  -HS     Moving from lying on back to sitting on the side of a flat bed without bedrails? 4  -HS     Moving to and from a bed to a chair (including a wheelchair)? 3  -HS     Standing up from a chair using your arms (e.g., wheelchair, bedside chair)? 3  -HS     Climbing 3-5 steps with a railing? 3  -HS     To walk in hospital room? 3  -HS     AM-PAC 6 Clicks Score (PT) 20  -HS     Highest level of  mobility 6 --> Walked 10 steps or more  -HS           User Key  (r) = Recorded By, (t) = Taken By, (c) = Cosigned By    Initials Name Provider Type    Xiomara Red COTA Occupational Therapist Assistant     Maxine Kessler RN Registered Nurse                Occupational Therapy Education     Title: PT OT SLP Therapies (In Progress)     Topic: Occupational Therapy (Done)     Point: ADL training (Done)     Description:   Instruct learner(s) on proper safety adaptation and remediation techniques during self care or transfers.   Instruct in proper use of assistive devices.              Learning Progress Summary           Patient Acceptance, E, VU,NR by  at 3/18/2023 1511    Acceptance, E,TB,H, VU by LW at 3/17/2023 1047    Comment: Educated patient on Home safety    Acceptance, E,TB, VU by LW at 3/16/2023 1523    Acceptance, E,TB, VU by CM at 3/15/2023 1551    Comment: OT POC, Role of OT, d/c recommendations, PLB                   Point: Home exercise program (Done)     Description:   Instruct learner(s) on appropriate technique for monitoring, assisting and/or progressing therapeutic exercises/activities.              Learning Progress Summary           Patient Acceptance, E,TB,H, VU by LW at 3/17/2023 1047    Comment: Educated patient on Home safety    Acceptance, E,TB, VU by LW at 3/16/2023 1523                   Point: Precautions (Done)     Description:   Instruct learner(s) on prescribed precautions during self-care and functional transfers.              Learning Progress Summary           Patient Acceptance, E, VU,NR by  at 3/18/2023 1511    Acceptance, E,TB,H, VU by LW at 3/17/2023 1047    Comment: Educated patient on Home safety    Acceptance, E,TB, VU by LW at 3/16/2023 1523    Acceptance, E,TB, VU by CM at 3/15/2023 1551    Comment: OT POC, Role of OT, d/c recommendations, PLB                   Point: Body mechanics (Done)     Description:   Instruct learner(s) on proper positioning and spine  alignment during self-care, functional mobility activities and/or exercises.              Learning Progress Summary           Patient Acceptance, E, VU,NR by  at 3/18/2023 1511    Acceptance, E,TB,H, VU by  at 3/17/2023 1047    Comment: Educated patient on Home safety    Acceptance, E,TB, VU by LW at 3/16/2023 1523    Acceptance, E,TB, VU by  at 3/15/2023 1551    Comment: OT POC, Role of OT, d/c recommendations, PLB                               User Key     Initials Effective Dates Name Provider Type Discipline     06/16/21 -  Enedelia Cabrera COTA Occupational Therapist Assistant OT    LW 06/16/21 -  Solange Joseph COTA Occupational Therapist Assistant OT     11/18/22 -  Kiki Kauffman OT Occupational Therapist OT              OT Recommendation and Plan  Therapy Frequency (OT): other (see comments) (5-7 d/wk)  Plan of Care Review  Plan of Care Reviewed With: patient  Progress: improving     Time Calculation:    Time Calculation- OT     Row Name 03/20/23 0752             Time Calculation- OT    OT Start Time 0752  -KD      OT Stop Time 0819  -KD      OT Time Calculation (min) 27 min  -KD      Total Timed Code Minutes- OT 27 minute(s)  -KD      OT Received On 03/20/23  -KD         Timed Charges    18003 - OT Therapeutic Exercise Minutes 27  -KD         Total Minutes    Timed Charges Total Minutes 27  -KD       Total Minutes 27  -KD            User Key  (r) = Recorded By, (t) = Taken By, (c) = Cosigned By    Initials Name Provider Type    KD Xiomara Francois COTA Occupational Therapist Assistant              Therapy Charges for Today     Code Description Service Date Service Provider Modifiers Qty    [id number] HC OT THER PROC EA 15 MIN 3/20/2023 Xiomara Francois COTA GO 2               MARÍA Gonzales  3/20/2023

## 2023-03-20 NOTE — DISCHARGE SUMMARY
[facility] Medicine Services  DISCHARGE SUMMARY       Date of Admission: 3/14/2023  Date of Discharge:  3/20/2023  Primary Care Physician: Jaxson Zepeda MD    Presenting Problem/History of Present Illness:  Metabolic encephalopathy [G93.41]  Acute respiratory failure with hypoxia and hypercapnia (HCC) [J96.01, J96.02]  Pneumonia of left lower lobe due to infectious organism [J18.9]       Final Discharge Diagnoses:  Active Hospital Problems    Diagnosis    • **Pneumonia of left lower lobe due to infectious organism    • Acute and chronic respiratory failure with hypercapnia (HCC)    • Acute and chronic respiratory failure with hypoxia (HCC)    • COPD (chronic obstructive pulmonary disease) (HCC)    • Myopathies        Consults:   Consults     Date and Time Order Name Status Description    3/14/2023  6:08 PM Hospitalist (on-call MD unless specified)            Procedures Performed:                 Pertinent Test Results:   Lab Results (most recent)     Procedure Component Value Units Date/Time    POC Glucose Once [[id number]]  (Abnormal) Collected: 03/20/23 0756    Specimen: Blood Updated: 03/20/23 0823     Glucose 169 mg/dL      Comment: RN NotifiedOperator: [id number] MARIEL Cee ID: [id number]       POC Glucose Once [[id number]]  (Abnormal) Collected: 03/19/23 1942    Specimen: Blood Updated: 03/20/23 0758     Glucose 401 mg/dL      Comment: RN NotifiedOperator: [id number] MADHAV BARROWeter ID: [id number]       Comprehensive Metabolic Panel [[id number]]  (Abnormal) Collected: 03/20/23 0600    Specimen: Blood Updated: 03/20/23 0726     Glucose 154 mg/dL      BUN 37 mg/dL      Creatinine 1.74 mg/dL      Sodium 138 mmol/L      Potassium 3.8 mmol/L      Chloride 100 mmol/L      CO2 28.0 mmol/L      Calcium 8.5 mg/dL      Total Protein 5.9 g/dL      Albumin 3.4 g/dL      ALT (SGPT) 45 U/L      AST (SGOT) 22 U/L      Alkaline Phosphatase 54 U/L      Total  Bilirubin 0.3 mg/dL      Globulin 2.5 gm/dL      A/G Ratio 1.4 g/dL      BUN/Creatinine Ratio 21.3     Anion Gap 10.0 mmol/L      eGFR 40.4 mL/min/1.73     Narrative:      GFR Normal >60  Chronic Kidney Disease <60  Kidney Failure <15    The GFR formula is only valid for adults with stable renal function between ages 18 and 70.    CBC & Differential [[id number]]  (Abnormal) Collected: 03/20/23 0600    Specimen: Blood Updated: 03/20/23 0712    Narrative:      The following orders were created for panel order CBC & Differential.  Procedure                               Abnormality         Status                     ---------                               -----------         ------                     CBC Auto Differential[[id number]]        Abnormal            Final result                 Please view results for these tests on the individual orders.    CBC Auto Differential [[id number]]  (Abnormal) Collected: 03/20/23 0600    Specimen: Blood Updated: 03/20/23 0712     WBC 6.79 10*3/mm3      RBC 3.60 10*6/mm3      Hemoglobin 10.5 g/dL      Hematocrit 31.8 %      MCV 88.3 fL      MCH 29.2 pg      MCHC 33.0 g/dL      RDW 13.2 %      RDW-SD 43.2 fl      MPV 9.3 fL      Platelets 235 10*3/mm3      Neutrophil % 66.3 %      Lymphocyte % 23.6 %      Monocyte % 7.5 %      Eosinophil % 0.4 %      Basophil % 0.3 %      Immature Grans % 1.9 %      Neutrophils, Absolute 4.50 10*3/mm3      Lymphocytes, Absolute 1.60 10*3/mm3      Monocytes, Absolute 0.51 10*3/mm3      Eosinophils, Absolute 0.03 10*3/mm3      Basophils, Absolute 0.02 10*3/mm3      Immature Grans, Absolute 0.13 10*3/mm3      nRBC 0.0 /100 WBC     Blood Culture - Blood, Hand, Right [[id number]]  (Normal) Collected: 03/14/23 1815    Specimen: Blood from Hand, Right Updated: 03/19/23 1831     Blood Culture No growth at 5 days    Blood Culture - Blood, Arm, Left [[id number]]  (Normal) Collected: 03/14/23 1815    Specimen: Blood from Arm, Left Updated: 03/19/23 1831     Blood  Culture No growth at 5 days    Comprehensive Metabolic Panel [[id number]]  (Abnormal) Collected: 03/19/23 0553    Specimen: Blood Updated: 03/19/23 0710     Glucose 189 mg/dL      BUN 41 mg/dL      Creatinine 1.97 mg/dL      Sodium 140 mmol/L      Potassium 4.2 mmol/L      Chloride 102 mmol/L      CO2 28.0 mmol/L      Calcium 8.3 mg/dL      Total Protein 5.9 g/dL      Albumin 3.4 g/dL      ALT (SGPT) 33 U/L      AST (SGOT) 18 U/L      Alkaline Phosphatase 54 U/L      Total Bilirubin 0.2 mg/dL      Globulin 2.5 gm/dL      A/G Ratio 1.4 g/dL      BUN/Creatinine Ratio 20.8     Anion Gap 10.0 mmol/L      eGFR 34.8 mL/min/1.73     Narrative:      GFR Normal >60  Chronic Kidney Disease <60  Kidney Failure <15    The GFR formula is only valid for adults with stable renal function between ages 18 and 70.    CBC & Differential [[id number]]  (Abnormal) Collected: 03/19/23 0553    Specimen: Blood Updated: 03/19/23 0654    Narrative:      The following orders were created for panel order CBC & Differential.  Procedure                               Abnormality         Status                     ---------                               -----------         ------                     CBC Auto Differential[[id number]]        Abnormal            Final result                 Please view results for these tests on the individual orders.    CBC Auto Differential [[id number]]  (Abnormal) Collected: 03/19/23 0553    Specimen: Blood Updated: 03/19/23 0654     WBC 6.73 10*3/mm3      RBC 3.70 10*6/mm3      Hemoglobin 10.4 g/dL      Hematocrit 33.0 %      MCV 89.2 fL      MCH 28.1 pg      MCHC 31.5 g/dL      RDW 13.2 %      RDW-SD 43.5 fl      MPV 9.1 fL      Platelets 235 10*3/mm3      Neutrophil % 72.8 %      Lymphocyte % 18.4 %      Monocyte % 6.8 %      Eosinophil % 0.1 %      Basophil % 0.3 %      Immature Grans % 1.6 %      Neutrophils, Absolute 4.89 10*3/mm3      Lymphocytes, Absolute 1.24 10*3/mm3      Monocytes, Absolute 0.46 10*3/mm3       Eosinophils, Absolute 0.01 10*3/mm3      Basophils, Absolute 0.02 10*3/mm3      Immature Grans, Absolute 0.11 10*3/mm3      nRBC 0.0 /100 WBC     Hemoglobin A1c [[id number]]  (Abnormal) Collected: 03/18/23 0818    Specimen: Blood Updated: 03/18/23 1257     Hemoglobin A1C 6.20 %     Narrative:      Hemoglobin A1C Ranges:    Increased Risk for Diabetes  5.7% to 6.4%  Diabetes                     >= 6.5%  Diabetic Goal                < 7.0%    Urine Eosinophils, Luisito's Stain - Urine, Clean Catch [[id number]]  (Normal) Collected: 03/17/23 1626    Specimen: Urine, Clean Catch Updated: 03/18/23 1013     Luisito Stain Negative    Creatinine Urine Random (kidney function) GFR component - Urine, Clean Catch [[id number]] Collected: 03/17/23 1626    Specimen: Urine, Clean Catch Updated: 03/18/23 0108     Creatinine, Urine 70.7 mg/dL     Narrative:      Reference intervals for random urine have not been established.  Clinical usage is dependent upon physician's interpretation in combination with other laboratory tests.       Urinalysis With Microscopic - Urine, Clean Catch [[id number]]  (Abnormal) Collected: 03/17/23 1626    Specimen: Urine, Clean Catch Updated: 03/17/23 1645    Narrative:      The following orders were created for panel order Urinalysis With Microscopic - Urine, Clean Catch.  Procedure                               Abnormality         Status                     ---------                               -----------         ------                     Urinalysis without micro...[[id number]]  Abnormal            Final result               Urinalysis, Microscopic ...[[id number]]                      Final result                 Please view results for these tests on the individual orders.    Urinalysis without microscopic (no culture) - Urine, Clean Catch [[id number]]  (Abnormal) Collected: 03/17/23 1626    Specimen: Urine, Clean Catch Updated: 03/17/23 1645     Color, UA Yellow     Appearance, UA Clear     pH, UA 5.5      Specific Gravity, UA 1.019     Glucose, UA >=1000 mg/dL (3+)     Ketones, UA Trace     Bilirubin, UA Negative     Blood, UA Negative     Protein, UA 30 mg/dL (1+)     Leuk Esterase, UA Negative     Nitrite, UA Negative     Urobilinogen, UA 0.2 E.U./dL    Urinalysis, Microscopic Only - Urine, Clean Catch [[id number]] Collected: 03/17/23 1626    Specimen: Urine, Clean Catch Updated: 03/17/23 1645     RBC, UA None Seen /HPF      WBC, UA 0-2 /HPF      Bacteria, UA None Seen /HPF      Squamous Epithelial Cells, UA 0-2 /HPF      Hyaline Casts, UA None Seen /LPF      Methodology Automated Microscopy    Sodium, Urine, Random - Urine, Clean Catch [[id number]] Collected: 03/17/23 1626    Specimen: Urine, Clean Catch Updated: 03/17/23 1643     Sodium, Urine 79 mmol/L     Narrative:      Reference intervals for random urine have not been established.  Clinical usage is dependent upon physician's interpretation in combination with other laboratory tests.       Urine Culture - Urine, Straight Cath [[id number]]  (Normal) Collected: 03/14/23 1841    Specimen: Urine from Straight Cath Updated: 03/16/23 0749     Urine Culture No growth    Extra Tubes [[id number]] Collected: 03/16/23 0601    Specimen: Blood, Venous Line Updated: 03/16/23 0715    Narrative:      The following orders were created for panel order Extra Tubes.  Procedure                               Abnormality         Status                     ---------                               -----------         ------                     Lavender Top[[id number]]                                     Final result                 Please view results for these tests on the individual orders.    Lavender Top [[id number]] Collected: 03/16/23 0601    Specimen: Blood Updated: 03/16/23 0715     Extra Tube hold for add-on     Comment: Auto resulted       Basic Metabolic Panel [[id number]]  (Abnormal) Collected: 03/16/23 0601    Specimen: Blood Updated: 03/16/23 0659     Glucose 218 mg/dL       BUN 20 mg/dL      Creatinine 1.77 mg/dL      Sodium 140 mmol/L      Potassium 3.9 mmol/L      Chloride 102 mmol/L      CO2 28.0 mmol/L      Calcium 8.2 mg/dL      BUN/Creatinine Ratio 11.3     Anion Gap 10.0 mmol/L      eGFR 39.6 mL/min/1.73     Narrative:      GFR Normal >60  Chronic Kidney Disease <60  Kidney Failure <15    The GFR formula is only valid for adults with stable renal function between ages 18 and 70.    Basic Metabolic Panel [[id number]]  (Abnormal) Collected: 03/15/23 0604    Specimen: Blood Updated: 03/15/23 0701     Glucose 183 mg/dL      BUN 16 mg/dL      Creatinine 1.55 mg/dL      Sodium 141 mmol/L      Potassium 3.8 mmol/L      Chloride 105 mmol/L      CO2 28.0 mmol/L      Calcium 8.1 mg/dL      BUN/Creatinine Ratio 10.3     Anion Gap 8.0 mmol/L      eGFR 46.4 mL/min/1.73     Narrative:      GFR Normal >60  Chronic Kidney Disease <60  Kidney Failure <15    The GFR formula is only valid for adults with stable renal function between ages 18 and 70.    Blood Gas, Arterial - [[id number]]  (Abnormal) Collected: 03/15/23 0435    Specimen: Arterial Blood Updated: 03/15/23 0437     Site Right Radial     Elder's Test N/A     pH, Arterial 7.333 pH units      Comment: 84 Value below reference range        pCO2, Arterial 60.7 mm Hg      Comment: 83 Value above reference range        pO2, Arterial 60.7 mm Hg      Comment: 84 Value below reference range        HCO3, Arterial 32.2 mmol/L      Comment: 83 Value above reference range        Base Excess, Arterial 5.0 mmol/L      Comment: 83 Value above reference range        O2 Saturation, Arterial 92.7 %      Comment: 84 Value below reference range        Barometric Pressure for Blood Gas 756 mmHg      Modality Nasal Cannula     Flow Rate 3.0 lpm      Ventilator Mode NA     Collected by RT     Comment: Meter: [id number]     :  [id number]       Blood Gas, Arterial - [[id number]]  (Abnormal) Collected: 03/15/23 0144    Specimen: Arterial Blood Updated:  03/15/23 0145     Site Arterial Line     Elder's Test N/A     pH, Arterial 7.313 pH units      Comment: 84 Value below reference range        pCO2, Arterial 64.1 mm Hg      Comment: 83 Value above reference range        pO2, Arterial 64.0 mm Hg      Comment: 84 Value below reference range        HCO3, Arterial 32.5 mmol/L      Comment: 83 Value above reference range        Base Excess, Arterial 4.8 mmol/L      Comment: 83 Value above reference range        O2 Saturation, Arterial 93.4 %      Comment: 84 Value below reference range        Barometric Pressure for Blood Gas 756 mmHg      Modality Room Air     Ventilator Mode NA     Collected by RT     Comment: Meter: [id number]     :  [id number]       High Sensitivity Troponin T 2Hr [[id number]]  (Abnormal) Collected: 03/14/23 2100    Specimen: Blood Updated: 03/14/23 2119     HS Troponin T 46 ng/L      Troponin T Delta 1 ng/L     Narrative:      High Sensitive Troponin T Reference Range:  <10.0 ng/L- Negative Female for AMI  <15.0 ng/L- Negative Male for AMI  >=10 - Abnormal Female indicating possible myocardial injury.  >=15 - Abnormal Male indicating possible myocardial injury.   Clinicians would have to utilize clinical acumen, EKG, Troponin, and serial changes to determine if it is an Acute Myocardial Infarction or myocardial injury due to an underlying chronic condition.         High Sensitivity Troponin T [[id number]]  (Abnormal) Collected: 03/14/23 1917    Specimen: Blood Updated: 03/14/23 2009     HS Troponin T 45 ng/L     Narrative:      High Sensitive Troponin T Reference Range:  <10.0 ng/L- Negative Female for AMI  <15.0 ng/L- Negative Male for AMI  >=10 - Abnormal Female indicating possible myocardial injury.  >=15 - Abnormal Male indicating possible myocardial injury.   Clinicians would have to utilize clinical acumen, EKG, Troponin, and serial changes to determine if it is an Acute Myocardial Infarction or myocardial injury due to an underlying  chronic condition.         BNP [[id number]]  (Normal) Collected: 03/14/23 1917    Specimen: Blood Updated: 03/14/23 2009     proBNP 770.4 pg/mL     Narrative:      Among patients with dyspnea, NT-proBNP is highly sensitive for the detection of acute congestive heart failure. In addition NT-proBNP of <300 pg/ml effectively rules out acute congestive heart failure with 99% negative predictive value.    Results may be falsely decreased if patient taking Biotin.      Urinalysis, Microscopic Only - Straight Cath [[id number]]  (Abnormal) Collected: 03/14/23 1841    Specimen: Urine from Straight Cath Updated: 03/14/23 1923     RBC, UA 3-5 /HPF      WBC, UA 6-12 /HPF      Bacteria, UA 1+ /HPF      Squamous Epithelial Cells, UA 3-5 /HPF      Transitional Epithelial Cells, UA 3-6 /HPF      Hyaline Casts, UA 7-12 /LPF      Methodology Manual Light Microscopy    Urinalysis With Culture If Indicated - Straight Cath [[id number]]  (Abnormal) Collected: 03/14/23 1841    Specimen: Urine from Straight Cath Updated: 03/14/23 1911     Color, UA Yellow     Appearance, UA Clear     pH, UA 5.5     Specific Gravity, UA 1.014     Glucose, UA Negative     Ketones, UA Negative     Bilirubin, UA Negative     Blood, UA Small (1+)     Protein,  mg/dL (2+)     Leuk Esterase, UA Negative     Nitrite, UA Negative     Urobilinogen, UA 0.2 E.U./dL    Narrative:      In absence of clinical symptoms, the presence of pyuria, bacteria, and/or nitrites on the urinalysis result does not correlate with infection.    Lactic Acid, Plasma [[id number]]  (Normal) Collected: 03/14/23 1815    Specimen: Blood Updated: 03/14/23 1844     Lactate 1.2 mmol/L     COVID-19 and FLU A/B PCR - Swab, Nasopharynx [[id number]]  (Normal) Collected: 03/14/23 1733    Specimen: Swab from Nasopharynx Updated: 03/14/23 1808     COVID19 Not Detected     Influenza A PCR Not Detected     Influenza B PCR Not Detected    Narrative:      Fact sheet for providers:  https://www.fda.gov/media/477795/download    Fact sheet for patients: https://www.fda.gov/media/836494/download    Test performed by PCR.    [place] Draw [[id number]] Collected: 03/14/23 1646    Specimen: Blood Updated: 03/14/23 1800    Narrative:      The following orders were created for panel order [place] Draw.  Procedure                               Abnormality         Status                     ---------                               -----------         ------                     Green Top (Gel)[[id number]]                                  Final result               Lavender Top[[id number]]                                     Final result               Gold Top - SST[[id number]]                                   Final result               Light Blue Top[[id number]]                                   Final result                 Please view results for these tests on the individual orders.    Gold Top - SST [[id number]] Collected: 03/14/23 1646    Specimen: Blood Updated: 03/14/23 1800     Extra Tube Hold for add-ons.     Comment: Auto resulted.       Light Blue Top [[id number]] Collected: 03/14/23 1646    Specimen: Blood Updated: 03/14/23 1800     Extra Tube Hold for add-ons.     Comment: Auto resulted       Green Top (Gel) [[id number]] Collected: 03/14/23 1646    Specimen: Blood Updated: 03/14/23 1800     Extra Tube Hold for add-ons.     Comment: Auto resulted.       Lavender Top [[id number]] Collected: 03/14/23 1646    Specimen: Blood Updated: 03/14/23 1800     Extra Tube hold for add-on     Comment: Auto resulted       D-dimer, Quantitative [[id number]]  (Normal) Collected: 03/14/23 1646    Specimen: Blood Updated: 03/14/23 1752     D-Dimer, Quantitative 710 ng/mL (FEU)     Narrative:      According to the assay 's published package insert, a normal (<500 ng/mL (FEU)) D-dimer result in conjunction with a non-high clinical probability assessment, excludes deep vein thrombosis (DVT) and pulmonary embolism  "(PE) with high sensitivity.    D-dimer values increase with age and this can make VTE exclusion of an older population difficult. To address this, the [employer], based on best available evidence and recent guidelines, recommends that clinicians use age-adjusted D-dimer thresholds in patients greater than 50 years of age with: a) a low probability of PE who do not meet all Pulmonary Embolism Rule Out Criteria, or b) in those with intermediate probability of PE.   The formula for an age-adjusted D-dimer cut-off is \"age*10\".  For example, a 60 year old patient would have an age-adjusted cut-off of 600 ng/mL (FEU) and an 80 year old 800 ng/mL (FEU).      aPTT [[id number]]  (Normal) Collected: 03/14/23 1646    Specimen: Blood Updated: 03/14/23 1752     PTT 27.1 seconds     Narrative:      The recommended Heparin therapeutic range is 68-97 seconds.    Protime-INR [[id number]]  (Normal) Collected: 03/14/23 1646    Specimen: Blood Updated: 03/14/23 1751     Protime 12.6 Seconds      INR 0.95    Narrative:      Therapeutic range for most indications is 2.0-3.0 INR,  or 2.5-3.5 for mechanical heart valves.    Single High Sensitivity Troponin T [[id number]]  (Abnormal) Collected: 03/14/23 1646    Specimen: Blood Updated: 03/14/23 1723     HS Troponin T 51 ng/L     Narrative:      High Sensitive Troponin T Reference Range:  <10.0 ng/L- Negative Female for AMI  <15.0 ng/L- Negative Male for AMI  >=10 - Abnormal Female indicating possible myocardial injury.  >=15 - Abnormal Male indicating possible myocardial injury.   Clinicians would have to utilize clinical acumen, EKG, Troponin, and serial changes to determine if it is an Acute Myocardial Infarction or myocardial injury due to an underlying chronic condition.         Magnesium [[id number]]  (Normal) Collected: 03/14/23 1646    Specimen: Blood Updated: 03/14/23 1723     Magnesium 2.1 mg/dL         Imaging Results (Most Recent)     Procedure Component Value " Units Date/Time    CT Head Without Contrast [[id number]] Collected: 03/14/23 1716     Updated: 03/14/23 1733    Narrative:      EXAM: CT HEAD WITHOUT CONTRAST    CLINICAL HISTORY:  77 years Male,Mental status change, unknown  cause    COMPARISON:  None    Axial with coronal and sagittal images were obtained without  intravenous contrast.  This examination was performed according  to our departmental dose optimization program which includes  automated exposure control, adjustment of the MA and kV according  to patient size, and/or use of iterative reconstruction  technique.    FINDINGS:  Mild low attenuation in the bilateral periventricular/deep  cerebral white matter, most consistent with mild chronic small  vessel ischemic change. No acute intracranial hemorrhage, mass  effect, hydrocephalus, or CT signs of acute infarct.    Mild mucosal thickening along the bilateral maxillary sinuses.  Opacification of several left mastoid air cells.      Impression:      1. Mild chronic small vessel ischemic changes of acute  intracranial process.  2. Opacification of several left mastoid air cells.    Electronically signed by:  Finesse Granados MD  3/14/2023 5:31 PM CDT  Workstation: [id number]    XR Chest 1 View [[id number]] Collected: 03/14/23 1641     Updated: 03/14/23 1704    Narrative:        PORTABLE CHEST    HISTORY: Weakness. Dizziness. Altered mental status.    Portable AP upright film of the chest was obtained at 4:39 PM.  COMPARISON: August 30, 2022    FINDINGS:   EKG leads.  Subsegmental infiltrate left lung base compatible with pneumonia.  Small left pleural effusion.  The heart is not enlarged.  The pulmonary vasculature is not increased.  No pneumothorax.  No acute osseous abnormality.  Degenerative changes each shoulder.      Impression:      CONCLUSION:  Subsegmental infiltrate left lung base compatible with pneumonia.  Small left pleural effusion.    [id number]    Electronically signed by:  Farzad Mcadams MD  3/14/2023  5:02 PM CDT  Workstation: [id number]          Chief Complaint on Day of Discharge:  No complaints on day of discharge.    Hospital Course:  The patient is a 77 y.o. male who presented to [facility] with weakness and confusion.  Patient is admitted to hospital 3/14/2023.  He is discharged home 3/20/2023.    Patient is a 77 year old male (PMHx HTN, HLD, DM, Black Lung disease, GERD) who presented to [facility] ED by EMS from home. Wife accompanies him and provided majority of the history. She reports he has not been feeling well for the last several days. In the last two days, she has noticed he really has been sleeping a lot and has not been eating appropriately for his diabetic status. She really noticed he seemed confused last night and he seems to have developed a tremor, but he states he has always had this. He does use oxygen chronically at home on 2L due to black lung disease, but has had some increased shortness of breath at times. They deny cough, congestion, fever, or known sick contacts.      ED findings pertinent for CXR findings of a left lower lobe pneumonia. CBC unremarkable; CMP shows mild creatinine elevation at 1.73. CT head negative. Hospitalist team consulted for admission with ABG pending. ABG resulted later to reveal respiratory acidosis with hypercapnia and BiPAP was initiated in ED.    Impression/treatment plan  1.  Pneumonia of left lower lobe due to infectious organism              -IV Rocephin and Azithromycin initiated; blood cultures pending              -continue oxygen, inhalers/nebs, steroids.  Completed antibiotic regimen.     2. Respiratory acidosis with hypercapnia              -BiPAP continued during patient's stay along with [place] dilators and steroids.     3. Metabolic encephalopathy               -secondary to above; continue to monitor for improvement/worsening     4. FLORENCIA             On day of discharge BUN is 37 creatinine is 1.74 GFR is 40.4.             Appears to be  acute on chronic kidney disease stage IIIb     5. DM              -Consistent carbohydrate diet, Acchecks with sliding scale insulin     6. HTN              -Blood pressure on day of discharge is 131/66.  O2 saturation 97% 3 L.              -Stable  7. HLD              -continue Lipitor     8. BPH              -continue Flomax     Patient has acute on chronic hypercapnic and hypoxic respiratory failure along with COPD.  Upon admission to hospital patient PCO2 was 78.4 after being moved to BiPAP on AVAPS mode PCO2 only dropped to 60.7 improving then home BiPAP/BiPAP ST/BiPAP STA is insufficient due to severity of the disease.  Patient requires volume ventilation AE mode with auto titrating EPAP to reduce hypercapnic episodes.  Ordering noninvasive ventilator to reduce hospital readmissions.     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  Patient has been on multiple rounds of antibiotics and steroids for this condition.  Patient has had a productive cough for greater than 6 months.  Ordering a flow vest to help mobilize secretions and reduce hospital readmissions.    Patient is seen and examined 3/20/2023.  On day of discharge patient is feeling much better.  He is anticipating going home.  He is currently stable.  He remains short of breath at rest and on exertion however significantly improved since admission.  Patient will be discharged home today.  He is to follow-up with primary care provider within 1 week of discharge.  I reviewed all findings discharge medications follow instructions with the patient he states he understands and will follow recommendations.  The patient did complete Rocephin and azithromycin regimen during his admission.      Condition on Discharge: Stable    Physical Exam on Discharge:  /66 (BP Location: Right arm, Patient Position: Lying)   Pulse 90   Temp 98.2 °F (36.8 °C) (Temporal)  "  Resp 18   Ht 165.1 cm (65\")   Wt 101 kg (222 lb 8 oz)   SpO2 97%   BMI 37.03 kg/m²   Physical Exam  Vitals and nursing note reviewed.   Constitutional:       Appearance: Normal appearance.   HENT:      Head: Normocephalic and atraumatic.      Right Ear: External ear normal.      Left Ear: External ear normal.      Nose: Nose normal.      Mouth/Throat:      Mouth: Mucous membranes are moist.      Pharynx: Oropharynx is clear.   Eyes:      General: No scleral icterus.     Extraocular Movements: Extraocular movements intact.      Conjunctiva/sclera: Conjunctivae normal.      Pupils: Pupils are equal, round, and reactive to light.   Cardiovascular:      Rate and Rhythm: Normal rate and regular rhythm.      Heart sounds: No murmur heard.  Pulmonary:      Effort: Pulmonary effort is normal.      Breath sounds: Normal breath sounds.   Abdominal:      General: Bowel sounds are normal.      Palpations: Abdomen is soft.   Musculoskeletal:         General: Normal range of motion.      Cervical back: Normal range of motion and neck supple.   Skin:     General: Skin is warm and dry.      Capillary Refill: Capillary refill takes less than 2 seconds.   Neurological:      General: No focal deficit present.      Mental Status: He is alert and oriented to person, place, and time.   Psychiatric:         Mood and Affect: Mood normal.         Behavior: Behavior normal.           Discharge Disposition:  Home or Self Care    Discharge Medications:     Discharge Medications      New Medications      Instructions Start Date   predniSONE 50 MG tablet  Commonly known as: DELTASONE   50 mg, Oral, Daily With Breakfast   Start Date: March 21, 2023        Continue These Medications      Instructions Start Date   acetaminophen 500 MG tablet  Commonly known as: TYLENOL   500 mg, Oral, Nightly      albuterol sulfate  (90 Base) MCG/ACT inhaler  Commonly known as: PROVENTIL HFA;VENTOLIN HFA;PROAIR HFA   2 puffs, Inhalation, Every 4 Hours " PRN      aspirin 81 MG chewable tablet   81 mg, Oral, Daily      atorvastatin 20 MG tablet  Commonly known as: LIPITOR   20 mg, Oral, Nightly      B-12 1000 MCG tablet   1 TABLET BY MOUTH ON MONDAY, WEDNESDAY AND FRIDAY      esomeprazole 20 MG capsule  Commonly known as: nexIUM   20 mg, Oral, Every Morning Before Breakfast      folic acid 1 MG tablet  Commonly known as: FOLVITE   1 mg, Oral, Take As Directed, Monday, Wednesday, and Friday      glimepiride 2 MG tablet  Commonly known as: AMARYL   2 mg, Oral, 2 Times Daily      isosorbide mononitrate 30 MG 24 hr tablet  Commonly known as: IMDUR   30 mg, Oral, Every 24 Hours Scheduled      loperamide 2 MG capsule  Commonly known as: IMODIUM   2 mg, Oral, 2 Times Daily PRN      metFORMIN  MG 24 hr tablet  Commonly known as: GLUCOPHAGE-XR   No dose, route, or frequency recorded.      nebivolol 10 MG tablet  Commonly known as: Bystolic   10 mg, Oral, Daily      O2  Commonly known as: OXYGEN   2 L/min, Inhalation, Nightly      omeprazole 20 MG capsule  Commonly known as: priLOSEC   20 mg, Oral, Daily      OneTouch Delica Plus [id number] misc   No dose, route, or frequency recorded.      OneTouch Delica Plus Lancing misc   No dose, route, or frequency recorded.      OneTouch Ultra test strip  Generic drug: glucose blood   No dose, route, or frequency recorded.      tamsulosin 0.4 MG capsule 24 hr capsule  Commonly known as: FLOMAX   1 capsule, Oral, Nightly      umeclidinium-vilanterol 62.5-25 MCG/INH aerosol powder  inhaler  Commonly known as: ANORO ELLIPTA   1 puff, Inhalation, Daily             Discharge Diet:   Diet Instructions     Diet: Diabetic Diets; Consistent Carbohydrate; Texture: Regular Texture (IDDSI 7); Fluid Consistency: Thin (IDDSI 0)      Discharge Diet: Diabetic Diets    Diabetic Diet: Consistent Carbohydrate    Texture: Regular Texture (IDDSI 7)    Fluid Consistency: Thin (IDDSI 0)          Activity at Discharge:   Activity Instructions     Activity  as Tolerated            Discharge Care Plan/Instructions:  Discharge today  Follow-up with primary care provider within 1 week of discharge  Activity is as tolerated  Diet ADA regular texture  Continue home medications as documented.    Follow-up Appointments:   No future appointments.    Test Results Pending at Discharge:  None    Sidney Bustos DO    Time: Time to complete discharge 35 minutes

## 2023-03-21 NOTE — OUTREACH NOTE
Prep Survey    Flowsheet Row Responses   [facility] facility patient discharged from? [facility]   Is LACE score < 7 ? No   Eligibility Readm Mgmt   Discharge diagnosis **Pneumonia of left lower lobe due to infectious organism   Does the patient have one of the following disease processes/diagnoses(primary or secondary)? Pneumonia   Does the patient have Home health ordered? No   Is there a DME ordered? Yes   What DME was ordered? [facility] - [facility]    Prep survey completed? Yes          KEMAR LAI - Registered Nurse

## 2023-03-21 NOTE — PAYOR COMM NOTE
"Kassandra Enriquezmore  [facility]  Case Management Extender  [phone] phone  [phone] fax      Auth# [id number]    Rolando Novoa (77 y.o. Male)     Date of Birth   1945    Social Security Number       Address   [address] [place] [address]    Home Phone   [phone]    MRN   [id number]       Amish   Taoist    Marital Status                               Admission Date   3/14/23    Admission Type   Emergency    Admitting Provider   Suad Pritchett MD    Attending Provider       Department, Room/Bed   [address], 409/1       Discharge Date   3/20/2023    Discharge Disposition   Home or Self Care    Discharge Destination                               Attending Provider: (none)   Allergies: Toradol [Ketorolac Tromethamine]    Isolation: None   Infection: None   Code Status: Prior    Ht: 165.1 cm (65\")   Wt: 101 kg (222 lb 8 oz)    Admission Cmt: None   Principal Problem: Pneumonia of left lower lobe due to infectious organism [J18.9]                 Active Insurance as of 3/14/2023     Primary Coverage     Payor Plan Insurance Group Employer/Plan Group    [facility] [employer] MEDICARE REPLACEMENT [id number]     Payor Plan Address Payor Plan Phone Number Payor Plan Fax Number Effective Dates    [address] [address] [phone]  3/20/2020 - None Entered    [facility] [id number]       Subscriber Name Subscriber Birth Date Member ID       Rolando Novoa 1945 [id number]                 Emergency Contacts      (Rel.) Home Phone Work Phone Mobile Phone    PhilSolange garcia (Spouse) [phone] -- [phone]               Discharge Summary      Sidney Bustos DO at 03/20/23 1231              [facility] Medicine Services  DISCHARGE SUMMARY       Date of Admission: 3/14/2023  Date of Discharge:  3/20/2023  Primary Care " Physician: Jaxson Zepeda MD    Presenting Problem/History of Present Illness:  Metabolic encephalopathy [G93.41]  Acute respiratory failure with hypoxia and hypercapnia (HCC) [J96.01, J96.02]  Pneumonia of left lower lobe due to infectious organism [J18.9]       Final Discharge Diagnoses:  Active Hospital Problems    Diagnosis    • **Pneumonia of left lower lobe due to infectious organism    • Acute and chronic respiratory failure with hypercapnia (HCC)    • Acute and chronic respiratory failure with hypoxia (HCC)    • COPD (chronic obstructive pulmonary disease) (HCC)    • Myopathies        Consults:   Consults     Date and Time Order Name Status Description    3/14/2023  6:08 PM Hospitalist (on-call MD unless specified)            Procedures Performed:                 Pertinent Test Results:   Lab Results (most recent)     Procedure Component Value Units Date/Time    POC Glucose Once [[id number]]  (Abnormal) Collected: 03/20/23 0756    Specimen: Blood Updated: 03/20/23 0823     Glucose 169 mg/dL      Comment: RN NotifiedOperator: [id number] MARIEL Cee ID: [id number]       POC Glucose Once [[id number]]  (Abnormal) Collected: 03/19/23 1942    Specimen: Blood Updated: 03/20/23 0758     Glucose 401 mg/dL      Comment: RN NotifiedOperator: [id number] MADHAV Meade ID: [id number]       Comprehensive Metabolic Panel [[id number]]  (Abnormal) Collected: 03/20/23 0600    Specimen: Blood Updated: 03/20/23 0726     Glucose 154 mg/dL      BUN 37 mg/dL      Creatinine 1.74 mg/dL      Sodium 138 mmol/L      Potassium 3.8 mmol/L      Chloride 100 mmol/L      CO2 28.0 mmol/L      Calcium 8.5 mg/dL      Total Protein 5.9 g/dL      Albumin 3.4 g/dL      ALT (SGPT) 45 U/L      AST (SGOT) 22 U/L      Alkaline Phosphatase 54 U/L      Total Bilirubin 0.3 mg/dL      Globulin 2.5 gm/dL      A/G Ratio 1.4 g/dL      BUN/Creatinine Ratio 21.3     Anion Gap 10.0 mmol/L      eGFR 40.4 mL/min/1.73     Narrative:      GFR Normal  >60  Chronic Kidney Disease <60  Kidney Failure <15    The GFR formula is only valid for adults with stable renal function between ages 18 and 70.    CBC & Differential [[id number]]  (Abnormal) Collected: 03/20/23 0600    Specimen: Blood Updated: 03/20/23 0712    Narrative:      The following orders were created for panel order CBC & Differential.  Procedure                               Abnormality         Status                     ---------                               -----------         ------                     CBC Auto Differential[[id number]]        Abnormal            Final result                 Please view results for these tests on the individual orders.    CBC Auto Differential [[id number]]  (Abnormal) Collected: 03/20/23 0600    Specimen: Blood Updated: 03/20/23 0712     WBC 6.79 10*3/mm3      RBC 3.60 10*6/mm3      Hemoglobin 10.5 g/dL      Hematocrit 31.8 %      MCV 88.3 fL      MCH 29.2 pg      MCHC 33.0 g/dL      RDW 13.2 %      RDW-SD 43.2 fl      MPV 9.3 fL      Platelets 235 10*3/mm3      Neutrophil % 66.3 %      Lymphocyte % 23.6 %      Monocyte % 7.5 %      Eosinophil % 0.4 %      Basophil % 0.3 %      Immature Grans % 1.9 %      Neutrophils, Absolute 4.50 10*3/mm3      Lymphocytes, Absolute 1.60 10*3/mm3      Monocytes, Absolute 0.51 10*3/mm3      Eosinophils, Absolute 0.03 10*3/mm3      Basophils, Absolute 0.02 10*3/mm3      Immature Grans, Absolute 0.13 10*3/mm3      nRBC 0.0 /100 WBC     Blood Culture - Blood, Hand, Right [[id number]]  (Normal) Collected: 03/14/23 1815    Specimen: Blood from Hand, Right Updated: 03/19/23 1831     Blood Culture No growth at 5 days    Blood Culture - Blood, Arm, Left [[id number]]  (Normal) Collected: 03/14/23 1815    Specimen: Blood from Arm, Left Updated: 03/19/23 1831     Blood Culture No growth at 5 days    Comprehensive Metabolic Panel [[id number]]  (Abnormal) Collected: 03/19/23 0553    Specimen: Blood Updated: 03/19/23 0710     Glucose 189 mg/dL      BUN  41 mg/dL      Creatinine 1.97 mg/dL      Sodium 140 mmol/L      Potassium 4.2 mmol/L      Chloride 102 mmol/L      CO2 28.0 mmol/L      Calcium 8.3 mg/dL      Total Protein 5.9 g/dL      Albumin 3.4 g/dL      ALT (SGPT) 33 U/L      AST (SGOT) 18 U/L      Alkaline Phosphatase 54 U/L      Total Bilirubin 0.2 mg/dL      Globulin 2.5 gm/dL      A/G Ratio 1.4 g/dL      BUN/Creatinine Ratio 20.8     Anion Gap 10.0 mmol/L      eGFR 34.8 mL/min/1.73     Narrative:      GFR Normal >60  Chronic Kidney Disease <60  Kidney Failure <15    The GFR formula is only valid for adults with stable renal function between ages 18 and 70.    CBC & Differential [[id number]]  (Abnormal) Collected: 03/19/23 0553    Specimen: Blood Updated: 03/19/23 0654    Narrative:      The following orders were created for panel order CBC & Differential.  Procedure                               Abnormality         Status                     ---------                               -----------         ------                     CBC Auto Differential[[id number]]        Abnormal            Final result                 Please view results for these tests on the individual orders.    CBC Auto Differential [[id number]]  (Abnormal) Collected: 03/19/23 0553    Specimen: Blood Updated: 03/19/23 0654     WBC 6.73 10*3/mm3      RBC 3.70 10*6/mm3      Hemoglobin 10.4 g/dL      Hematocrit 33.0 %      MCV 89.2 fL      MCH 28.1 pg      MCHC 31.5 g/dL      RDW 13.2 %      RDW-SD 43.5 fl      MPV 9.1 fL      Platelets 235 10*3/mm3      Neutrophil % 72.8 %      Lymphocyte % 18.4 %      Monocyte % 6.8 %      Eosinophil % 0.1 %      Basophil % 0.3 %      Immature Grans % 1.6 %      Neutrophils, Absolute 4.89 10*3/mm3      Lymphocytes, Absolute 1.24 10*3/mm3      Monocytes, Absolute 0.46 10*3/mm3      Eosinophils, Absolute 0.01 10*3/mm3      Basophils, Absolute 0.02 10*3/mm3      Immature Grans, Absolute 0.11 10*3/mm3      nRBC 0.0 /100 WBC     Hemoglobin A1c [[id number]]   (Abnormal) Collected: 03/18/23 0818    Specimen: Blood Updated: 03/18/23 1257     Hemoglobin A1C 6.20 %     Narrative:      Hemoglobin A1C Ranges:    Increased Risk for Diabetes  5.7% to 6.4%  Diabetes                     >= 6.5%  Diabetic Goal                < 7.0%    Urine Eosinophils, Luisito's Stain - Urine, Clean Catch [[id number]]  (Normal) Collected: 03/17/23 1626    Specimen: Urine, Clean Catch Updated: 03/18/23 1013     Luisito Stain Negative    Creatinine Urine Random (kidney function) GFR component - Urine, Clean Catch [[id number]] Collected: 03/17/23 1626    Specimen: Urine, Clean Catch Updated: 03/18/23 0108     Creatinine, Urine 70.7 mg/dL     Narrative:      Reference intervals for random urine have not been established.  Clinical usage is dependent upon physician's interpretation in combination with other laboratory tests.       Urinalysis With Microscopic - Urine, Clean Catch [[id number]]  (Abnormal) Collected: 03/17/23 1626    Specimen: Urine, Clean Catch Updated: 03/17/23 1645    Narrative:      The following orders were created for panel order Urinalysis With Microscopic - Urine, Clean Catch.  Procedure                               Abnormality         Status                     ---------                               -----------         ------                     Urinalysis without micro...[[id number]]  Abnormal            Final result               Urinalysis, Microscopic ...[[id number]]                      Final result                 Please view results for these tests on the individual orders.    Urinalysis without microscopic (no culture) - Urine, Clean Catch [[id number]]  (Abnormal) Collected: 03/17/23 1626    Specimen: Urine, Clean Catch Updated: 03/17/23 1645     Color, UA Yellow     Appearance, UA Clear     pH, UA 5.5     Specific Gravity, UA 1.019     Glucose, UA >=1000 mg/dL (3+)     Ketones, UA Trace     Bilirubin, UA Negative     Blood, UA Negative     Protein, UA 30 mg/dL (1+)     Leuk  Esterase, UA Negative     Nitrite, UA Negative     Urobilinogen, UA 0.2 E.U./dL    Urinalysis, Microscopic Only - Urine, Clean Catch [[id number]] Collected: 03/17/23 1626    Specimen: Urine, Clean Catch Updated: 03/17/23 1645     RBC, UA None Seen /HPF      WBC, UA 0-2 /HPF      Bacteria, UA None Seen /HPF      Squamous Epithelial Cells, UA 0-2 /HPF      Hyaline Casts, UA None Seen /LPF      Methodology Automated Microscopy    Sodium, Urine, Random - Urine, Clean Catch [[id number]] Collected: 03/17/23 1626    Specimen: Urine, Clean Catch Updated: 03/17/23 1643     Sodium, Urine 79 mmol/L     Narrative:      Reference intervals for random urine have not been established.  Clinical usage is dependent upon physician's interpretation in combination with other laboratory tests.       Urine Culture - Urine, Straight Cath [[id number]]  (Normal) Collected: 03/14/23 1841    Specimen: Urine from Straight Cath Updated: 03/16/23 0749     Urine Culture No growth    Extra Tubes [[id number]] Collected: 03/16/23 0601    Specimen: Blood, Venous Line Updated: 03/16/23 0715    Narrative:      The following orders were created for panel order Extra Tubes.  Procedure                               Abnormality         Status                     ---------                               -----------         ------                     Lavender Top[[id number]]                                     Final result                 Please view results for these tests on the individual orders.    Lavender Top [[id number]] Collected: 03/16/23 0601    Specimen: Blood Updated: 03/16/23 0715     Extra Tube hold for add-on     Comment: Auto resulted       Basic Metabolic Panel [[id number]]  (Abnormal) Collected: 03/16/23 0601    Specimen: Blood Updated: 03/16/23 0659     Glucose 218 mg/dL      BUN 20 mg/dL      Creatinine 1.77 mg/dL      Sodium 140 mmol/L      Potassium 3.9 mmol/L      Chloride 102 mmol/L      CO2 28.0 mmol/L      Calcium 8.2 mg/dL       BUN/Creatinine Ratio 11.3     Anion Gap 10.0 mmol/L      eGFR 39.6 mL/min/1.73     Narrative:      GFR Normal >60  Chronic Kidney Disease <60  Kidney Failure <15    The GFR formula is only valid for adults with stable renal function between ages 18 and 70.    Basic Metabolic Panel [[id number]]  (Abnormal) Collected: 03/15/23 0604    Specimen: Blood Updated: 03/15/23 0701     Glucose 183 mg/dL      BUN 16 mg/dL      Creatinine 1.55 mg/dL      Sodium 141 mmol/L      Potassium 3.8 mmol/L      Chloride 105 mmol/L      CO2 28.0 mmol/L      Calcium 8.1 mg/dL      BUN/Creatinine Ratio 10.3     Anion Gap 8.0 mmol/L      eGFR 46.4 mL/min/1.73     Narrative:      GFR Normal >60  Chronic Kidney Disease <60  Kidney Failure <15    The GFR formula is only valid for adults with stable renal function between ages 18 and 70.    Blood Gas, Arterial - [[id number]]  (Abnormal) Collected: 03/15/23 0435    Specimen: Arterial Blood Updated: 03/15/23 0437     Site Right Radial     Elder's Test N/A     pH, Arterial 7.333 pH units      Comment: 84 Value below reference range        pCO2, Arterial 60.7 mm Hg      Comment: 83 Value above reference range        pO2, Arterial 60.7 mm Hg      Comment: 84 Value below reference range        HCO3, Arterial 32.2 mmol/L      Comment: 83 Value above reference range        Base Excess, Arterial 5.0 mmol/L      Comment: 83 Value above reference range        O2 Saturation, Arterial 92.7 %      Comment: 84 Value below reference range        Barometric Pressure for Blood Gas 756 mmHg      Modality Nasal Cannula     Flow Rate 3.0 lpm      Ventilator Mode NA     Collected by RT     Comment: Meter: [id number]     :  [id number]       Blood Gas, Arterial - [[id number]]  (Abnormal) Collected: 03/15/23 0144    Specimen: Arterial Blood Updated: 03/15/23 0145     Site Arterial Line     Elder's Test N/A     pH, Arterial 7.313 pH units      Comment: 84 Value below reference range        pCO2, Arterial 64.1 mm  Hg      Comment: 83 Value above reference range        pO2, Arterial 64.0 mm Hg      Comment: 84 Value below reference range        HCO3, Arterial 32.5 mmol/L      Comment: 83 Value above reference range        Base Excess, Arterial 4.8 mmol/L      Comment: 83 Value above reference range        O2 Saturation, Arterial 93.4 %      Comment: 84 Value below reference range        Barometric Pressure for Blood Gas 756 mmHg      Modality Room Air     Ventilator Mode NA     Collected by RT     Comment: Meter: [id number]     :  [id number]       High Sensitivity Troponin T 2Hr [[id number]]  (Abnormal) Collected: 03/14/23 2100    Specimen: Blood Updated: 03/14/23 2119     HS Troponin T 46 ng/L      Troponin T Delta 1 ng/L     Narrative:      High Sensitive Troponin T Reference Range:  <10.0 ng/L- Negative Female for AMI  <15.0 ng/L- Negative Male for AMI  >=10 - Abnormal Female indicating possible myocardial injury.  >=15 - Abnormal Male indicating possible myocardial injury.   Clinicians would have to utilize clinical acumen, EKG, Troponin, and serial changes to determine if it is an Acute Myocardial Infarction or myocardial injury due to an underlying chronic condition.         High Sensitivity Troponin T [[id number]]  (Abnormal) Collected: 03/14/23 1917    Specimen: Blood Updated: 03/14/23 2009     HS Troponin T 45 ng/L     Narrative:      High Sensitive Troponin T Reference Range:  <10.0 ng/L- Negative Female for AMI  <15.0 ng/L- Negative Male for AMI  >=10 - Abnormal Female indicating possible myocardial injury.  >=15 - Abnormal Male indicating possible myocardial injury.   Clinicians would have to utilize clinical acumen, EKG, Troponin, and serial changes to determine if it is an Acute Myocardial Infarction or myocardial injury due to an underlying chronic condition.         BNP [[id number]]  (Normal) Collected: 03/14/23 1917    Specimen: Blood Updated: 03/14/23 2009     proBNP 770.4 pg/mL     Narrative:       Among patients with dyspnea, NT-proBNP is highly sensitive for the detection of acute congestive heart failure. In addition NT-proBNP of <300 pg/ml effectively rules out acute congestive heart failure with 99% negative predictive value.    Results may be falsely decreased if patient taking Biotin.      Urinalysis, Microscopic Only - Straight Cath [[id number]]  (Abnormal) Collected: 03/14/23 1841    Specimen: Urine from Straight Cath Updated: 03/14/23 1923     RBC, UA 3-5 /HPF      WBC, UA 6-12 /HPF      Bacteria, UA 1+ /HPF      Squamous Epithelial Cells, UA 3-5 /HPF      Transitional Epithelial Cells, UA 3-6 /HPF      Hyaline Casts, UA 7-12 /LPF      Methodology Manual Light Microscopy    Urinalysis With Culture If Indicated - Straight Cath [[id number]]  (Abnormal) Collected: 03/14/23 1841    Specimen: Urine from Straight Cath Updated: 03/14/23 1911     Color, UA Yellow     Appearance, UA Clear     pH, UA 5.5     Specific Gravity, UA 1.014     Glucose, UA Negative     Ketones, UA Negative     Bilirubin, UA Negative     Blood, UA Small (1+)     Protein,  mg/dL (2+)     Leuk Esterase, UA Negative     Nitrite, UA Negative     Urobilinogen, UA 0.2 E.U./dL    Narrative:      In absence of clinical symptoms, the presence of pyuria, bacteria, and/or nitrites on the urinalysis result does not correlate with infection.    Lactic Acid, Plasma [[id number]]  (Normal) Collected: 03/14/23 1815    Specimen: Blood Updated: 03/14/23 1844     Lactate 1.2 mmol/L     COVID-19 and FLU A/B PCR - Swab, Nasopharynx [[id number]]  (Normal) Collected: 03/14/23 1733    Specimen: Swab from Nasopharynx Updated: 03/14/23 1808     COVID19 Not Detected     Influenza A PCR Not Detected     Influenza B PCR Not Detected    Narrative:      Fact sheet for providers: https://www.fda.gov/media/204451/download    Fact sheet for patients: https://www.fda.gov/media/852763/download    Test performed by PCR.    [place] Draw [[id number]] Collected: 03/14/23  1646    Specimen: Blood Updated: 03/14/23 1800    Narrative:      The following orders were created for panel order [place] Draw.  Procedure                               Abnormality         Status                     ---------                               -----------         ------                     Green Top (Gel)[[id number]]                                  Final result               Lavender Top[[id number]]                                     Final result               Gold Top - SST[[id number]]                                   Final result               Light Blue Top[[id number]]                                   Final result                 Please view results for these tests on the individual orders.    Gold Top - SST [[id number]] Collected: 03/14/23 1646    Specimen: Blood Updated: 03/14/23 1800     Extra Tube Hold for add-ons.     Comment: Auto resulted.       Light Blue Top [[id number]] Collected: 03/14/23 1646    Specimen: Blood Updated: 03/14/23 1800     Extra Tube Hold for add-ons.     Comment: Auto resulted       Green Top (Gel) [[id number]] Collected: 03/14/23 1646    Specimen: Blood Updated: 03/14/23 1800     Extra Tube Hold for add-ons.     Comment: Auto resulted.       Lavender Top [[id number]] Collected: 03/14/23 1646    Specimen: Blood Updated: 03/14/23 1800     Extra Tube hold for add-on     Comment: Auto resulted       D-dimer, Quantitative [[id number]]  (Normal) Collected: 03/14/23 1646    Specimen: Blood Updated: 03/14/23 1752     D-Dimer, Quantitative 710 ng/mL (FEU)     Narrative:      According to the assay 's published package insert, a normal (<500 ng/mL (FEU)) D-dimer result in conjunction with a non-high clinical probability assessment, excludes deep vein thrombosis (DVT) and pulmonary embolism (PE) with high sensitivity.    D-dimer values increase with age and this can make VTE exclusion of an older population difficult. To address this, the American College of Physicians,  "based on best available evidence and recent guidelines, recommends that clinicians use age-adjusted D-dimer thresholds in patients greater than 50 years of age with: a) a low probability of PE who do not meet all Pulmonary Embolism Rule Out Criteria, or b) in those with intermediate probability of PE.   The formula for an age-adjusted D-dimer cut-off is \"age*10\".  For example, a 60 year old patient would have an age-adjusted cut-off of 600 ng/mL (FEU) and an 80 year old 800 ng/mL (FEU).      aPTT [[id number]]  (Normal) Collected: 03/14/23 1646    Specimen: Blood Updated: 03/14/23 1752     PTT 27.1 seconds     Narrative:      The recommended Heparin therapeutic range is 68-97 seconds.    Protime-INR [[id number]]  (Normal) Collected: 03/14/23 1646    Specimen: Blood Updated: 03/14/23 1751     Protime 12.6 Seconds      INR 0.95    Narrative:      Therapeutic range for most indications is 2.0-3.0 INR,  or 2.5-3.5 for mechanical heart valves.    Single High Sensitivity Troponin T [[id number]]  (Abnormal) Collected: 03/14/23 1646    Specimen: Blood Updated: 03/14/23 1723     HS Troponin T 51 ng/L     Narrative:      High Sensitive Troponin T Reference Range:  <10.0 ng/L- Negative Female for AMI  <15.0 ng/L- Negative Male for AMI  >=10 - Abnormal Female indicating possible myocardial injury.  >=15 - Abnormal Male indicating possible myocardial injury.   Clinicians would have to utilize clinical acumen, EKG, Troponin, and serial changes to determine if it is an Acute Myocardial Infarction or myocardial injury due to an underlying chronic condition.         Magnesium [[id number]]  (Normal) Collected: 03/14/23 1646    Specimen: Blood Updated: 03/14/23 1723     Magnesium 2.1 mg/dL         Imaging Results (Most Recent)     Procedure Component Value Units Date/Time    CT Head Without Contrast [[id number]] Collected: 03/14/23 1716     Updated: 03/14/23 1733    Narrative:      EXAM: CT HEAD WITHOUT CONTRAST    CLINICAL HISTORY:  77 " years Male,Mental status change, unknown  cause    COMPARISON:  None    Axial with coronal and sagittal images were obtained without  intravenous contrast.  This examination was performed according  to our departmental dose optimization program which includes  automated exposure control, adjustment of the MA and kV according  to patient size, and/or use of iterative reconstruction  technique.    FINDINGS:  Mild low attenuation in the bilateral periventricular/deep  cerebral white matter, most consistent with mild chronic small  vessel ischemic change. No acute intracranial hemorrhage, mass  effect, hydrocephalus, or CT signs of acute infarct.    Mild mucosal thickening along the bilateral maxillary sinuses.  Opacification of several left mastoid air cells.      Impression:      1. Mild chronic small vessel ischemic changes of acute  intracranial process.  2. Opacification of several left mastoid air cells.    Electronically signed by:  Finesse Granados MD  3/14/2023 5:31 PM CDT  Workstation: [id number]    XR Chest 1 View [[id number]] Collected: 03/14/23 1641     Updated: 03/14/23 1704    Narrative:        PORTABLE CHEST    HISTORY: Weakness. Dizziness. Altered mental status.    Portable AP upright film of the chest was obtained at 4:39 PM.  COMPARISON: August 30, 2022    FINDINGS:   EKG leads.  Subsegmental infiltrate left lung base compatible with pneumonia.  Small left pleural effusion.  The heart is not enlarged.  The pulmonary vasculature is not increased.  No pneumothorax.  No acute osseous abnormality.  Degenerative changes each shoulder.      Impression:      CONCLUSION:  Subsegmental infiltrate left lung base compatible with pneumonia.  Small left pleural effusion.    [id number]    Electronically signed by:  Farzad Mcadams MD  3/14/2023 5:02 PM CDT  Workstation: [id number]          Chief Complaint on Day of Discharge:  No complaints on day of discharge.    Hospital Course:  The patient is a 77 y.o. male who presented to  [facility] with weakness and confusion.  Patient is admitted to hospital 3/14/2023.  He is discharged home 3/20/2023.    Patient is a 77 year old male (PMHx HTN, HLD, DM, Black Lung disease, GERD) who presented to [facility] ED by EMS from home. Wife accompanies him and provided majority of the history. She reports he has not been feeling well for the last several days. In the last two days, she has noticed he really has been sleeping a lot and has not been eating appropriately for his diabetic status. She really noticed he seemed confused last night and he seems to have developed a tremor, but he states he has always had this. He does use oxygen chronically at home on 2L due to black lung disease, but has had some increased shortness of breath at times. They deny cough, congestion, fever, or known sick contacts.      ED findings pertinent for CXR findings of a left lower lobe pneumonia. CBC unremarkable; CMP shows mild creatinine elevation at 1.73. CT head negative. Hospitalist team consulted for admission with ABG pending. ABG resulted later to reveal respiratory acidosis with hypercapnia and BiPAP was initiated in ED.    Impression/treatment plan  1.  Pneumonia of left lower lobe due to infectious organism              -IV Rocephin and Azithromycin initiated; blood cultures pending              -continue oxygen, inhalers/nebs, steroids.  Completed antibiotic regimen.     2. Respiratory acidosis with hypercapnia              -BiPAP continued during patient's stay along with [place] dilators and steroids.     3. Metabolic encephalopathy               -secondary to above; continue to monitor for improvement/worsening     4. FLORENCIA             On day of discharge BUN is 37 creatinine is 1.74 GFR is 40.4.             Appears to be acute on chronic kidney disease stage IIIb     5. DM              -Consistent carbohydrate diet, Acchecks with sliding scale insulin     6. HTN              -Blood pressure on day of  "discharge is 131/66.  O2 saturation 97% 3 L.              -Stable  7. HLD              -continue Lipitor     8. BPH              -continue Flomax     Patient has acute on chronic hypercapnic and hypoxic respiratory failure along with COPD.  Upon admission to hospital patient PCO2 was 78.4 after being moved to BiPAP on AVAPS mode PCO2 only dropped to 60.7 improving then home BiPAP/BiPAP ST/BiPAP STA is insufficient due to severity of the disease.  Patient requires volume ventilation AE mode with auto titrating EPAP to reduce hypercapnic episodes.  Ordering noninvasive ventilator to reduce hospital readmissions.     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  Patient has been on multiple rounds of antibiotics and steroids for this condition.  Patient has had a productive cough for greater than 6 months.  Ordering a flow vest to help mobilize secretions and reduce hospital readmissions.    Patient is seen and examined 3/20/2023.  On day of discharge patient is feeling much better.  He is anticipating going home.  He is currently stable.  He remains short of breath at rest and on exertion however significantly improved since admission.  Patient will be discharged home today.  He is to follow-up with primary care provider within 1 week of discharge.  I reviewed all findings discharge medications follow instructions with the patient he states he understands and will follow recommendations.  The patient did complete Rocephin and azithromycin regimen during his admission.      Condition on Discharge: Stable    Physical Exam on Discharge:  /66 (BP Location: Right arm, Patient Position: Lying)   Pulse 90   Temp 98.2 °F (36.8 °C) (Temporal)   Resp 18   Ht 165.1 cm (65\")   Wt 101 kg (222 lb 8 oz)   SpO2 97%   BMI 37.03 kg/m²   Physical Exam  Vitals and nursing note reviewed.   Constitutional:       Appearance: " Normal appearance.   HENT:      Head: Normocephalic and atraumatic.      Right Ear: External ear normal.      Left Ear: External ear normal.      Nose: Nose normal.      Mouth/Throat:      Mouth: Mucous membranes are moist.      Pharynx: Oropharynx is clear.   Eyes:      General: No scleral icterus.     Extraocular Movements: Extraocular movements intact.      Conjunctiva/sclera: Conjunctivae normal.      Pupils: Pupils are equal, round, and reactive to light.   Cardiovascular:      Rate and Rhythm: Normal rate and regular rhythm.      Heart sounds: No murmur heard.  Pulmonary:      Effort: Pulmonary effort is normal.      Breath sounds: Normal breath sounds.   Abdominal:      General: Bowel sounds are normal.      Palpations: Abdomen is soft.   Musculoskeletal:         General: Normal range of motion.      Cervical back: Normal range of motion and neck supple.   Skin:     General: Skin is warm and dry.      Capillary Refill: Capillary refill takes less than 2 seconds.   Neurological:      General: No focal deficit present.      Mental Status: He is alert and oriented to person, place, and time.   Psychiatric:         Mood and Affect: Mood normal.         Behavior: Behavior normal.           Discharge Disposition:  Home or Self Care    Discharge Medications:     Discharge Medications      New Medications      Instructions Start Date   predniSONE 50 MG tablet  Commonly known as: DELTASONE   50 mg, Oral, Daily With Breakfast   Start Date: March 21, 2023        Continue These Medications      Instructions Start Date   acetaminophen 500 MG tablet  Commonly known as: TYLENOL   500 mg, Oral, Nightly      albuterol sulfate  (90 Base) MCG/ACT inhaler  Commonly known as: PROVENTIL HFA;VENTOLIN HFA;PROAIR HFA   2 puffs, Inhalation, Every 4 Hours PRN      aspirin 81 MG chewable tablet   81 mg, Oral, Daily      atorvastatin 20 MG tablet  Commonly known as: LIPITOR   20 mg, Oral, Nightly      B-12 1000 MCG tablet   1  TABLET BY MOUTH ON MONDAY, WEDNESDAY AND FRIDAY      esomeprazole 20 MG capsule  Commonly known as: nexIUM   20 mg, Oral, Every Morning Before Breakfast      folic acid 1 MG tablet  Commonly known as: FOLVITE   1 mg, Oral, Take As Directed, Monday, Wednesday, and Friday      glimepiride 2 MG tablet  Commonly known as: AMARYL   2 mg, Oral, 2 Times Daily      isosorbide mononitrate 30 MG 24 hr tablet  Commonly known as: IMDUR   30 mg, Oral, Every 24 Hours Scheduled      loperamide 2 MG capsule  Commonly known as: IMODIUM   2 mg, Oral, 2 Times Daily PRN      metFORMIN  MG 24 hr tablet  Commonly known as: GLUCOPHAGE-XR   No dose, route, or frequency recorded.      nebivolol 10 MG tablet  Commonly known as: Bystolic   10 mg, Oral, Daily      O2  Commonly known as: OXYGEN   2 L/min, Inhalation, Nightly      omeprazole 20 MG capsule  Commonly known as: priLOSEC   20 mg, Oral, Daily      OneTouch Delica Plus [id number] misc   No dose, route, or frequency recorded.      OneTouch Delica Plus Lancing misc   No dose, route, or frequency recorded.      OneTouch Ultra test strip  Generic drug: glucose blood   No dose, route, or frequency recorded.      tamsulosin 0.4 MG capsule 24 hr capsule  Commonly known as: FLOMAX   1 capsule, Oral, Nightly      umeclidinium-vilanterol 62.5-25 MCG/INH aerosol powder  inhaler  Commonly known as: ANORO ELLIPTA   1 puff, Inhalation, Daily             Discharge Diet:   Diet Instructions     Diet: Diabetic Diets; Consistent Carbohydrate; Texture: Regular Texture (IDDSI 7); Fluid Consistency: Thin (IDDSI 0)      Discharge Diet: Diabetic Diets    Diabetic Diet: Consistent Carbohydrate    Texture: Regular Texture (IDDSI 7)    Fluid Consistency: Thin (IDDSI 0)          Activity at Discharge:   Activity Instructions     Activity as Tolerated            Discharge Care Plan/Instructions:  Discharge today  Follow-up with primary care provider within 1 week of discharge  Activity is as tolerated  Diet  ADA regular texture  Continue home medications as documented.    Follow-up Appointments:   No future appointments.    Test Results Pending at Discharge:  None    Ivana Rosas DO    Time: Time to complete discharge 35 minutes    Electronically signed by Ivana Rosas DO at 03/20/23 1243       Discharge Order (From admission, onward)     Start     Ordered    03/20/23 0835  Discharge patient  Once        Expected Discharge Date: 03/20/23    Discharge Disposition: Home or Self Care    Physician of Record for Attribution - Please select from Treatment Team: IVANA ROSAS [[id number]]    Review needed by CMO to determine Physician of Record: No       Question Answer Comment   Physician of Record for Attribution - Please select from Treatment Team IVANA ROSAS    Review needed by CMO to determine Physician of Record No        03/20/23 0875

## 2023-03-24 ENCOUNTER — READMISSION MANAGEMENT (OUTPATIENT)
Dept: CALL CENTER | Facility: HOSPITAL | Age: 78
End: 2023-03-24
Payer: MEDICARE

## 2023-03-24 NOTE — OUTREACH NOTE
COPD/PN Week 1 Survey    Flowsheet Row Responses   [facility] facility patient discharged from? [place]   Does the patient have one of the following disease processes/diagnoses(primary or secondary)? Pneumonia   Week 1 attempt successful? No   Unsuccessful attempts Attempt 1          Eloise Lua Licensed Nurse

## 2023-03-28 ENCOUNTER — READMISSION MANAGEMENT (OUTPATIENT)
Dept: CALL CENTER | Facility: HOSPITAL | Age: 78
End: 2023-03-28
Payer: MEDICARE

## 2023-03-28 RX ORDER — FOLIC ACID 1 MG/1
TABLET ORAL
Qty: 90 TABLET | Refills: 11 | OUTPATIENT
Start: 2023-03-28

## 2023-03-28 NOTE — OUTREACH NOTE
COPD/PN Week 1 Survey    Flowsheet Row Responses   [facility] [employer] facility patient discharged from? [place]   Does the patient have one of the following disease processes/diagnoses(primary or secondary)? Pneumonia   Week 1 attempt successful? No   Unsuccessful attempts Attempt 2  [All numbers listed were attempted-no answer]          Mehnaz H - Registered Nurse

## 2023-03-31 ENCOUNTER — READMISSION MANAGEMENT (OUTPATIENT)
Dept: CALL CENTER | Facility: HOSPITAL | Age: 78
End: 2023-03-31
Payer: MEDICARE

## 2023-03-31 NOTE — OUTREACH NOTE
COPD/PN Week 3 Survey    Flowsheet Row Responses   Oriental orthodox facility patient discharged from? [place]   Does the patient have one of the following disease processes/diagnoses(primary or secondary)? Pneumonia   Discharge diagnosis **Pneumonia of left lower lobe due to infectious organism          JASMEET SAMSON - Registered Nurse

## 2023-04-12 RX ORDER — CYANOCOBALAMIN (VITAMIN B-12) 1000 MCG
TABLET ORAL
Qty: 36 TABLET | Refills: 0 | OUTPATIENT
Start: 2023-04-12

## 2023-04-12 NOTE — TELEPHONE ENCOUNTER
Rx Refill Note  Requested Prescriptions     Pending Prescriptions Disp Refills   • Cyanocobalamin (B-12) 1000 MCG tablet [Pharmacy Med Name: B-12 1000MCG TABLET] 36 tablet 0     Si TABLET BY MOUTH ON MONDAY, WEDNESDAY AND FRIDAY      Last office visit with prescribing clinician: 2022   Last telemedicine visit with prescribing clinician: Visit date not found   Next office visit with prescribing clinician: Visit date not found                         Would you like a call back once the refill request has been completed: [] Yes [] No    If the office needs to give you a call back, can they leave a voicemail: [] Yes [] No    Carrie Leong, Ronnie Rep  23, 11:17 CDT

## 2023-05-17 ENCOUNTER — TRANSCRIBE ORDERS (OUTPATIENT)
Dept: PODIATRY | Facility: CLINIC | Age: 78
End: 2023-05-17
Payer: MEDICARE

## 2023-05-17 DIAGNOSIS — L60.8 ACQUIRED DYSMORPHIC TOENAIL: Primary | ICD-10-CM

## 2023-05-17 DIAGNOSIS — E11.42 DIABETIC POLYNEUROPATHY ASSOCIATED WITH TYPE 2 DIABETES MELLITUS: ICD-10-CM

## 2023-06-08 ENCOUNTER — OFFICE VISIT (OUTPATIENT)
Dept: PODIATRY | Facility: CLINIC | Age: 78
End: 2023-06-08
Payer: MEDICARE

## 2023-06-08 VITALS — HEIGHT: 65 IN | WEIGHT: 222 LBS | BODY MASS INDEX: 36.99 KG/M2

## 2023-06-08 DIAGNOSIS — E11.8 DIABETIC FOOT: ICD-10-CM

## 2023-06-08 DIAGNOSIS — E11.649 TYPE 2 DIABETES MELLITUS WITH HYPOGLYCEMIA WITHOUT COMA, WITH LONG-TERM CURRENT USE OF INSULIN: Primary | ICD-10-CM

## 2023-06-08 DIAGNOSIS — B35.1 PAIN DUE TO ONYCHOMYCOSIS OF TOENAILS OF BOTH FEET: ICD-10-CM

## 2023-06-08 DIAGNOSIS — M79.674 PAIN DUE TO ONYCHOMYCOSIS OF TOENAILS OF BOTH FEET: ICD-10-CM

## 2023-06-08 DIAGNOSIS — M79.675 PAIN DUE TO ONYCHOMYCOSIS OF TOENAILS OF BOTH FEET: ICD-10-CM

## 2023-06-08 DIAGNOSIS — Z89.202: ICD-10-CM

## 2023-06-08 DIAGNOSIS — B35.1 ONYCHOMYCOSIS: ICD-10-CM

## 2023-06-08 DIAGNOSIS — Z79.4 TYPE 2 DIABETES MELLITUS WITH HYPOGLYCEMIA WITHOUT COMA, WITH LONG-TERM CURRENT USE OF INSULIN: Primary | ICD-10-CM

## 2023-06-08 NOTE — PROGRESS NOTES
Rolando Novoa  1945  78 y.o. male  A1C:6.20      06/08/2023    Chief Complaint   Patient presents with    Left Foot - Pain    Right Foot - Pain       History of Present Illness    Rolando Novoa is a 78 y.o.male Patient presents to clinic for diabetic foot exam.       Past Medical History:   Diagnosis Date    Acid reflux     Black lung disease     Cataract     Diabetes     Enlarged prostate     High cholesterol     Hypertension     Kidney stone     multiple    Obesity     Psoriasis          Past Surgical History:   Procedure Laterality Date    AMPUTATION Left     lower arm and hand    CATARACT EXTRACTION W/ INTRAOCULAR LENS IMPLANT Left 01/22/2021    Procedure: REMIOVE CATARACT AND IMPLANT  INTRAOCULAR LENS;  Surgeon: Sam Isaacs MD;  Location: [facility] OR;  Service: Ophthalmology;  Laterality: Left;    CATARACT EXTRACTION W/ INTRAOCULAR LENS IMPLANT Right 01/29/2021    Procedure: REMOVE CATARACT AND IMPLANT INTRAOCULAR LENS;  Surgeon: Sam Isaacs MD;  Location: [facility] OR;  Service: Ophthalmology;  Laterality: Right;    CHOLECYSTECTOMY OPEN      COLONOSCOPY N/A 11/09/2018    Procedure: COLONOSCOPY;  Surgeon: Sukhdeep Mae MD;  Location: [facility] ENDOSCOPY;  Service: Gastroenterology    COLONOSCOPY N/A 03/28/2022    Procedure: COLONOSCOPY;  Surgeon: Sukhdeep Mae MD;  Location: [facility] ENDOSCOPY;  Service: Gastroenterology;  Laterality: N/A;    CYSTOSCOPY N/A 12/16/2017    Procedure: CYSTOSCOPY WITH CLOT EVACUATION;  Surgeon: Prosper Ariza MD;  Location: [facility] OR;  Service:     CYSTOSCOPY, URETEROSCOPY, RETROGRADE PYELOGRAM, STENT INSERTION Left 12/15/2017    Procedure: CYSTOSCOPY LEFT URETEROSCOPY RETROGRADE PYELOGRAM HOLMIUM LASER STENT INSERTION;  Surgeon: Prosper Ariza MD;  Location: [facility] OR;  Service:     CYSTOSCOPY, URETEROSCOPY, RETROGRADE PYELOGRAM, STENT INSERTION Left 08/10/2022    Procedure: CYSTOSCOPY LEFT RETROGRADE URETEROSCOPY LASER LITHOTRIPSY STENT  PLACMENT;  Surgeon: Prosper Ariza MD;  Location: [facility];  Service: Urology;  Laterality: Left;    DENTAL PROCEDURE      multiple extractions    EXTRACORPOREAL SHOCK WAVE LITHOTRIPSY (ESWL)      EXTRACORPOREAL SHOCKWAVE LITHOTRIPSY (ESWL), STENT INSERTION/REMOVAL Left 02/03/2017    Procedure: LEFT EXTRACORPOREAL SHOCKWAVE LITHOTRIPSY, POSSIBLE CYSTOSCOPY, POSSIBLE STENT REMOVAL ;  Surgeon: Prosper Ariza MD;  Location: [facility] OR;  Service:     EXTRACORPOREAL SHOCKWAVE LITHOTRIPSY (ESWL), STENT INSERTION/REMOVAL Left 05/04/2018    Procedure: EXTRACORPOREAL SHOCKWAVE LITHOTRIPSY;  Surgeon: Prosper Ariza MD;  Location: [facility];  Service: Urology    KIDNEY STONE SURGERY      SCROTAL SURGERY      trauma    WISDOM TOOTH EXTRACTION      took bottom ones         Family History   Problem Relation Age of Onset    Hypertension Mother     Stroke Mother     Heart attack Father     Psoriasis Father     Heart attack Sister     Heart attack Sister     Breast cancer Sister     Leukemia Brother     Diabetes Brother     Heart attack Brother     Diabetes Brother     Congenital heart disease Brother     Early death Brother     Cancer Son     No Known Problems Son        Allergies   Allergen Reactions    Toradol [Ketorolac Tromethamine] Anaphylaxis       Social History     Socioeconomic History    Marital status:    Tobacco Use    Smoking status: Never     Passive exposure: Past    Smokeless tobacco: Never    Tobacco comments:     For 16 years   Vaping Use    Vaping Use: Never used   Substance and Sexual Activity    Alcohol use: Not Currently     Comment: none since 19 y/o    Drug use: Never    Sexual activity: Defer         Current Outpatient Medications   Medication Sig Dispense Refill    acetaminophen (TYLENOL) 500 MG tablet Take 1 tablet by mouth Every Night.      albuterol sulfate  (90 Base) MCG/ACT inhaler Inhale 2 puffs Every 4 (Four) Hours As Needed for Wheezing.      aspirin 81 MG chewable tablet Chew  "1 tablet Daily.      atorvastatin (LIPITOR) 20 MG tablet Take 1 tablet by mouth Every Night. 30 tablet 11    Cyanocobalamin (B-12) 1000 MCG tablet 1 TABLET BY MOUTH ON MONDAY, WEDNESDAY AND FRIDAY 36 tablet 0    esomeprazole (nexIUM) 20 MG capsule Take 1 capsule by mouth Every Morning Before Breakfast.      folic acid (FOLVITE) 1 MG tablet Take 1 tablet by mouth Take As Directed. Monday, Wednesday, and Friday      glimepiride (AMARYL) 2 MG tablet Take 1 tablet by mouth 2 (Two) Times a Day.      Lancet Devices (OneTouch Delica Plus Lancing) misc       Lancets (OneTouch Delica Plus [id number]) misc       loperamide (IMODIUM) 2 MG capsule Take 1 capsule by mouth 2 (Two) Times a Day As Needed for Diarrhea.      metFORMIN ER (GLUCOPHAGE-XR) 500 MG 24 hr tablet       nebivolol (Bystolic) 10 MG tablet Take 1 tablet by mouth Daily. 30 tablet 11    O2 (OXYGEN) Inhale 2 L/min Every Night.      omeprazole (priLOSEC) 20 MG capsule Take 1 capsule by mouth Daily.      OneTouch Ultra test strip       tamsulosin (FLOMAX) 0.4 MG capsule 24 hr capsule Take 1 capsule by mouth Every Night.      isosorbide mononitrate (IMDUR) 30 MG 24 hr tablet Take 1 tablet by mouth Daily for 30 days. 30 tablet 0    umeclidinium-vilanterol (ANORO ELLIPTA) 62.5-25 MCG/INH aerosol powder  inhaler Inhale 1 puff Daily.       No current facility-administered medications for this visit.       Review of Systems      OBJECTIVE    Ht 165.1 cm (65\")   Wt 101 kg (222 lb)   BMI 36.94 kg/m²     Body mass index is 36.94 kg/m².        Physical Exam  Vitals reviewed.   Constitutional:       Appearance: Normal appearance. He is well-developed.   HENT:      Head: Normocephalic and atraumatic.   Neck:      Trachea: Trachea and phonation normal.   Cardiovascular:      Pulses:           Dorsalis pedis pulses are 1+ on the right side and 1+ on the left side.        Posterior tibial pulses are 1+ on the right side and 1+ on the left side.   Pulmonary:      Effort: Pulmonary " effort is normal. No respiratory distress.   Abdominal:      General: There is no distension.      Palpations: Abdomen is soft.   Feet:      Right foot:      Protective Sensation: 10 sites tested.  10 sites sensed.      Skin integrity: Skin integrity normal.      Toenail Condition: Right toenails are abnormally thick and long. Fungal disease present.     Left foot:      Protective Sensation: 10 sites tested.  10 sites sensed.      Skin integrity: Skin integrity normal.      Toenail Condition: Left toenails are abnormally thick and long. Fungal disease present.  Skin:     General: Skin is warm and dry.   Neurological:      Mental Status: He is alert and oriented to person, place, and time.      GCS: GCS eye subscore is 4. GCS verbal subscore is 5. GCS motor subscore is 6.   Psychiatric:         Speech: Speech normal.         Behavior: Behavior normal. Behavior is cooperative.         Thought Content: Thought content normal.         Judgment: Judgment normal.              Procedures        ASSESSMENT AND PLAN    Diagnoses and all orders for this visit:    1. Type 2 diabetes mellitus with hypoglycemia without coma, with long-term current use of insulin (Primary)    2. Diabetic foot    3. Onychomycosis    4. Pain due to onychomycosis of toenails of both feet    5. Status post amputation of arm, left      Body mass index is 36.94 kg/m².    Recommend follow-up with primary care to discuss BMI greater than 30    - A diabetic foot screening exam was performed and the patient was educated on the foot complications related to diabetes,  preventative foot care and what signs and symptoms to watch for.  Instructed to contact our office if any foot problems develop before next visit.  -Discussed treatments for  painful toenails. Treatment options discussed included nail removal versus debridement. Patient elected for routine nail debridement. An ABN has been signed by the patient.  - Toenails 1-5  bilateral were debrided in  length and thickness with nail nipper and electric  to decrease fungal load and risk of infection.  - All the patients questions were answered.  - RTC 3 months or sooner if needed.            This document has been electronically signed by Archie TINEO FNP-C, ONP-C on June 8, 2023 18:07 CDT

## 2023-12-29 NOTE — CONSULTS
[facility] Medicine Admission      Date of Admission: 12/15/2017      Primary Care Physician: Jaxson Zepeda MD      Chief Complaint: hypoxia    HPI:    72 yr old male admitted after he had a stent placed for a kidney stone.  Following the procedure he was unable to wean off of his oxygen.  Although he does not complain of any shortness of air.  His oxygen saturation went to 87 percent on room air.  And currently he is on 2 L.  He denies any coughing.  He does report that he has on and off trouble breathing especially in the winter months.  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  He uses albuterol inhaler intermittently but he is not on oxygen at home.    Past Medical History:  has a past medical history of Acid reflux; Diabetes; Enlarged prostate; High cholesterol; Hypertension; Kidney stone; and Psoriasis.    Past Surgical History:  has a past surgical history that includes Extracorporeal shock wave lithotripsy; Cholecystectomy open; Amputation (Left); Scrotal surgery; extracorporeal shockwave lithotripsy (eswl), stent insertion/removal (Left, 2/3/2017); and Kidney stone surgery.    Family History: family history is not on file.   Father had coronary artery disease    Social History:  reports that he has never smoked. He has never used smokeless tobacco. He reports that he does not drink alcohol or use illicit drugs.    Allergies: No Known Allergies    Medications: Scheduled Meds:  budesonide 0.5 mg Nebulization BID - RT   ipratropium-albuterol 3 mL Nebulization 4x Daily - RT   metFORMIN 500 mg Oral BID   tamsulosin 0.4 mg Oral Nightly     Continuous Infusions:   PRN Meds:.iopamidol  •  loperamide  •  Morphine **AND** naloxone  •  ondansetron **OR** ondansetron ODT **OR** ondansetron  No current facility-administered medications on file prior to encounter.      Current Outpatient Prescriptions on File Prior to Encounter   Medication Sig Dispense Refill   •  atorvastatin (LIPITOR) 20 MG tablet Take 20 mg by mouth Every Night.     • Exenatide ER 2 MG pen-injector Inject  under the skin 1 (One) Time Per Week.     • glimepiride (AMARYL) 2 MG tablet Take 2 mg by mouth Every Morning Before Breakfast.     • loperamide (IMODIUM) 2 MG capsule Take 2 mg by mouth 2 (Two) Times a Day As Needed for diarrhea.     • metFORMIN (GLUCOPHAGE) 500 MG tablet Take 500 mg by mouth 2 (two) times a day. May take 3 depending on glucose reading         Review of Systems:  Review of Systems   Constitutional: Negative for activity change.   HENT: Positive for congestion.    Respiratory: Negative for chest tightness and shortness of breath.    Cardiovascular: Negative for chest pain.   Gastrointestinal: Negative for constipation and diarrhea.   Genitourinary: Positive for difficulty urinating and dysuria.      Otherwise complete ROS is negative except as mentioned above.    Physical Exam:   Temp:  [96.8 °F (36 °C)-98 °F (36.7 °C)] 97.7 °F (36.5 °C)  Heart Rate:  [63-90] 88  Resp:  [16-24] 20  BP: (118-166)/(67-89) 125/73  Physical Exam   Constitutional: He appears well-developed and well-nourished. No distress.   HENT:   Head: Normocephalic and atraumatic.   Cardiovascular: Normal rate.    Pulmonary/Chest: Effort normal. No respiratory distress. He has no wheezes.   Abdominal: Soft. He exhibits no distension.   Musculoskeletal: Normal range of motion.   Neurological: He is alert. No cranial nerve deficit.   Skin: Skin is warm. He is not diaphoretic.   Psychiatric: He has a normal mood and affect. His behavior is normal. Judgment and thought content normal.   Vitals reviewed.        Results Reviewed:  I have personally reviewed current lab, radiology, and data and agree with results.  Lab Results (last 24 hours)     Procedure Component Value Units Date/Time    POC Glucose Once [[id number]]  (Normal) Collected:  12/15/17 1244    Specimen:  Blood Updated:  12/15/17 1255     Glucose 109 mg/dL        Meter: [id number]: [id number] LUIS GUAN       POC Glucose Once [[id number]]  (Normal) Collected:  12/15/17 1713    Specimen:  Blood Updated:  12/15/17 1725     Glucose 117 mg/dL       RN NotifiedMeter: [id number]: [id number] EJ BRADFORD       CBC & Differential [[id number]] Collected:  12/16/17 0602    Specimen:  Blood Updated:  12/16/17 0639    Narrative:       The following orders were created for panel order CBC & Differential.  Procedure                               Abnormality         Status                     ---------                               -----------         ------                     CBC Auto Differential[[id number]]        Abnormal            Final result                 Please view results for these tests on the individual orders.    CBC Auto Differential [[id number]]  (Abnormal) Collected:  12/16/17 0602    Specimen:  Blood Updated:  12/16/17 0639     WBC 10.38 (H) 10*3/mm3      RBC 4.94 10*6/mm3      Hemoglobin 14.8 g/dL      Hematocrit 45.0 %      MCV 91.1 fL      MCH 30.0 pg      MCHC 32.9 g/dL      RDW 13.7 %      RDW-SD 45.7 (H) fl      MPV 9.1 fL      Platelets 261 10*3/mm3      Neutrophil % 82.4 (H) %      Lymphocyte % 10.8 %      Monocyte % 6.2 %      Eosinophil % 0.1 %      Basophil % 0.2 %      Immature Grans % 0.3 %      Neutrophils, Absolute 8.56 10*3/mm3      Lymphocytes, Absolute 1.12 10*3/mm3      Monocytes, Absolute 0.64 10*3/mm3      Eosinophils, Absolute 0.01 10*3/mm3      Basophils, Absolute 0.02 10*3/mm3      Immature Grans, Absolute 0.03 (H) 10*3/mm3     Basic Metabolic Panel [[id number]]  (Abnormal) Collected:  12/16/17 0602    Specimen:  Blood Updated:  12/16/17 0651     Glucose 170 (H) mg/dL      BUN 17 mg/dL      Creatinine 1.08 mg/dL      Sodium 136 (L) mmol/L      Potassium 4.5 mmol/L      Chloride 98 mmol/L      CO2 27.0 mmol/L      Calcium 8.9 mg/dL      eGFR Non African Amer 68 mL/min/1.73      BUN/Creatinine Ratio 15.7     Anion Gap 11.0  mmol/L         Imaging Results (last 24 hours)     Procedure Component Value Units Date/Time    FL Retrograde Pyelogram In OR [[id number]] Updated:  12/15/17 170            Assessment:    Hospital Problem List     * (Principal)Kidney stone    Overview Signed 12/13/2017  8:42 AM by Prosper Ariza MD     Added automatically from request for surgery [id number]             Kidney stone -  Fellow managing. Currently has skinner catheter    Hypoxia - nebulizer treatments ordered, supplemental oxygen , chest xray , ABG    DM II - metformin          Xu Chandler Galo MD  12/16/17  10:36 AM                 Educational attainment Vocabulary

## 2024-06-13 NOTE — OUTREACH NOTE
Prep Survey    Flowsheet Row Responses   [facility] facility patient discharged from? [facility]   Is LACE score < 7 ? No   Emergency Room discharge w/ pulse ox? No   Eligibility Readm Mgmt   Discharge diagnosis Chest pain, unspecified type   Does the patient have one of the following disease processes/diagnoses(primary or secondary)? Other   Does the patient have Home health ordered? No   Is there a DME ordered? No   Prep survey completed? Yes          REBECCA SUBRAMANIAN - Registered Nurse         To get better and follow your care plan as instructed.

## (undated) DEVICE — PK CYSTO LF 60

## (undated) DEVICE — GLV SURG NEOLON 2G PF LF 6.5 STRL

## (undated) DEVICE — SOL IRR NACL 0.9PCT 3000ML

## (undated) DEVICE — NITINOL WIRE WITH HYDROPHILIC TIP: Brand: SENSOR

## (undated) DEVICE — PATIENT RETURN ELECTRODE, SINGLE-USE, CONTACT QUALITY MONITORING, ADULT, WITH 9FT CORD, FOR PATIENTS WEIGING OVER 33LBS. (15KG): Brand: MEGADYNE

## (undated) DEVICE — SOL PVPI SPRY BETADINE 3OZ

## (undated) DEVICE — IRRIGATOR TOOMEY 70CC

## (undated) DEVICE — SOL IRRG H2O PL/BG 1000ML STRL

## (undated) DEVICE — CATH URETRL OPN/END 5F70CM

## (undated) DEVICE — GLV SURG NEOLON 2G PF LF 7.5 STRL

## (undated) DEVICE — URETERAL ACCESS SHEATH SET: Brand: NAVIGATOR HD

## (undated) DEVICE — SOL IRR H2O BTL 1000ML STRL

## (undated) DEVICE — Device

## (undated) DEVICE — SYR 10ML

## (undated) DEVICE — Device: Brand: DISPOSABLE ELECTROSURGICAL SNARE

## (undated) DEVICE — TRAP SXN POLYP QUICKCATCH LF

## (undated) DEVICE — INTRO CATH SUPRAPUB LAWRENCE 16FORNG

## (undated) DEVICE — SILICONE ELASTOMER COATED LATEX FOLEY CATHETER, 30 CC, 3-WAY, 22 FR (7.3 MM): Brand: DOVER

## (undated) DEVICE — GLV SURG SENSICARE MICRO PF LF 7.5 STRL

## (undated) DEVICE — DRAINBAG,ANTI-REFLUX TOWER,L/F,2000ML,LL: Brand: MEDLINE

## (undated) DEVICE — CONTAINER,SPECIMEN,OR STERILE,4OZ: Brand: MEDLINE

## (undated) DEVICE — GLV SURG SENSICARE GREEN W/ALOE PF LF 6.5 STRL

## (undated) DEVICE — EVAC BLDR UROVAC W ADAPT

## (undated) DEVICE — CATH URETRL OPEN END W/CONNECT 5F 70CM

## (undated) DEVICE — GLV SURG SENSICARE POLYISPRN W/ALOE PF LF 6.5 GRN STRL

## (undated) DEVICE — CATHETER,FOLEY,100%SILICONE,16FR,10ML,LF: Brand: MEDLINE

## (undated) DEVICE — GW PTFE FIX/CORE FLXTIP .038 3X150CM

## (undated) DEVICE — STERILE POLYISOPRENE POWDER-FREE SURGICAL GLOVES WITH EMOLLIENT COATING: Brand: PROTEXIS

## (undated) DEVICE — ADAPT/Y SCPE GATEWAY ADVNTGE

## (undated) DEVICE — ENDOSCOPIC SEAL URO 1 SIZE FITS ALL: Brand: ENDOSCOPIC SEAL